# Patient Record
Sex: MALE | Race: WHITE | Employment: OTHER | URBAN - METROPOLITAN AREA
[De-identification: names, ages, dates, MRNs, and addresses within clinical notes are randomized per-mention and may not be internally consistent; named-entity substitution may affect disease eponyms.]

---

## 2017-02-03 PROBLEM — D40.10: Status: ACTIVE | Noted: 2017-02-03

## 2017-02-10 PROBLEM — C62.91 SEMINOMA OF RIGHT TESTIS, STAGE 1 (HCC): Status: ACTIVE | Noted: 2017-02-10

## 2017-11-26 PROBLEM — I60.9 SAH (SUBARACHNOID HEMORRHAGE) (HCC): Status: ACTIVE | Noted: 2017-11-26

## 2017-11-26 PROBLEM — S06.5XAA SUBDURAL HEMATOMA: Status: ACTIVE | Noted: 2017-11-26

## 2017-11-27 PROBLEM — F10.929 ALCOHOL INTOXICATION (HCC): Status: RESOLVED | Noted: 2017-11-27 | Resolved: 2017-11-27

## 2017-11-27 PROBLEM — J96.00 ACUTE RESPIRATORY FAILURE (HCC): Status: ACTIVE | Noted: 2017-11-27

## 2017-11-27 PROBLEM — F10.929 ALCOHOL INTOXICATION (HCC): Status: ACTIVE | Noted: 2017-11-27

## 2017-12-13 PROBLEM — I26.99 PULMONARY EMBOLUS (HCC): Status: ACTIVE | Noted: 2017-12-13

## 2018-01-08 PROBLEM — I95.9 HYPOTENSION: Status: ACTIVE | Noted: 2018-01-08

## 2018-01-08 PROBLEM — R13.10 DYSPHAGIA: Status: ACTIVE | Noted: 2018-01-08

## 2018-01-08 PROBLEM — J96.10 CHRONIC RESPIRATORY FAILURE (HCC): Status: ACTIVE | Noted: 2018-01-08

## 2018-01-08 PROBLEM — Z86.718 HISTORY OF DVT (DEEP VEIN THROMBOSIS): Status: ACTIVE | Noted: 2018-01-08

## 2018-01-08 PROBLEM — Z98.890 HISTORY OF CRANIOTOMY: Status: ACTIVE | Noted: 2018-01-08

## 2018-01-08 PROBLEM — G81.90 HEMIPLEGIA (HCC): Status: ACTIVE | Noted: 2018-01-08

## 2018-03-29 DIAGNOSIS — C62.91 SEMINOMA OF RIGHT TESTIS, STAGE 1 (HCC): Primary | ICD-10-CM

## 2018-04-05 ENCOUNTER — HOSPITAL ENCOUNTER (OUTPATIENT)
Dept: CT IMAGING | Age: 43
Discharge: HOME | End: 2018-04-05
Payer: COMMERCIAL

## 2018-04-05 DIAGNOSIS — C62.91 SEMINOMA OF RIGHT TESTIS, STAGE 1 (HCC): ICD-10-CM

## 2018-04-05 PROCEDURE — 74177 CT ABD & PELVIS W/CONTRAST: CPT

## 2018-04-05 PROCEDURE — 6360000004 HC RX CONTRAST MEDICATION: Performed by: RADIOLOGY

## 2018-04-05 RX ADMIN — IOPAMIDOL 80 ML: 755 INJECTION, SOLUTION INTRAVENOUS at 07:49

## 2018-04-09 ENCOUNTER — HOSPITAL ENCOUNTER (OUTPATIENT)
Dept: INFUSION THERAPY | Age: 43
Discharge: HOME | End: 2018-04-09
Payer: COMMERCIAL

## 2018-04-09 ENCOUNTER — TELEPHONE (OUTPATIENT)
Dept: INFUSION THERAPY | Age: 43
End: 2018-04-09

## 2018-04-09 ENCOUNTER — TELEPHONE (OUTPATIENT)
Dept: ONCOLOGY | Age: 43
End: 2018-04-09

## 2018-04-09 ENCOUNTER — OFFICE VISIT (OUTPATIENT)
Dept: ONCOLOGY | Age: 43
End: 2018-04-09
Payer: COMMERCIAL

## 2018-04-09 VITALS
HEART RATE: 62 BPM | WEIGHT: 197 LBS | RESPIRATION RATE: 20 BRPM | HEIGHT: 71 IN | BODY MASS INDEX: 27.58 KG/M2 | SYSTOLIC BLOOD PRESSURE: 95 MMHG | TEMPERATURE: 97.6 F | DIASTOLIC BLOOD PRESSURE: 67 MMHG

## 2018-04-09 DIAGNOSIS — D40.11 NEOPLASM OF UNCERTAIN BEHAVIOR OF RIGHT TESTIS: ICD-10-CM

## 2018-04-09 DIAGNOSIS — C62.91 SEMINOMA OF RIGHT TESTIS, STAGE 1 (HCC): ICD-10-CM

## 2018-04-09 DIAGNOSIS — D40.11 NEOPLASM OF UNCERTAIN BEHAVIOR OF RIGHT TESTIS: Primary | ICD-10-CM

## 2018-04-09 DIAGNOSIS — C62.91 SEMINOMA OF RIGHT TESTIS, STAGE 1 (HCC): Primary | ICD-10-CM

## 2018-04-09 LAB
ALBUMIN SERPL-MCNC: 4.1 G/DL (ref 3.5–5.2)
ALP BLD-CCNC: 82 U/L (ref 40–129)
ALT SERPL-CCNC: 13 U/L (ref 0–40)
ANION GAP SERPL CALCULATED.3IONS-SCNC: 13 MMOL/L (ref 7–16)
AST SERPL-CCNC: 11 U/L (ref 0–39)
BASOPHILS ABSOLUTE: 0.07 E9/L (ref 0–0.2)
BASOPHILS RELATIVE PERCENT: 1 % (ref 0–2)
BILIRUB SERPL-MCNC: 0.9 MG/DL (ref 0–1.2)
BUN BLDV-MCNC: 11 MG/DL (ref 6–20)
CALCIUM SERPL-MCNC: 9.5 MG/DL (ref 8.6–10.2)
CHLORIDE BLD-SCNC: 104 MMOL/L (ref 98–107)
CO2: 22 MMOL/L (ref 22–29)
CREAT SERPL-MCNC: 0.8 MG/DL (ref 0.7–1.2)
EOSINOPHILS ABSOLUTE: 0.12 E9/L (ref 0.05–0.5)
EOSINOPHILS RELATIVE PERCENT: 1.8 % (ref 0–6)
GFR AFRICAN AMERICAN: >60
GFR NON-AFRICAN AMERICAN: >60 ML/MIN/1.73
GLUCOSE BLD-MCNC: 127 MG/DL (ref 74–109)
HCT VFR BLD CALC: 44.7 % (ref 37–54)
HEMOGLOBIN: 14.9 G/DL (ref 12.5–16.5)
IMMATURE GRANULOCYTES #: 0.01 E9/L
IMMATURE GRANULOCYTES %: 0.1 % (ref 0–5)
LYMPHOCYTES ABSOLUTE: 1.86 E9/L (ref 1.5–4)
LYMPHOCYTES RELATIVE PERCENT: 27.9 % (ref 20–42)
MCH RBC QN AUTO: 27 PG (ref 26–35)
MCHC RBC AUTO-ENTMCNC: 33.3 % (ref 32–34.5)
MCV RBC AUTO: 81.1 FL (ref 80–99.9)
MONOCYTES ABSOLUTE: 0.51 E9/L (ref 0.1–0.95)
MONOCYTES RELATIVE PERCENT: 7.6 % (ref 2–12)
NEUTROPHILS ABSOLUTE: 4.1 E9/L (ref 1.8–7.3)
NEUTROPHILS RELATIVE PERCENT: 61.6 % (ref 43–80)
PDW BLD-RTO: 15.4 FL (ref 11.5–15)
PLATELET # BLD: 323 E9/L (ref 130–450)
PMV BLD AUTO: 8.9 FL (ref 7–12)
POTASSIUM SERPL-SCNC: 4.4 MMOL/L (ref 3.5–5)
RBC # BLD: 5.51 E12/L (ref 3.8–5.8)
SODIUM BLD-SCNC: 139 MMOL/L (ref 132–146)
TOTAL PROTEIN: 7.1 G/DL (ref 6.4–8.3)
TSH SERPL DL<=0.05 MIU/L-ACNC: 3.95 UIU/ML (ref 0.27–4.2)
WBC # BLD: 6.7 E9/L (ref 4.5–11.5)

## 2018-04-09 PROCEDURE — 80053 COMPREHEN METABOLIC PANEL: CPT

## 2018-04-09 PROCEDURE — 84443 ASSAY THYROID STIM HORMONE: CPT

## 2018-04-09 PROCEDURE — 82105 ALPHA-FETOPROTEIN SERUM: CPT

## 2018-04-09 PROCEDURE — 36415 COLL VENOUS BLD VENIPUNCTURE: CPT

## 2018-04-09 PROCEDURE — 84702 CHORIONIC GONADOTROPIN TEST: CPT

## 2018-04-09 PROCEDURE — 99212 OFFICE O/P EST SF 10 MIN: CPT

## 2018-04-09 PROCEDURE — 99213 OFFICE O/P EST LOW 20 MIN: CPT

## 2018-04-09 PROCEDURE — 85025 COMPLETE CBC W/AUTO DIFF WBC: CPT

## 2018-04-09 RX ORDER — LEVETIRACETAM 500 MG/1
500 TABLET ORAL DAILY
Refills: 5 | COMMUNITY
Start: 2018-03-20 | End: 2018-05-07 | Stop reason: ALTCHOICE

## 2018-04-09 RX ORDER — TRAZODONE HYDROCHLORIDE 100 MG/1
100 TABLET ORAL DAILY
Refills: 3 | COMMUNITY
Start: 2018-03-12 | End: 2018-04-09 | Stop reason: SDUPTHER

## 2018-04-09 NOTE — TELEPHONE ENCOUNTER
Called patient, spoke to spouse, to give appt information for CT chest scan. Patient is to arrive at [address] [place] [address] registration desk on 4/16/18 at 8:45 am, then will report to radiology afterward. Patient is to be NPO (nothing to eat or drink) beginning at 6:15 am.  Wife understood.

## 2018-04-10 DIAGNOSIS — C78.6 MALIGNANT NEOPLASM METASTATIC TO RETROPERITONEUM (HCC): ICD-10-CM

## 2018-04-10 DIAGNOSIS — C62.91 SEMINOMA OF RIGHT TESTIS, STAGE 1 (HCC): Primary | ICD-10-CM

## 2018-04-10 RX ORDER — PROCHLORPERAZINE MALEATE 10 MG
10 TABLET ORAL EVERY 6 HOURS PRN
Qty: 60 TABLET | Refills: 1 | Status: SHIPPED | OUTPATIENT
Start: 2018-04-10 | End: 2018-05-09

## 2018-04-10 RX ORDER — ONDANSETRON HYDROCHLORIDE 8 MG/1
8 TABLET, FILM COATED ORAL EVERY 12 HOURS PRN
Qty: 30 TABLET | Refills: 1 | Status: SHIPPED | OUTPATIENT
Start: 2018-04-10 | End: 2018-05-09

## 2018-04-11 LAB
AFP-TUMOR MARKER: 3 NG/ML (ref 0–9)
HCG TUMOR MARKER: 26 IU/L (ref 0–3)

## 2018-04-12 ENCOUNTER — HOSPITAL ENCOUNTER (OUTPATIENT)
Dept: INFUSION THERAPY | Age: 43
Discharge: HOME | End: 2018-04-12
Payer: COMMERCIAL

## 2018-04-12 PROCEDURE — 99215 OFFICE O/P EST HI 40 MIN: CPT

## 2018-04-12 NOTE — PROGRESS NOTES
Spoke with patient and family about chemotherapy  (Cisplatin/Etoposide). We went over the protocol to be used, what to expect as far as side effects, and when to call with problems. This was intended to reinforce the education done by Dr. Dominik Hogue . Patient had the opportunity to ask questions with all questions being answered to their satisfaction. I gave medication and dietary handouts to the patient to take home and told patient to call if there are any questions once they had a chance to absorb the information. The patient expresses understanding and acceptance of instructions.          Kasey Todd RP 4/12/2018 3:22 PM

## 2018-04-12 NOTE — PROGRESS NOTES
CHEMOTHERAPY TEACHING    Chemotherapy teaching performed today for patient and wife and mother. The teaching included:    1. Rationale for chemotherapy    2. Actions of chemotherapy    3. Actions of pre and/or post chemotherapy medications    4. Administration plan: approximate length of treatment and interval between doses. A.  Chemotherapeutic Agents:  CISplatin and Etoposide    5. Management of side effects:     A. Nausea and vomiting:  · Eat light the day of treatment  · Dietary alterations: no greasy or spicy foods. Stay away from favorites. B.  Alopecia:  · Obtain hairpiece prior to hair loss  · Alternatives to wigs  C. Bone Marrow Depression:  · Frequent CBC - Nick count (lab time prior to appointment with doctor)   D.  WBC:  · Function and recovery  · Precautionary measures when low (temperatures BID - avoid ill people/crowds)  E. Hemoglobin:  · Function and recovery  · Signs/symptoms if low  · Possibility of transfusion  F.  Platelets:  · Function and recovery  · Signs/symptoms if low    6. Mental status changes post chemotherapy:  A. Patient will need a  after 1st treatment (pre-meds)  B. Encourage family to stay with patient 24 hours post treatment. 7.  Sexuality and Reproduction:   A. Teratogenic effects of chemotherapy (prevent pregnancy during treatment                     and at least 2 months post therapy). B. Sperm banking (young males). 8.  Encourage patient to call clinic with questions. 9.  Copy of home going instructions         After answering the patient's questions, the patient received \"Chemotherapy and You\" booklet. A thermometer was given to the patient. Teaching lasted approx 80 minutes.       Diamond Delcid  4/12/2018

## 2018-04-13 ENCOUNTER — HOSPITAL ENCOUNTER (OUTPATIENT)
Dept: CT IMAGING | Age: 43
Discharge: HOME | End: 2018-04-13
Payer: COMMERCIAL

## 2018-04-13 DIAGNOSIS — C62.91 SEMINOMA OF RIGHT TESTIS, STAGE 1 (HCC): ICD-10-CM

## 2018-04-13 PROCEDURE — 6360000004 HC RX CONTRAST MEDICATION: Performed by: RADIOLOGY

## 2018-04-13 PROCEDURE — 71260 CT THORAX DX C+: CPT

## 2018-04-13 RX ADMIN — IOPAMIDOL 80 ML: 755 INJECTION, SOLUTION INTRAVENOUS at 09:09

## 2018-04-16 ENCOUNTER — HOSPITAL ENCOUNTER (OUTPATIENT)
Dept: INFUSION THERAPY | Age: 43
Discharge: HOME | End: 2018-04-16
Payer: COMMERCIAL

## 2018-04-16 ENCOUNTER — OFFICE VISIT (OUTPATIENT)
Dept: ONCOLOGY | Age: 43
End: 2018-04-16
Payer: COMMERCIAL

## 2018-04-16 VITALS — RESPIRATION RATE: 20 BRPM | DIASTOLIC BLOOD PRESSURE: 55 MMHG | SYSTOLIC BLOOD PRESSURE: 114 MMHG | HEART RATE: 70 BPM

## 2018-04-16 VITALS
HEIGHT: 71 IN | HEART RATE: 61 BPM | RESPIRATION RATE: 20 BRPM | SYSTOLIC BLOOD PRESSURE: 97 MMHG | TEMPERATURE: 97.9 F | WEIGHT: 201.9 LBS | BODY MASS INDEX: 28.27 KG/M2 | DIASTOLIC BLOOD PRESSURE: 64 MMHG

## 2018-04-16 DIAGNOSIS — C62.91 SEMINOMA OF RIGHT TESTIS, STAGE 1 (HCC): ICD-10-CM

## 2018-04-16 DIAGNOSIS — C62.91 SEMINOMA OF RIGHT TESTIS, STAGE 1 (HCC): Primary | ICD-10-CM

## 2018-04-16 DIAGNOSIS — D40.11 NEOPLASM OF UNCERTAIN BEHAVIOR OF RIGHT TESTIS: ICD-10-CM

## 2018-04-16 LAB
ALBUMIN SERPL-MCNC: 4.1 G/DL (ref 3.5–5.2)
ALP BLD-CCNC: 76 U/L (ref 40–129)
ALT SERPL-CCNC: 11 U/L (ref 0–40)
ANION GAP SERPL CALCULATED.3IONS-SCNC: 11 MMOL/L (ref 7–16)
AST SERPL-CCNC: 11 U/L (ref 0–39)
BASOPHILS ABSOLUTE: 0.1 E9/L (ref 0–0.2)
BASOPHILS RELATIVE PERCENT: 1.4 % (ref 0–2)
BILIRUB SERPL-MCNC: 0.5 MG/DL (ref 0–1.2)
BUN BLDV-MCNC: 12 MG/DL (ref 6–20)
CALCIUM SERPL-MCNC: 9.1 MG/DL (ref 8.6–10.2)
CHLORIDE BLD-SCNC: 105 MMOL/L (ref 98–107)
CO2: 22 MMOL/L (ref 22–29)
CREAT SERPL-MCNC: 1 MG/DL (ref 0.7–1.2)
EOSINOPHILS ABSOLUTE: 0.3 E9/L (ref 0.05–0.5)
EOSINOPHILS RELATIVE PERCENT: 4.3 % (ref 0–6)
GFR AFRICAN AMERICAN: >60
GFR NON-AFRICAN AMERICAN: >60 ML/MIN/1.73
GLUCOSE BLD-MCNC: 104 MG/DL (ref 74–109)
HCT VFR BLD CALC: 44.1 % (ref 37–54)
HEMOGLOBIN: 14.1 G/DL (ref 12.5–16.5)
IMMATURE GRANULOCYTES #: 0.02 E9/L
IMMATURE GRANULOCYTES %: 0.3 % (ref 0–5)
LACTATE DEHYDROGENASE: 195 U/L (ref 135–225)
LYMPHOCYTES ABSOLUTE: 2.16 E9/L (ref 1.5–4)
LYMPHOCYTES RELATIVE PERCENT: 31.2 % (ref 20–42)
MAGNESIUM: 2.1 MG/DL (ref 1.6–2.6)
MCH RBC QN AUTO: 26.7 PG (ref 26–35)
MCHC RBC AUTO-ENTMCNC: 32 % (ref 32–34.5)
MCV RBC AUTO: 83.5 FL (ref 80–99.9)
MONOCYTES ABSOLUTE: 0.66 E9/L (ref 0.1–0.95)
MONOCYTES RELATIVE PERCENT: 9.5 % (ref 2–12)
NEUTROPHILS ABSOLUTE: 3.69 E9/L (ref 1.8–7.3)
NEUTROPHILS RELATIVE PERCENT: 53.3 % (ref 43–80)
PDW BLD-RTO: 15.8 FL (ref 11.5–15)
PLATELET # BLD: 305 E9/L (ref 130–450)
PMV BLD AUTO: 8.5 FL (ref 7–12)
POTASSIUM SERPL-SCNC: 4.4 MMOL/L (ref 3.5–5)
RBC # BLD: 5.28 E12/L (ref 3.8–5.8)
SODIUM BLD-SCNC: 138 MMOL/L (ref 132–146)
TOTAL PROTEIN: 7 G/DL (ref 6.4–8.3)
WBC # BLD: 6.9 E9/L (ref 4.5–11.5)

## 2018-04-16 PROCEDURE — 96415 CHEMO IV INFUSION ADDL HR: CPT

## 2018-04-16 PROCEDURE — 96361 HYDRATE IV INFUSION ADD-ON: CPT

## 2018-04-16 PROCEDURE — 85025 COMPLETE CBC W/AUTO DIFF WBC: CPT

## 2018-04-16 PROCEDURE — 83735 ASSAY OF MAGNESIUM: CPT

## 2018-04-16 PROCEDURE — 96375 TX/PRO/DX INJ NEW DRUG ADDON: CPT

## 2018-04-16 PROCEDURE — 96417 CHEMO IV INFUS EACH ADDL SEQ: CPT

## 2018-04-16 PROCEDURE — 96413 CHEMO IV INFUSION 1 HR: CPT

## 2018-04-16 PROCEDURE — 80053 COMPREHEN METABOLIC PANEL: CPT

## 2018-04-16 PROCEDURE — 83615 LACTATE (LD) (LDH) ENZYME: CPT

## 2018-04-16 PROCEDURE — 2580000003 HC RX 258: Performed by: INTERNAL MEDICINE

## 2018-04-16 PROCEDURE — 6360000002 HC RX W HCPCS: Performed by: INTERNAL MEDICINE

## 2018-04-16 PROCEDURE — 36415 COLL VENOUS BLD VENIPUNCTURE: CPT

## 2018-04-16 RX ORDER — SODIUM CHLORIDE 9 MG/ML
INJECTION, SOLUTION INTRAVENOUS ONCE
Status: CANCELLED | OUTPATIENT
Start: 2018-04-18 | End: 2018-04-18

## 2018-04-16 RX ORDER — SODIUM CHLORIDE 9 MG/ML
INJECTION, SOLUTION INTRAVENOUS CONTINUOUS
Status: CANCELLED | OUTPATIENT
Start: 2018-04-20

## 2018-04-16 RX ORDER — SODIUM CHLORIDE 9 MG/ML
INJECTION, SOLUTION INTRAVENOUS CONTINUOUS
Status: CANCELLED | OUTPATIENT
Start: 2018-04-19

## 2018-04-16 RX ORDER — 0.9 % SODIUM CHLORIDE 0.9 %
10 VIAL (ML) INJECTION ONCE
Status: CANCELLED | OUTPATIENT
Start: 2018-04-20 | End: 2018-04-20

## 2018-04-16 RX ORDER — HEPARIN SODIUM (PORCINE) LOCK FLUSH IV SOLN 100 UNIT/ML 100 UNIT/ML
500 SOLUTION INTRAVENOUS PRN
Status: CANCELLED | OUTPATIENT
Start: 2018-04-20

## 2018-04-16 RX ORDER — 0.9 % SODIUM CHLORIDE 0.9 %
500 INTRAVENOUS SOLUTION INTRAVENOUS ONCE
Status: CANCELLED
Start: 2018-04-19 | End: 2018-04-19

## 2018-04-16 RX ORDER — DIPHENHYDRAMINE HYDROCHLORIDE 50 MG/ML
50 INJECTION INTRAMUSCULAR; INTRAVENOUS ONCE
Status: CANCELLED | OUTPATIENT
Start: 2018-04-20 | End: 2018-04-20

## 2018-04-16 RX ORDER — SODIUM CHLORIDE 9 MG/ML
INJECTION, SOLUTION INTRAVENOUS CONTINUOUS
Status: CANCELLED | OUTPATIENT
Start: 2018-04-17

## 2018-04-16 RX ORDER — METHYLPREDNISOLONE SODIUM SUCCINATE 125 MG/2ML
125 INJECTION, POWDER, LYOPHILIZED, FOR SOLUTION INTRAMUSCULAR; INTRAVENOUS ONCE
Status: CANCELLED | OUTPATIENT
Start: 2018-04-20 | End: 2018-04-20

## 2018-04-16 RX ORDER — DEXAMETHASONE SODIUM PHOSPHATE 10 MG/ML
10 INJECTION, SOLUTION INTRAMUSCULAR; INTRAVENOUS ONCE
Status: COMPLETED | OUTPATIENT
Start: 2018-04-16 | End: 2018-04-16

## 2018-04-16 RX ORDER — HEPARIN SODIUM (PORCINE) LOCK FLUSH IV SOLN 100 UNIT/ML 100 UNIT/ML
500 SOLUTION INTRAVENOUS PRN
Status: CANCELLED | OUTPATIENT
Start: 2018-04-16

## 2018-04-16 RX ORDER — PALONOSETRON 0.05 MG/ML
0.25 INJECTION, SOLUTION INTRAVENOUS ONCE
Status: CANCELLED | OUTPATIENT
Start: 2018-04-16

## 2018-04-16 RX ORDER — SODIUM CHLORIDE 9 MG/ML
INJECTION, SOLUTION INTRAVENOUS ONCE
Status: COMPLETED | OUTPATIENT
Start: 2018-04-16 | End: 2018-04-16

## 2018-04-16 RX ORDER — 0.9 % SODIUM CHLORIDE 0.9 %
500 INTRAVENOUS SOLUTION INTRAVENOUS ONCE
Status: CANCELLED
Start: 2018-04-20 | End: 2018-04-20

## 2018-04-16 RX ORDER — SODIUM CHLORIDE 0.9 % (FLUSH) 0.9 %
10 SYRINGE (ML) INJECTION PRN
Status: CANCELLED | OUTPATIENT
Start: 2018-04-19

## 2018-04-16 RX ORDER — SODIUM CHLORIDE 9 MG/ML
INJECTION, SOLUTION INTRAVENOUS ONCE
Status: CANCELLED | OUTPATIENT
Start: 2018-04-20 | End: 2018-04-20

## 2018-04-16 RX ORDER — SODIUM CHLORIDE 0.9 % (FLUSH) 0.9 %
10 SYRINGE (ML) INJECTION PRN
Status: CANCELLED | OUTPATIENT
Start: 2018-04-16

## 2018-04-16 RX ORDER — HEPARIN SODIUM (PORCINE) LOCK FLUSH IV SOLN 100 UNIT/ML 100 UNIT/ML
500 SOLUTION INTRAVENOUS PRN
Status: CANCELLED | OUTPATIENT
Start: 2018-04-19

## 2018-04-16 RX ORDER — 0.9 % SODIUM CHLORIDE 0.9 %
10 VIAL (ML) INJECTION ONCE
Status: CANCELLED | OUTPATIENT
Start: 2018-04-19 | End: 2018-04-19

## 2018-04-16 RX ORDER — 0.9 % SODIUM CHLORIDE 0.9 %
10 VIAL (ML) INJECTION ONCE
Status: CANCELLED | OUTPATIENT
Start: 2018-04-18 | End: 2018-04-18

## 2018-04-16 RX ORDER — SODIUM CHLORIDE 9 MG/ML
INJECTION, SOLUTION INTRAVENOUS ONCE
Status: CANCELLED | OUTPATIENT
Start: 2018-04-16 | End: 2018-04-16

## 2018-04-16 RX ORDER — SODIUM CHLORIDE 9 MG/ML
INJECTION, SOLUTION INTRAVENOUS CONTINUOUS
Status: CANCELLED | OUTPATIENT
Start: 2018-04-18

## 2018-04-16 RX ORDER — SODIUM CHLORIDE 9 MG/ML
INJECTION, SOLUTION INTRAVENOUS ONCE
Status: CANCELLED | OUTPATIENT
Start: 2018-04-17 | End: 2018-04-17

## 2018-04-16 RX ORDER — METHYLPREDNISOLONE SODIUM SUCCINATE 125 MG/2ML
125 INJECTION, POWDER, LYOPHILIZED, FOR SOLUTION INTRAMUSCULAR; INTRAVENOUS ONCE
Status: CANCELLED | OUTPATIENT
Start: 2018-04-16 | End: 2018-04-16

## 2018-04-16 RX ORDER — 0.9 % SODIUM CHLORIDE 0.9 %
10 VIAL (ML) INJECTION ONCE
Status: CANCELLED | OUTPATIENT
Start: 2018-04-17 | End: 2018-04-17

## 2018-04-16 RX ORDER — HEPARIN SODIUM (PORCINE) LOCK FLUSH IV SOLN 100 UNIT/ML 100 UNIT/ML
500 SOLUTION INTRAVENOUS PRN
Status: CANCELLED | OUTPATIENT
Start: 2018-04-17

## 2018-04-16 RX ORDER — 0.9 % SODIUM CHLORIDE 0.9 %
500 INTRAVENOUS SOLUTION INTRAVENOUS ONCE
Status: CANCELLED
Start: 2018-04-18 | End: 2018-04-18

## 2018-04-16 RX ORDER — 0.9 % SODIUM CHLORIDE 0.9 %
500 INTRAVENOUS SOLUTION INTRAVENOUS ONCE
Status: CANCELLED
Start: 2018-04-17 | End: 2018-04-17

## 2018-04-16 RX ORDER — DIPHENHYDRAMINE HYDROCHLORIDE 50 MG/ML
50 INJECTION INTRAMUSCULAR; INTRAVENOUS ONCE
Status: CANCELLED | OUTPATIENT
Start: 2018-04-17 | End: 2018-04-17

## 2018-04-16 RX ORDER — METHYLPREDNISOLONE SODIUM SUCCINATE 125 MG/2ML
125 INJECTION, POWDER, LYOPHILIZED, FOR SOLUTION INTRAMUSCULAR; INTRAVENOUS ONCE
Status: CANCELLED | OUTPATIENT
Start: 2018-04-18 | End: 2018-04-18

## 2018-04-16 RX ORDER — HEPARIN SODIUM (PORCINE) LOCK FLUSH IV SOLN 100 UNIT/ML 100 UNIT/ML
500 SOLUTION INTRAVENOUS PRN
Status: CANCELLED | OUTPATIENT
Start: 2018-04-18

## 2018-04-16 RX ORDER — PALONOSETRON 0.05 MG/ML
0.25 INJECTION, SOLUTION INTRAVENOUS ONCE
Status: CANCELLED
Start: 2018-04-19 | End: 2018-04-19

## 2018-04-16 RX ORDER — DIPHENHYDRAMINE HYDROCHLORIDE 50 MG/ML
50 INJECTION INTRAMUSCULAR; INTRAVENOUS ONCE
Status: CANCELLED | OUTPATIENT
Start: 2018-04-16 | End: 2018-04-16

## 2018-04-16 RX ORDER — SODIUM CHLORIDE 0.9 % (FLUSH) 0.9 %
10 SYRINGE (ML) INJECTION PRN
Status: CANCELLED | OUTPATIENT
Start: 2018-04-20

## 2018-04-16 RX ORDER — DIPHENHYDRAMINE HYDROCHLORIDE 50 MG/ML
50 INJECTION INTRAMUSCULAR; INTRAVENOUS ONCE
Status: CANCELLED | OUTPATIENT
Start: 2018-04-18 | End: 2018-04-18

## 2018-04-16 RX ORDER — METHYLPREDNISOLONE SODIUM SUCCINATE 125 MG/2ML
125 INJECTION, POWDER, LYOPHILIZED, FOR SOLUTION INTRAMUSCULAR; INTRAVENOUS ONCE
Status: CANCELLED | OUTPATIENT
Start: 2018-04-17 | End: 2018-04-17

## 2018-04-16 RX ORDER — 0.9 % SODIUM CHLORIDE 0.9 %
10 VIAL (ML) INJECTION ONCE
Status: CANCELLED | OUTPATIENT
Start: 2018-04-16 | End: 2018-04-16

## 2018-04-16 RX ORDER — 0.9 % SODIUM CHLORIDE 0.9 %
500 INTRAVENOUS SOLUTION INTRAVENOUS ONCE
Status: CANCELLED
Start: 2018-04-16 | End: 2018-04-16

## 2018-04-16 RX ORDER — SODIUM CHLORIDE 0.9 % (FLUSH) 0.9 %
10 SYRINGE (ML) INJECTION PRN
Status: CANCELLED | OUTPATIENT
Start: 2018-04-17

## 2018-04-16 RX ORDER — SODIUM CHLORIDE 9 MG/ML
INJECTION, SOLUTION INTRAVENOUS ONCE
Status: CANCELLED | OUTPATIENT
Start: 2018-04-19 | End: 2018-04-19

## 2018-04-16 RX ORDER — 0.9 % SODIUM CHLORIDE 0.9 %
500 INTRAVENOUS SOLUTION INTRAVENOUS ONCE
Status: COMPLETED | OUTPATIENT
Start: 2018-04-16 | End: 2018-04-16

## 2018-04-16 RX ORDER — METHYLPREDNISOLONE SODIUM SUCCINATE 125 MG/2ML
125 INJECTION, POWDER, LYOPHILIZED, FOR SOLUTION INTRAMUSCULAR; INTRAVENOUS ONCE
Status: CANCELLED | OUTPATIENT
Start: 2018-04-19 | End: 2018-04-19

## 2018-04-16 RX ORDER — SODIUM CHLORIDE 0.9 % (FLUSH) 0.9 %
10 SYRINGE (ML) INJECTION PRN
Status: CANCELLED | OUTPATIENT
Start: 2018-04-18

## 2018-04-16 RX ORDER — SODIUM CHLORIDE 9 MG/ML
INJECTION, SOLUTION INTRAVENOUS CONTINUOUS
Status: CANCELLED | OUTPATIENT
Start: 2018-04-16

## 2018-04-16 RX ORDER — PALONOSETRON 0.05 MG/ML
0.25 INJECTION, SOLUTION INTRAVENOUS ONCE
Status: COMPLETED | OUTPATIENT
Start: 2018-04-16 | End: 2018-04-16

## 2018-04-16 RX ORDER — DIPHENHYDRAMINE HYDROCHLORIDE 50 MG/ML
50 INJECTION INTRAMUSCULAR; INTRAVENOUS ONCE
Status: CANCELLED | OUTPATIENT
Start: 2018-04-19 | End: 2018-04-19

## 2018-04-16 RX ADMIN — SODIUM CHLORIDE 500 ML: 9 INJECTION, SOLUTION INTRAVENOUS at 09:33

## 2018-04-16 RX ADMIN — SODIUM CHLORIDE: 9 INJECTION, SOLUTION INTRAVENOUS at 10:54

## 2018-04-16 RX ADMIN — DEXAMETHASONE SODIUM PHOSPHATE 10 MG: 10 INJECTION, SOLUTION INTRAMUSCULAR; INTRAVENOUS at 09:36

## 2018-04-16 RX ADMIN — POTASSIUM CHLORIDE: 2 INJECTION, SOLUTION, CONCENTRATE INTRAVENOUS at 11:33

## 2018-04-16 RX ADMIN — SODIUM CHLORIDE 150 MG: 9 INJECTION, SOLUTION INTRAVENOUS at 10:55

## 2018-04-16 RX ADMIN — ETOPOSIDE 200 MG: 20 INJECTION INTRAVENOUS at 13:45

## 2018-04-16 RX ADMIN — PALONOSETRON HYDROCHLORIDE 0.25 MG: 0.25 INJECTION INTRAVENOUS at 09:34

## 2018-04-17 ENCOUNTER — HOSPITAL ENCOUNTER (OUTPATIENT)
Dept: INFUSION THERAPY | Age: 43
Discharge: HOME | End: 2018-04-17
Payer: COMMERCIAL

## 2018-04-17 VITALS
HEART RATE: 56 BPM | SYSTOLIC BLOOD PRESSURE: 114 MMHG | DIASTOLIC BLOOD PRESSURE: 72 MMHG | TEMPERATURE: 98.9 F | RESPIRATION RATE: 20 BRPM

## 2018-04-17 DIAGNOSIS — D40.11 NEOPLASM OF UNCERTAIN BEHAVIOR OF RIGHT TESTIS: ICD-10-CM

## 2018-04-17 PROCEDURE — 96361 HYDRATE IV INFUSION ADD-ON: CPT

## 2018-04-17 PROCEDURE — 96375 TX/PRO/DX INJ NEW DRUG ADDON: CPT

## 2018-04-17 PROCEDURE — 96417 CHEMO IV INFUS EACH ADDL SEQ: CPT

## 2018-04-17 PROCEDURE — 96413 CHEMO IV INFUSION 1 HR: CPT

## 2018-04-17 PROCEDURE — 2580000003 HC RX 258: Performed by: INTERNAL MEDICINE

## 2018-04-17 PROCEDURE — 6360000002 HC RX W HCPCS: Performed by: INTERNAL MEDICINE

## 2018-04-17 RX ORDER — DEXAMETHASONE SODIUM PHOSPHATE 10 MG/ML
8 INJECTION, SOLUTION INTRAMUSCULAR; INTRAVENOUS ONCE
Status: COMPLETED | OUTPATIENT
Start: 2018-04-17 | End: 2018-04-17

## 2018-04-17 RX ORDER — 0.9 % SODIUM CHLORIDE 0.9 %
500 INTRAVENOUS SOLUTION INTRAVENOUS ONCE
Status: COMPLETED | OUTPATIENT
Start: 2018-04-17 | End: 2018-04-17

## 2018-04-17 RX ORDER — SODIUM CHLORIDE 9 MG/ML
INJECTION, SOLUTION INTRAVENOUS ONCE
Status: COMPLETED | OUTPATIENT
Start: 2018-04-17 | End: 2018-04-17

## 2018-04-17 RX ADMIN — CISPLATIN: 100 INJECTION, SOLUTION INTRAVENOUS at 09:54

## 2018-04-17 RX ADMIN — SODIUM CHLORIDE: 9 INJECTION, SOLUTION INTRAVENOUS at 09:53

## 2018-04-17 RX ADMIN — ETOPOSIDE 200 MG: 20 INJECTION INTRAVENOUS at 11:04

## 2018-04-17 RX ADMIN — SODIUM CHLORIDE 500 ML: 9 INJECTION, SOLUTION INTRAVENOUS at 08:35

## 2018-04-17 RX ADMIN — DEXAMETHASONE SODIUM PHOSPHATE 8 MG: 10 INJECTION, SOLUTION INTRAMUSCULAR; INTRAVENOUS at 08:39

## 2018-04-18 ENCOUNTER — HOSPITAL ENCOUNTER (OUTPATIENT)
Dept: INFUSION THERAPY | Age: 43
Discharge: HOME | End: 2018-04-18
Payer: COMMERCIAL

## 2018-04-18 VITALS — SYSTOLIC BLOOD PRESSURE: 121 MMHG | HEART RATE: 50 BPM | DIASTOLIC BLOOD PRESSURE: 71 MMHG | RESPIRATION RATE: 18 BRPM

## 2018-04-18 DIAGNOSIS — D40.11 NEOPLASM OF UNCERTAIN BEHAVIOR OF RIGHT TESTIS: ICD-10-CM

## 2018-04-18 PROCEDURE — 96375 TX/PRO/DX INJ NEW DRUG ADDON: CPT

## 2018-04-18 PROCEDURE — 96360 HYDRATION IV INFUSION INIT: CPT

## 2018-04-18 PROCEDURE — 96413 CHEMO IV INFUSION 1 HR: CPT

## 2018-04-18 PROCEDURE — 6360000002 HC RX W HCPCS: Performed by: INTERNAL MEDICINE

## 2018-04-18 PROCEDURE — 2580000003 HC RX 258: Performed by: INTERNAL MEDICINE

## 2018-04-18 PROCEDURE — 96417 CHEMO IV INFUS EACH ADDL SEQ: CPT

## 2018-04-18 PROCEDURE — 96415 CHEMO IV INFUSION ADDL HR: CPT

## 2018-04-18 RX ORDER — SODIUM CHLORIDE 9 MG/ML
INJECTION, SOLUTION INTRAVENOUS ONCE
Status: COMPLETED | OUTPATIENT
Start: 2018-04-18 | End: 2018-04-18

## 2018-04-18 RX ORDER — DEXAMETHASONE SODIUM PHOSPHATE 10 MG/ML
8 INJECTION, SOLUTION INTRAMUSCULAR; INTRAVENOUS ONCE
Status: COMPLETED | OUTPATIENT
Start: 2018-04-18 | End: 2018-04-18

## 2018-04-18 RX ORDER — 0.9 % SODIUM CHLORIDE 0.9 %
500 INTRAVENOUS SOLUTION INTRAVENOUS ONCE
Status: COMPLETED | OUTPATIENT
Start: 2018-04-18 | End: 2018-04-18

## 2018-04-18 RX ADMIN — ETOPOSIDE 200 MG: 20 INJECTION, SOLUTION INTRAVENOUS at 10:56

## 2018-04-18 RX ADMIN — CISPLATIN: 100 INJECTION, SOLUTION INTRAVENOUS at 09:43

## 2018-04-18 RX ADMIN — DEXAMETHASONE SODIUM PHOSPHATE 8 MG: 10 INJECTION, SOLUTION INTRAMUSCULAR; INTRAVENOUS at 09:15

## 2018-04-18 RX ADMIN — SODIUM CHLORIDE: 9 INJECTION, SOLUTION INTRAVENOUS at 09:42

## 2018-04-18 RX ADMIN — SODIUM CHLORIDE 500 ML: 9 INJECTION, SOLUTION INTRAVENOUS at 08:18

## 2018-04-19 ENCOUNTER — HOSPITAL ENCOUNTER (OUTPATIENT)
Dept: INFUSION THERAPY | Age: 43
Discharge: HOME | End: 2018-04-19
Payer: COMMERCIAL

## 2018-04-19 VITALS
RESPIRATION RATE: 20 BRPM | HEART RATE: 50 BPM | TEMPERATURE: 98.8 F | SYSTOLIC BLOOD PRESSURE: 112 MMHG | DIASTOLIC BLOOD PRESSURE: 77 MMHG

## 2018-04-19 DIAGNOSIS — D40.11 NEOPLASM OF UNCERTAIN BEHAVIOR OF RIGHT TESTIS: ICD-10-CM

## 2018-04-19 PROCEDURE — 6360000002 HC RX W HCPCS: Performed by: INTERNAL MEDICINE

## 2018-04-19 PROCEDURE — 96361 HYDRATE IV INFUSION ADD-ON: CPT

## 2018-04-19 PROCEDURE — 2580000003 HC RX 258: Performed by: INTERNAL MEDICINE

## 2018-04-19 PROCEDURE — 96375 TX/PRO/DX INJ NEW DRUG ADDON: CPT

## 2018-04-19 PROCEDURE — 6370000000 HC RX 637 (ALT 250 FOR IP): Performed by: NURSE PRACTITIONER

## 2018-04-19 PROCEDURE — 96413 CHEMO IV INFUSION 1 HR: CPT

## 2018-04-19 PROCEDURE — 96417 CHEMO IV INFUS EACH ADDL SEQ: CPT

## 2018-04-19 RX ORDER — 0.9 % SODIUM CHLORIDE 0.9 %
500 INTRAVENOUS SOLUTION INTRAVENOUS ONCE
Status: COMPLETED | OUTPATIENT
Start: 2018-04-19 | End: 2018-04-19

## 2018-04-19 RX ORDER — SODIUM CHLORIDE 9 MG/ML
INJECTION, SOLUTION INTRAVENOUS ONCE
Status: COMPLETED | OUTPATIENT
Start: 2018-04-19 | End: 2018-04-19

## 2018-04-19 RX ORDER — PALONOSETRON 0.05 MG/ML
0.25 INJECTION, SOLUTION INTRAVENOUS ONCE
Status: COMPLETED | OUTPATIENT
Start: 2018-04-19 | End: 2018-04-19

## 2018-04-19 RX ORDER — LOPERAMIDE HYDROCHLORIDE 2 MG/1
4 CAPSULE ORAL ONCE
Status: COMPLETED | OUTPATIENT
Start: 2018-04-19 | End: 2018-04-19

## 2018-04-19 RX ORDER — DEXAMETHASONE SODIUM PHOSPHATE 10 MG/ML
8 INJECTION, SOLUTION INTRAMUSCULAR; INTRAVENOUS ONCE
Status: COMPLETED | OUTPATIENT
Start: 2018-04-19 | End: 2018-04-19

## 2018-04-19 RX ADMIN — LOPERAMIDE HYDROCHLORIDE 4 MG: 2 CAPSULE ORAL at 10:20

## 2018-04-19 RX ADMIN — DEXAMETHASONE SODIUM PHOSPHATE 8 MG: 10 INJECTION, SOLUTION INTRAMUSCULAR; INTRAVENOUS at 09:06

## 2018-04-19 RX ADMIN — CISPLATIN: 100 INJECTION, SOLUTION INTRAVENOUS at 10:30

## 2018-04-19 RX ADMIN — PALONOSETRON HYDROCHLORIDE 0.25 MG: 0.25 INJECTION INTRAVENOUS at 09:02

## 2018-04-19 RX ADMIN — ETOPOSIDE 200 MG: 20 INJECTION, SOLUTION INTRAVENOUS at 11:36

## 2018-04-19 RX ADMIN — SODIUM CHLORIDE: 9 INJECTION, SOLUTION INTRAVENOUS at 10:29

## 2018-04-19 RX ADMIN — SODIUM CHLORIDE 500 ML: 9 INJECTION, SOLUTION INTRAVENOUS at 09:01

## 2018-04-20 ENCOUNTER — HOSPITAL ENCOUNTER (OUTPATIENT)
Dept: INFUSION THERAPY | Age: 43
Discharge: HOME | End: 2018-04-20
Payer: COMMERCIAL

## 2018-04-20 VITALS
RESPIRATION RATE: 20 BRPM | SYSTOLIC BLOOD PRESSURE: 116 MMHG | DIASTOLIC BLOOD PRESSURE: 66 MMHG | TEMPERATURE: 97.7 F | HEART RATE: 51 BPM

## 2018-04-20 DIAGNOSIS — D40.11 NEOPLASM OF UNCERTAIN BEHAVIOR OF RIGHT TESTIS: ICD-10-CM

## 2018-04-20 PROCEDURE — 2580000003 HC RX 258: Performed by: INTERNAL MEDICINE

## 2018-04-20 PROCEDURE — 96417 CHEMO IV INFUS EACH ADDL SEQ: CPT

## 2018-04-20 PROCEDURE — 96413 CHEMO IV INFUSION 1 HR: CPT

## 2018-04-20 PROCEDURE — 6360000002 HC RX W HCPCS: Performed by: INTERNAL MEDICINE

## 2018-04-20 PROCEDURE — 96377 APPLICATON ON-BODY INJECTOR: CPT

## 2018-04-20 PROCEDURE — 96375 TX/PRO/DX INJ NEW DRUG ADDON: CPT

## 2018-04-20 RX ORDER — HEPARIN SODIUM (PORCINE) LOCK FLUSH IV SOLN 100 UNIT/ML 100 UNIT/ML
500 SOLUTION INTRAVENOUS PRN
Status: DISCONTINUED | OUTPATIENT
Start: 2018-04-20 | End: 2018-04-21 | Stop reason: HOSPADM

## 2018-04-20 RX ORDER — 0.9 % SODIUM CHLORIDE 0.9 %
500 INTRAVENOUS SOLUTION INTRAVENOUS ONCE
Status: COMPLETED | OUTPATIENT
Start: 2018-04-20 | End: 2018-04-20

## 2018-04-20 RX ORDER — SODIUM CHLORIDE 0.9 % (FLUSH) 0.9 %
10 SYRINGE (ML) INJECTION PRN
Status: DISCONTINUED | OUTPATIENT
Start: 2018-04-20 | End: 2018-04-21 | Stop reason: HOSPADM

## 2018-04-20 RX ORDER — SODIUM CHLORIDE 9 MG/ML
INJECTION, SOLUTION INTRAVENOUS ONCE
Status: DISCONTINUED | OUTPATIENT
Start: 2018-04-20 | End: 2018-04-21 | Stop reason: HOSPADM

## 2018-04-20 RX ORDER — DEXAMETHASONE SODIUM PHOSPHATE 10 MG/ML
8 INJECTION, SOLUTION INTRAMUSCULAR; INTRAVENOUS ONCE
Status: COMPLETED | OUTPATIENT
Start: 2018-04-20 | End: 2018-04-20

## 2018-04-20 RX ADMIN — DEXAMETHASONE SODIUM PHOSPHATE 8 MG: 10 INJECTION, SOLUTION INTRAMUSCULAR; INTRAVENOUS at 09:58

## 2018-04-20 RX ADMIN — ETOPOSIDE 200 MG: 20 INJECTION, SOLUTION INTRAVENOUS at 11:24

## 2018-04-20 RX ADMIN — PEGFILGRASTIM 6 MG: KIT SUBCUTANEOUS at 12:38

## 2018-04-20 RX ADMIN — SODIUM CHLORIDE: 9 INJECTION, SOLUTION INTRAVENOUS at 09:57

## 2018-04-20 RX ADMIN — CISPLATIN: 100 INJECTION, SOLUTION INTRAVENOUS at 10:08

## 2018-04-20 RX ADMIN — SODIUM CHLORIDE 500 ML: 9 INJECTION, SOLUTION INTRAVENOUS at 08:51

## 2018-04-20 NOTE — PROGRESS NOTES
The patients wife viewed the [facility] video and was given the opportunity to ask questions. The patient and his wife were given a copy of instructions for patient with [facility].

## 2018-04-23 ENCOUNTER — HOSPITAL ENCOUNTER (OUTPATIENT)
Dept: INFUSION THERAPY | Age: 43
Discharge: HOME | End: 2018-04-23
Payer: COMMERCIAL

## 2018-04-23 DIAGNOSIS — C62.91 SEMINOMA OF RIGHT TESTIS, STAGE 1 (HCC): ICD-10-CM

## 2018-04-23 LAB
ALBUMIN SERPL-MCNC: 4.1 G/DL (ref 3.5–5.2)
ALP BLD-CCNC: 70 U/L (ref 40–129)
ALT SERPL-CCNC: 14 U/L (ref 0–40)
ANION GAP SERPL CALCULATED.3IONS-SCNC: 15 MMOL/L (ref 7–16)
AST SERPL-CCNC: 10 U/L (ref 0–39)
BASOPHILS ABSOLUTE: 0 E9/L (ref 0–0.2)
BASOPHILS RELATIVE PERCENT: 0.4 % (ref 0–2)
BILIRUB SERPL-MCNC: 1.3 MG/DL (ref 0–1.2)
BUN BLDV-MCNC: 12 MG/DL (ref 6–20)
CALCIUM SERPL-MCNC: 8.9 MG/DL (ref 8.6–10.2)
CHLORIDE BLD-SCNC: 100 MMOL/L (ref 98–107)
CO2: 20 MMOL/L (ref 22–29)
CREAT SERPL-MCNC: 0.8 MG/DL (ref 0.7–1.2)
EOSINOPHILS ABSOLUTE: 0.24 E9/L (ref 0.05–0.5)
EOSINOPHILS RELATIVE PERCENT: 1 % (ref 0–6)
GFR AFRICAN AMERICAN: >60
GFR NON-AFRICAN AMERICAN: >60 ML/MIN/1.73
GLUCOSE BLD-MCNC: 136 MG/DL (ref 74–109)
HCT VFR BLD CALC: 42.7 % (ref 37–54)
HEMOGLOBIN: 14.4 G/DL (ref 12.5–16.5)
LYMPHOCYTES ABSOLUTE: 1.45 E9/L (ref 1.5–4)
LYMPHOCYTES RELATIVE PERCENT: 5.5 % (ref 20–42)
MCH RBC QN AUTO: 27.4 PG (ref 26–35)
MCHC RBC AUTO-ENTMCNC: 33.7 % (ref 32–34.5)
MCV RBC AUTO: 81.2 FL (ref 80–99.9)
MONOCYTES ABSOLUTE: 0 E9/L (ref 0.1–0.95)
MONOCYTES RELATIVE PERCENT: 0.2 % (ref 2–12)
NEUTROPHILS ABSOLUTE: 22.75 E9/L (ref 1.8–7.3)
NEUTROPHILS RELATIVE PERCENT: 93.5 % (ref 43–80)
OVALOCYTES: ABNORMAL
PDW BLD-RTO: 15.4 FL (ref 11.5–15)
PLATELET # BLD: 269 E9/L (ref 130–450)
PMV BLD AUTO: 8.9 FL (ref 7–12)
POIKILOCYTES: ABNORMAL
POTASSIUM SERPL-SCNC: 3.8 MMOL/L (ref 3.5–5)
RBC # BLD: 5.26 E12/L (ref 3.8–5.8)
SODIUM BLD-SCNC: 135 MMOL/L (ref 132–146)
TOTAL PROTEIN: 6.8 G/DL (ref 6.4–8.3)
WBC # BLD: 24.2 E9/L (ref 4.5–11.5)

## 2018-04-23 PROCEDURE — 80053 COMPREHEN METABOLIC PANEL: CPT

## 2018-04-23 PROCEDURE — 85025 COMPLETE CBC W/AUTO DIFF WBC: CPT

## 2018-04-23 PROCEDURE — 36415 COLL VENOUS BLD VENIPUNCTURE: CPT

## 2018-04-25 ENCOUNTER — HOSPITAL ENCOUNTER (OUTPATIENT)
Dept: INFUSION THERAPY | Age: 43
Discharge: HOME | End: 2018-04-25
Payer: COMMERCIAL

## 2018-04-25 ENCOUNTER — OFFICE VISIT (OUTPATIENT)
Dept: ONCOLOGY | Age: 43
End: 2018-04-25
Payer: COMMERCIAL

## 2018-04-25 VITALS
RESPIRATION RATE: 20 BRPM | SYSTOLIC BLOOD PRESSURE: 87 MMHG | WEIGHT: 192.9 LBS | TEMPERATURE: 97.5 F | HEIGHT: 71 IN | HEART RATE: 66 BPM | BODY MASS INDEX: 27.01 KG/M2 | DIASTOLIC BLOOD PRESSURE: 51 MMHG

## 2018-04-25 VITALS — SYSTOLIC BLOOD PRESSURE: 105 MMHG | DIASTOLIC BLOOD PRESSURE: 69 MMHG | HEART RATE: 62 BPM | RESPIRATION RATE: 20 BRPM

## 2018-04-25 DIAGNOSIS — C62.91 SEMINOMA OF RIGHT TESTIS, STAGE 1 (HCC): Primary | ICD-10-CM

## 2018-04-25 DIAGNOSIS — C62.91 SEMINOMA OF RIGHT TESTIS, STAGE 1 (HCC): ICD-10-CM

## 2018-04-25 PROCEDURE — 96360 HYDRATION IV INFUSION INIT: CPT

## 2018-04-25 PROCEDURE — 2580000003 HC RX 258: Performed by: NURSE PRACTITIONER

## 2018-04-25 PROCEDURE — 99212 OFFICE O/P EST SF 10 MIN: CPT

## 2018-04-25 RX ORDER — SODIUM CHLORIDE 0.9 % (FLUSH) 0.9 %
5 SYRINGE (ML) INJECTION PRN
Status: CANCELLED | OUTPATIENT
Start: 2018-04-25

## 2018-04-25 RX ORDER — 0.9 % SODIUM CHLORIDE 0.9 %
500 INTRAVENOUS SOLUTION INTRAVENOUS ONCE
Status: COMPLETED | OUTPATIENT
Start: 2018-04-25 | End: 2018-04-25

## 2018-04-25 RX ORDER — SODIUM CHLORIDE 0.9 % (FLUSH) 0.9 %
10 SYRINGE (ML) INJECTION PRN
Status: CANCELLED | OUTPATIENT
Start: 2018-04-25

## 2018-04-25 RX ORDER — HEPARIN SODIUM (PORCINE) LOCK FLUSH IV SOLN 100 UNIT/ML 100 UNIT/ML
500 SOLUTION INTRAVENOUS PRN
Status: CANCELLED | OUTPATIENT
Start: 2018-04-25

## 2018-04-25 RX ORDER — 0.9 % SODIUM CHLORIDE 0.9 %
500 INTRAVENOUS SOLUTION INTRAVENOUS ONCE
Status: CANCELLED | OUTPATIENT
Start: 2018-04-25 | End: 2018-04-25

## 2018-04-25 RX ADMIN — SODIUM CHLORIDE 500 ML: 9 INJECTION, SOLUTION INTRAVENOUS at 13:27

## 2018-05-07 ENCOUNTER — HOSPITAL ENCOUNTER (OUTPATIENT)
Dept: INFUSION THERAPY | Age: 43
Discharge: HOME | End: 2018-05-07
Payer: COMMERCIAL

## 2018-05-07 ENCOUNTER — OFFICE VISIT (OUTPATIENT)
Dept: ONCOLOGY | Age: 43
End: 2018-05-07
Payer: COMMERCIAL

## 2018-05-07 ENCOUNTER — HOSPITAL ENCOUNTER (OUTPATIENT)
Dept: INTERVENTIONAL RADIOLOGY/VASCULAR | Age: 43
Discharge: HOME | End: 2018-05-07
Payer: COMMERCIAL

## 2018-05-07 ENCOUNTER — HOSPITAL ENCOUNTER (OUTPATIENT)
Age: 43
Discharge: HOME | End: 2018-05-07
Payer: COMMERCIAL

## 2018-05-07 VITALS
DIASTOLIC BLOOD PRESSURE: 41 MMHG | HEART RATE: 72 BPM | SYSTOLIC BLOOD PRESSURE: 112 MMHG | BODY MASS INDEX: 26.98 KG/M2 | WEIGHT: 192.7 LBS | TEMPERATURE: 97.8 F | HEIGHT: 71 IN | RESPIRATION RATE: 20 BRPM

## 2018-05-07 VITALS — HEART RATE: 55 BPM | RESPIRATION RATE: 20 BRPM | SYSTOLIC BLOOD PRESSURE: 120 MMHG | DIASTOLIC BLOOD PRESSURE: 73 MMHG

## 2018-05-07 DIAGNOSIS — C62.91 SEMINOMA OF RIGHT TESTIS, STAGE 1 (HCC): ICD-10-CM

## 2018-05-07 DIAGNOSIS — D40.11 NEOPLASM OF UNCERTAIN BEHAVIOR OF RIGHT TESTIS: ICD-10-CM

## 2018-05-07 DIAGNOSIS — I87.8 POOR VENOUS ACCESS: ICD-10-CM

## 2018-05-07 DIAGNOSIS — C62.91 SEMINOMA OF RIGHT TESTIS, STAGE 1 (HCC): Primary | ICD-10-CM

## 2018-05-07 LAB
ALBUMIN SERPL-MCNC: 3.8 G/DL (ref 3.5–5.2)
ALP BLD-CCNC: 102 U/L (ref 40–129)
ALT SERPL-CCNC: 11 U/L (ref 0–40)
ANION GAP SERPL CALCULATED.3IONS-SCNC: 13 MMOL/L (ref 7–16)
AST SERPL-CCNC: 13 U/L (ref 0–39)
BASOPHILS ABSOLUTE: 0.09 E9/L (ref 0–0.2)
BASOPHILS RELATIVE PERCENT: 0.5 % (ref 0–2)
BILIRUB SERPL-MCNC: 0.2 MG/DL (ref 0–1.2)
BUN BLDV-MCNC: 6 MG/DL (ref 6–20)
CALCIUM SERPL-MCNC: 9.3 MG/DL (ref 8.6–10.2)
CHLORIDE BLD-SCNC: 103 MMOL/L (ref 98–107)
CO2: 25 MMOL/L (ref 22–29)
CREAT SERPL-MCNC: 0.9 MG/DL (ref 0.7–1.2)
EOSINOPHILS ABSOLUTE: 0.09 E9/L (ref 0.05–0.5)
EOSINOPHILS RELATIVE PERCENT: 0.5 % (ref 0–6)
GFR AFRICAN AMERICAN: >60
GFR NON-AFRICAN AMERICAN: >60 ML/MIN/1.73
GLUCOSE BLD-MCNC: 99 MG/DL (ref 74–109)
HCT VFR BLD CALC: 39.4 % (ref 37–54)
HEMOGLOBIN: 12.9 G/DL (ref 12.5–16.5)
INR BLD: 1.1
LYMPHOCYTES ABSOLUTE: 2.34 E9/L (ref 1.5–4)
LYMPHOCYTES RELATIVE PERCENT: 13 % (ref 20–42)
MAGNESIUM: 2 MG/DL (ref 1.6–2.6)
MCH RBC QN AUTO: 27.2 PG (ref 26–35)
MCHC RBC AUTO-ENTMCNC: 32.7 % (ref 32–34.5)
MCV RBC AUTO: 82.9 FL (ref 80–99.9)
METAMYELOCYTES RELATIVE PERCENT: 3.5 % (ref 0–1)
MONOCYTES ABSOLUTE: 0.72 E9/L (ref 0.1–0.95)
MONOCYTES RELATIVE PERCENT: 3.5 % (ref 2–12)
MYELOCYTE PERCENT: 2 % (ref 0–0)
NEUTROPHILS ABSOLUTE: 14.94 E9/L (ref 1.8–7.3)
NEUTROPHILS RELATIVE PERCENT: 77 % (ref 43–80)
PDW BLD-RTO: 16.3 FL (ref 11.5–15)
PLATELET # BLD: 378 E9/L (ref 130–450)
PMV BLD AUTO: 8.2 FL (ref 7–12)
POTASSIUM SERPL-SCNC: 4.4 MMOL/L (ref 3.5–5)
PROTHROMBIN TIME: 12.4 SEC (ref 9.3–12.4)
RBC # BLD: 4.75 E12/L (ref 3.8–5.8)
RBC # BLD: NORMAL 10*6/UL
SODIUM BLD-SCNC: 141 MMOL/L (ref 132–146)
TOTAL PROTEIN: 7 G/DL (ref 6.4–8.3)
WBC # BLD: 18 E9/L (ref 4.5–11.5)

## 2018-05-07 PROCEDURE — 77001 FLUOROGUIDE FOR VEIN DEVICE: CPT | Performed by: RADIOLOGY

## 2018-05-07 PROCEDURE — 96413 CHEMO IV INFUSION 1 HR: CPT

## 2018-05-07 PROCEDURE — 96417 CHEMO IV INFUS EACH ADDL SEQ: CPT

## 2018-05-07 PROCEDURE — 96361 HYDRATE IV INFUSION ADD-ON: CPT

## 2018-05-07 PROCEDURE — 2580000003 HC RX 258: Performed by: INTERNAL MEDICINE

## 2018-05-07 PROCEDURE — 80053 COMPREHEN METABOLIC PANEL: CPT

## 2018-05-07 PROCEDURE — 76937 US GUIDE VASCULAR ACCESS: CPT | Performed by: RADIOLOGY

## 2018-05-07 PROCEDURE — 85610 PROTHROMBIN TIME: CPT

## 2018-05-07 PROCEDURE — 6360000002 HC RX W HCPCS: Performed by: INTERNAL MEDICINE

## 2018-05-07 PROCEDURE — 36569 INSJ PICC 5 YR+ W/O IMAGING: CPT | Performed by: RADIOLOGY

## 2018-05-07 PROCEDURE — 85025 COMPLETE CBC W/AUTO DIFF WBC: CPT

## 2018-05-07 PROCEDURE — 96415 CHEMO IV INFUSION ADDL HR: CPT

## 2018-05-07 PROCEDURE — 36415 COLL VENOUS BLD VENIPUNCTURE: CPT | Performed by: INTERNAL MEDICINE

## 2018-05-07 PROCEDURE — 96375 TX/PRO/DX INJ NEW DRUG ADDON: CPT

## 2018-05-07 PROCEDURE — C1751 CATH, INF, PER/CENT/MIDLINE: HCPCS

## 2018-05-07 PROCEDURE — 96366 THER/PROPH/DIAG IV INF ADDON: CPT

## 2018-05-07 PROCEDURE — 83735 ASSAY OF MAGNESIUM: CPT

## 2018-05-07 RX ORDER — HEPARIN SODIUM (PORCINE) LOCK FLUSH IV SOLN 100 UNIT/ML 100 UNIT/ML
500 SOLUTION INTRAVENOUS PRN
Status: CANCELLED | OUTPATIENT
Start: 2018-05-10

## 2018-05-07 RX ORDER — SODIUM CHLORIDE 0.9 % (FLUSH) 0.9 %
10 SYRINGE (ML) INJECTION PRN
Status: CANCELLED | OUTPATIENT
Start: 2018-05-11

## 2018-05-07 RX ORDER — 0.9 % SODIUM CHLORIDE 0.9 %
500 INTRAVENOUS SOLUTION INTRAVENOUS ONCE
Status: CANCELLED
Start: 2018-05-09 | End: 2018-05-09

## 2018-05-07 RX ORDER — 0.9 % SODIUM CHLORIDE 0.9 %
500 INTRAVENOUS SOLUTION INTRAVENOUS ONCE
Status: COMPLETED | OUTPATIENT
Start: 2018-05-07 | End: 2018-05-07

## 2018-05-07 RX ORDER — SODIUM CHLORIDE 9 MG/ML
INJECTION, SOLUTION INTRAVENOUS ONCE
Status: COMPLETED | OUTPATIENT
Start: 2018-05-07 | End: 2018-05-07

## 2018-05-07 RX ORDER — SODIUM CHLORIDE 9 MG/ML
INJECTION, SOLUTION INTRAVENOUS ONCE
Status: CANCELLED | OUTPATIENT
Start: 2018-05-10 | End: 2018-05-10

## 2018-05-07 RX ORDER — LACOSAMIDE 150 MG/1
150 TABLET, FILM COATED ORAL DAILY
Status: ON HOLD | COMMUNITY
Start: 2018-05-05 | End: 2018-06-14 | Stop reason: HOSPADM

## 2018-05-07 RX ORDER — DIPHENHYDRAMINE HYDROCHLORIDE 50 MG/ML
50 INJECTION INTRAMUSCULAR; INTRAVENOUS ONCE
Status: CANCELLED | OUTPATIENT
Start: 2018-05-08 | End: 2018-05-08

## 2018-05-07 RX ORDER — SODIUM CHLORIDE 9 MG/ML
INJECTION, SOLUTION INTRAVENOUS CONTINUOUS
Status: CANCELLED | OUTPATIENT
Start: 2018-05-10

## 2018-05-07 RX ORDER — SODIUM CHLORIDE 9 MG/ML
INJECTION, SOLUTION INTRAVENOUS CONTINUOUS
Status: CANCELLED | OUTPATIENT
Start: 2018-05-08

## 2018-05-07 RX ORDER — HEPARIN SODIUM (PORCINE) LOCK FLUSH IV SOLN 100 UNIT/ML 100 UNIT/ML
500 SOLUTION INTRAVENOUS PRN
Status: CANCELLED | OUTPATIENT
Start: 2018-05-08

## 2018-05-07 RX ORDER — DEXAMETHASONE SODIUM PHOSPHATE 10 MG/ML
10 INJECTION, SOLUTION INTRAMUSCULAR; INTRAVENOUS ONCE
Status: COMPLETED | OUTPATIENT
Start: 2018-05-07 | End: 2018-05-07

## 2018-05-07 RX ORDER — SODIUM CHLORIDE 0.9 % (FLUSH) 0.9 %
10 SYRINGE (ML) INJECTION PRN
Status: CANCELLED | OUTPATIENT
Start: 2018-05-09

## 2018-05-07 RX ORDER — SODIUM CHLORIDE 0.9 % (FLUSH) 0.9 %
10 SYRINGE (ML) INJECTION PRN
Status: CANCELLED | OUTPATIENT
Start: 2018-05-07

## 2018-05-07 RX ORDER — DIPHENHYDRAMINE HYDROCHLORIDE 50 MG/ML
50 INJECTION INTRAMUSCULAR; INTRAVENOUS ONCE
Status: CANCELLED | OUTPATIENT
Start: 2018-05-09 | End: 2018-05-09

## 2018-05-07 RX ORDER — METHYLPREDNISOLONE SODIUM SUCCINATE 125 MG/2ML
125 INJECTION, POWDER, LYOPHILIZED, FOR SOLUTION INTRAMUSCULAR; INTRAVENOUS ONCE
Status: CANCELLED | OUTPATIENT
Start: 2018-05-11 | End: 2018-05-11

## 2018-05-07 RX ORDER — METHYLPREDNISOLONE SODIUM SUCCINATE 125 MG/2ML
125 INJECTION, POWDER, LYOPHILIZED, FOR SOLUTION INTRAMUSCULAR; INTRAVENOUS ONCE
Status: CANCELLED | OUTPATIENT
Start: 2018-05-09 | End: 2018-05-09

## 2018-05-07 RX ORDER — HEPARIN SODIUM (PORCINE) LOCK FLUSH IV SOLN 100 UNIT/ML 100 UNIT/ML
500 SOLUTION INTRAVENOUS PRN
Status: CANCELLED | OUTPATIENT
Start: 2018-05-09

## 2018-05-07 RX ORDER — PHENYTOIN SODIUM 100 MG/1
100 CAPSULE, EXTENDED RELEASE ORAL 3 TIMES DAILY
Status: ON HOLD | COMMUNITY
Start: 2018-05-05 | End: 2019-06-01

## 2018-05-07 RX ORDER — PALONOSETRON 0.05 MG/ML
0.25 INJECTION, SOLUTION INTRAVENOUS ONCE
Status: COMPLETED | OUTPATIENT
Start: 2018-05-07 | End: 2018-05-07

## 2018-05-07 RX ORDER — DIPHENHYDRAMINE HYDROCHLORIDE 50 MG/ML
50 INJECTION INTRAMUSCULAR; INTRAVENOUS ONCE
Status: CANCELLED | OUTPATIENT
Start: 2018-05-07 | End: 2018-05-07

## 2018-05-07 RX ORDER — 0.9 % SODIUM CHLORIDE 0.9 %
500 INTRAVENOUS SOLUTION INTRAVENOUS ONCE
Status: CANCELLED
Start: 2018-05-11 | End: 2018-05-11

## 2018-05-07 RX ORDER — 0.9 % SODIUM CHLORIDE 0.9 %
10 VIAL (ML) INJECTION ONCE
Status: CANCELLED | OUTPATIENT
Start: 2018-05-08 | End: 2018-05-08

## 2018-05-07 RX ORDER — METHYLPREDNISOLONE SODIUM SUCCINATE 125 MG/2ML
125 INJECTION, POWDER, LYOPHILIZED, FOR SOLUTION INTRAMUSCULAR; INTRAVENOUS ONCE
Status: CANCELLED | OUTPATIENT
Start: 2018-05-10 | End: 2018-05-10

## 2018-05-07 RX ORDER — METHYLPREDNISOLONE SODIUM SUCCINATE 125 MG/2ML
125 INJECTION, POWDER, LYOPHILIZED, FOR SOLUTION INTRAMUSCULAR; INTRAVENOUS ONCE
Status: CANCELLED | OUTPATIENT
Start: 2018-05-07 | End: 2018-05-07

## 2018-05-07 RX ORDER — 0.9 % SODIUM CHLORIDE 0.9 %
500 INTRAVENOUS SOLUTION INTRAVENOUS ONCE
Status: CANCELLED
Start: 2018-05-07 | End: 2018-05-07

## 2018-05-07 RX ORDER — 0.9 % SODIUM CHLORIDE 0.9 %
500 INTRAVENOUS SOLUTION INTRAVENOUS ONCE
Status: CANCELLED
Start: 2018-05-10 | End: 2018-05-10

## 2018-05-07 RX ORDER — HEPARIN SODIUM (PORCINE) LOCK FLUSH IV SOLN 100 UNIT/ML 100 UNIT/ML
500 SOLUTION INTRAVENOUS PRN
Status: CANCELLED | OUTPATIENT
Start: 2018-05-11

## 2018-05-07 RX ORDER — 0.9 % SODIUM CHLORIDE 0.9 %
10 VIAL (ML) INJECTION ONCE
Status: CANCELLED | OUTPATIENT
Start: 2018-05-07 | End: 2018-05-07

## 2018-05-07 RX ORDER — 0.9 % SODIUM CHLORIDE 0.9 %
500 INTRAVENOUS SOLUTION INTRAVENOUS ONCE
Status: CANCELLED
Start: 2018-05-08 | End: 2018-05-08

## 2018-05-07 RX ORDER — LORAZEPAM 0.5 MG/1
0.5 TABLET ORAL DAILY
COMMUNITY
Start: 2018-05-05 | End: 2018-05-09

## 2018-05-07 RX ORDER — DIPHENHYDRAMINE HYDROCHLORIDE 50 MG/ML
50 INJECTION INTRAMUSCULAR; INTRAVENOUS ONCE
Status: CANCELLED | OUTPATIENT
Start: 2018-05-11 | End: 2018-05-11

## 2018-05-07 RX ORDER — SODIUM CHLORIDE 0.9 % (FLUSH) 0.9 %
10 SYRINGE (ML) INJECTION PRN
Status: CANCELLED | OUTPATIENT
Start: 2018-05-08

## 2018-05-07 RX ORDER — SODIUM CHLORIDE 9 MG/ML
INJECTION, SOLUTION INTRAVENOUS CONTINUOUS
Status: CANCELLED | OUTPATIENT
Start: 2018-05-11

## 2018-05-07 RX ORDER — METHYLPREDNISOLONE SODIUM SUCCINATE 125 MG/2ML
125 INJECTION, POWDER, LYOPHILIZED, FOR SOLUTION INTRAMUSCULAR; INTRAVENOUS ONCE
Status: CANCELLED | OUTPATIENT
Start: 2018-05-08 | End: 2018-05-08

## 2018-05-07 RX ORDER — SODIUM CHLORIDE 9 MG/ML
INJECTION, SOLUTION INTRAVENOUS ONCE
Status: CANCELLED | OUTPATIENT
Start: 2018-05-08 | End: 2018-05-08

## 2018-05-07 RX ORDER — DIPHENHYDRAMINE HYDROCHLORIDE 50 MG/ML
50 INJECTION INTRAMUSCULAR; INTRAVENOUS ONCE
Status: CANCELLED | OUTPATIENT
Start: 2018-05-10 | End: 2018-05-10

## 2018-05-07 RX ORDER — PALONOSETRON 0.05 MG/ML
0.25 INJECTION, SOLUTION INTRAVENOUS ONCE
Status: CANCELLED
Start: 2018-05-10 | End: 2018-05-10

## 2018-05-07 RX ORDER — 0.9 % SODIUM CHLORIDE 0.9 %
10 VIAL (ML) INJECTION ONCE
Status: CANCELLED | OUTPATIENT
Start: 2018-05-09 | End: 2018-05-09

## 2018-05-07 RX ORDER — SODIUM CHLORIDE 0.9 % (FLUSH) 0.9 %
10 SYRINGE (ML) INJECTION PRN
Status: CANCELLED | OUTPATIENT
Start: 2018-05-10

## 2018-05-07 RX ORDER — SODIUM CHLORIDE 9 MG/ML
INJECTION, SOLUTION INTRAVENOUS ONCE
Status: CANCELLED | OUTPATIENT
Start: 2018-05-11 | End: 2018-05-11

## 2018-05-07 RX ORDER — SODIUM CHLORIDE 9 MG/ML
INJECTION, SOLUTION INTRAVENOUS ONCE
Status: CANCELLED | OUTPATIENT
Start: 2018-05-07 | End: 2018-05-07

## 2018-05-07 RX ORDER — LEVETIRACETAM 750 MG/1
750 TABLET ORAL 2 TIMES DAILY
Status: ON HOLD | COMMUNITY
Start: 2018-05-05 | End: 2018-06-14 | Stop reason: HOSPADM

## 2018-05-07 RX ORDER — SODIUM CHLORIDE 9 MG/ML
INJECTION, SOLUTION INTRAVENOUS ONCE
Status: CANCELLED | OUTPATIENT
Start: 2018-05-09 | End: 2018-05-09

## 2018-05-07 RX ORDER — SODIUM CHLORIDE 9 MG/ML
INJECTION, SOLUTION INTRAVENOUS CONTINUOUS
Status: CANCELLED | OUTPATIENT
Start: 2018-05-07

## 2018-05-07 RX ORDER — 0.9 % SODIUM CHLORIDE 0.9 %
10 VIAL (ML) INJECTION ONCE
Status: CANCELLED | OUTPATIENT
Start: 2018-05-10 | End: 2018-05-10

## 2018-05-07 RX ORDER — 0.9 % SODIUM CHLORIDE 0.9 %
10 VIAL (ML) INJECTION ONCE
Status: CANCELLED | OUTPATIENT
Start: 2018-05-11 | End: 2018-05-11

## 2018-05-07 RX ORDER — HEPARIN SODIUM (PORCINE) LOCK FLUSH IV SOLN 100 UNIT/ML 100 UNIT/ML
500 SOLUTION INTRAVENOUS PRN
Status: CANCELLED | OUTPATIENT
Start: 2018-05-07

## 2018-05-07 RX ORDER — PALONOSETRON 0.05 MG/ML
0.25 INJECTION, SOLUTION INTRAVENOUS ONCE
Status: CANCELLED | OUTPATIENT
Start: 2018-05-07

## 2018-05-07 RX ORDER — SODIUM CHLORIDE 9 MG/ML
INJECTION, SOLUTION INTRAVENOUS CONTINUOUS
Status: CANCELLED | OUTPATIENT
Start: 2018-05-09

## 2018-05-07 RX ADMIN — DEXAMETHASONE SODIUM PHOSPHATE 10 MG: 10 INJECTION, SOLUTION INTRAMUSCULAR; INTRAVENOUS at 14:17

## 2018-05-07 RX ADMIN — SODIUM CHLORIDE: 9 INJECTION, SOLUTION INTRAVENOUS at 14:15

## 2018-05-07 RX ADMIN — SODIUM CHLORIDE 500 ML: 9 INJECTION, SOLUTION INTRAVENOUS at 14:14

## 2018-05-07 RX ADMIN — PALONOSETRON 0.25 MG: 0.05 INJECTION, SOLUTION INTRAVENOUS at 14:15

## 2018-05-07 RX ADMIN — POTASSIUM CHLORIDE: 2 INJECTION, SOLUTION, CONCENTRATE INTRAVENOUS at 15:03

## 2018-05-07 RX ADMIN — SODIUM CHLORIDE 150 MG: 0.9 INJECTION, SOLUTION INTRAVENOUS at 14:34

## 2018-05-07 RX ADMIN — ETOPOSIDE 200 MG: 20 INJECTION INTRAVENOUS at 17:14

## 2018-05-07 NOTE — PROGRESS NOTES
Two failed attempts at venipuncture, followed by a successful attempt that the patient stated was painful while the line was being flushed therefore necessiting its removal, followed by a successful attempt into the right antecubital that clotted off shortly after insert and before the treatment was started. A stat pt/inr is drawn from a separate sight and the patient is scheduled for a PICC line at [facility] at 1300.

## 2018-05-07 NOTE — PROGRESS NOTES
A conversation was had with the patient about the possibility of having a port inserted due to the declining condition of his peripheral veins. Pt receptive to the idea. NP, Alec Hammans, to put an order in for a surgical consult.

## 2018-05-08 ENCOUNTER — HOSPITAL ENCOUNTER (OUTPATIENT)
Dept: INFUSION THERAPY | Age: 43
Discharge: HOME | End: 2018-05-08
Payer: COMMERCIAL

## 2018-05-08 VITALS
HEART RATE: 64 BPM | BODY MASS INDEX: 26.88 KG/M2 | TEMPERATURE: 97.2 F | HEIGHT: 71 IN | RESPIRATION RATE: 20 BRPM | WEIGHT: 192 LBS | DIASTOLIC BLOOD PRESSURE: 67 MMHG | SYSTOLIC BLOOD PRESSURE: 103 MMHG

## 2018-05-08 DIAGNOSIS — D40.11 NEOPLASM OF UNCERTAIN BEHAVIOR OF RIGHT TESTIS: ICD-10-CM

## 2018-05-08 PROCEDURE — 96375 TX/PRO/DX INJ NEW DRUG ADDON: CPT

## 2018-05-08 PROCEDURE — 96413 CHEMO IV INFUSION 1 HR: CPT

## 2018-05-08 PROCEDURE — 2580000003 HC RX 258: Performed by: INTERNAL MEDICINE

## 2018-05-08 PROCEDURE — 96417 CHEMO IV INFUS EACH ADDL SEQ: CPT

## 2018-05-08 PROCEDURE — 96361 HYDRATE IV INFUSION ADD-ON: CPT

## 2018-05-08 PROCEDURE — 6360000002 HC RX W HCPCS: Performed by: INTERNAL MEDICINE

## 2018-05-08 RX ORDER — SODIUM CHLORIDE 0.9 % (FLUSH) 0.9 %
10 SYRINGE (ML) INJECTION PRN
Status: DISCONTINUED | OUTPATIENT
Start: 2018-05-08 | End: 2018-05-09 | Stop reason: HOSPADM

## 2018-05-08 RX ORDER — HEPARIN SODIUM (PORCINE) LOCK FLUSH IV SOLN 100 UNIT/ML 100 UNIT/ML
500 SOLUTION INTRAVENOUS PRN
Status: DISCONTINUED | OUTPATIENT
Start: 2018-05-08 | End: 2018-05-09 | Stop reason: HOSPADM

## 2018-05-08 RX ORDER — SODIUM CHLORIDE 9 MG/ML
INJECTION, SOLUTION INTRAVENOUS ONCE
Status: COMPLETED | OUTPATIENT
Start: 2018-05-08 | End: 2018-05-08

## 2018-05-08 RX ORDER — 0.9 % SODIUM CHLORIDE 0.9 %
500 INTRAVENOUS SOLUTION INTRAVENOUS ONCE
Status: COMPLETED | OUTPATIENT
Start: 2018-05-08 | End: 2018-05-08

## 2018-05-08 RX ORDER — DEXAMETHASONE SODIUM PHOSPHATE 10 MG/ML
8 INJECTION, SOLUTION INTRAMUSCULAR; INTRAVENOUS ONCE
Status: COMPLETED | OUTPATIENT
Start: 2018-05-08 | End: 2018-05-08

## 2018-05-08 RX ADMIN — CISPLATIN: 100 INJECTION, SOLUTION INTRAVENOUS at 12:53

## 2018-05-08 RX ADMIN — Medication 500 UNITS: at 15:27

## 2018-05-08 RX ADMIN — ETOPOSIDE 200 MG: 20 INJECTION, SOLUTION INTRAVENOUS at 14:20

## 2018-05-08 RX ADMIN — Medication 10 ML: at 15:28

## 2018-05-08 RX ADMIN — DEXAMETHASONE SODIUM PHOSPHATE 8 MG: 10 INJECTION, SOLUTION INTRAMUSCULAR; INTRAVENOUS at 11:59

## 2018-05-08 RX ADMIN — SODIUM CHLORIDE 500 ML: 9 INJECTION, SOLUTION INTRAVENOUS at 11:58

## 2018-05-08 RX ADMIN — SODIUM CHLORIDE: 9 INJECTION, SOLUTION INTRAVENOUS at 11:59

## 2018-05-08 NOTE — PROGRESS NOTES
Late entry 05/07/2018: Met with patient and his wife during his cycle 2 day 1 Cisplatin and Etoposide treatment for his testicular cancer. Introduced nurse navigator role, gave contact information and informed of other supportive services in clinic: , nutrition, ACS patient navigator and financial navigator. Patient denied issues with living arrangements, transportation, insurance and prescription coverage. He had his clinic calendar and verbalized understanding that he's to return to clinic 05/08-05/11/2018 for chemo days 2-5, then 05/21/2018 for his юлия and to see Dr. Para Lanes. His chemotherapy is now being administered through a PICC line. Patient reported he tolerated his last chemo treatment well, took Compazine throughout the 1st week as needed, so was able to eat, his weight is stable. Encouraged to drink plenty of fluids to keep hydrated, especially as the weather gets warmer. Patient stated he's napping every day and tries to stay as independent as he's able to. He's not driving at this time and can't until he's been seizure free for 6 months. His wife said she thinks he had one last Wednesday, therefore she's driving, but has family members helping her as needed. Discussed and gave the following information: ACS Testicular Cancer with emphasis on Germ Cell information, ACS Managing Fatigue, ACS Taking Care of Myself During Chemotherapy, [address] [place] [address] brochure, Look Good Feel Better information, local resources for survivors and [facility]. Patient denied any needs today and encouraged him to call should any arise, he verbalized understanding. Omari Razo RN, ADN, BSE, OCN Patient Nurse Navigator

## 2018-05-09 ENCOUNTER — HOSPITAL ENCOUNTER (OUTPATIENT)
Dept: INFUSION THERAPY | Age: 43
Discharge: HOME | End: 2018-05-09
Payer: COMMERCIAL

## 2018-05-09 ENCOUNTER — TELEPHONE (OUTPATIENT)
Dept: SURGERY | Age: 43
End: 2018-05-09

## 2018-05-09 ENCOUNTER — INITIAL CONSULT (OUTPATIENT)
Dept: SURGERY | Age: 43
End: 2018-05-09
Payer: COMMERCIAL

## 2018-05-09 VITALS
HEART RATE: 76 BPM | WEIGHT: 198 LBS | DIASTOLIC BLOOD PRESSURE: 74 MMHG | HEIGHT: 71 IN | SYSTOLIC BLOOD PRESSURE: 108 MMHG | OXYGEN SATURATION: 97 % | BODY MASS INDEX: 27.72 KG/M2

## 2018-05-09 VITALS — SYSTOLIC BLOOD PRESSURE: 120 MMHG | HEART RATE: 56 BPM | RESPIRATION RATE: 20 BRPM | DIASTOLIC BLOOD PRESSURE: 80 MMHG

## 2018-05-09 DIAGNOSIS — I87.8 POOR VENOUS ACCESS: Primary | ICD-10-CM

## 2018-05-09 DIAGNOSIS — D40.11 NEOPLASM OF UNCERTAIN BEHAVIOR OF RIGHT TESTIS: ICD-10-CM

## 2018-05-09 PROCEDURE — 6360000002 HC RX W HCPCS: Performed by: INTERNAL MEDICINE

## 2018-05-09 PROCEDURE — 2580000003 HC RX 258: Performed by: INTERNAL MEDICINE

## 2018-05-09 PROCEDURE — 99214 OFFICE O/P EST MOD 30 MIN: CPT | Performed by: SURGERY

## 2018-05-09 PROCEDURE — 96361 HYDRATE IV INFUSION ADD-ON: CPT

## 2018-05-09 PROCEDURE — 96417 CHEMO IV INFUS EACH ADDL SEQ: CPT

## 2018-05-09 PROCEDURE — 96413 CHEMO IV INFUSION 1 HR: CPT

## 2018-05-09 PROCEDURE — 96375 TX/PRO/DX INJ NEW DRUG ADDON: CPT

## 2018-05-09 RX ORDER — 0.9 % SODIUM CHLORIDE 0.9 %
500 INTRAVENOUS SOLUTION INTRAVENOUS ONCE
Status: COMPLETED | OUTPATIENT
Start: 2018-05-09 | End: 2018-05-09

## 2018-05-09 RX ORDER — SODIUM CHLORIDE 0.9 % (FLUSH) 0.9 %
10 SYRINGE (ML) INJECTION PRN
Status: DISCONTINUED | OUTPATIENT
Start: 2018-05-09 | End: 2018-05-10 | Stop reason: HOSPADM

## 2018-05-09 RX ORDER — DEXAMETHASONE SODIUM PHOSPHATE 10 MG/ML
8 INJECTION, SOLUTION INTRAMUSCULAR; INTRAVENOUS ONCE
Status: COMPLETED | OUTPATIENT
Start: 2018-05-09 | End: 2018-05-09

## 2018-05-09 RX ORDER — HEPARIN SODIUM (PORCINE) LOCK FLUSH IV SOLN 100 UNIT/ML 100 UNIT/ML
500 SOLUTION INTRAVENOUS PRN
Status: DISCONTINUED | OUTPATIENT
Start: 2018-05-09 | End: 2018-05-10 | Stop reason: HOSPADM

## 2018-05-09 RX ORDER — SODIUM CHLORIDE 9 MG/ML
INJECTION, SOLUTION INTRAVENOUS ONCE
Status: COMPLETED | OUTPATIENT
Start: 2018-05-09 | End: 2018-05-09

## 2018-05-09 RX ADMIN — SODIUM CHLORIDE 500 ML: 9 INJECTION, SOLUTION INTRAVENOUS at 09:10

## 2018-05-09 RX ADMIN — Medication 10 ML: at 12:42

## 2018-05-09 RX ADMIN — DEXAMETHASONE SODIUM PHOSPHATE 8 MG: 10 INJECTION, SOLUTION INTRAMUSCULAR; INTRAVENOUS at 10:12

## 2018-05-09 RX ADMIN — CISPLATIN: 1 INJECTION, SOLUTION INTRAVENOUS at 10:23

## 2018-05-09 RX ADMIN — SODIUM CHLORIDE: 9 INJECTION, SOLUTION INTRAVENOUS at 10:12

## 2018-05-09 RX ADMIN — Medication 500 UNITS: at 12:42

## 2018-05-09 RX ADMIN — ETOPOSIDE 200 MG: 20 INJECTION INTRAVENOUS at 11:35

## 2018-05-10 ENCOUNTER — HOSPITAL ENCOUNTER (OUTPATIENT)
Dept: INFUSION THERAPY | Age: 43
Discharge: HOME | End: 2018-05-10
Payer: COMMERCIAL

## 2018-05-10 ENCOUNTER — TELEPHONE (OUTPATIENT)
Dept: SURGERY | Age: 43
End: 2018-05-10

## 2018-05-10 VITALS
SYSTOLIC BLOOD PRESSURE: 126 MMHG | RESPIRATION RATE: 20 BRPM | HEART RATE: 51 BPM | DIASTOLIC BLOOD PRESSURE: 82 MMHG | TEMPERATURE: 97.4 F

## 2018-05-10 DIAGNOSIS — C62.91 SEMINOMA OF RIGHT TESTIS, STAGE 1 (HCC): Primary | ICD-10-CM

## 2018-05-10 DIAGNOSIS — D40.11 NEOPLASM OF UNCERTAIN BEHAVIOR OF RIGHT TESTIS: ICD-10-CM

## 2018-05-10 DIAGNOSIS — R56.9 INCREASING FREQUENCY OF SEIZURE ACTIVITY (HCC): ICD-10-CM

## 2018-05-10 DIAGNOSIS — Z98.890 HISTORY OF CRANIOTOMY: ICD-10-CM

## 2018-05-10 PROCEDURE — 96375 TX/PRO/DX INJ NEW DRUG ADDON: CPT

## 2018-05-10 PROCEDURE — 96417 CHEMO IV INFUS EACH ADDL SEQ: CPT

## 2018-05-10 PROCEDURE — 96361 HYDRATE IV INFUSION ADD-ON: CPT

## 2018-05-10 PROCEDURE — 6360000002 HC RX W HCPCS: Performed by: INTERNAL MEDICINE

## 2018-05-10 PROCEDURE — 2580000003 HC RX 258: Performed by: INTERNAL MEDICINE

## 2018-05-10 PROCEDURE — 96413 CHEMO IV INFUSION 1 HR: CPT

## 2018-05-10 RX ORDER — SODIUM CHLORIDE 0.9 % (FLUSH) 0.9 %
10 SYRINGE (ML) INJECTION PRN
Status: DISCONTINUED | OUTPATIENT
Start: 2018-05-10 | End: 2018-05-11 | Stop reason: HOSPADM

## 2018-05-10 RX ORDER — 0.9 % SODIUM CHLORIDE 0.9 %
500 INTRAVENOUS SOLUTION INTRAVENOUS ONCE
Status: COMPLETED | OUTPATIENT
Start: 2018-05-10 | End: 2018-05-10

## 2018-05-10 RX ORDER — PALONOSETRON 0.05 MG/ML
0.25 INJECTION, SOLUTION INTRAVENOUS ONCE
Status: COMPLETED | OUTPATIENT
Start: 2018-05-10 | End: 2018-05-10

## 2018-05-10 RX ORDER — DEXAMETHASONE SODIUM PHOSPHATE 10 MG/ML
8 INJECTION, SOLUTION INTRAMUSCULAR; INTRAVENOUS ONCE
Status: COMPLETED | OUTPATIENT
Start: 2018-05-10 | End: 2018-05-10

## 2018-05-10 RX ORDER — HEPARIN SODIUM (PORCINE) LOCK FLUSH IV SOLN 100 UNIT/ML 100 UNIT/ML
500 SOLUTION INTRAVENOUS PRN
Status: DISCONTINUED | OUTPATIENT
Start: 2018-05-10 | End: 2018-05-11 | Stop reason: HOSPADM

## 2018-05-10 RX ORDER — SODIUM CHLORIDE 9 MG/ML
INJECTION, SOLUTION INTRAVENOUS ONCE
Status: COMPLETED | OUTPATIENT
Start: 2018-05-10 | End: 2018-05-10

## 2018-05-10 RX ADMIN — Medication 10 ML: at 13:14

## 2018-05-10 RX ADMIN — PALONOSETRON HYDROCHLORIDE 0.25 MG: 0.25 INJECTION INTRAVENOUS at 10:30

## 2018-05-10 RX ADMIN — SODIUM CHLORIDE: 9 INJECTION, SOLUTION INTRAVENOUS at 10:28

## 2018-05-10 RX ADMIN — DEXAMETHASONE SODIUM PHOSPHATE 8 MG: 10 INJECTION, SOLUTION INTRAMUSCULAR; INTRAVENOUS at 10:32

## 2018-05-10 RX ADMIN — ETOPOSIDE 200 MG: 20 INJECTION INTRAVENOUS at 12:04

## 2018-05-10 RX ADMIN — SODIUM CHLORIDE 500 ML: 9 INJECTION, SOLUTION INTRAVENOUS at 09:14

## 2018-05-10 RX ADMIN — CISPLATIN: 100 INJECTION, SOLUTION INTRAVENOUS at 10:51

## 2018-05-10 RX ADMIN — Medication 500 UNITS: at 13:14

## 2018-05-11 ENCOUNTER — HOSPITAL ENCOUNTER (OUTPATIENT)
Dept: INFUSION THERAPY | Age: 43
Discharge: HOME | End: 2018-05-11
Payer: COMMERCIAL

## 2018-05-11 ENCOUNTER — TELEPHONE (OUTPATIENT)
Dept: INFUSION THERAPY | Age: 43
End: 2018-05-11

## 2018-05-11 ENCOUNTER — TELEPHONE (OUTPATIENT)
Dept: SURGERY | Age: 43
End: 2018-05-11

## 2018-05-11 VITALS — RESPIRATION RATE: 20 BRPM | HEART RATE: 53 BPM | DIASTOLIC BLOOD PRESSURE: 69 MMHG | SYSTOLIC BLOOD PRESSURE: 108 MMHG

## 2018-05-11 DIAGNOSIS — D40.11 NEOPLASM OF UNCERTAIN BEHAVIOR OF RIGHT TESTIS: ICD-10-CM

## 2018-05-11 DIAGNOSIS — I87.8 POOR VENOUS ACCESS: ICD-10-CM

## 2018-05-11 DIAGNOSIS — C62.91 SEMINOMA OF RIGHT TESTIS, STAGE 1 (HCC): Primary | ICD-10-CM

## 2018-05-11 PROCEDURE — 2580000003 HC RX 258: Performed by: INTERNAL MEDICINE

## 2018-05-11 PROCEDURE — 96417 CHEMO IV INFUS EACH ADDL SEQ: CPT

## 2018-05-11 PROCEDURE — 96377 APPLICATON ON-BODY INJECTOR: CPT

## 2018-05-11 PROCEDURE — 96361 HYDRATE IV INFUSION ADD-ON: CPT

## 2018-05-11 PROCEDURE — 6360000002 HC RX W HCPCS: Performed by: INTERNAL MEDICINE

## 2018-05-11 PROCEDURE — 96413 CHEMO IV INFUSION 1 HR: CPT

## 2018-05-11 PROCEDURE — 96375 TX/PRO/DX INJ NEW DRUG ADDON: CPT

## 2018-05-11 RX ORDER — SODIUM CHLORIDE 9 MG/ML
INJECTION, SOLUTION INTRAVENOUS ONCE
Status: COMPLETED | OUTPATIENT
Start: 2018-05-11 | End: 2018-05-11

## 2018-05-11 RX ORDER — SODIUM CHLORIDE 0.9 % (FLUSH) 0.9 %
10 SYRINGE (ML) INJECTION PRN
Status: DISCONTINUED | OUTPATIENT
Start: 2018-05-11 | End: 2018-05-12 | Stop reason: HOSPADM

## 2018-05-11 RX ORDER — DEXAMETHASONE SODIUM PHOSPHATE 10 MG/ML
8 INJECTION, SOLUTION INTRAMUSCULAR; INTRAVENOUS ONCE
Status: COMPLETED | OUTPATIENT
Start: 2018-05-11 | End: 2018-05-11

## 2018-05-11 RX ORDER — HEPARIN SODIUM (PORCINE) LOCK FLUSH IV SOLN 100 UNIT/ML 100 UNIT/ML
500 SOLUTION INTRAVENOUS PRN
Status: DISCONTINUED | OUTPATIENT
Start: 2018-05-11 | End: 2018-05-12 | Stop reason: HOSPADM

## 2018-05-11 RX ORDER — 0.9 % SODIUM CHLORIDE 0.9 %
500 INTRAVENOUS SOLUTION INTRAVENOUS ONCE
Status: COMPLETED | OUTPATIENT
Start: 2018-05-11 | End: 2018-05-11

## 2018-05-11 RX ADMIN — SODIUM CHLORIDE, PRESERVATIVE FREE 500 UNITS: 5 INJECTION INTRAVENOUS at 15:11

## 2018-05-11 RX ADMIN — SODIUM CHLORIDE 500 ML: 9 INJECTION, SOLUTION INTRAVENOUS at 12:06

## 2018-05-11 RX ADMIN — Medication 10 ML: at 15:11

## 2018-05-11 RX ADMIN — SODIUM CHLORIDE: 9 INJECTION, SOLUTION INTRAVENOUS at 12:26

## 2018-05-11 RX ADMIN — DEXAMETHASONE SODIUM PHOSPHATE 8 MG: 10 INJECTION, SOLUTION INTRAMUSCULAR; INTRAVENOUS at 12:26

## 2018-05-11 RX ADMIN — ETOPOSIDE 200 MG: 20 INJECTION INTRAVENOUS at 13:56

## 2018-05-11 RX ADMIN — PEGFILGRASTIM 6 MG: KIT SUBCUTANEOUS at 15:11

## 2018-05-11 RX ADMIN — CISPLATIN: 100 INJECTION, SOLUTION INTRAVENOUS at 12:49

## 2018-05-11 NOTE — TELEPHONE ENCOUNTER
[address] Dr (spoke to Doris small). Explained that patient needs to be seen tomorrow (Saturday May 12, 2018) for Home Care Visit. Doris small asked me to fax the order to their office to better see what is being ordered and schedule patient home care visits. I faxed order to [phone].

## 2018-05-11 NOTE — TELEPHONE ENCOUNTER
Torsten Randall phoned back from [facility] to let me know that patient is set up for his home care and they will be seeing him tomorrow (Saturday 5/12/18)for his first visit. They spoke to wife and she is aware of home health visit(s).

## 2018-05-17 ENCOUNTER — APPOINTMENT (OUTPATIENT)
Dept: GENERAL RADIOLOGY | Age: 43
End: 2018-05-17
Attending: SURGERY
Payer: COMMERCIAL

## 2018-05-17 ENCOUNTER — HOSPITAL ENCOUNTER (OUTPATIENT)
Age: 43
Setting detail: OUTPATIENT SURGERY
Discharge: HOME | End: 2018-05-17
Attending: SURGERY | Admitting: SURGERY
Payer: COMMERCIAL

## 2018-05-17 ENCOUNTER — ANESTHESIA (OUTPATIENT)
Dept: OPERATING ROOM | Age: 43
End: 2018-05-17
Payer: COMMERCIAL

## 2018-05-17 ENCOUNTER — ANESTHESIA EVENT (OUTPATIENT)
Dept: OPERATING ROOM | Age: 43
End: 2018-05-17
Payer: COMMERCIAL

## 2018-05-17 VITALS
RESPIRATION RATE: 16 BRPM | HEIGHT: 71 IN | SYSTOLIC BLOOD PRESSURE: 100 MMHG | DIASTOLIC BLOOD PRESSURE: 68 MMHG | WEIGHT: 185 LBS | HEART RATE: 59 BPM | OXYGEN SATURATION: 100 % | BODY MASS INDEX: 25.9 KG/M2 | TEMPERATURE: 97.4 F

## 2018-05-17 VITALS
OXYGEN SATURATION: 100 % | RESPIRATION RATE: 18 BRPM | DIASTOLIC BLOOD PRESSURE: 66 MMHG | SYSTOLIC BLOOD PRESSURE: 101 MMHG

## 2018-05-17 DIAGNOSIS — I87.8 POOR VENOUS ACCESS: ICD-10-CM

## 2018-05-17 PROCEDURE — C1788 PORT, INDWELLING, IMP: HCPCS | Performed by: SURGERY

## 2018-05-17 PROCEDURE — 2580000003 HC RX 258: Performed by: SURGERY

## 2018-05-17 PROCEDURE — 3600000003 HC SURGERY LEVEL 3 BASE: Performed by: SURGERY

## 2018-05-17 PROCEDURE — 76937 US GUIDE VASCULAR ACCESS: CPT | Performed by: SURGERY

## 2018-05-17 PROCEDURE — 6360000002 HC RX W HCPCS: Performed by: NURSE ANESTHETIST, CERTIFIED REGISTERED

## 2018-05-17 PROCEDURE — 2500000003 HC RX 250 WO HCPCS: Performed by: SURGERY

## 2018-05-17 PROCEDURE — 71045 X-RAY EXAM CHEST 1 VIEW: CPT

## 2018-05-17 PROCEDURE — 77001 FLUOROGUIDE FOR VEIN DEVICE: CPT | Performed by: SURGERY

## 2018-05-17 PROCEDURE — 36561 INSERT TUNNELED CV CATH: CPT | Performed by: SURGERY

## 2018-05-17 PROCEDURE — 2500000003 HC RX 250 WO HCPCS: Performed by: NURSE ANESTHETIST, CERTIFIED REGISTERED

## 2018-05-17 PROCEDURE — 3209999900 FLUORO FOR SURGICAL PROCEDURES

## 2018-05-17 PROCEDURE — 3700000000 HC ANESTHESIA ATTENDED CARE: Performed by: SURGERY

## 2018-05-17 PROCEDURE — 3600000013 HC SURGERY LEVEL 3 ADDTL 15MIN: Performed by: SURGERY

## 2018-05-17 PROCEDURE — 6360000002 HC RX W HCPCS: Performed by: SURGERY

## 2018-05-17 PROCEDURE — 3700000001 HC ADD 15 MINUTES (ANESTHESIA): Performed by: SURGERY

## 2018-05-17 PROCEDURE — 7100000010 HC PHASE II RECOVERY - FIRST 15 MIN: Performed by: SURGERY

## 2018-05-17 PROCEDURE — 6360000002 HC RX W HCPCS: Performed by: ANESTHESIOLOGY

## 2018-05-17 PROCEDURE — 7100000011 HC PHASE II RECOVERY - ADDTL 15 MIN: Performed by: SURGERY

## 2018-05-17 DEVICE — PORT SMARTPORT 8FR CT TI W/VLV SHTH: Type: IMPLANTABLE DEVICE | Site: CHEST | Status: FUNCTIONAL

## 2018-05-17 RX ORDER — FENTANYL CITRATE 50 UG/ML
25 INJECTION, SOLUTION INTRAMUSCULAR; INTRAVENOUS EVERY 5 MIN PRN
Status: DISCONTINUED | OUTPATIENT
Start: 2018-05-17 | End: 2018-05-17 | Stop reason: HOSPADM

## 2018-05-17 RX ORDER — PROPOFOL 10 MG/ML
INJECTION, EMULSION INTRAVENOUS CONTINUOUS PRN
Status: DISCONTINUED | OUTPATIENT
Start: 2018-05-17 | End: 2018-05-17 | Stop reason: SDUPTHER

## 2018-05-17 RX ORDER — EPHEDRINE SULFATE 50 MG/ML
INJECTION INTRAVENOUS PRN
Status: DISCONTINUED | OUTPATIENT
Start: 2018-05-17 | End: 2018-05-17 | Stop reason: SDUPTHER

## 2018-05-17 RX ORDER — HEPARIN SODIUM (PORCINE) LOCK FLUSH IV SOLN 100 UNIT/ML 100 UNIT/ML
SOLUTION INTRAVENOUS PRN
Status: DISCONTINUED | OUTPATIENT
Start: 2018-05-17 | End: 2018-05-17 | Stop reason: HOSPADM

## 2018-05-17 RX ORDER — FENTANYL CITRATE 50 UG/ML
50 INJECTION, SOLUTION INTRAMUSCULAR; INTRAVENOUS EVERY 5 MIN PRN
Status: DISCONTINUED | OUTPATIENT
Start: 2018-05-17 | End: 2018-05-17 | Stop reason: HOSPADM

## 2018-05-17 RX ORDER — PROPOFOL 10 MG/ML
INJECTION, EMULSION INTRAVENOUS PRN
Status: DISCONTINUED | OUTPATIENT
Start: 2018-05-17 | End: 2018-05-17 | Stop reason: SDUPTHER

## 2018-05-17 RX ORDER — SODIUM CHLORIDE 0.9 % (FLUSH) 0.9 %
10 SYRINGE (ML) INJECTION EVERY 12 HOURS SCHEDULED
Status: DISCONTINUED | OUTPATIENT
Start: 2018-05-17 | End: 2018-05-17 | Stop reason: HOSPADM

## 2018-05-17 RX ORDER — SODIUM CHLORIDE 9 MG/ML
INJECTION, SOLUTION INTRAVENOUS CONTINUOUS
Status: DISCONTINUED | OUTPATIENT
Start: 2018-05-17 | End: 2018-05-17 | Stop reason: HOSPADM

## 2018-05-17 RX ORDER — MIDAZOLAM HYDROCHLORIDE 1 MG/ML
INJECTION INTRAMUSCULAR; INTRAVENOUS PRN
Status: DISCONTINUED | OUTPATIENT
Start: 2018-05-17 | End: 2018-05-17 | Stop reason: SDUPTHER

## 2018-05-17 RX ORDER — SODIUM CHLORIDE 0.9 % (FLUSH) 0.9 %
10 SYRINGE (ML) INJECTION PRN
Status: DISCONTINUED | OUTPATIENT
Start: 2018-05-17 | End: 2018-05-17 | Stop reason: HOSPADM

## 2018-05-17 RX ADMIN — EPHEDRINE SULFATE 10 MG: 50 INJECTION, SOLUTION INTRAVENOUS at 08:11

## 2018-05-17 RX ADMIN — MIDAZOLAM HYDROCHLORIDE 2 MG: 1 INJECTION INTRAMUSCULAR; INTRAVENOUS at 07:34

## 2018-05-17 RX ADMIN — PROPOFOL 50 MG: 10 INJECTION, EMULSION INTRAVENOUS at 07:38

## 2018-05-17 RX ADMIN — FENTANYL CITRATE 50 MCG: 50 INJECTION, SOLUTION INTRAMUSCULAR; INTRAVENOUS at 07:34

## 2018-05-17 RX ADMIN — CEFAZOLIN SODIUM 2 G: 2 SOLUTION INTRAVENOUS at 07:49

## 2018-05-17 RX ADMIN — PROPOFOL 80 MCG/KG/MIN: 10 INJECTION, EMULSION INTRAVENOUS at 07:38

## 2018-05-17 RX ADMIN — EPHEDRINE SULFATE 5 MG: 50 INJECTION, SOLUTION INTRAVENOUS at 07:58

## 2018-05-17 RX ADMIN — EPHEDRINE SULFATE 5 MG: 50 INJECTION, SOLUTION INTRAVENOUS at 08:01

## 2018-05-17 RX ADMIN — SODIUM CHLORIDE: 9 INJECTION, SOLUTION INTRAVENOUS at 07:34

## 2018-05-17 ASSESSMENT — LIFESTYLE VARIABLES: SMOKING_STATUS: 0

## 2018-05-17 ASSESSMENT — ENCOUNTER SYMPTOMS: SHORTNESS OF BREATH: 0

## 2018-05-17 ASSESSMENT — PULMONARY FUNCTION TESTS
PIF_VALUE: 1
PIF_VALUE: 0
PIF_VALUE: 1
PIF_VALUE: 0
PIF_VALUE: 1
PIF_VALUE: 0
PIF_VALUE: 1
PIF_VALUE: 0
PIF_VALUE: 1
PIF_VALUE: 2
PIF_VALUE: 1
PIF_VALUE: 0
PIF_VALUE: 1
PIF_VALUE: 3
PIF_VALUE: 3
PIF_VALUE: 1
PIF_VALUE: 1

## 2018-05-17 ASSESSMENT — PAIN - FUNCTIONAL ASSESSMENT: PAIN_FUNCTIONAL_ASSESSMENT: 0-10

## 2018-05-17 ASSESSMENT — PAIN SCALES - GENERAL: PAINLEVEL_OUTOF10: 0

## 2018-05-21 ENCOUNTER — HOSPITAL ENCOUNTER (OUTPATIENT)
Dept: INFUSION THERAPY | Age: 43
Discharge: HOME | End: 2018-05-21
Payer: COMMERCIAL

## 2018-05-21 ENCOUNTER — OFFICE VISIT (OUTPATIENT)
Dept: ONCOLOGY | Age: 43
End: 2018-05-21
Payer: COMMERCIAL

## 2018-05-21 VITALS
HEART RATE: 78 BPM | WEIGHT: 182.2 LBS | TEMPERATURE: 97.6 F | DIASTOLIC BLOOD PRESSURE: 68 MMHG | SYSTOLIC BLOOD PRESSURE: 101 MMHG | HEIGHT: 71 IN | RESPIRATION RATE: 18 BRPM | BODY MASS INDEX: 25.51 KG/M2

## 2018-05-21 DIAGNOSIS — D40.11 NEOPLASM OF UNCERTAIN BEHAVIOR OF RIGHT TESTIS: ICD-10-CM

## 2018-05-21 DIAGNOSIS — C62.91 SEMINOMA OF RIGHT TESTIS, STAGE 1 (HCC): ICD-10-CM

## 2018-05-21 DIAGNOSIS — C62.91 SEMINOMA OF RIGHT TESTIS, STAGE 1 (HCC): Primary | ICD-10-CM

## 2018-05-21 LAB
ALBUMIN SERPL-MCNC: 4 G/DL (ref 3.5–5.2)
ALP BLD-CCNC: 112 U/L (ref 40–129)
ALT SERPL-CCNC: 14 U/L (ref 0–40)
ANION GAP SERPL CALCULATED.3IONS-SCNC: 13 MMOL/L (ref 7–16)
AST SERPL-CCNC: 9 U/L (ref 0–39)
BASOPHILS ABSOLUTE: 0.15 E9/L (ref 0–0.2)
BASOPHILS RELATIVE PERCENT: 1.9 % (ref 0–2)
BILIRUB SERPL-MCNC: 0.4 MG/DL (ref 0–1.2)
BUN BLDV-MCNC: 10 MG/DL (ref 6–20)
CALCIUM SERPL-MCNC: 9.1 MG/DL (ref 8.6–10.2)
CHLORIDE BLD-SCNC: 102 MMOL/L (ref 98–107)
CO2: 24 MMOL/L (ref 22–29)
CREAT SERPL-MCNC: 0.9 MG/DL (ref 0.7–1.2)
EOSINOPHILS ABSOLUTE: 0.11 E9/L (ref 0.05–0.5)
EOSINOPHILS RELATIVE PERCENT: 1.4 % (ref 0–6)
GFR AFRICAN AMERICAN: >60
GFR NON-AFRICAN AMERICAN: >60 ML/MIN/1.73
GLUCOSE BLD-MCNC: 94 MG/DL (ref 74–109)
HCT VFR BLD CALC: 31.3 % (ref 37–54)
HEMOGLOBIN: 10.5 G/DL (ref 12.5–16.5)
IMMATURE GRANULOCYTES #: 0.06 E9/L
IMMATURE GRANULOCYTES %: 0.8 % (ref 0–5)
LYMPHOCYTES ABSOLUTE: 1.69 E9/L (ref 1.5–4)
LYMPHOCYTES RELATIVE PERCENT: 21.7 % (ref 20–42)
MCH RBC QN AUTO: 27.8 PG (ref 26–35)
MCHC RBC AUTO-ENTMCNC: 33.5 % (ref 32–34.5)
MCV RBC AUTO: 82.8 FL (ref 80–99.9)
MONOCYTES ABSOLUTE: 1.1 E9/L (ref 0.1–0.95)
MONOCYTES RELATIVE PERCENT: 14.1 % (ref 2–12)
NEUTROPHILS ABSOLUTE: 4.68 E9/L (ref 1.8–7.3)
NEUTROPHILS RELATIVE PERCENT: 60.1 % (ref 43–80)
PDW BLD-RTO: 15.5 FL (ref 11.5–15)
PLATELET # BLD: 138 E9/L (ref 130–450)
PMV BLD AUTO: 8.8 FL (ref 7–12)
POTASSIUM SERPL-SCNC: 4.3 MMOL/L (ref 3.5–5)
RBC # BLD: 3.78 E12/L (ref 3.8–5.8)
SODIUM BLD-SCNC: 139 MMOL/L (ref 132–146)
TOTAL PROTEIN: 6.6 G/DL (ref 6.4–8.3)
WBC # BLD: 7.8 E9/L (ref 4.5–11.5)

## 2018-05-21 PROCEDURE — 85025 COMPLETE CBC W/AUTO DIFF WBC: CPT

## 2018-05-21 PROCEDURE — 6360000002 HC RX W HCPCS: Performed by: NURSE PRACTITIONER

## 2018-05-21 PROCEDURE — 99214 OFFICE O/P EST MOD 30 MIN: CPT | Performed by: INTERNAL MEDICINE

## 2018-05-21 PROCEDURE — 2580000003 HC RX 258: Performed by: NURSE PRACTITIONER

## 2018-05-21 PROCEDURE — 80053 COMPREHEN METABOLIC PANEL: CPT

## 2018-05-21 RX ORDER — SODIUM CHLORIDE 0.9 % (FLUSH) 0.9 %
10 SYRINGE (ML) INJECTION PRN
Status: CANCELLED | OUTPATIENT
Start: 2018-05-31

## 2018-05-21 RX ORDER — SODIUM CHLORIDE 0.9 % (FLUSH) 0.9 %
10 SYRINGE (ML) INJECTION PRN
Status: DISCONTINUED | OUTPATIENT
Start: 2018-05-21 | End: 2018-05-22 | Stop reason: HOSPADM

## 2018-05-21 RX ORDER — DIPHENHYDRAMINE HYDROCHLORIDE 50 MG/ML
50 INJECTION INTRAMUSCULAR; INTRAVENOUS ONCE
Status: CANCELLED | OUTPATIENT
Start: 2018-06-02 | End: 2018-06-02

## 2018-05-21 RX ORDER — SODIUM CHLORIDE 9 MG/ML
INJECTION, SOLUTION INTRAVENOUS ONCE
Status: CANCELLED | OUTPATIENT
Start: 2018-05-29 | End: 2018-05-29

## 2018-05-21 RX ORDER — PALONOSETRON 0.05 MG/ML
0.25 INJECTION, SOLUTION INTRAVENOUS ONCE
Status: CANCELLED | OUTPATIENT
Start: 2018-05-29

## 2018-05-21 RX ORDER — SODIUM CHLORIDE 0.9 % (FLUSH) 0.9 %
10 SYRINGE (ML) INJECTION PRN
Status: CANCELLED | OUTPATIENT
Start: 2018-05-30

## 2018-05-21 RX ORDER — SODIUM CHLORIDE 0.9 % (FLUSH) 0.9 %
10 SYRINGE (ML) INJECTION PRN
Status: CANCELLED | OUTPATIENT
Start: 2018-06-01

## 2018-05-21 RX ORDER — 0.9 % SODIUM CHLORIDE 0.9 %
500 INTRAVENOUS SOLUTION INTRAVENOUS ONCE
Status: CANCELLED
Start: 2018-06-01 | End: 2018-06-01

## 2018-05-21 RX ORDER — SODIUM CHLORIDE 0.9 % (FLUSH) 0.9 %
10 SYRINGE (ML) INJECTION PRN
Status: CANCELLED | OUTPATIENT
Start: 2018-05-29

## 2018-05-21 RX ORDER — HEPARIN SODIUM (PORCINE) LOCK FLUSH IV SOLN 100 UNIT/ML 100 UNIT/ML
500 SOLUTION INTRAVENOUS PRN
Status: CANCELLED | OUTPATIENT
Start: 2018-05-30

## 2018-05-21 RX ORDER — PALONOSETRON 0.05 MG/ML
0.25 INJECTION, SOLUTION INTRAVENOUS ONCE
Status: CANCELLED
Start: 2018-06-01 | End: 2018-06-01

## 2018-05-21 RX ORDER — DIPHENHYDRAMINE HYDROCHLORIDE 50 MG/ML
50 INJECTION INTRAMUSCULAR; INTRAVENOUS ONCE
Status: CANCELLED | OUTPATIENT
Start: 2018-05-30 | End: 2018-05-30

## 2018-05-21 RX ORDER — 0.9 % SODIUM CHLORIDE 0.9 %
500 INTRAVENOUS SOLUTION INTRAVENOUS ONCE
Status: CANCELLED
Start: 2018-05-29 | End: 2018-05-29

## 2018-05-21 RX ORDER — SODIUM CHLORIDE 9 MG/ML
INJECTION, SOLUTION INTRAVENOUS ONCE
Status: CANCELLED | OUTPATIENT
Start: 2018-05-31 | End: 2018-05-31

## 2018-05-21 RX ORDER — SODIUM CHLORIDE 0.9 % (FLUSH) 0.9 %
5 SYRINGE (ML) INJECTION PRN
Status: CANCELLED | OUTPATIENT
Start: 2018-05-21

## 2018-05-21 RX ORDER — HEPARIN SODIUM (PORCINE) LOCK FLUSH IV SOLN 100 UNIT/ML 100 UNIT/ML
500 SOLUTION INTRAVENOUS PRN
Status: CANCELLED | OUTPATIENT
Start: 2018-05-29

## 2018-05-21 RX ORDER — 0.9 % SODIUM CHLORIDE 0.9 %
500 INTRAVENOUS SOLUTION INTRAVENOUS ONCE
Status: CANCELLED
Start: 2018-06-02 | End: 2018-06-02

## 2018-05-21 RX ORDER — LORAZEPAM 0.5 MG/1
0.5 TABLET ORAL EVERY 6 HOURS PRN
Status: ON HOLD | COMMUNITY
End: 2019-06-01

## 2018-05-21 RX ORDER — HEPARIN SODIUM (PORCINE) LOCK FLUSH IV SOLN 100 UNIT/ML 100 UNIT/ML
500 SOLUTION INTRAVENOUS PRN
Status: DISCONTINUED | OUTPATIENT
Start: 2018-05-21 | End: 2018-05-22 | Stop reason: HOSPADM

## 2018-05-21 RX ORDER — SODIUM CHLORIDE 9 MG/ML
INJECTION, SOLUTION INTRAVENOUS ONCE
Status: CANCELLED | OUTPATIENT
Start: 2018-06-01 | End: 2018-06-01

## 2018-05-21 RX ORDER — METHYLPREDNISOLONE SODIUM SUCCINATE 125 MG/2ML
125 INJECTION, POWDER, LYOPHILIZED, FOR SOLUTION INTRAMUSCULAR; INTRAVENOUS ONCE
Status: CANCELLED | OUTPATIENT
Start: 2018-05-29 | End: 2018-05-29

## 2018-05-21 RX ORDER — DIPHENHYDRAMINE HYDROCHLORIDE 50 MG/ML
50 INJECTION INTRAMUSCULAR; INTRAVENOUS ONCE
Status: CANCELLED | OUTPATIENT
Start: 2018-05-31 | End: 2018-05-31

## 2018-05-21 RX ORDER — METHYLPREDNISOLONE SODIUM SUCCINATE 125 MG/2ML
125 INJECTION, POWDER, LYOPHILIZED, FOR SOLUTION INTRAMUSCULAR; INTRAVENOUS ONCE
Status: CANCELLED | OUTPATIENT
Start: 2018-06-02 | End: 2018-06-02

## 2018-05-21 RX ORDER — SODIUM CHLORIDE 0.9 % (FLUSH) 0.9 %
10 SYRINGE (ML) INJECTION PRN
Status: CANCELLED | OUTPATIENT
Start: 2018-05-21

## 2018-05-21 RX ORDER — SODIUM CHLORIDE 9 MG/ML
INJECTION, SOLUTION INTRAVENOUS CONTINUOUS
Status: CANCELLED | OUTPATIENT
Start: 2018-05-31

## 2018-05-21 RX ORDER — SODIUM CHLORIDE 9 MG/ML
INJECTION, SOLUTION INTRAVENOUS ONCE
Status: CANCELLED | OUTPATIENT
Start: 2018-06-02 | End: 2018-06-02

## 2018-05-21 RX ORDER — SODIUM CHLORIDE 9 MG/ML
INJECTION, SOLUTION INTRAVENOUS CONTINUOUS
Status: CANCELLED | OUTPATIENT
Start: 2018-06-02

## 2018-05-21 RX ORDER — 0.9 % SODIUM CHLORIDE 0.9 %
10 VIAL (ML) INJECTION ONCE
Status: CANCELLED | OUTPATIENT
Start: 2018-05-31 | End: 2018-05-31

## 2018-05-21 RX ORDER — 0.9 % SODIUM CHLORIDE 0.9 %
10 VIAL (ML) INJECTION ONCE
Status: CANCELLED | OUTPATIENT
Start: 2018-06-01 | End: 2018-06-01

## 2018-05-21 RX ORDER — 0.9 % SODIUM CHLORIDE 0.9 %
500 INTRAVENOUS SOLUTION INTRAVENOUS ONCE
Status: CANCELLED
Start: 2018-05-30 | End: 2018-05-30

## 2018-05-21 RX ORDER — 0.9 % SODIUM CHLORIDE 0.9 %
10 VIAL (ML) INJECTION ONCE
Status: CANCELLED | OUTPATIENT
Start: 2018-06-02 | End: 2018-06-02

## 2018-05-21 RX ORDER — SODIUM CHLORIDE 0.9 % (FLUSH) 0.9 %
10 SYRINGE (ML) INJECTION PRN
Status: CANCELLED | OUTPATIENT
Start: 2018-06-02

## 2018-05-21 RX ORDER — HEPARIN SODIUM (PORCINE) LOCK FLUSH IV SOLN 100 UNIT/ML 100 UNIT/ML
500 SOLUTION INTRAVENOUS PRN
Status: CANCELLED | OUTPATIENT
Start: 2018-05-31

## 2018-05-21 RX ORDER — 0.9 % SODIUM CHLORIDE 0.9 %
500 INTRAVENOUS SOLUTION INTRAVENOUS ONCE
Status: CANCELLED
Start: 2018-05-31 | End: 2018-05-31

## 2018-05-21 RX ORDER — 0.9 % SODIUM CHLORIDE 0.9 %
10 VIAL (ML) INJECTION ONCE
Status: CANCELLED | OUTPATIENT
Start: 2018-05-29 | End: 2018-05-29

## 2018-05-21 RX ORDER — 0.9 % SODIUM CHLORIDE 0.9 %
10 VIAL (ML) INJECTION ONCE
Status: CANCELLED | OUTPATIENT
Start: 2018-05-30 | End: 2018-05-30

## 2018-05-21 RX ORDER — SODIUM CHLORIDE 9 MG/ML
INJECTION, SOLUTION INTRAVENOUS CONTINUOUS
Status: CANCELLED | OUTPATIENT
Start: 2018-05-29

## 2018-05-21 RX ORDER — METHYLPREDNISOLONE SODIUM SUCCINATE 125 MG/2ML
125 INJECTION, POWDER, LYOPHILIZED, FOR SOLUTION INTRAMUSCULAR; INTRAVENOUS ONCE
Status: CANCELLED | OUTPATIENT
Start: 2018-05-30 | End: 2018-05-30

## 2018-05-21 RX ORDER — DIPHENHYDRAMINE HYDROCHLORIDE 50 MG/ML
50 INJECTION INTRAMUSCULAR; INTRAVENOUS ONCE
Status: CANCELLED | OUTPATIENT
Start: 2018-05-29 | End: 2018-05-29

## 2018-05-21 RX ORDER — DIPHENHYDRAMINE HYDROCHLORIDE 50 MG/ML
50 INJECTION INTRAMUSCULAR; INTRAVENOUS ONCE
Status: CANCELLED | OUTPATIENT
Start: 2018-06-01 | End: 2018-06-01

## 2018-05-21 RX ORDER — HEPARIN SODIUM (PORCINE) LOCK FLUSH IV SOLN 100 UNIT/ML 100 UNIT/ML
500 SOLUTION INTRAVENOUS PRN
Status: CANCELLED | OUTPATIENT
Start: 2018-06-02

## 2018-05-21 RX ORDER — HEPARIN SODIUM (PORCINE) LOCK FLUSH IV SOLN 100 UNIT/ML 100 UNIT/ML
500 SOLUTION INTRAVENOUS PRN
Status: CANCELLED | OUTPATIENT
Start: 2018-05-21

## 2018-05-21 RX ORDER — METHYLPREDNISOLONE SODIUM SUCCINATE 125 MG/2ML
125 INJECTION, POWDER, LYOPHILIZED, FOR SOLUTION INTRAMUSCULAR; INTRAVENOUS ONCE
Status: CANCELLED | OUTPATIENT
Start: 2018-05-31 | End: 2018-05-31

## 2018-05-21 RX ORDER — SODIUM CHLORIDE 9 MG/ML
INJECTION, SOLUTION INTRAVENOUS ONCE
Status: CANCELLED | OUTPATIENT
Start: 2018-05-30 | End: 2018-05-30

## 2018-05-21 RX ORDER — HEPARIN SODIUM (PORCINE) LOCK FLUSH IV SOLN 100 UNIT/ML 100 UNIT/ML
500 SOLUTION INTRAVENOUS PRN
Status: CANCELLED | OUTPATIENT
Start: 2018-06-01

## 2018-05-21 RX ORDER — SODIUM CHLORIDE 9 MG/ML
INJECTION, SOLUTION INTRAVENOUS CONTINUOUS
Status: CANCELLED | OUTPATIENT
Start: 2018-06-01

## 2018-05-21 RX ORDER — SODIUM CHLORIDE 9 MG/ML
INJECTION, SOLUTION INTRAVENOUS CONTINUOUS
Status: CANCELLED | OUTPATIENT
Start: 2018-05-30

## 2018-05-21 RX ORDER — METHYLPREDNISOLONE SODIUM SUCCINATE 125 MG/2ML
125 INJECTION, POWDER, LYOPHILIZED, FOR SOLUTION INTRAMUSCULAR; INTRAVENOUS ONCE
Status: CANCELLED | OUTPATIENT
Start: 2018-06-01 | End: 2018-06-01

## 2018-05-21 RX ADMIN — Medication 500 UNITS: at 13:11

## 2018-05-21 RX ADMIN — Medication 10 ML: at 13:11

## 2018-05-29 ENCOUNTER — HOSPITAL ENCOUNTER (OUTPATIENT)
Dept: INFUSION THERAPY | Age: 43
Discharge: HOME | End: 2018-05-29
Payer: COMMERCIAL

## 2018-05-29 ENCOUNTER — OFFICE VISIT (OUTPATIENT)
Dept: ONCOLOGY | Age: 43
End: 2018-05-29
Payer: COMMERCIAL

## 2018-05-29 VITALS
RESPIRATION RATE: 20 BRPM | SYSTOLIC BLOOD PRESSURE: 93 MMHG | BODY MASS INDEX: 24.86 KG/M2 | WEIGHT: 177.6 LBS | DIASTOLIC BLOOD PRESSURE: 56 MMHG | HEART RATE: 73 BPM | HEIGHT: 71 IN | TEMPERATURE: 98.2 F

## 2018-05-29 VITALS — DIASTOLIC BLOOD PRESSURE: 56 MMHG | SYSTOLIC BLOOD PRESSURE: 96 MMHG | HEART RATE: 74 BPM | RESPIRATION RATE: 20 BRPM

## 2018-05-29 DIAGNOSIS — D40.11 NEOPLASM OF UNCERTAIN BEHAVIOR OF RIGHT TESTIS: ICD-10-CM

## 2018-05-29 DIAGNOSIS — C62.91 SEMINOMA OF RIGHT TESTIS, STAGE 1 (HCC): Primary | ICD-10-CM

## 2018-05-29 DIAGNOSIS — C62.91 SEMINOMA OF RIGHT TESTIS, STAGE 1 (HCC): ICD-10-CM

## 2018-05-29 LAB
ALBUMIN SERPL-MCNC: 3.9 G/DL (ref 3.5–5.2)
ALP BLD-CCNC: 111 U/L (ref 40–129)
ALT SERPL-CCNC: 23 U/L (ref 0–40)
ANION GAP SERPL CALCULATED.3IONS-SCNC: 14 MMOL/L (ref 7–16)
ANISOCYTOSIS: ABNORMAL
AST SERPL-CCNC: 20 U/L (ref 0–39)
BASOPHILS ABSOLUTE: 0 E9/L (ref 0–0.2)
BASOPHILS RELATIVE PERCENT: 0.8 % (ref 0–2)
BILIRUB SERPL-MCNC: 0.3 MG/DL (ref 0–1.2)
BUN BLDV-MCNC: 9 MG/DL (ref 6–20)
CALCIUM SERPL-MCNC: 9.7 MG/DL (ref 8.6–10.2)
CHLORIDE BLD-SCNC: 101 MMOL/L (ref 98–107)
CO2: 24 MMOL/L (ref 22–29)
CREAT SERPL-MCNC: 1 MG/DL (ref 0.7–1.2)
EOSINOPHILS ABSOLUTE: 0.63 E9/L (ref 0.05–0.5)
EOSINOPHILS RELATIVE PERCENT: 2.5 % (ref 0–6)
GFR AFRICAN AMERICAN: >60
GFR NON-AFRICAN AMERICAN: >60 ML/MIN/1.73
GLUCOSE BLD-MCNC: 128 MG/DL (ref 74–109)
HCT VFR BLD CALC: 34.2 % (ref 37–54)
HEMOGLOBIN: 11.5 G/DL (ref 12.5–16.5)
LYMPHOCYTES ABSOLUTE: 3.54 E9/L (ref 1.5–4)
LYMPHOCYTES RELATIVE PERCENT: 13.9 % (ref 20–42)
MAGNESIUM: 2.1 MG/DL (ref 1.6–2.6)
MCH RBC QN AUTO: 27.8 PG (ref 26–35)
MCHC RBC AUTO-ENTMCNC: 33.6 % (ref 32–34.5)
MCV RBC AUTO: 82.6 FL (ref 80–99.9)
METAMYELOCYTES RELATIVE PERCENT: 1 % (ref 0–1)
MONOCYTES ABSOLUTE: 0.76 E9/L (ref 0.1–0.95)
MONOCYTES RELATIVE PERCENT: 3 % (ref 2–12)
MYELOCYTE PERCENT: 1.5 % (ref 0–0)
NEUTROPHILS ABSOLUTE: 20.49 E9/L (ref 1.8–7.3)
NEUTROPHILS RELATIVE PERCENT: 78.1 % (ref 43–80)
OVALOCYTES: ABNORMAL
PDW BLD-RTO: 17.3 FL (ref 11.5–15)
PLATELET # BLD: 411 E9/L (ref 130–450)
PMV BLD AUTO: 8.2 FL (ref 7–12)
POIKILOCYTES: ABNORMAL
POLYCHROMASIA: ABNORMAL
POTASSIUM SERPL-SCNC: 4.3 MMOL/L (ref 3.5–5)
RBC # BLD: 4.14 E12/L (ref 3.8–5.8)
SODIUM BLD-SCNC: 139 MMOL/L (ref 132–146)
TOTAL PROTEIN: 7.4 G/DL (ref 6.4–8.3)
WBC # BLD: 25.3 E9/L (ref 4.5–11.5)

## 2018-05-29 PROCEDURE — 96413 CHEMO IV INFUSION 1 HR: CPT

## 2018-05-29 PROCEDURE — 96415 CHEMO IV INFUSION ADDL HR: CPT

## 2018-05-29 PROCEDURE — 36415 COLL VENOUS BLD VENIPUNCTURE: CPT | Performed by: NURSE PRACTITIONER

## 2018-05-29 PROCEDURE — 96366 THER/PROPH/DIAG IV INF ADDON: CPT

## 2018-05-29 PROCEDURE — 6360000002 HC RX W HCPCS: Performed by: INTERNAL MEDICINE

## 2018-05-29 PROCEDURE — 85025 COMPLETE CBC W/AUTO DIFF WBC: CPT

## 2018-05-29 PROCEDURE — 96375 TX/PRO/DX INJ NEW DRUG ADDON: CPT

## 2018-05-29 PROCEDURE — 80053 COMPREHEN METABOLIC PANEL: CPT

## 2018-05-29 PROCEDURE — 36593 DECLOT VASCULAR DEVICE: CPT

## 2018-05-29 PROCEDURE — 83735 ASSAY OF MAGNESIUM: CPT

## 2018-05-29 PROCEDURE — 96417 CHEMO IV INFUS EACH ADDL SEQ: CPT

## 2018-05-29 PROCEDURE — 2580000003 HC RX 258: Performed by: INTERNAL MEDICINE

## 2018-05-29 RX ORDER — SODIUM CHLORIDE 0.9 % (FLUSH) 0.9 %
10 SYRINGE (ML) INJECTION PRN
Status: DISCONTINUED | OUTPATIENT
Start: 2018-05-29 | End: 2018-05-30 | Stop reason: HOSPADM

## 2018-05-29 RX ORDER — PALONOSETRON 0.05 MG/ML
0.25 INJECTION, SOLUTION INTRAVENOUS ONCE
Status: COMPLETED | OUTPATIENT
Start: 2018-05-29 | End: 2018-05-29

## 2018-05-29 RX ORDER — HEPARIN SODIUM (PORCINE) LOCK FLUSH IV SOLN 100 UNIT/ML 100 UNIT/ML
500 SOLUTION INTRAVENOUS PRN
Status: DISCONTINUED | OUTPATIENT
Start: 2018-05-29 | End: 2018-05-30 | Stop reason: HOSPADM

## 2018-05-29 RX ORDER — 0.9 % SODIUM CHLORIDE 0.9 %
500 INTRAVENOUS SOLUTION INTRAVENOUS ONCE
Status: COMPLETED | OUTPATIENT
Start: 2018-05-29 | End: 2018-05-29

## 2018-05-29 RX ORDER — DEXAMETHASONE SODIUM PHOSPHATE 10 MG/ML
10 INJECTION, SOLUTION INTRAMUSCULAR; INTRAVENOUS ONCE
Status: COMPLETED | OUTPATIENT
Start: 2018-05-29 | End: 2018-05-29

## 2018-05-29 RX ORDER — SODIUM CHLORIDE 9 MG/ML
INJECTION, SOLUTION INTRAVENOUS ONCE
Status: COMPLETED | OUTPATIENT
Start: 2018-05-29 | End: 2018-05-29

## 2018-05-29 RX ADMIN — SODIUM CHLORIDE 500 ML: 9 INJECTION, SOLUTION INTRAVENOUS at 11:51

## 2018-05-29 RX ADMIN — POTASSIUM CHLORIDE: 2 INJECTION, SOLUTION, CONCENTRATE INTRAVENOUS at 13:45

## 2018-05-29 RX ADMIN — PALONOSETRON HYDROCHLORIDE 0.25 MG: 0.25 INJECTION INTRAVENOUS at 11:53

## 2018-05-29 RX ADMIN — ETOPOSIDE 200 MG: 20 INJECTION INTRAVENOUS at 15:56

## 2018-05-29 RX ADMIN — SODIUM CHLORIDE: 9 INJECTION, SOLUTION INTRAVENOUS at 13:45

## 2018-05-29 RX ADMIN — DEXAMETHASONE SODIUM PHOSPHATE 10 MG: 10 INJECTION, SOLUTION INTRAMUSCULAR; INTRAVENOUS at 11:53

## 2018-05-29 RX ADMIN — SODIUM CHLORIDE 150 MG: 900 INJECTION, SOLUTION INTRAVENOUS at 12:00

## 2018-05-29 RX ADMIN — ALTEPLASE 2 MG: 2.2 INJECTION, POWDER, LYOPHILIZED, FOR SOLUTION INTRAVENOUS at 10:37

## 2018-05-29 NOTE — PROGRESS NOTES
Left chest medport accessed without difficulty, flushes easily but unable to aspirate blood return, alteplase instilled and dwelled x 30 minutes at which time 20 cc of blood is aspirated and wasted and the mport is flushes with 20cc of NS.

## 2018-05-30 ENCOUNTER — HOSPITAL ENCOUNTER (OUTPATIENT)
Dept: INFUSION THERAPY | Age: 43
Discharge: HOME | End: 2018-05-30
Payer: COMMERCIAL

## 2018-05-30 VITALS — HEART RATE: 59 BPM | RESPIRATION RATE: 18 BRPM | DIASTOLIC BLOOD PRESSURE: 65 MMHG | SYSTOLIC BLOOD PRESSURE: 105 MMHG

## 2018-05-30 DIAGNOSIS — D40.11 NEOPLASM OF UNCERTAIN BEHAVIOR OF RIGHT TESTIS: ICD-10-CM

## 2018-05-30 PROCEDURE — 2580000003 HC RX 258: Performed by: INTERNAL MEDICINE

## 2018-05-30 PROCEDURE — 96361 HYDRATE IV INFUSION ADD-ON: CPT

## 2018-05-30 PROCEDURE — 96413 CHEMO IV INFUSION 1 HR: CPT

## 2018-05-30 PROCEDURE — 96417 CHEMO IV INFUS EACH ADDL SEQ: CPT

## 2018-05-30 PROCEDURE — 96375 TX/PRO/DX INJ NEW DRUG ADDON: CPT

## 2018-05-30 PROCEDURE — 6360000002 HC RX W HCPCS: Performed by: INTERNAL MEDICINE

## 2018-05-30 RX ORDER — DEXAMETHASONE SODIUM PHOSPHATE 10 MG/ML
8 INJECTION, SOLUTION INTRAMUSCULAR; INTRAVENOUS ONCE
Status: COMPLETED | OUTPATIENT
Start: 2018-05-30 | End: 2018-05-30

## 2018-05-30 RX ORDER — 0.9 % SODIUM CHLORIDE 0.9 %
500 INTRAVENOUS SOLUTION INTRAVENOUS ONCE
Status: COMPLETED | OUTPATIENT
Start: 2018-05-30 | End: 2018-05-30

## 2018-05-30 RX ORDER — HEPARIN SODIUM (PORCINE) LOCK FLUSH IV SOLN 100 UNIT/ML 100 UNIT/ML
500 SOLUTION INTRAVENOUS PRN
Status: DISCONTINUED | OUTPATIENT
Start: 2018-05-30 | End: 2018-05-31 | Stop reason: HOSPADM

## 2018-05-30 RX ORDER — SODIUM CHLORIDE 9 MG/ML
INJECTION, SOLUTION INTRAVENOUS ONCE
Status: DISCONTINUED | OUTPATIENT
Start: 2018-05-30 | End: 2018-05-31 | Stop reason: HOSPADM

## 2018-05-30 RX ORDER — SODIUM CHLORIDE 0.9 % (FLUSH) 0.9 %
10 SYRINGE (ML) INJECTION PRN
Status: DISCONTINUED | OUTPATIENT
Start: 2018-05-30 | End: 2018-05-31 | Stop reason: HOSPADM

## 2018-05-30 RX ADMIN — SODIUM CHLORIDE: 9 INJECTION, SOLUTION INTRAVENOUS at 14:39

## 2018-05-30 RX ADMIN — SODIUM CHLORIDE 500 ML: 9 INJECTION, SOLUTION INTRAVENOUS at 14:39

## 2018-05-30 RX ADMIN — Medication 500 UNITS: at 17:32

## 2018-05-30 RX ADMIN — Medication 10 ML: at 17:32

## 2018-05-30 RX ADMIN — ETOPOSIDE 200 MG: 20 INJECTION INTRAVENOUS at 16:15

## 2018-05-30 RX ADMIN — CISPLATIN: 1 INJECTION, SOLUTION INTRAVENOUS at 15:07

## 2018-05-30 RX ADMIN — DEXAMETHASONE SODIUM PHOSPHATE 8 MG: 10 INJECTION, SOLUTION INTRAMUSCULAR; INTRAVENOUS at 14:40

## 2018-05-31 ENCOUNTER — HOSPITAL ENCOUNTER (OUTPATIENT)
Dept: INFUSION THERAPY | Age: 43
Discharge: HOME | End: 2018-05-31
Payer: COMMERCIAL

## 2018-05-31 VITALS — RESPIRATION RATE: 20 BRPM | HEART RATE: 55 BPM | SYSTOLIC BLOOD PRESSURE: 97 MMHG | DIASTOLIC BLOOD PRESSURE: 64 MMHG

## 2018-05-31 DIAGNOSIS — D40.11 NEOPLASM OF UNCERTAIN BEHAVIOR OF RIGHT TESTIS: ICD-10-CM

## 2018-05-31 PROCEDURE — 96417 CHEMO IV INFUS EACH ADDL SEQ: CPT

## 2018-05-31 PROCEDURE — 6360000002 HC RX W HCPCS: Performed by: INTERNAL MEDICINE

## 2018-05-31 PROCEDURE — 96375 TX/PRO/DX INJ NEW DRUG ADDON: CPT

## 2018-05-31 PROCEDURE — 96413 CHEMO IV INFUSION 1 HR: CPT

## 2018-05-31 PROCEDURE — 2580000003 HC RX 258: Performed by: INTERNAL MEDICINE

## 2018-05-31 RX ORDER — DEXAMETHASONE SODIUM PHOSPHATE 10 MG/ML
8 INJECTION, SOLUTION INTRAMUSCULAR; INTRAVENOUS ONCE
Status: COMPLETED | OUTPATIENT
Start: 2018-05-31 | End: 2018-05-31

## 2018-05-31 RX ORDER — SODIUM CHLORIDE 9 MG/ML
INJECTION, SOLUTION INTRAVENOUS ONCE
Status: COMPLETED | OUTPATIENT
Start: 2018-05-31 | End: 2018-05-31

## 2018-05-31 RX ORDER — HEPARIN SODIUM (PORCINE) LOCK FLUSH IV SOLN 100 UNIT/ML 100 UNIT/ML
500 SOLUTION INTRAVENOUS PRN
Status: DISCONTINUED | OUTPATIENT
Start: 2018-05-31 | End: 2018-06-01 | Stop reason: HOSPADM

## 2018-05-31 RX ORDER — SODIUM CHLORIDE 0.9 % (FLUSH) 0.9 %
10 SYRINGE (ML) INJECTION PRN
Status: DISCONTINUED | OUTPATIENT
Start: 2018-05-31 | End: 2018-06-01 | Stop reason: HOSPADM

## 2018-05-31 RX ORDER — 0.9 % SODIUM CHLORIDE 0.9 %
500 INTRAVENOUS SOLUTION INTRAVENOUS ONCE
Status: COMPLETED | OUTPATIENT
Start: 2018-05-31 | End: 2018-05-31

## 2018-05-31 RX ADMIN — SODIUM CHLORIDE: 9 INJECTION, SOLUTION INTRAVENOUS at 12:45

## 2018-05-31 RX ADMIN — ETOPOSIDE 200 MG: 20 INJECTION INTRAVENOUS at 14:24

## 2018-05-31 RX ADMIN — Medication 10 ML: at 15:33

## 2018-05-31 RX ADMIN — DEXAMETHASONE SODIUM PHOSPHATE 8 MG: 10 INJECTION, SOLUTION INTRAMUSCULAR; INTRAVENOUS at 12:50

## 2018-05-31 RX ADMIN — SODIUM CHLORIDE 500 ML: 9 INJECTION, SOLUTION INTRAVENOUS at 12:45

## 2018-05-31 RX ADMIN — SODIUM CHLORIDE, PRESERVATIVE FREE 500 UNITS: 5 INJECTION INTRAVENOUS at 15:34

## 2018-05-31 RX ADMIN — CISPLATIN: 100 INJECTION, SOLUTION INTRAVENOUS at 13:16

## 2018-06-01 ENCOUNTER — HOSPITAL ENCOUNTER (OUTPATIENT)
Dept: INFUSION THERAPY | Age: 43
Discharge: HOME | End: 2018-06-01
Payer: COMMERCIAL

## 2018-06-01 VITALS — HEART RATE: 60 BPM | SYSTOLIC BLOOD PRESSURE: 102 MMHG | DIASTOLIC BLOOD PRESSURE: 58 MMHG | RESPIRATION RATE: 20 BRPM

## 2018-06-01 DIAGNOSIS — D40.11 NEOPLASM OF UNCERTAIN BEHAVIOR OF RIGHT TESTIS: ICD-10-CM

## 2018-06-01 PROCEDURE — 2580000003 HC RX 258: Performed by: INTERNAL MEDICINE

## 2018-06-01 PROCEDURE — 96413 CHEMO IV INFUSION 1 HR: CPT

## 2018-06-01 PROCEDURE — 96361 HYDRATE IV INFUSION ADD-ON: CPT

## 2018-06-01 PROCEDURE — 96375 TX/PRO/DX INJ NEW DRUG ADDON: CPT

## 2018-06-01 PROCEDURE — 96417 CHEMO IV INFUS EACH ADDL SEQ: CPT

## 2018-06-01 PROCEDURE — 6360000002 HC RX W HCPCS: Performed by: INTERNAL MEDICINE

## 2018-06-01 RX ORDER — SODIUM CHLORIDE 9 MG/ML
INJECTION, SOLUTION INTRAVENOUS ONCE
Status: COMPLETED | OUTPATIENT
Start: 2018-06-01 | End: 2018-06-01

## 2018-06-01 RX ORDER — PALONOSETRON 0.05 MG/ML
0.25 INJECTION, SOLUTION INTRAVENOUS ONCE
Status: COMPLETED | OUTPATIENT
Start: 2018-06-01 | End: 2018-06-01

## 2018-06-01 RX ORDER — SODIUM CHLORIDE 0.9 % (FLUSH) 0.9 %
10 SYRINGE (ML) INJECTION PRN
Status: DISCONTINUED | OUTPATIENT
Start: 2018-06-01 | End: 2018-06-02 | Stop reason: HOSPADM

## 2018-06-01 RX ORDER — DEXAMETHASONE SODIUM PHOSPHATE 10 MG/ML
8 INJECTION, SOLUTION INTRAMUSCULAR; INTRAVENOUS ONCE
Status: COMPLETED | OUTPATIENT
Start: 2018-06-01 | End: 2018-06-01

## 2018-06-01 RX ORDER — 0.9 % SODIUM CHLORIDE 0.9 %
500 INTRAVENOUS SOLUTION INTRAVENOUS ONCE
Status: COMPLETED | OUTPATIENT
Start: 2018-06-01 | End: 2018-06-01

## 2018-06-01 RX ORDER — HEPARIN SODIUM (PORCINE) LOCK FLUSH IV SOLN 100 UNIT/ML 100 UNIT/ML
500 SOLUTION INTRAVENOUS PRN
Status: DISCONTINUED | OUTPATIENT
Start: 2018-06-01 | End: 2018-06-02 | Stop reason: HOSPADM

## 2018-06-01 RX ADMIN — DEXAMETHASONE SODIUM PHOSPHATE 8 MG: 10 INJECTION, SOLUTION INTRAMUSCULAR; INTRAVENOUS at 10:17

## 2018-06-01 RX ADMIN — Medication 10 ML: at 13:04

## 2018-06-01 RX ADMIN — SODIUM CHLORIDE, PRESERVATIVE FREE 500 UNITS: 5 INJECTION INTRAVENOUS at 13:04

## 2018-06-01 RX ADMIN — CISPLATIN: 100 INJECTION, SOLUTION INTRAVENOUS at 10:45

## 2018-06-01 RX ADMIN — SODIUM CHLORIDE 500 ML: 9 INJECTION, SOLUTION INTRAVENOUS at 10:17

## 2018-06-01 RX ADMIN — SODIUM CHLORIDE: 9 INJECTION, SOLUTION INTRAVENOUS at 10:17

## 2018-06-01 RX ADMIN — ETOPOSIDE 200 MG: 20 INJECTION INTRAVENOUS at 11:57

## 2018-06-01 RX ADMIN — PALONOSETRON HYDROCHLORIDE 0.25 MG: 0.25 INJECTION INTRAVENOUS at 10:17

## 2018-06-02 ENCOUNTER — HOSPITAL ENCOUNTER (OUTPATIENT)
Age: 43
Discharge: HOME | End: 2018-06-02
Attending: INTERNAL MEDICINE | Admitting: INTERNAL MEDICINE
Payer: COMMERCIAL

## 2018-06-02 VITALS
SYSTOLIC BLOOD PRESSURE: 92 MMHG | TEMPERATURE: 98.6 F | OXYGEN SATURATION: 97 % | DIASTOLIC BLOOD PRESSURE: 52 MMHG | HEART RATE: 54 BPM | RESPIRATION RATE: 18 BRPM

## 2018-06-02 DIAGNOSIS — D40.11 NEOPLASM OF UNCERTAIN BEHAVIOR OF RIGHT TESTIS: ICD-10-CM

## 2018-06-02 PROCEDURE — 96372 THER/PROPH/DIAG INJ SC/IM: CPT

## 2018-06-02 PROCEDURE — 6360000002 HC RX W HCPCS: Performed by: INTERNAL MEDICINE

## 2018-06-02 PROCEDURE — 96365 THER/PROPH/DIAG IV INF INIT: CPT

## 2018-06-02 PROCEDURE — 96417 CHEMO IV INFUS EACH ADDL SEQ: CPT

## 2018-06-02 PROCEDURE — 96360 HYDRATION IV INFUSION INIT: CPT

## 2018-06-02 PROCEDURE — 96413 CHEMO IV INFUSION 1 HR: CPT

## 2018-06-02 PROCEDURE — 96367 TX/PROPH/DG ADDL SEQ IV INF: CPT

## 2018-06-02 PROCEDURE — 96361 HYDRATE IV INFUSION ADD-ON: CPT

## 2018-06-02 PROCEDURE — 2580000003 HC RX 258: Performed by: INTERNAL MEDICINE

## 2018-06-02 PROCEDURE — 96377 APPLICATON ON-BODY INJECTOR: CPT

## 2018-06-02 PROCEDURE — 96375 TX/PRO/DX INJ NEW DRUG ADDON: CPT

## 2018-06-02 PROCEDURE — 36593 DECLOT VASCULAR DEVICE: CPT

## 2018-06-02 RX ORDER — 0.9 % SODIUM CHLORIDE 0.9 %
500 INTRAVENOUS SOLUTION INTRAVENOUS ONCE
Status: COMPLETED | OUTPATIENT
Start: 2018-06-02 | End: 2018-06-02

## 2018-06-02 RX ORDER — SODIUM CHLORIDE 9 MG/ML
250 INJECTION, SOLUTION INTRAVENOUS ONCE
Status: DISCONTINUED | OUTPATIENT
Start: 2018-06-02 | End: 2018-06-02 | Stop reason: HOSPADM

## 2018-06-02 RX ORDER — DEXAMETHASONE SODIUM PHOSPHATE 10 MG/ML
8 INJECTION, SOLUTION INTRAMUSCULAR; INTRAVENOUS ONCE
Status: COMPLETED | OUTPATIENT
Start: 2018-06-02 | End: 2018-06-02

## 2018-06-02 RX ORDER — SODIUM CHLORIDE 0.9 % (FLUSH) 0.9 %
10 SYRINGE (ML) INJECTION PRN
Status: DISCONTINUED | OUTPATIENT
Start: 2018-06-02 | End: 2018-06-02 | Stop reason: HOSPADM

## 2018-06-02 RX ORDER — HEPARIN SODIUM (PORCINE) LOCK FLUSH IV SOLN 100 UNIT/ML 100 UNIT/ML
500 SOLUTION INTRAVENOUS PRN
Status: DISCONTINUED | OUTPATIENT
Start: 2018-06-02 | End: 2018-06-02 | Stop reason: HOSPADM

## 2018-06-02 RX ADMIN — ALTEPLASE 2 MG: 2.2 INJECTION, POWDER, LYOPHILIZED, FOR SOLUTION INTRAVENOUS at 12:29

## 2018-06-02 RX ADMIN — SODIUM CHLORIDE 500 ML: 9 INJECTION, SOLUTION INTRAVENOUS at 13:00

## 2018-06-02 RX ADMIN — SODIUM CHLORIDE 250 ML: 9 INJECTION, SOLUTION INTRAVENOUS at 14:11

## 2018-06-02 RX ADMIN — ETOPOSIDE 200 MG: 20 INJECTION INTRAVENOUS at 15:12

## 2018-06-02 RX ADMIN — PEGFILGRASTIM 6 MG: KIT SUBCUTANEOUS at 16:22

## 2018-06-02 RX ADMIN — DEXAMETHASONE SODIUM PHOSPHATE 8 MG: 10 INJECTION, SOLUTION INTRAMUSCULAR; INTRAVENOUS at 13:03

## 2018-06-02 RX ADMIN — CISPLATIN: 100 INJECTION, SOLUTION INTRAVENOUS at 14:12

## 2018-06-06 DIAGNOSIS — D40.11 NEOPLASM OF UNCERTAIN BEHAVIOR OF RIGHT TESTIS: Primary | ICD-10-CM

## 2018-06-06 RX ORDER — MIRTAZAPINE 15 MG/1
15 TABLET, FILM COATED ORAL NIGHTLY
Qty: 30 TABLET | Refills: 0 | Status: SHIPPED | OUTPATIENT
Start: 2018-06-06 | End: 2018-07-18 | Stop reason: SDUPTHER

## 2018-06-09 ENCOUNTER — APPOINTMENT (OUTPATIENT)
Dept: CT IMAGING | Age: 43
End: 2018-06-09
Payer: COMMERCIAL

## 2018-06-09 ENCOUNTER — HOSPITAL ENCOUNTER (EMERGENCY)
Age: 43
Discharge: HOME | End: 2018-06-10
Attending: EMERGENCY MEDICINE
Payer: COMMERCIAL

## 2018-06-09 ENCOUNTER — APPOINTMENT (OUTPATIENT)
Dept: GENERAL RADIOLOGY | Age: 43
End: 2018-06-09
Payer: COMMERCIAL

## 2018-06-09 DIAGNOSIS — H66.001 ACUTE SUPPURATIVE OTITIS MEDIA OF RIGHT EAR WITHOUT SPONTANEOUS RUPTURE OF TYMPANIC MEMBRANE, RECURRENCE NOT SPECIFIED: Primary | ICD-10-CM

## 2018-06-09 LAB
ALBUMIN SERPL-MCNC: 3.8 G/DL (ref 3.5–5.2)
ALP BLD-CCNC: 147 U/L (ref 40–129)
ALT SERPL-CCNC: 41 U/L (ref 0–40)
AMORPHOUS: ABNORMAL
ANION GAP SERPL CALCULATED.3IONS-SCNC: 17 MMOL/L (ref 7–16)
AST SERPL-CCNC: 15 U/L (ref 0–39)
BACTERIA: ABNORMAL /HPF
BILIRUB SERPL-MCNC: 0.5 MG/DL (ref 0–1.2)
BILIRUBIN URINE: ABNORMAL
BLOOD, URINE: NEGATIVE
BUN BLDV-MCNC: 14 MG/DL (ref 6–20)
CALCIUM SERPL-MCNC: 9.2 MG/DL (ref 8.6–10.2)
CHLORIDE BLD-SCNC: 99 MMOL/L (ref 98–107)
CLARITY: CLEAR
CO2: 22 MMOL/L (ref 22–29)
COLOR: YELLOW
CREAT SERPL-MCNC: 0.7 MG/DL (ref 0.7–1.2)
EPITHELIAL CELLS, UA: ABNORMAL /HPF
GFR AFRICAN AMERICAN: >60
GFR NON-AFRICAN AMERICAN: >60 ML/MIN/1.73
GLUCOSE BLD-MCNC: 115 MG/DL (ref 74–109)
GLUCOSE URINE: NEGATIVE MG/DL
HCT VFR BLD CALC: 26.8 % (ref 37–54)
HEMOGLOBIN: 9 G/DL (ref 12.5–16.5)
KETONES, URINE: ABNORMAL MG/DL
LACTIC ACID: 1.4 MMOL/L (ref 0.5–2.2)
LEUKOCYTE ESTERASE, URINE: NEGATIVE
LIPASE: 19 U/L (ref 13–60)
MCH RBC QN AUTO: 27.8 PG (ref 26–35)
MCHC RBC AUTO-ENTMCNC: 33.6 % (ref 32–34.5)
MCV RBC AUTO: 82.7 FL (ref 80–99.9)
NITRITE, URINE: NEGATIVE
PDW BLD-RTO: 16 FL (ref 11.5–15)
PH UA: 5 (ref 5–9)
PLATELET # BLD: 236 E9/L (ref 130–450)
PMV BLD AUTO: 8.8 FL (ref 7–12)
POTASSIUM SERPL-SCNC: 3.8 MMOL/L (ref 3.5–5)
PROTEIN UA: 30 MG/DL
RBC # BLD: 3.24 E12/L (ref 3.8–5.8)
RBC UA: ABNORMAL /HPF (ref 0–2)
SODIUM BLD-SCNC: 138 MMOL/L (ref 132–146)
SPECIFIC GRAVITY UA: >=1.03 (ref 1–1.03)
TOTAL PROTEIN: 6.6 G/DL (ref 6.4–8.3)
UROBILINOGEN, URINE: 1 E.U./DL
WBC # BLD: 14.8 E9/L (ref 4.5–11.5)
WBC UA: ABNORMAL /HPF (ref 0–5)

## 2018-06-09 PROCEDURE — 87040 BLOOD CULTURE FOR BACTERIA: CPT

## 2018-06-09 PROCEDURE — 85027 COMPLETE CBC AUTOMATED: CPT

## 2018-06-09 PROCEDURE — 81001 URINALYSIS AUTO W/SCOPE: CPT

## 2018-06-09 PROCEDURE — 83605 ASSAY OF LACTIC ACID: CPT

## 2018-06-09 PROCEDURE — 6370000000 HC RX 637 (ALT 250 FOR IP): Performed by: EMERGENCY MEDICINE

## 2018-06-09 PROCEDURE — 36415 COLL VENOUS BLD VENIPUNCTURE: CPT

## 2018-06-09 PROCEDURE — 80053 COMPREHEN METABOLIC PANEL: CPT

## 2018-06-09 PROCEDURE — 2580000003 HC RX 258: Performed by: EMERGENCY MEDICINE

## 2018-06-09 PROCEDURE — 87088 URINE BACTERIA CULTURE: CPT

## 2018-06-09 PROCEDURE — 99284 EMERGENCY DEPT VISIT MOD MDM: CPT

## 2018-06-09 PROCEDURE — 96374 THER/PROPH/DIAG INJ IV PUSH: CPT

## 2018-06-09 PROCEDURE — 71045 X-RAY EXAM CHEST 1 VIEW: CPT

## 2018-06-09 PROCEDURE — 83690 ASSAY OF LIPASE: CPT

## 2018-06-09 PROCEDURE — 6360000002 HC RX W HCPCS: Performed by: EMERGENCY MEDICINE

## 2018-06-09 PROCEDURE — 70450 CT HEAD/BRAIN W/O DYE: CPT

## 2018-06-09 RX ORDER — ONDANSETRON 4 MG/1
4 TABLET, ORALLY DISINTEGRATING ORAL EVERY 8 HOURS PRN
COMMUNITY
End: 2018-10-10 | Stop reason: ALTCHOICE

## 2018-06-09 RX ORDER — AMOXICILLIN AND CLAVULANATE POTASSIUM 875; 125 MG/1; MG/1
1 TABLET, FILM COATED ORAL 2 TIMES DAILY
Qty: 14 TABLET | Refills: 0 | Status: SHIPPED | OUTPATIENT
Start: 2018-06-09 | End: 2018-06-09

## 2018-06-09 RX ORDER — 0.9 % SODIUM CHLORIDE 0.9 %
1000 INTRAVENOUS SOLUTION INTRAVENOUS ONCE
Status: COMPLETED | OUTPATIENT
Start: 2018-06-09 | End: 2018-06-09

## 2018-06-09 RX ORDER — AMOXICILLIN AND CLAVULANATE POTASSIUM 875; 125 MG/1; MG/1
1 TABLET, FILM COATED ORAL 2 TIMES DAILY
Qty: 20 TABLET | Refills: 0 | Status: SHIPPED | OUTPATIENT
Start: 2018-06-09 | End: 2018-06-19

## 2018-06-09 RX ORDER — AMOXICILLIN AND CLAVULANATE POTASSIUM 875; 125 MG/1; MG/1
1 TABLET, FILM COATED ORAL ONCE
Status: COMPLETED | OUTPATIENT
Start: 2018-06-09 | End: 2018-06-09

## 2018-06-09 RX ORDER — ONDANSETRON 2 MG/ML
4 INJECTION INTRAMUSCULAR; INTRAVENOUS ONCE
Status: COMPLETED | OUTPATIENT
Start: 2018-06-09 | End: 2018-06-09

## 2018-06-09 RX ADMIN — SODIUM CHLORIDE 1000 ML: 9 INJECTION, SOLUTION INTRAVENOUS at 21:01

## 2018-06-09 RX ADMIN — ONDANSETRON 4 MG: 2 INJECTION INTRAMUSCULAR; INTRAVENOUS at 21:01

## 2018-06-09 RX ADMIN — AMOXICILLIN AND CLAVULANATE POTASSIUM 1 TABLET: 875; 125 TABLET, FILM COATED ORAL at 23:58

## 2018-06-09 ASSESSMENT — PAIN SCALES - GENERAL: PAINLEVEL_OUTOF10: 7

## 2018-06-09 ASSESSMENT — ENCOUNTER SYMPTOMS
SHORTNESS OF BREATH: 0
CHEST TIGHTNESS: 0
TROUBLE SWALLOWING: 0
ABDOMINAL PAIN: 0
SINUS PRESSURE: 1
SORE THROAT: 1
NAUSEA: 1
VOMITING: 1

## 2018-06-09 ASSESSMENT — PAIN DESCRIPTION - PAIN TYPE: TYPE: ACUTE PAIN

## 2018-06-09 ASSESSMENT — PAIN DESCRIPTION - DESCRIPTORS: DESCRIPTORS: CONSTANT;PRESSURE

## 2018-06-09 ASSESSMENT — PAIN DESCRIPTION - FREQUENCY: FREQUENCY: CONTINUOUS

## 2018-06-10 VITALS
DIASTOLIC BLOOD PRESSURE: 71 MMHG | RESPIRATION RATE: 18 BRPM | HEIGHT: 71 IN | OXYGEN SATURATION: 100 % | WEIGHT: 170 LBS | SYSTOLIC BLOOD PRESSURE: 97 MMHG | HEART RATE: 63 BPM | TEMPERATURE: 98.8 F | BODY MASS INDEX: 23.8 KG/M2

## 2018-06-11 ENCOUNTER — OFFICE VISIT (OUTPATIENT)
Dept: ONCOLOGY | Age: 43
End: 2018-06-11
Payer: COMMERCIAL

## 2018-06-11 ENCOUNTER — HOSPITAL ENCOUNTER (INPATIENT)
Age: 43
LOS: 3 days | Discharge: HOME | DRG: 102 | End: 2018-06-14
Attending: INTERNAL MEDICINE | Admitting: INTERNAL MEDICINE
Payer: COMMERCIAL

## 2018-06-11 ENCOUNTER — APPOINTMENT (OUTPATIENT)
Dept: MRI IMAGING | Age: 43
DRG: 102 | End: 2018-06-11
Attending: INTERNAL MEDICINE
Payer: COMMERCIAL

## 2018-06-11 ENCOUNTER — HOSPITAL ENCOUNTER (OUTPATIENT)
Dept: INFUSION THERAPY | Age: 43
Discharge: HOME | End: 2018-06-11
Payer: COMMERCIAL

## 2018-06-11 VITALS
WEIGHT: 173 LBS | HEIGHT: 71 IN | TEMPERATURE: 97.8 F | BODY MASS INDEX: 24.22 KG/M2 | RESPIRATION RATE: 18 BRPM | HEART RATE: 67 BPM | SYSTOLIC BLOOD PRESSURE: 97 MMHG | DIASTOLIC BLOOD PRESSURE: 56 MMHG

## 2018-06-11 DIAGNOSIS — D40.11 NEOPLASM OF UNCERTAIN BEHAVIOR OF RIGHT TESTIS: ICD-10-CM

## 2018-06-11 DIAGNOSIS — R56.9 SEIZURE (HCC): Primary | ICD-10-CM

## 2018-06-11 DIAGNOSIS — C62.91 SEMINOMA OF RIGHT TESTIS, STAGE 1 (HCC): Primary | ICD-10-CM

## 2018-06-11 DIAGNOSIS — D50.9 IRON DEFICIENCY ANEMIA, UNSPECIFIED IRON DEFICIENCY ANEMIA TYPE: ICD-10-CM

## 2018-06-11 DIAGNOSIS — C62.91 SEMINOMA OF RIGHT TESTIS, STAGE 1 (HCC): ICD-10-CM

## 2018-06-11 PROBLEM — R51.9 INTRACTABLE HEADACHE: Status: ACTIVE | Noted: 2018-06-11
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
## 2018-06-11 LAB
ALBUMIN SERPL-MCNC: 4.2 G/DL (ref 3.5–5.2)
ALP BLD-CCNC: 116 U/L (ref 40–129)
ALT SERPL-CCNC: 29 U/L (ref 0–40)
ANION GAP SERPL CALCULATED.3IONS-SCNC: 15 MMOL/L (ref 7–16)
AST SERPL-CCNC: 9 U/L (ref 0–39)
BASOPHILS ABSOLUTE: 0.09 E9/L (ref 0–0.2)
BASOPHILS RELATIVE PERCENT: 1.4 % (ref 0–2)
BILIRUB SERPL-MCNC: 0.6 MG/DL (ref 0–1.2)
BUN BLDV-MCNC: 12 MG/DL (ref 6–20)
CALCIUM SERPL-MCNC: 9.4 MG/DL (ref 8.6–10.2)
CHLORIDE BLD-SCNC: 99 MMOL/L (ref 98–107)
CO2: 24 MMOL/L (ref 22–29)
CREAT SERPL-MCNC: 0.8 MG/DL (ref 0.7–1.2)
EOSINOPHILS ABSOLUTE: 0.04 E9/L (ref 0.05–0.5)
EOSINOPHILS RELATIVE PERCENT: 0.6 % (ref 0–6)
GFR AFRICAN AMERICAN: >60
GFR NON-AFRICAN AMERICAN: >60 ML/MIN/1.73
GLUCOSE BLD-MCNC: 120 MG/DL (ref 74–109)
HCT VFR BLD CALC: 28 % (ref 37–54)
HEMOGLOBIN: 9.2 G/DL (ref 12.5–16.5)
IMMATURE GRANULOCYTES #: 0.03 E9/L
IMMATURE GRANULOCYTES %: 0.5 % (ref 0–5)
LYMPHOCYTES ABSOLUTE: 1.17 E9/L (ref 1.5–4)
LYMPHOCYTES RELATIVE PERCENT: 17.7 % (ref 20–42)
MAGNESIUM: 1.7 MG/DL (ref 1.6–2.6)
MCH RBC QN AUTO: 28.4 PG (ref 26–35)
MCHC RBC AUTO-ENTMCNC: 32.9 % (ref 32–34.5)
MCV RBC AUTO: 86.4 FL (ref 80–99.9)
MONOCYTES ABSOLUTE: 0.52 E9/L (ref 0.1–0.95)
MONOCYTES RELATIVE PERCENT: 7.9 % (ref 2–12)
NEUTROPHILS ABSOLUTE: 4.77 E9/L (ref 1.8–7.3)
NEUTROPHILS RELATIVE PERCENT: 71.9 % (ref 43–80)
PDW BLD-RTO: 16.2 FL (ref 11.5–15)
PLATELET # BLD: 155 E9/L (ref 130–450)
PMV BLD AUTO: 9.1 FL (ref 7–12)
POTASSIUM SERPL-SCNC: 3.7 MMOL/L (ref 3.5–5)
RBC # BLD: 3.24 E12/L (ref 3.8–5.8)
SODIUM BLD-SCNC: 138 MMOL/L (ref 132–146)
TOTAL PROTEIN: 7.1 G/DL (ref 6.4–8.3)
WBC # BLD: 6.6 E9/L (ref 4.5–11.5)

## 2018-06-11 PROCEDURE — 70553 MRI BRAIN STEM W/O & W/DYE: CPT

## 2018-06-11 PROCEDURE — 97530 THERAPEUTIC ACTIVITIES: CPT | Performed by: PHYSICAL THERAPIST

## 2018-06-11 PROCEDURE — 6370000000 HC RX 637 (ALT 250 FOR IP): Performed by: FAMILY MEDICINE

## 2018-06-11 PROCEDURE — 6370000000 HC RX 637 (ALT 250 FOR IP): Performed by: INTERNAL MEDICINE

## 2018-06-11 PROCEDURE — 6360000002 HC RX W HCPCS: Performed by: INTERNAL MEDICINE

## 2018-06-11 PROCEDURE — 80053 COMPREHEN METABOLIC PANEL: CPT

## 2018-06-11 PROCEDURE — 1200000000 HC SEMI PRIVATE

## 2018-06-11 PROCEDURE — G8978 MOBILITY CURRENT STATUS: HCPCS | Performed by: PHYSICAL THERAPIST

## 2018-06-11 PROCEDURE — 99214 OFFICE O/P EST MOD 30 MIN: CPT | Performed by: INTERNAL MEDICINE

## 2018-06-11 PROCEDURE — 83735 ASSAY OF MAGNESIUM: CPT

## 2018-06-11 PROCEDURE — 6360000002 HC RX W HCPCS: Performed by: FAMILY MEDICINE

## 2018-06-11 PROCEDURE — 99222 1ST HOSP IP/OBS MODERATE 55: CPT | Performed by: NEUROLOGICAL SURGERY

## 2018-06-11 PROCEDURE — 6360000004 HC RX CONTRAST MEDICATION: Performed by: RADIOLOGY

## 2018-06-11 PROCEDURE — 97161 PT EVAL LOW COMPLEX 20 MIN: CPT | Performed by: PHYSICAL THERAPIST

## 2018-06-11 PROCEDURE — 85025 COMPLETE CBC W/AUTO DIFF WBC: CPT

## 2018-06-11 PROCEDURE — 36415 COLL VENOUS BLD VENIPUNCTURE: CPT | Performed by: INTERNAL MEDICINE

## 2018-06-11 PROCEDURE — G8979 MOBILITY GOAL STATUS: HCPCS | Performed by: PHYSICAL THERAPIST

## 2018-06-11 PROCEDURE — A9579 GAD-BASE MR CONTRAST NOS,1ML: HCPCS | Performed by: RADIOLOGY

## 2018-06-11 PROCEDURE — 99202 OFFICE O/P NEW SF 15 MIN: CPT

## 2018-06-11 RX ORDER — MIRTAZAPINE 15 MG/1
15 TABLET, FILM COATED ORAL NIGHTLY
Status: DISCONTINUED | OUTPATIENT
Start: 2018-06-11 | End: 2018-06-14 | Stop reason: HOSPADM

## 2018-06-11 RX ORDER — PHENYTOIN SODIUM 100 MG/1
100 CAPSULE, EXTENDED RELEASE ORAL 3 TIMES DAILY
Status: DISCONTINUED | OUTPATIENT
Start: 2018-06-11 | End: 2018-06-14 | Stop reason: HOSPADM

## 2018-06-11 RX ORDER — LEVETIRACETAM 500 MG/1
1500 TABLET ORAL 2 TIMES DAILY
Status: DISCONTINUED | OUTPATIENT
Start: 2018-06-11 | End: 2018-06-14 | Stop reason: HOSPADM

## 2018-06-11 RX ORDER — LORAZEPAM 0.5 MG/1
0.5 TABLET ORAL EVERY 6 HOURS PRN
Status: DISCONTINUED | OUTPATIENT
Start: 2018-06-11 | End: 2018-06-14 | Stop reason: HOSPADM

## 2018-06-11 RX ORDER — HEPARIN SODIUM (PORCINE) LOCK FLUSH IV SOLN 100 UNIT/ML 100 UNIT/ML
300 SOLUTION INTRAVENOUS PRN
Status: DISCONTINUED | OUTPATIENT
Start: 2018-06-11 | End: 2018-06-14 | Stop reason: HOSPADM

## 2018-06-11 RX ORDER — ONDANSETRON 2 MG/ML
4 INJECTION INTRAMUSCULAR; INTRAVENOUS EVERY 6 HOURS PRN
Status: DISCONTINUED | OUTPATIENT
Start: 2018-06-11 | End: 2018-06-14 | Stop reason: HOSPADM

## 2018-06-11 RX ORDER — ACETAMINOPHEN 325 MG/1
650 TABLET ORAL EVERY 4 HOURS PRN
Status: DISCONTINUED | OUTPATIENT
Start: 2018-06-11 | End: 2018-06-14 | Stop reason: HOSPADM

## 2018-06-11 RX ORDER — SODIUM CHLORIDE 0.9 % (FLUSH) 0.9 %
10 SYRINGE (ML) INJECTION PRN
Status: DISCONTINUED | OUTPATIENT
Start: 2018-06-11 | End: 2018-06-14 | Stop reason: HOSPADM

## 2018-06-11 RX ORDER — HEPARIN SODIUM (PORCINE) LOCK FLUSH IV SOLN 100 UNIT/ML 100 UNIT/ML
SOLUTION INTRAVENOUS
Status: DISPENSED
Start: 2018-06-11 | End: 2018-06-12

## 2018-06-11 RX ORDER — AMOXICILLIN AND CLAVULANATE POTASSIUM 875; 125 MG/1; MG/1
1 TABLET, FILM COATED ORAL 2 TIMES DAILY
Status: DISCONTINUED | OUTPATIENT
Start: 2018-06-11 | End: 2018-06-14 | Stop reason: HOSPADM

## 2018-06-11 RX ORDER — SODIUM CHLORIDE AND POTASSIUM CHLORIDE .9; .15 G/100ML; G/100ML
SOLUTION INTRAVENOUS CONTINUOUS
Status: DISCONTINUED | OUTPATIENT
Start: 2018-06-11 | End: 2018-06-14 | Stop reason: HOSPADM

## 2018-06-11 RX ADMIN — SODIUM CHLORIDE AND POTASSIUM CHLORIDE: 9; 1.49 INJECTION, SOLUTION INTRAVENOUS at 17:15

## 2018-06-11 RX ADMIN — PHENYTOIN SODIUM 100 MG: 100 CAPSULE ORAL at 17:15

## 2018-06-11 RX ADMIN — ACETAMINOPHEN 650 MG: 325 TABLET, FILM COATED ORAL at 20:47

## 2018-06-11 RX ADMIN — LACOSAMIDE 150 MG: 100 TABLET, FILM COATED ORAL at 21:54

## 2018-06-11 RX ADMIN — AMOXICILLIN AND CLAVULANATE POTASSIUM 1 TABLET: 875; 125 TABLET, FILM COATED ORAL at 21:21

## 2018-06-11 RX ADMIN — GADOTERIDOL 20 ML: 279.3 INJECTION, SOLUTION INTRAVENOUS at 16:01

## 2018-06-11 RX ADMIN — LEVETIRACETAM 1500 MG: 500 TABLET ORAL at 21:21

## 2018-06-11 RX ADMIN — ALTEPLASE 1 MG: 2.2 INJECTION, POWDER, LYOPHILIZED, FOR SOLUTION INTRAVENOUS at 23:51

## 2018-06-11 RX ADMIN — ALTEPLASE 1 MG: 2.2 INJECTION, POWDER, LYOPHILIZED, FOR SOLUTION INTRAVENOUS at 17:12

## 2018-06-11 RX ADMIN — MIRTAZAPINE 15 MG: 15 TABLET, FILM COATED ORAL at 21:21

## 2018-06-11 RX ADMIN — LORAZEPAM 0.5 MG: 0.5 TABLET ORAL at 21:21

## 2018-06-11 RX ADMIN — PHENYTOIN SODIUM 100 MG: 100 CAPSULE ORAL at 21:21

## 2018-06-11 ASSESSMENT — PAIN DESCRIPTION - DESCRIPTORS: DESCRIPTORS: HEADACHE

## 2018-06-11 ASSESSMENT — PAIN SCALES - GENERAL
PAINLEVEL_OUTOF10: 7
PAINLEVEL_OUTOF10: 0

## 2018-06-11 ASSESSMENT — PAIN DESCRIPTION - ONSET: ONSET: GRADUAL

## 2018-06-11 ASSESSMENT — PAIN DESCRIPTION - LOCATION: LOCATION: HEAD

## 2018-06-11 ASSESSMENT — PAIN DESCRIPTION - PROGRESSION: CLINICAL_PROGRESSION: GRADUALLY WORSENING

## 2018-06-11 ASSESSMENT — PAIN DESCRIPTION - PAIN TYPE: TYPE: ACUTE PAIN

## 2018-06-11 ASSESSMENT — PAIN DESCRIPTION - FREQUENCY: FREQUENCY: INTERMITTENT

## 2018-06-11 NOTE — PROGRESS NOTES
Patient to go up and down 10  step(s) using 1 rail(s) with  Supervision       Increase rom in affected joints by 10%  Increase strength in affected mm groups by 1/3 grade  Increase balance to allow for improvement towards functional goals. Increase endurance to allow for improvement towards functional goals.          Glenys Ricardo, PT

## 2018-06-11 NOTE — H&P
History Narrative    ** Merged History Encounter **            ROS:  General:   Admits to generalized malaise and fatigue. Admits to weight loss. Psychological:   Denies anxiety, disorientation or hallucinations    ENT:    Admits to an intractable headache. The pain does have some localization to the right frontal lobe. Cardiovascular:   Denies any chest pain, irregular heartbeats, or palpitations. No paroxysmal nocturnal dyspnea. Respiratory:   Denies shortness of breath, coughing, sputum production, hemoptysis, or wheezing. No orthopnea. Gastrointestinal:   Admits to decreased oral intake and weight loss. No overt abdominal pain. Genito-Urinary:    Denies any urgency, frequency, hematuria. Voiding without difficulty. Musculoskeletal:   Denies joint pain, joint stiffness, joint swelling or muscle pain    Neurology:    Denies any headache or focal neurological deficits. Admits to generalized weakness and deconditioning. Derm:    Denies any rashes, ulcers, or excoriations. Denies bruising. Extremities:   Denies any lower extremity swelling or edema. PHYSICAL EXAM:  VITALS:  Vitals:    06/11/18 1430   BP: (!) 84/55   Pulse: 64   Resp: 18   Temp: 97.4 °F (36.3 °C)   SpO2: 100%         CONSTITUTIONAL:    Awake, alert, cooperative, no apparent distress, and appears stated age    EYES:    PERRL, EOMI, sclera clear, conjunctiva normal    ENT:    Normocephalic, atraumatic, sinuses nontender on palpation. External ears without lesions. Oral pharynx with moist mucus membranes. Tonsils without erythema or exudates. Obvious signs of previous surgery. NECK:    Supple, symmetrical, trachea midline, no adenopathy, thyroid symmetric, not enlarged and no tenderness, skin normal, no bruits, no JVD    HEMATOLOGIC/LYMPHATICS:    No cervical lymphadenopathy and no supraclavicular lymphadenopathy    LUNGS:    Symmetric.  No increased work of breathing, good air exchange, clear to auscultation 06/11/2018    LABGLOM >60 06/11/2018    GLUCOSE 120 06/11/2018       ASSESSMENT:  1. Intractable headache with increasing dizziness and frequent falls at home in the setting of previous subdural hematoma and subarachnoid hemorrhage status post craniotomy and dural plasty in the past with external hydrocephalus  2. Seizure disorder on multiple epileptic medications  3. 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 Severe protein calorie malnutrition with decreased oral intake  5. Hyperlipidemia    PLAN:  The patient will undergo MRI evaluation for further workup of his intractable headache. This is certainly a concerning finding in the setting of his history as documented above. The patient does seem to exhibit some focal deficits and the neurosurgical team will be asked to provide consultation as well. Seizure medications will be continued. IV fluid resuscitation will be undertaken and we will ask the dietary team to evaluate for supplement recommendations as the patient has become profoundly malnourished. The patient's wife was present and updated accordingly. Certainly many of the patient's symptoms are related to his current chemotherapy regimen including his decreased oral intake and fatigue.     Aracely Yañez D.O., Sudhakar Allison  2:44 PM  6/11/2018

## 2018-06-11 NOTE — PROGRESS NOTES
Dr. Adam Perera did return call and I read him MRI results, no further orders. He will see the patient tomorrow.   Electronically signed by Calista Lin RN on 6/11/2018 at 6:22 PM

## 2018-06-12 LAB
ALBUMIN SERPL-MCNC: 3.7 G/DL (ref 3.5–5.2)
ALP BLD-CCNC: 101 U/L (ref 40–129)
ALT SERPL-CCNC: 21 U/L (ref 0–40)
ANION GAP SERPL CALCULATED.3IONS-SCNC: 10 MMOL/L (ref 7–16)
AST SERPL-CCNC: 8 U/L (ref 0–39)
BASOPHILS ABSOLUTE: 0.06 E9/L (ref 0–0.2)
BASOPHILS RELATIVE PERCENT: 1 % (ref 0–2)
BILIRUB SERPL-MCNC: 0.5 MG/DL (ref 0–1.2)
BUN BLDV-MCNC: 9 MG/DL (ref 6–20)
CALCIUM SERPL-MCNC: 9.1 MG/DL (ref 8.6–10.2)
CHLORIDE BLD-SCNC: 104 MMOL/L (ref 98–107)
CO2: 23 MMOL/L (ref 22–29)
CREAT SERPL-MCNC: 0.6 MG/DL (ref 0.7–1.2)
EOSINOPHILS ABSOLUTE: 0.07 E9/L (ref 0.05–0.5)
EOSINOPHILS RELATIVE PERCENT: 1.1 % (ref 0–6)
GFR AFRICAN AMERICAN: >60
GFR NON-AFRICAN AMERICAN: >60 ML/MIN/1.73
GLUCOSE BLD-MCNC: 85 MG/DL (ref 74–109)
HCT VFR BLD CALC: 23.8 % (ref 37–54)
HEMOGLOBIN: 8 G/DL (ref 12.5–16.5)
IMMATURE GRANULOCYTES #: 0.1 E9/L
IMMATURE GRANULOCYTES %: 1.6 % (ref 0–5)
LYMPHOCYTES ABSOLUTE: 1.46 E9/L (ref 1.5–4)
LYMPHOCYTES RELATIVE PERCENT: 23.7 % (ref 20–42)
MAGNESIUM: 1.7 MG/DL (ref 1.6–2.6)
MCH RBC QN AUTO: 28.9 PG (ref 26–35)
MCHC RBC AUTO-ENTMCNC: 33.6 % (ref 32–34.5)
MCV RBC AUTO: 85.9 FL (ref 80–99.9)
MONOCYTES ABSOLUTE: 0.71 E9/L (ref 0.1–0.95)
MONOCYTES RELATIVE PERCENT: 11.5 % (ref 2–12)
NEUTROPHILS ABSOLUTE: 3.76 E9/L (ref 1.8–7.3)
NEUTROPHILS RELATIVE PERCENT: 61.1 % (ref 43–80)
PDW BLD-RTO: 17.1 FL (ref 11.5–15)
PHOSPHORUS: 2.7 MG/DL (ref 2.5–4.5)
PLATELET # BLD: 112 E9/L (ref 130–450)
PMV BLD AUTO: 9.1 FL (ref 7–12)
POTASSIUM SERPL-SCNC: 3.8 MMOL/L (ref 3.5–5)
RBC # BLD: 2.77 E12/L (ref 3.8–5.8)
SODIUM BLD-SCNC: 137 MMOL/L (ref 132–146)
TOTAL PROTEIN: 6.3 G/DL (ref 6.4–8.3)
TSH SERPL DL<=0.05 MIU/L-ACNC: 1.62 UIU/ML (ref 0.27–4.2)
URINE CULTURE, ROUTINE: NORMAL
WBC # BLD: 6.2 E9/L (ref 4.5–11.5)

## 2018-06-12 PROCEDURE — 80053 COMPREHEN METABOLIC PANEL: CPT

## 2018-06-12 PROCEDURE — G8987 SELF CARE CURRENT STATUS: HCPCS

## 2018-06-12 PROCEDURE — 97535 SELF CARE MNGMENT TRAINING: CPT

## 2018-06-12 PROCEDURE — 84100 ASSAY OF PHOSPHORUS: CPT

## 2018-06-12 PROCEDURE — 6370000000 HC RX 637 (ALT 250 FOR IP): Performed by: INTERNAL MEDICINE

## 2018-06-12 PROCEDURE — 1200000000 HC SEMI PRIVATE

## 2018-06-12 PROCEDURE — 6360000002 HC RX W HCPCS: Performed by: INTERNAL MEDICINE

## 2018-06-12 PROCEDURE — 36415 COLL VENOUS BLD VENIPUNCTURE: CPT

## 2018-06-12 PROCEDURE — 97530 THERAPEUTIC ACTIVITIES: CPT

## 2018-06-12 PROCEDURE — 84443 ASSAY THYROID STIM HORMONE: CPT

## 2018-06-12 PROCEDURE — 97166 OT EVAL MOD COMPLEX 45 MIN: CPT

## 2018-06-12 PROCEDURE — 83735 ASSAY OF MAGNESIUM: CPT

## 2018-06-12 PROCEDURE — G8988 SELF CARE GOAL STATUS: HCPCS

## 2018-06-12 PROCEDURE — 6370000000 HC RX 637 (ALT 250 FOR IP): Performed by: FAMILY MEDICINE

## 2018-06-12 PROCEDURE — 85025 COMPLETE CBC W/AUTO DIFF WBC: CPT

## 2018-06-12 RX ORDER — KETOROLAC TROMETHAMINE 30 MG/ML
30 INJECTION, SOLUTION INTRAMUSCULAR; INTRAVENOUS EVERY 6 HOURS PRN
Status: DISCONTINUED | OUTPATIENT
Start: 2018-06-12 | End: 2018-06-14 | Stop reason: HOSPADM

## 2018-06-12 RX ADMIN — PHENYTOIN SODIUM 100 MG: 100 CAPSULE ORAL at 09:05

## 2018-06-12 RX ADMIN — PHENYTOIN SODIUM 100 MG: 100 CAPSULE ORAL at 14:52

## 2018-06-12 RX ADMIN — ENOXAPARIN SODIUM 40 MG: 40 INJECTION, SOLUTION INTRAVENOUS; SUBCUTANEOUS at 11:56

## 2018-06-12 RX ADMIN — PHENYTOIN SODIUM 100 MG: 100 CAPSULE ORAL at 20:02

## 2018-06-12 RX ADMIN — KETOROLAC TROMETHAMINE 30 MG: 30 INJECTION, SOLUTION INTRAMUSCULAR at 20:05

## 2018-06-12 RX ADMIN — SODIUM CHLORIDE AND POTASSIUM CHLORIDE: 9; 1.49 INJECTION, SOLUTION INTRAVENOUS at 20:05

## 2018-06-12 RX ADMIN — LACOSAMIDE 150 MG: 100 TABLET, FILM COATED ORAL at 09:05

## 2018-06-12 RX ADMIN — LEVETIRACETAM 1500 MG: 500 TABLET ORAL at 09:05

## 2018-06-12 RX ADMIN — AMOXICILLIN AND CLAVULANATE POTASSIUM 1 TABLET: 875; 125 TABLET, FILM COATED ORAL at 20:02

## 2018-06-12 RX ADMIN — LACOSAMIDE 150 MG: 100 TABLET, FILM COATED ORAL at 20:02

## 2018-06-12 RX ADMIN — AMOXICILLIN AND CLAVULANATE POTASSIUM 1 TABLET: 875; 125 TABLET, FILM COATED ORAL at 09:05

## 2018-06-12 RX ADMIN — LEVETIRACETAM 1500 MG: 500 TABLET ORAL at 20:01

## 2018-06-12 RX ADMIN — MIRTAZAPINE 15 MG: 15 TABLET, FILM COATED ORAL at 20:02

## 2018-06-12 RX ADMIN — ACETAMINOPHEN 650 MG: 325 TABLET, FILM COATED ORAL at 09:07

## 2018-06-12 ASSESSMENT — PAIN SCALES - GENERAL
PAINLEVEL_OUTOF10: 5
PAINLEVEL_OUTOF10: 2
PAINLEVEL_OUTOF10: 8

## 2018-06-12 ASSESSMENT — PAIN DESCRIPTION - PAIN TYPE: TYPE: ACUTE PAIN

## 2018-06-12 ASSESSMENT — PAIN DESCRIPTION - DESCRIPTORS: DESCRIPTORS: HEADACHE

## 2018-06-12 ASSESSMENT — PAIN DESCRIPTION - LOCATION: LOCATION: HEAD

## 2018-06-12 NOTE — PROGRESS NOTES
HPI:  Ge Varma seems to be resting comfortably during my examination this morning but continues to complain of a mild headache. MRI of the brain has been reviewed at length with the patient and the neurosurgical team is providing consultation. Lab work and vital signs are otherwise stable. The patient's wife was updated extensively at the bedside yesterday. Patient voiced no new complaints since hospital admission. Questions, answers, and tests reviewed. ROS:  Cardiovascular:   Denies any chest pain, irregular heartbeats, or palpitations. Respiratory:   Denies shortness of breath, coughing, sputum production, hemoptysis, or wheezing. Gastrointestinal:   Denies nausea, vomiting, diarrhea, or constipation. Denies any abdominal pain. Admits to a decreased appetite and decreased oral intake. Extremities:   Denies any lower extremity swelling or edema. Neurology:    Admits to a right-sided focal headache. Admits to generalized weakness and deconditioning. Derm:    Denies any rashes, ulcers, or excoriations. Denies bruising. Genitourinary:    Denies any urgency, frequency, hematuria. Voiding without difficulty. Physical Exam:    Vitals:    06/11/18 2350   BP:    Pulse:    Resp:    Temp:    SpO2: 99%       HEENT:  PERRLA. EOMI. Sclera clear. Buccal mucosa moist.    Neck:  Supple. Trachea midline. No thyromegaly. No JVD. No bruits. Heart:  Rhythm regular, rate controlled. No murmurs. Lungs:  Symmetrical. Clear to auscultation bilaterally. No wheezes. No rhonchi. No rales. Abdomen: Soft. Non-tender. Non-distended. Bowel sounds positive. No organomegaly or masses. No pain on palpation    Extremities:  Peripheral pulses present. No peripheral edema. No ulcers. Neurologic:  Alert x 3. Very fine focal deficits identified with proprioception. Skin:  No petechia. No hemorrhage. No wounds.     CBC with Differential:    Lab Results   Component Value Date    WBC 6.2 06/12/2018    RBC 2.77 06/12/2018    HGB 8.0 06/12/2018    HCT 23.8 06/12/2018     06/12/2018    MCV 85.9 06/12/2018    MCH 28.9 06/12/2018    MCHC 33.6 06/12/2018    RDW 17.1 06/12/2018    METASPCT 1.0 05/29/2018    LYMPHOPCT 23.7 06/12/2018    MONOPCT 11.5 06/12/2018    MYELOPCT 1.5 05/29/2018    BASOPCT 1.0 06/12/2018    MONOSABS 0.71 06/12/2018    LYMPHSABS 1.46 06/12/2018    EOSABS 0.07 06/12/2018    BASOSABS 0.06 06/12/2018     CMP:    Lab Results   Component Value Date     06/12/2018    K 3.8 06/12/2018    K 4.7 01/09/2018     06/12/2018    CO2 23 06/12/2018    BUN 9 06/12/2018    CREATININE 0.6 06/12/2018    GFRAA >60 06/12/2018    LABGLOM >60 06/12/2018    GLUCOSE 85 06/12/2018    PROT 6.3 06/12/2018    LABALBU 3.7 06/12/2018    CALCIUM 9.1 06/12/2018    BILITOT 0.5 06/12/2018    ALKPHOS 101 06/12/2018    AST 8 06/12/2018    ALT 21 06/12/2018       Other Data:      Intake/Output Summary (Last 24 hours) at 06/12/18 0920  Last data filed at 06/12/18 0617   Gross per 24 hour   Intake             1061 ml   Output              450 ml   Net              611 ml         Scheduled Medications:   amoxicillin-clavulanate  1 tablet Oral BID    mirtazapine  15 mg Oral Nightly    phenytoin  100 mg Oral TID    levETIRAcetam  1,500 mg Oral BID    lacosamide  150 mg Oral BID    heparin flush             Infusion Medications:   0.9% NaCl with KCl 20 mEq 75 mL/hr at 06/11/18 9783       Assessment:   1. Intractable headache with increasing dizziness and frequent falls at home in the setting of previous subdural hematoma and subarachnoid hemorrhage status post craniotomy and duraplasty  2. 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 Seizure disorder on multiple epileptic medications  4. 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  Severe protein calorie malnutrition with decreased oral intake  6. Hyperlipidemia    Plan:   MRI results of been reviewed at length with the patient. The patient's headache may be explained by the underlying mastoid effusion and transverse sinus/jugular vein thrombus. Postoperative changes were identified without any new abnormality. We will await final recommendations from the neurosurgical team. We have asked the therapy teams to work with the patient. The dietary team will also evaluate for recommendations in regards to the patient's poor appetite and decreased oral intake. Continue current therapy. See orders for further plan of care.     Rahel Love D.O.  9:20 AM  6/12/2018

## 2018-06-12 NOTE — PROGRESS NOTES
Nutrition Assessment    Type and Reason for Visit: Initial, Positive Nutrition Screen    Nutrition Recommendations: None at this time    Malnutrition Assessment:  · Malnutrition Status: Meets the criteria for severe malnutrition  · Context: Chronic illness  · Findings of the 6 clinical characteristics of malnutrition (Minimum of 2 out of 6 clinical characteristics is required to make the diagnosis of moderate or severe Protein Calorie Malnutrition based on AND/ASPEN Guidelines):  1. Energy Intake-Less than or equal to 75%, greater than or equal to 1 month    2. Weight Loss-10% loss or greater (17%), in 6 months (7 months)  3. Fat Loss-No significant subcutaneous fat loss,    4. Muscle Loss-No significant muscle mass loss,    5. Fluid Accumulation-No significant fluid accumulation,    6.   Strength-Not measured    Nutrition Diagnosis:   · Problem: in context of chronic illness, Severe malnutrition  · Etiology: related to Insufficient energy/nutrient consumption, Catabolic illness (Metabolic demands cancer/chemo)     Signs and symptoms:  as evidenced by Diet history of poor intake, Intake 25-50%, Weight loss greater than or equal to 10% in 6 months, Nausea, Vomiting    Nutrition Assessment:  · Subjective Assessment: Patient states weight loss since brain injury November 2017; diminished appetite since chemo x 2 months PTA, experiencing N/V 4 days PTA and currently no appetite  · Nutrition-Focused Physical Findings: No evidence of fat/muscle wasting, sitting up in bed, stated loss of appetite, abdomen soft, positive bowel sounds, no edema present  · Wound Type: None  · Current Nutrition Therapies:  · Oral Diet Orders: General   · Oral Diet intake: 26-50%  · Oral Nutrition Supplement (ONS) Orders: None  · Anthropometric Measures:  · Ht: 5' 11\" (180.3 cm)   · Current Body Wt: 175 lb (79.4 kg)  · Admission Body Wt: 173 lb (78.5 kg) (6/11, no method)  · Usual Body Wt: 210 lb (95.3 kg) (Patient stated, confirmed weight loss EMR 11/26/17, 208 lb, bed scale, 1/18/18 202 lb, bed scale, 5/7/18 192 lb, no method)  · % Weight Change: 17%,  7 months  · Ideal Body Wt: 172 lb (78 kg), % Ideal Body 102%  · Adjusted Body Wt:  , body weight adjusted for    · BMI Classification: BMI 18.5 - 24.9 Normal Weight  · Comparative Standards (Estimated Nutrition Needs):  · Estimated Daily Total Kcal: 6035-8352 kcal (MSJ 1715 x 1.3 SF (chemo) )  · Estimated Daily Protein (g):  gm    Estimated Intake vs Estimated Needs: Intake Less Than Needs    Nutrition Risk Level: High    Nutrition Interventions:   Continue current diet, Start ONS (Ensure Enlive 2x/day)  Continued Inpatient Monitoring, No recommendations at this time, Education Not Indicated    Nutrition Evaluation:   · Evaluation: Goals set   · Goals: % current diet and supplement with good tolerance    · Monitoring: Meal Intake, Supplement Intake, Diet Tolerance, Nausea or Vomiting, Skin Integrity, Fluid Balance, Weight, Comparative Standards, Pertinent Labs    See Adult Nutrition Doc Flowsheet for more detail.      Electronically signed by Olga Major on 6/12/18 at 12:04 PM    Contact Number: [phone]

## 2018-06-12 NOTE — PROGRESS NOTES
Occupational Therapy  Occupational Therapy Initial Assessment    Date:2018  Patient Name: Austin Gonzales  MRN: [id number]  : 1975  ROOM #: 0415/0415-01    Placement recommendation:[facility]OT   Equipment prescriptions needed:  TBD shower seat ? AM-PAC Daily Activity Inpatient   How much help for putting on and taking off regular lower body clothing?: A Little  How much help for Bathing?: A Little  How much help for Toileting?: A Little  How much help for putting on and taking off regular upper body clothing?: A Little  How much help for taking care of personal grooming?: None  How much help for eating meals?: None  AM-PAC Inpatient Daily Activity Raw Score: 20  AM-PAC Inpatient ADL T-Scale Score : 42.03  ADL Inpatient CMS 0-100% Score: 38.32  ADL Inpatient CMS G-Code Modifier : CJ      Diagnosis / Problem List: No diagnosis found.    Patient Active Problem List   Diagnosis    SAH (subarachnoid hemorrhage) (HCC)    Subdural hematoma (HCC)    Acute respiratory failure (HCC)    Hypoalbuminemia    Electrolyte imbalance    Hypophosphatemia    Fall (on) (from) other stairs and steps, initial encounter    Pulmonary embolus ([address]    Tumor of testis of uncertain behavior    Seminoma of right testis, stage 1 ([address]    History of craniotomy    Uncal herniation (HCC)    Dysphagia    Hypotension    Hemiplegia (HCC)    Chronic respiratory failure (HCC)    History of DVT (deep vein thrombosis)    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)          The admitting diagnosis and active problem list as listed above have been reviewed prior to the initiation of this

## 2018-06-13 LAB
ANION GAP SERPL CALCULATED.3IONS-SCNC: 12 MMOL/L (ref 7–16)
BASOPHILS ABSOLUTE: 0.03 E9/L (ref 0–0.2)
BASOPHILS RELATIVE PERCENT: 0.5 % (ref 0–2)
BUN BLDV-MCNC: 6 MG/DL (ref 6–20)
CALCIUM SERPL-MCNC: 8.7 MG/DL (ref 8.6–10.2)
CHLORIDE BLD-SCNC: 105 MMOL/L (ref 98–107)
CO2: 22 MMOL/L (ref 22–29)
CREAT SERPL-MCNC: 0.7 MG/DL (ref 0.7–1.2)
EOSINOPHILS ABSOLUTE: 0.08 E9/L (ref 0.05–0.5)
EOSINOPHILS RELATIVE PERCENT: 1.5 % (ref 0–6)
FERRITIN: 1082 NG/ML
FOLATE: 11 NG/ML (ref 4.8–24.2)
GFR AFRICAN AMERICAN: >60
GFR NON-AFRICAN AMERICAN: >60 ML/MIN/1.73
GLUCOSE BLD-MCNC: 77 MG/DL (ref 74–109)
HCT VFR BLD CALC: 22.2 % (ref 37–54)
HCT VFR BLD CALC: 23.3 % (ref 37–54)
HEMOGLOBIN: 7.7 G/DL (ref 12.5–16.5)
IMMATURE RETIC FRACT: 27.9 % (ref 2.3–13.4)
IRON SATURATION: 24 % (ref 20–55)
IRON: 35 MCG/DL (ref 59–158)
LYMPHOCYTES ABSOLUTE: 1.84 E9/L (ref 1.5–4)
LYMPHOCYTES RELATIVE PERCENT: 34.3 % (ref 20–42)
MCH RBC QN AUTO: 28.9 PG (ref 26–35)
MCHC RBC AUTO-ENTMCNC: 33 % (ref 32–34.5)
MCV RBC AUTO: 87.6 FL (ref 80–99.9)
MONOCYTES ABSOLUTE: 0.11 E9/L (ref 0.1–0.95)
MONOCYTES RELATIVE PERCENT: 1.5 % (ref 2–12)
NEUTROPHILS ABSOLUTE: 3.35 E9/L (ref 1.8–7.3)
NEUTROPHILS RELATIVE PERCENT: 62.3 % (ref 43–80)
PDW BLD-RTO: 17.8 FL (ref 11.5–15)
PLATELET # BLD: 84 E9/L (ref 130–450)
PLATELET CONFIRMATION: NORMAL
PMV BLD AUTO: 9.3 FL (ref 7–12)
POTASSIUM SERPL-SCNC: 4 MMOL/L (ref 3.5–5)
RBC # BLD: 2.66 E12/L (ref 3.8–5.8)
RBC # BLD: NORMAL 10*6/UL
RETIC HGB EQUIVALENT: 34.8 PG (ref 28.2–36.6)
RETICULOCYTE ABSOLUTE COUNT: 0.09 E12/L
RETICULOCYTE COUNT PCT: 3.6 % (ref 0.4–1.9)
SODIUM BLD-SCNC: 139 MMOL/L (ref 132–146)
TOTAL IRON BINDING CAPACITY: 148 MCG/DL (ref 250–450)
VITAMIN B-12: 1970 PG/ML (ref 211–946)
WBC # BLD: 5.4 E9/L (ref 4.5–11.5)

## 2018-06-13 PROCEDURE — 83540 ASSAY OF IRON: CPT

## 2018-06-13 PROCEDURE — 1200000000 HC SEMI PRIVATE

## 2018-06-13 PROCEDURE — 85045 AUTOMATED RETICULOCYTE COUNT: CPT

## 2018-06-13 PROCEDURE — 82607 VITAMIN B-12: CPT

## 2018-06-13 PROCEDURE — 6370000000 HC RX 637 (ALT 250 FOR IP): Performed by: INTERNAL MEDICINE

## 2018-06-13 PROCEDURE — 80048 BASIC METABOLIC PNL TOTAL CA: CPT

## 2018-06-13 PROCEDURE — 82746 ASSAY OF FOLIC ACID SERUM: CPT

## 2018-06-13 PROCEDURE — 82728 ASSAY OF FERRITIN: CPT

## 2018-06-13 PROCEDURE — 36415 COLL VENOUS BLD VENIPUNCTURE: CPT

## 2018-06-13 PROCEDURE — 6360000002 HC RX W HCPCS: Performed by: INTERNAL MEDICINE

## 2018-06-13 PROCEDURE — 83550 IRON BINDING TEST: CPT

## 2018-06-13 PROCEDURE — 85025 COMPLETE CBC W/AUTO DIFF WBC: CPT

## 2018-06-13 PROCEDURE — 97530 THERAPEUTIC ACTIVITIES: CPT

## 2018-06-13 RX ORDER — LOPERAMIDE HYDROCHLORIDE 2 MG/1
2 CAPSULE ORAL 4 TIMES DAILY PRN
Status: DISCONTINUED | OUTPATIENT
Start: 2018-06-13 | End: 2018-06-14 | Stop reason: HOSPADM

## 2018-06-13 RX ORDER — LOPERAMIDE HYDROCHLORIDE 2 MG/1
2 CAPSULE ORAL ONCE
Status: DISCONTINUED | OUTPATIENT
Start: 2018-06-13 | End: 2018-06-14 | Stop reason: HOSPADM

## 2018-06-13 RX ORDER — FERROUS SULFATE 325(65) MG
325 TABLET ORAL 2 TIMES DAILY WITH MEALS
Status: DISCONTINUED | OUTPATIENT
Start: 2018-06-13 | End: 2018-06-14 | Stop reason: HOSPADM

## 2018-06-13 RX ADMIN — AMOXICILLIN AND CLAVULANATE POTASSIUM 1 TABLET: 875; 125 TABLET, FILM COATED ORAL at 09:11

## 2018-06-13 RX ADMIN — PHENYTOIN SODIUM 100 MG: 100 CAPSULE ORAL at 09:12

## 2018-06-13 RX ADMIN — AMOXICILLIN AND CLAVULANATE POTASSIUM 1 TABLET: 875; 125 TABLET, FILM COATED ORAL at 21:11

## 2018-06-13 RX ADMIN — LEVETIRACETAM 1500 MG: 500 TABLET ORAL at 21:12

## 2018-06-13 RX ADMIN — PHENYTOIN SODIUM 100 MG: 100 CAPSULE ORAL at 14:33

## 2018-06-13 RX ADMIN — KETOROLAC TROMETHAMINE 30 MG: 30 INJECTION, SOLUTION INTRAMUSCULAR at 07:26

## 2018-06-13 RX ADMIN — LOPERAMIDE HYDROCHLORIDE 2 MG: 2 CAPSULE ORAL at 19:01

## 2018-06-13 RX ADMIN — LEVETIRACETAM 1500 MG: 500 TABLET ORAL at 09:12

## 2018-06-13 RX ADMIN — LACOSAMIDE 150 MG: 100 TABLET, FILM COATED ORAL at 09:12

## 2018-06-13 RX ADMIN — KETOROLAC TROMETHAMINE 30 MG: 30 INJECTION, SOLUTION INTRAMUSCULAR at 14:33

## 2018-06-13 RX ADMIN — LACOSAMIDE 150 MG: 100 TABLET, FILM COATED ORAL at 21:12

## 2018-06-13 RX ADMIN — ENOXAPARIN SODIUM 40 MG: 40 INJECTION, SOLUTION INTRAVENOUS; SUBCUTANEOUS at 09:11

## 2018-06-13 RX ADMIN — MIRTAZAPINE 15 MG: 15 TABLET, FILM COATED ORAL at 21:12

## 2018-06-13 RX ADMIN — KETOROLAC TROMETHAMINE 30 MG: 30 INJECTION, SOLUTION INTRAMUSCULAR at 21:51

## 2018-06-13 RX ADMIN — PHENYTOIN SODIUM 100 MG: 100 CAPSULE ORAL at 21:12

## 2018-06-13 ASSESSMENT — PAIN DESCRIPTION - LOCATION
LOCATION: HEAD
LOCATION: HEAD

## 2018-06-13 ASSESSMENT — PAIN SCALES - GENERAL
PAINLEVEL_OUTOF10: 8
PAINLEVEL_OUTOF10: 9
PAINLEVEL_OUTOF10: 2
PAINLEVEL_OUTOF10: 0
PAINLEVEL_OUTOF10: 8
PAINLEVEL_OUTOF10: 8
PAINLEVEL_OUTOF10: 0

## 2018-06-13 ASSESSMENT — PAIN DESCRIPTION - ORIENTATION: ORIENTATION: RIGHT;POSTERIOR;OTHER (COMMENT)

## 2018-06-13 ASSESSMENT — PAIN DESCRIPTION - DESCRIPTORS
DESCRIPTORS: ACHING;THROBBING;DISCOMFORT
DESCRIPTORS: ACHING;DISCOMFORT

## 2018-06-13 ASSESSMENT — PAIN DESCRIPTION - FREQUENCY: FREQUENCY: INTERMITTENT

## 2018-06-13 ASSESSMENT — PAIN DESCRIPTION - PAIN TYPE
TYPE: CHRONIC PAIN
TYPE: ACUTE PAIN

## 2018-06-13 ASSESSMENT — PAIN DESCRIPTION - ONSET
ONSET: ON-GOING
ONSET: ON-GOING

## 2018-06-13 ASSESSMENT — PAIN DESCRIPTION - PROGRESSION: CLINICAL_PROGRESSION: NOT CHANGED

## 2018-06-13 NOTE — PROGRESS NOTES
Differential:    Lab Results   Component Value Date    WBC 5.4 06/13/2018    RBC 2.66 06/13/2018    HGB 7.7 06/13/2018    HCT 23.3 06/13/2018    PLT 84 06/13/2018    MCV 87.6 06/13/2018    MCH 28.9 06/13/2018    MCHC 33.0 06/13/2018    RDW 17.8 06/13/2018    METASPCT 1.0 05/29/2018    LYMPHOPCT 34.3 06/13/2018    MONOPCT 1.5 06/13/2018    MYELOPCT 1.5 05/29/2018    BASOPCT 0.5 06/13/2018    MONOSABS 0.11 06/13/2018    LYMPHSABS 1.84 06/13/2018    EOSABS 0.08 06/13/2018    BASOSABS 0.03 06/13/2018     CMP:    Lab Results   Component Value Date     06/13/2018    K 4.0 06/13/2018    K 4.7 01/09/2018     06/13/2018    CO2 22 06/13/2018    BUN 6 06/13/2018    CREATININE 0.7 06/13/2018    GFRAA >60 06/13/2018    LABGLOM >60 06/13/2018    GLUCOSE 77 06/13/2018    PROT 6.3 06/12/2018    LABALBU 3.7 06/12/2018    CALCIUM 8.7 06/13/2018    BILITOT 0.5 06/12/2018    ALKPHOS 101 06/12/2018    AST 8 06/12/2018    ALT 21 06/12/2018       Other Data:      Intake/Output Summary (Last 24 hours) at 06/13/18 1046  Last data filed at 06/13/18 0223   Gross per 24 hour   Intake            987.5 ml   Output              500 ml   Net            487.5 ml         Scheduled Medications:   enoxaparin  40 mg Subcutaneous Daily    amoxicillin-clavulanate  1 tablet Oral BID    mirtazapine  15 mg Oral Nightly    phenytoin  100 mg Oral TID    levETIRAcetam  1,500 mg Oral BID    lacosamide  150 mg Oral BID         Infusion Medications:   0.9% NaCl with KCl 20 mEq 75 mL/hr at 06/13/18 0800       Assessment:   1. Intractable headache with increasing dizziness and frequent falls at home in the setting of previous subdural hematoma and subarachnoid hemorrhage status post craniotomy and duraplasty  2. 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 Seizure disorder on multiple epileptic medications  4.  

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
## 2018-06-13 NOTE — PROGRESS NOTES
Physical Therapy  Daily Treatment Note  6/13/2018  [id number]                      Eliud aPul   [id number]                              1975    Patient Active Problem List   Diagnosis    [facility]oid [facility]) [facility]    Subdural hematoma (HCC)    Acute respiratory failure (HCC)    Hypoalbuminemia    Electrolyte imbalance    Hypophosphatemia    Fall (on) (from) other stairs and steps, initial encounter    Pulmonary embolus ([facility]    Tumor of testis of uncertain behavior    Seminoma of right testis, stage 1 ([facility]    History of craniotomy    Uncal herniation (HCC)    Dysphagia    Hypotension    Hemiplegia (HCC)    Chronic respiratory failure (HCC)    History of DVT (deep vein thrombosis)    Neoplasm of uncertain behavior of right testis    Intractable headache    Falls, initial encounter       Recommendation for discharge: HHPT with supervision   Equipment prescriptions needed:to be determined  AM-PAC Basic Mobility    Key: total(1); a lot (2); a little (3): none (4)  How much help from another person is needed. ..  3  1. Turning from your back to your side while in a flat bed without using bed rails?    3  2. Moving from lying on your back to sitting on the side of a flat bed w/o using bed rails? 3  3. Moving to and from a bed to a chair or wheelchair?     3  4. Standing up from a chair using your arms?    3  5. To walk in a hospital room?    2  6. Climbing 3-5 steps with a railing? Raw score:  17/24    Precautions: falls , hx craniotomy and cranioplasty, chemo     S: Patient cleared by nursing for treatment. Patient is agreeable to treatment. Pt's wife present and supportive. Pt with no new concerns  Pain status: (measured on a visual analog scale with 0=no pain and 10=excruciating pain) 0/10.    O: Pt was instructed in and performed the following:   Bed Mobility- Supine to sit-NA     Scooting-   NA   Sit to supine-supervision               Transfers-sit to stand- supervision

## 2018-06-14 VITALS
DIASTOLIC BLOOD PRESSURE: 65 MMHG | SYSTOLIC BLOOD PRESSURE: 100 MMHG | TEMPERATURE: 97.9 F | OXYGEN SATURATION: 97 % | HEART RATE: 58 BPM | RESPIRATION RATE: 16 BRPM | BODY MASS INDEX: 24.5 KG/M2 | WEIGHT: 175 LBS | HEIGHT: 71 IN

## 2018-06-14 LAB
ACANTHOCYTES: ABNORMAL
ANION GAP SERPL CALCULATED.3IONS-SCNC: 10 MMOL/L (ref 7–16)
ANISOCYTOSIS: ABNORMAL
BASOPHILS ABSOLUTE: 0.13 E9/L (ref 0–0.2)
BASOPHILS RELATIVE PERCENT: 2 % (ref 0–2)
BUN BLDV-MCNC: 5 MG/DL (ref 6–20)
BURR CELLS: ABNORMAL
CALCIUM SERPL-MCNC: 8.9 MG/DL (ref 8.6–10.2)
CHLORIDE BLD-SCNC: 109 MMOL/L (ref 98–107)
CO2: 22 MMOL/L (ref 22–29)
CREAT SERPL-MCNC: 0.6 MG/DL (ref 0.7–1.2)
EOSINOPHILS ABSOLUTE: 0.07 E9/L (ref 0.05–0.5)
EOSINOPHILS RELATIVE PERCENT: 1 % (ref 0–6)
GFR AFRICAN AMERICAN: >60
GFR NON-AFRICAN AMERICAN: >60 ML/MIN/1.73
GLUCOSE BLD-MCNC: 83 MG/DL (ref 74–109)
HCT VFR BLD CALC: 23 % (ref 37–54)
HEMOGLOBIN: 7.5 G/DL (ref 12.5–16.5)
LYMPHOCYTES ABSOLUTE: 1.81 E9/L (ref 1.5–4)
LYMPHOCYTES RELATIVE PERCENT: 27 % (ref 20–42)
MCH RBC QN AUTO: 28.6 PG (ref 26–35)
MCHC RBC AUTO-ENTMCNC: 32.6 % (ref 32–34.5)
MCV RBC AUTO: 87.8 FL (ref 80–99.9)
MONOCYTES ABSOLUTE: 0.47 E9/L (ref 0.1–0.95)
MONOCYTES RELATIVE PERCENT: 6.5 % (ref 2–12)
NEUTROPHILS ABSOLUTE: 4.29 E9/L (ref 1.8–7.3)
NEUTROPHILS RELATIVE PERCENT: 63.5 % (ref 43–80)
OVALOCYTES: ABNORMAL
PDW BLD-RTO: 18.6 FL (ref 11.5–15)
PLATELET # BLD: 78 E9/L (ref 130–450)
PLATELET CONFIRMATION: NORMAL
PMV BLD AUTO: 9.1 FL (ref 7–12)
POIKILOCYTES: ABNORMAL
POLYCHROMASIA: ABNORMAL
POTASSIUM SERPL-SCNC: 4.2 MMOL/L (ref 3.5–5)
RBC # BLD: 2.62 E12/L (ref 3.8–5.8)
SODIUM BLD-SCNC: 141 MMOL/L (ref 132–146)
TEAR DROP CELLS: ABNORMAL
TOXIC GRANULATION: ABNORMAL
WBC # BLD: 6.7 E9/L (ref 4.5–11.5)

## 2018-06-14 PROCEDURE — 80048 BASIC METABOLIC PNL TOTAL CA: CPT

## 2018-06-14 PROCEDURE — 97110 THERAPEUTIC EXERCISES: CPT

## 2018-06-14 PROCEDURE — 6370000000 HC RX 637 (ALT 250 FOR IP): Performed by: INTERNAL MEDICINE

## 2018-06-14 PROCEDURE — 97530 THERAPEUTIC ACTIVITIES: CPT

## 2018-06-14 PROCEDURE — 2580000003 HC RX 258: Performed by: FAMILY MEDICINE

## 2018-06-14 PROCEDURE — 85025 COMPLETE CBC W/AUTO DIFF WBC: CPT

## 2018-06-14 PROCEDURE — 36591 DRAW BLOOD OFF VENOUS DEVICE: CPT

## 2018-06-14 PROCEDURE — 36415 COLL VENOUS BLD VENIPUNCTURE: CPT

## 2018-06-14 PROCEDURE — 6360000002 HC RX W HCPCS: Performed by: INTERNAL MEDICINE

## 2018-06-14 RX ORDER — LACOSAMIDE 150 MG/1
150 TABLET ORAL 2 TIMES DAILY
Qty: 60 TABLET | Status: ON HOLD | COMMUNITY
Start: 2018-06-14 | End: 2019-06-01

## 2018-06-14 RX ORDER — LEVETIRACETAM 750 MG/1
1500 TABLET ORAL 2 TIMES DAILY
Qty: 60 TABLET | Refills: 3 | Status: ON HOLD | COMMUNITY
Start: 2018-06-14 | End: 2019-06-05 | Stop reason: HOSPADM

## 2018-06-14 RX ORDER — FERROUS SULFATE 325(65) MG
325 TABLET ORAL 2 TIMES DAILY WITH MEALS
Qty: 30 TABLET | Refills: 3 | Status: SHIPPED | OUTPATIENT
Start: 2018-06-14 | End: 2018-12-10 | Stop reason: ALTCHOICE

## 2018-06-14 RX ADMIN — PHENYTOIN SODIUM 100 MG: 100 CAPSULE ORAL at 08:59

## 2018-06-14 RX ADMIN — LEVETIRACETAM 1500 MG: 500 TABLET ORAL at 09:00

## 2018-06-14 RX ADMIN — Medication 10 ML: at 09:13

## 2018-06-14 RX ADMIN — PHENYTOIN SODIUM 100 MG: 100 CAPSULE ORAL at 14:47

## 2018-06-14 RX ADMIN — KETOROLAC TROMETHAMINE 30 MG: 30 INJECTION, SOLUTION INTRAMUSCULAR at 09:12

## 2018-06-14 RX ADMIN — ENOXAPARIN SODIUM 40 MG: 40 INJECTION, SOLUTION INTRAVENOUS; SUBCUTANEOUS at 09:00

## 2018-06-14 RX ADMIN — AMOXICILLIN AND CLAVULANATE POTASSIUM 1 TABLET: 875; 125 TABLET, FILM COATED ORAL at 11:04

## 2018-06-14 RX ADMIN — LACOSAMIDE 150 MG: 100 TABLET, FILM COATED ORAL at 08:59

## 2018-06-14 RX ADMIN — FERROUS SULFATE TAB 325 MG (65 MG ELEMENTAL FE) 325 MG: 325 (65 FE) TAB at 08:59

## 2018-06-14 ASSESSMENT — PAIN DESCRIPTION - LOCATION: LOCATION: HEAD

## 2018-06-14 ASSESSMENT — PAIN DESCRIPTION - FREQUENCY: FREQUENCY: INTERMITTENT

## 2018-06-14 ASSESSMENT — PAIN SCALES - GENERAL
PAINLEVEL_OUTOF10: 0
PAINLEVEL_OUTOF10: 7
PAINLEVEL_OUTOF10: 2

## 2018-06-14 ASSESSMENT — PAIN DESCRIPTION - DESCRIPTORS: DESCRIPTORS: ACHING;DISCOMFORT;DULL;HEADACHE

## 2018-06-14 ASSESSMENT — PAIN DESCRIPTION - PROGRESSION: CLINICAL_PROGRESSION: NOT CHANGED

## 2018-06-14 ASSESSMENT — PAIN DESCRIPTION - ONSET: ONSET: GRADUAL

## 2018-06-14 ASSESSMENT — PAIN DESCRIPTION - PAIN TYPE: TYPE: ACUTE PAIN

## 2018-06-14 NOTE — PROGRESS NOTES
cues     Gait:  Patient ambulated 70 feet x 2  using no device  with Minimal assist.  Comments:    Steps:  NA  Treatment: as above, ptperformed ap, x 20, standing heel raises, knee bends, marching x  10 reps  Comment: Call light left by patient. Pt returned to supine with alarm activated   A: Pt with increased activity as above . Decreased strength and endurance limiting function. At risk forfalls. Pt pleasant and motivated to participate. P: Continue with physical therapy     Sybil Carter PTA     Patient exhibits decreased strength, balance, coordination impairing functional mobility.     GOALS to be met in 3 days. Bed mobility-  Independent                                         Transfers-Sit to stand-Independent                Gait:  Patient to ambulate 150 feet   Independent                Steps: Patient to go up and down 10  step(s) using 1 rail(s) with  Supervision        Increase rom in affected joints by 10%  Increase strength in affected mm groups by 1/3 grade  Increase balance to allow for improvement towards functional goals. Increase endurance to allow for improvement towards functional goals.
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
## 2018-06-14 NOTE — PLAN OF CARE
Problem: Falls - Risk of:  Goal: Absence of physical injury  Absence of physical injury   Outcome: Met This Shift

## 2018-06-15 LAB — BLOOD CULTURE, ROUTINE: NORMAL

## 2018-06-18 ENCOUNTER — HOSPITAL ENCOUNTER (OUTPATIENT)
Dept: INFUSION THERAPY | Age: 43
Discharge: HOME | End: 2018-06-18
Payer: COMMERCIAL

## 2018-06-18 ENCOUNTER — OFFICE VISIT (OUTPATIENT)
Dept: ONCOLOGY | Age: 43
End: 2018-06-18
Payer: COMMERCIAL

## 2018-06-18 VITALS
SYSTOLIC BLOOD PRESSURE: 88 MMHG | TEMPERATURE: 98.7 F | HEIGHT: 71 IN | HEART RATE: 84 BPM | BODY MASS INDEX: 23.87 KG/M2 | DIASTOLIC BLOOD PRESSURE: 73 MMHG | RESPIRATION RATE: 20 BRPM | WEIGHT: 170.5 LBS

## 2018-06-18 VITALS
DIASTOLIC BLOOD PRESSURE: 68 MMHG | HEART RATE: 51 BPM | TEMPERATURE: 96.9 F | SYSTOLIC BLOOD PRESSURE: 106 MMHG | RESPIRATION RATE: 18 BRPM

## 2018-06-18 DIAGNOSIS — D40.11 NEOPLASM OF UNCERTAIN BEHAVIOR OF RIGHT TESTIS: ICD-10-CM

## 2018-06-18 DIAGNOSIS — C62.91 SEMINOMA OF RIGHT TESTIS, STAGE 1 (HCC): Primary | ICD-10-CM

## 2018-06-18 LAB
ABO/RH: NORMAL
ANTIBODY SCREEN: NORMAL
BLOOD BANK DISPENSE STATUS: NORMAL
BLOOD BANK DISPENSE STATUS: NORMAL
BLOOD BANK PRODUCT CODE: NORMAL
BLOOD BANK PRODUCT CODE: NORMAL
BPU ID: NORMAL
BPU ID: NORMAL
DESCRIPTION BLOOD BANK: NORMAL
DESCRIPTION BLOOD BANK: NORMAL

## 2018-06-18 PROCEDURE — 6360000002 HC RX W HCPCS: Performed by: NURSE PRACTITIONER

## 2018-06-18 PROCEDURE — 96374 THER/PROPH/DIAG INJ IV PUSH: CPT

## 2018-06-18 PROCEDURE — 86901 BLOOD TYPING SEROLOGIC RH(D): CPT

## 2018-06-18 PROCEDURE — 2580000003 HC RX 258: Performed by: NURSE PRACTITIONER

## 2018-06-18 PROCEDURE — P9016 RBC LEUKOCYTES REDUCED: HCPCS

## 2018-06-18 PROCEDURE — 86850 RBC ANTIBODY SCREEN: CPT

## 2018-06-18 PROCEDURE — 36430 TRANSFUSION BLD/BLD COMPNT: CPT

## 2018-06-18 PROCEDURE — 86920 COMPATIBILITY TEST SPIN: CPT

## 2018-06-18 PROCEDURE — 6370000000 HC RX 637 (ALT 250 FOR IP): Performed by: NURSE PRACTITIONER

## 2018-06-18 PROCEDURE — 86900 BLOOD TYPING SEROLOGIC ABO: CPT

## 2018-06-18 RX ORDER — EPINEPHRINE 1 MG/ML
0.3 INJECTION, SOLUTION, CONCENTRATE INTRAVENOUS PRN
Status: CANCELLED | OUTPATIENT
Start: 2018-06-18

## 2018-06-18 RX ORDER — 0.9 % SODIUM CHLORIDE 0.9 %
10 VIAL (ML) INJECTION ONCE
Status: CANCELLED | OUTPATIENT
Start: 2018-06-18 | End: 2018-06-18

## 2018-06-18 RX ORDER — ACETAMINOPHEN 325 MG/1
650 TABLET ORAL ONCE
Status: CANCELLED | OUTPATIENT
Start: 2018-06-18 | End: 2018-06-18

## 2018-06-18 RX ORDER — METHYLPREDNISOLONE SODIUM SUCCINATE 125 MG/2ML
125 INJECTION, POWDER, LYOPHILIZED, FOR SOLUTION INTRAMUSCULAR; INTRAVENOUS ONCE
Status: CANCELLED | OUTPATIENT
Start: 2018-06-18 | End: 2018-06-18

## 2018-06-18 RX ORDER — SODIUM CHLORIDE 0.9 % (FLUSH) 0.9 %
20 SYRINGE (ML) INJECTION PRN
Status: CANCELLED | OUTPATIENT
Start: 2018-06-18

## 2018-06-18 RX ORDER — SODIUM CHLORIDE 9 MG/ML
INJECTION, SOLUTION INTRAVENOUS CONTINUOUS
Status: CANCELLED | OUTPATIENT
Start: 2018-06-18

## 2018-06-18 RX ORDER — DIPHENHYDRAMINE HCL 25 MG
25 TABLET ORAL ONCE
Status: CANCELLED | OUTPATIENT
Start: 2018-06-18 | End: 2018-06-18

## 2018-06-18 RX ORDER — ACETAMINOPHEN 325 MG/1
650 TABLET ORAL ONCE
Status: COMPLETED | OUTPATIENT
Start: 2018-06-18 | End: 2018-06-18

## 2018-06-18 RX ORDER — SODIUM CHLORIDE 9 MG/ML
250 INJECTION, SOLUTION INTRAVENOUS CONTINUOUS
Status: DISCONTINUED | OUTPATIENT
Start: 2018-06-18 | End: 2018-06-19 | Stop reason: HOSPADM

## 2018-06-18 RX ORDER — DIPHENHYDRAMINE HYDROCHLORIDE 50 MG/ML
25 INJECTION INTRAMUSCULAR; INTRAVENOUS ONCE
Status: CANCELLED | OUTPATIENT
Start: 2018-06-18 | End: 2018-06-18

## 2018-06-18 RX ORDER — FUROSEMIDE 10 MG/ML
20 INJECTION INTRAMUSCULAR; INTRAVENOUS ONCE
Status: CANCELLED | OUTPATIENT
Start: 2018-06-18 | End: 2018-06-18

## 2018-06-18 RX ORDER — DIPHENHYDRAMINE HYDROCHLORIDE 50 MG/ML
25 INJECTION INTRAMUSCULAR; INTRAVENOUS ONCE
Status: COMPLETED | OUTPATIENT
Start: 2018-06-18 | End: 2018-06-18

## 2018-06-18 RX ORDER — DIPHENHYDRAMINE HYDROCHLORIDE 50 MG/ML
50 INJECTION INTRAMUSCULAR; INTRAVENOUS ONCE
Status: CANCELLED | OUTPATIENT
Start: 2018-06-18 | End: 2018-06-18

## 2018-06-18 RX ADMIN — SODIUM CHLORIDE 250 ML: 9 INJECTION, SOLUTION INTRAVENOUS at 11:39

## 2018-06-18 RX ADMIN — DIPHENHYDRAMINE HYDROCHLORIDE 25 MG: 50 INJECTION, SOLUTION INTRAMUSCULAR; INTRAVENOUS at 11:42

## 2018-06-18 RX ADMIN — ACETAMINOPHEN 650 MG: 325 TABLET, FILM COATED ORAL at 11:40

## 2018-06-18 ASSESSMENT — PAIN SCALES - GENERAL: PAINLEVEL_OUTOF10: 0

## 2018-06-19 NOTE — PROGRESS NOTES
Patient received 2 unit(s) of prbc's. Transfusion was tolerated well and the patient was observed for 30 minutes prior to discharge.

## 2018-06-25 ENCOUNTER — OFFICE VISIT (OUTPATIENT)
Dept: ONCOLOGY | Age: 43
End: 2018-06-25
Payer: COMMERCIAL

## 2018-06-25 ENCOUNTER — HOSPITAL ENCOUNTER (OUTPATIENT)
Dept: INFUSION THERAPY | Age: 43
Discharge: HOME | End: 2018-06-25
Payer: COMMERCIAL

## 2018-06-25 VITALS
HEIGHT: 71 IN | HEART RATE: 64 BPM | RESPIRATION RATE: 20 BRPM | TEMPERATURE: 97.9 F | BODY MASS INDEX: 23.9 KG/M2 | WEIGHT: 170.7 LBS | DIASTOLIC BLOOD PRESSURE: 73 MMHG | SYSTOLIC BLOOD PRESSURE: 100 MMHG

## 2018-06-25 VITALS — HEART RATE: 53 BPM | DIASTOLIC BLOOD PRESSURE: 56 MMHG | RESPIRATION RATE: 20 BRPM | SYSTOLIC BLOOD PRESSURE: 94 MMHG

## 2018-06-25 DIAGNOSIS — C62.91 SEMINOMA OF RIGHT TESTIS, STAGE 1 (HCC): Primary | ICD-10-CM

## 2018-06-25 DIAGNOSIS — D40.11 NEOPLASM OF UNCERTAIN BEHAVIOR OF RIGHT TESTIS: ICD-10-CM

## 2018-06-25 DIAGNOSIS — R56.9 SEIZURE (HCC): ICD-10-CM

## 2018-06-25 DIAGNOSIS — C62.91 SEMINOMA OF RIGHT TESTIS, STAGE 1 (HCC): ICD-10-CM

## 2018-06-25 DIAGNOSIS — D50.9 IRON DEFICIENCY ANEMIA, UNSPECIFIED IRON DEFICIENCY ANEMIA TYPE: ICD-10-CM

## 2018-06-25 LAB
ALBUMIN SERPL-MCNC: 3.9 G/DL (ref 3.5–5.2)
ALP BLD-CCNC: 99 U/L (ref 40–129)
ALT SERPL-CCNC: 24 U/L (ref 0–40)
ANION GAP SERPL CALCULATED.3IONS-SCNC: 13 MMOL/L (ref 7–16)
AST SERPL-CCNC: 17 U/L (ref 0–39)
BASOPHILS ABSOLUTE: 0.15 E9/L (ref 0–0.2)
BASOPHILS RELATIVE PERCENT: 1.4 % (ref 0–2)
BILIRUB SERPL-MCNC: 0.2 MG/DL (ref 0–1.2)
BUN BLDV-MCNC: 13 MG/DL (ref 6–20)
CALCIUM SERPL-MCNC: 9.5 MG/DL (ref 8.6–10.2)
CHLORIDE BLD-SCNC: 105 MMOL/L (ref 98–107)
CO2: 24 MMOL/L (ref 22–29)
CREAT SERPL-MCNC: 0.8 MG/DL (ref 0.7–1.2)
EOSINOPHILS ABSOLUTE: 0.3 E9/L (ref 0.05–0.5)
EOSINOPHILS RELATIVE PERCENT: 2.8 % (ref 0–6)
GFR AFRICAN AMERICAN: >60
GFR NON-AFRICAN AMERICAN: >60 ML/MIN/1.73
GLUCOSE BLD-MCNC: 89 MG/DL (ref 74–109)
HCT VFR BLD CALC: 38.2 % (ref 37–54)
HEMOGLOBIN: 12.5 G/DL (ref 12.5–16.5)
IMMATURE GRANULOCYTES #: 0.23 E9/L
IMMATURE GRANULOCYTES %: 2.2 % (ref 0–5)
LYMPHOCYTES ABSOLUTE: 2.32 E9/L (ref 1.5–4)
LYMPHOCYTES RELATIVE PERCENT: 22 % (ref 20–42)
MAGNESIUM: 1.9 MG/DL (ref 1.6–2.6)
MCH RBC QN AUTO: 29.6 PG (ref 26–35)
MCHC RBC AUTO-ENTMCNC: 32.7 % (ref 32–34.5)
MCV RBC AUTO: 90.3 FL (ref 80–99.9)
MONOCYTES ABSOLUTE: 0.89 E9/L (ref 0.1–0.95)
MONOCYTES RELATIVE PERCENT: 8.4 % (ref 2–12)
NEUTROPHILS ABSOLUTE: 6.65 E9/L (ref 1.8–7.3)
NEUTROPHILS RELATIVE PERCENT: 63.2 % (ref 43–80)
PDW BLD-RTO: 19 FL (ref 11.5–15)
PLATELET # BLD: 419 E9/L (ref 130–450)
PMV BLD AUTO: 8.2 FL (ref 7–12)
POTASSIUM SERPL-SCNC: 4.1 MMOL/L (ref 3.5–5)
RBC # BLD: 4.23 E12/L (ref 3.8–5.8)
SODIUM BLD-SCNC: 142 MMOL/L (ref 132–146)
TOTAL PROTEIN: 6.7 G/DL (ref 6.4–8.3)
WBC # BLD: 10.5 E9/L (ref 4.5–11.5)

## 2018-06-25 PROCEDURE — 96417 CHEMO IV INFUS EACH ADDL SEQ: CPT

## 2018-06-25 PROCEDURE — 96415 CHEMO IV INFUSION ADDL HR: CPT

## 2018-06-25 PROCEDURE — 85025 COMPLETE CBC W/AUTO DIFF WBC: CPT

## 2018-06-25 PROCEDURE — 96367 TX/PROPH/DG ADDL SEQ IV INF: CPT

## 2018-06-25 PROCEDURE — 2580000003 HC RX 258: Performed by: INTERNAL MEDICINE

## 2018-06-25 PROCEDURE — 96413 CHEMO IV INFUSION 1 HR: CPT

## 2018-06-25 PROCEDURE — 96375 TX/PRO/DX INJ NEW DRUG ADDON: CPT

## 2018-06-25 PROCEDURE — 6360000002 HC RX W HCPCS: Performed by: INTERNAL MEDICINE

## 2018-06-25 PROCEDURE — 80053 COMPREHEN METABOLIC PANEL: CPT

## 2018-06-25 PROCEDURE — 83735 ASSAY OF MAGNESIUM: CPT

## 2018-06-25 RX ORDER — PALONOSETRON 0.05 MG/ML
0.25 INJECTION, SOLUTION INTRAVENOUS ONCE
Status: CANCELLED | OUTPATIENT
Start: 2018-06-25

## 2018-06-25 RX ORDER — SODIUM CHLORIDE 9 MG/ML
INJECTION, SOLUTION INTRAVENOUS CONTINUOUS
Status: CANCELLED | OUTPATIENT
Start: 2018-06-27

## 2018-06-25 RX ORDER — SODIUM CHLORIDE 0.9 % (FLUSH) 0.9 %
10 SYRINGE (ML) INJECTION PRN
Status: CANCELLED | OUTPATIENT
Start: 2018-06-29

## 2018-06-25 RX ORDER — SODIUM CHLORIDE 9 MG/ML
INJECTION, SOLUTION INTRAVENOUS ONCE
Status: CANCELLED | OUTPATIENT
Start: 2018-06-25 | End: 2018-06-25

## 2018-06-25 RX ORDER — SODIUM CHLORIDE 9 MG/ML
INJECTION, SOLUTION INTRAVENOUS ONCE
Status: CANCELLED | OUTPATIENT
Start: 2018-06-28 | End: 2018-06-28

## 2018-06-25 RX ORDER — SODIUM CHLORIDE 9 MG/ML
INJECTION, SOLUTION INTRAVENOUS ONCE
Status: CANCELLED | OUTPATIENT
Start: 2018-06-26 | End: 2018-06-26

## 2018-06-25 RX ORDER — SODIUM CHLORIDE 9 MG/ML
INJECTION, SOLUTION INTRAVENOUS ONCE
Status: CANCELLED | OUTPATIENT
Start: 2018-06-27 | End: 2018-06-27

## 2018-06-25 RX ORDER — SODIUM CHLORIDE 9 MG/ML
INJECTION, SOLUTION INTRAVENOUS CONTINUOUS
Status: CANCELLED | OUTPATIENT
Start: 2018-06-25

## 2018-06-25 RX ORDER — DEXAMETHASONE SODIUM PHOSPHATE 10 MG/ML
10 INJECTION, SOLUTION INTRAMUSCULAR; INTRAVENOUS ONCE
Status: COMPLETED | OUTPATIENT
Start: 2018-06-25 | End: 2018-06-25

## 2018-06-25 RX ORDER — HEPARIN SODIUM (PORCINE) LOCK FLUSH IV SOLN 100 UNIT/ML 100 UNIT/ML
500 SOLUTION INTRAVENOUS PRN
Status: CANCELLED | OUTPATIENT
Start: 2018-06-28

## 2018-06-25 RX ORDER — METHYLPREDNISOLONE SODIUM SUCCINATE 125 MG/2ML
125 INJECTION, POWDER, LYOPHILIZED, FOR SOLUTION INTRAMUSCULAR; INTRAVENOUS ONCE
Status: CANCELLED | OUTPATIENT
Start: 2018-06-28 | End: 2018-06-28

## 2018-06-25 RX ORDER — SODIUM CHLORIDE 0.9 % (FLUSH) 0.9 %
10 SYRINGE (ML) INJECTION PRN
Status: CANCELLED | OUTPATIENT
Start: 2018-06-27

## 2018-06-25 RX ORDER — 0.9 % SODIUM CHLORIDE 0.9 %
500 INTRAVENOUS SOLUTION INTRAVENOUS ONCE
Status: CANCELLED
Start: 2018-06-25 | End: 2018-06-25

## 2018-06-25 RX ORDER — METHYLPREDNISOLONE SODIUM SUCCINATE 125 MG/2ML
125 INJECTION, POWDER, LYOPHILIZED, FOR SOLUTION INTRAMUSCULAR; INTRAVENOUS ONCE
Status: CANCELLED | OUTPATIENT
Start: 2018-06-27 | End: 2018-06-27

## 2018-06-25 RX ORDER — DIPHENHYDRAMINE HYDROCHLORIDE 50 MG/ML
50 INJECTION INTRAMUSCULAR; INTRAVENOUS ONCE
Status: CANCELLED | OUTPATIENT
Start: 2018-06-29 | End: 2018-06-29

## 2018-06-25 RX ORDER — 0.9 % SODIUM CHLORIDE 0.9 %
500 INTRAVENOUS SOLUTION INTRAVENOUS ONCE
Status: CANCELLED
Start: 2018-06-26 | End: 2018-06-26

## 2018-06-25 RX ORDER — 0.9 % SODIUM CHLORIDE 0.9 %
500 INTRAVENOUS SOLUTION INTRAVENOUS ONCE
Status: COMPLETED | OUTPATIENT
Start: 2018-06-25 | End: 2018-06-25

## 2018-06-25 RX ORDER — 0.9 % SODIUM CHLORIDE 0.9 %
500 INTRAVENOUS SOLUTION INTRAVENOUS ONCE
Status: CANCELLED
Start: 2018-06-28 | End: 2018-06-28

## 2018-06-25 RX ORDER — 0.9 % SODIUM CHLORIDE 0.9 %
10 VIAL (ML) INJECTION ONCE
Status: CANCELLED | OUTPATIENT
Start: 2018-06-27 | End: 2018-06-27

## 2018-06-25 RX ORDER — 0.9 % SODIUM CHLORIDE 0.9 %
500 INTRAVENOUS SOLUTION INTRAVENOUS ONCE
Status: CANCELLED
Start: 2018-06-27 | End: 2018-06-27

## 2018-06-25 RX ORDER — SODIUM CHLORIDE 9 MG/ML
INJECTION, SOLUTION INTRAVENOUS ONCE
Status: CANCELLED | OUTPATIENT
Start: 2018-06-29 | End: 2018-06-29

## 2018-06-25 RX ORDER — METHYLPREDNISOLONE SODIUM SUCCINATE 125 MG/2ML
125 INJECTION, POWDER, LYOPHILIZED, FOR SOLUTION INTRAMUSCULAR; INTRAVENOUS ONCE
Status: CANCELLED | OUTPATIENT
Start: 2018-06-29 | End: 2018-06-29

## 2018-06-25 RX ORDER — 0.9 % SODIUM CHLORIDE 0.9 %
10 VIAL (ML) INJECTION ONCE
Status: CANCELLED | OUTPATIENT
Start: 2018-06-25 | End: 2018-06-25

## 2018-06-25 RX ORDER — HEPARIN SODIUM (PORCINE) LOCK FLUSH IV SOLN 100 UNIT/ML 100 UNIT/ML
500 SOLUTION INTRAVENOUS PRN
Status: DISCONTINUED | OUTPATIENT
Start: 2018-06-25 | End: 2018-06-26 | Stop reason: HOSPADM

## 2018-06-25 RX ORDER — HEPARIN SODIUM (PORCINE) LOCK FLUSH IV SOLN 100 UNIT/ML 100 UNIT/ML
500 SOLUTION INTRAVENOUS PRN
Status: CANCELLED | OUTPATIENT
Start: 2018-06-27

## 2018-06-25 RX ORDER — SODIUM CHLORIDE 0.9 % (FLUSH) 0.9 %
10 SYRINGE (ML) INJECTION PRN
Status: CANCELLED | OUTPATIENT
Start: 2018-06-25

## 2018-06-25 RX ORDER — SODIUM CHLORIDE 9 MG/ML
INJECTION, SOLUTION INTRAVENOUS CONTINUOUS
Status: CANCELLED | OUTPATIENT
Start: 2018-06-28

## 2018-06-25 RX ORDER — SODIUM CHLORIDE 0.9 % (FLUSH) 0.9 %
10 SYRINGE (ML) INJECTION PRN
Status: CANCELLED | OUTPATIENT
Start: 2018-06-28

## 2018-06-25 RX ORDER — 0.9 % SODIUM CHLORIDE 0.9 %
10 VIAL (ML) INJECTION ONCE
Status: CANCELLED | OUTPATIENT
Start: 2018-06-26 | End: 2018-06-26

## 2018-06-25 RX ORDER — SODIUM CHLORIDE 9 MG/ML
INJECTION, SOLUTION INTRAVENOUS CONTINUOUS
Status: CANCELLED | OUTPATIENT
Start: 2018-06-26

## 2018-06-25 RX ORDER — HEPARIN SODIUM (PORCINE) LOCK FLUSH IV SOLN 100 UNIT/ML 100 UNIT/ML
500 SOLUTION INTRAVENOUS PRN
Status: CANCELLED | OUTPATIENT
Start: 2018-06-26

## 2018-06-25 RX ORDER — DIPHENHYDRAMINE HYDROCHLORIDE 50 MG/ML
50 INJECTION INTRAMUSCULAR; INTRAVENOUS ONCE
Status: CANCELLED | OUTPATIENT
Start: 2018-06-25 | End: 2018-06-25

## 2018-06-25 RX ORDER — SODIUM CHLORIDE 0.9 % (FLUSH) 0.9 %
10 SYRINGE (ML) INJECTION PRN
Status: DISCONTINUED | OUTPATIENT
Start: 2018-06-25 | End: 2018-06-26 | Stop reason: HOSPADM

## 2018-06-25 RX ORDER — METHYLPREDNISOLONE SODIUM SUCCINATE 125 MG/2ML
125 INJECTION, POWDER, LYOPHILIZED, FOR SOLUTION INTRAMUSCULAR; INTRAVENOUS ONCE
Status: CANCELLED | OUTPATIENT
Start: 2018-06-26 | End: 2018-06-26

## 2018-06-25 RX ORDER — HEPARIN SODIUM (PORCINE) LOCK FLUSH IV SOLN 100 UNIT/ML 100 UNIT/ML
500 SOLUTION INTRAVENOUS PRN
Status: CANCELLED | OUTPATIENT
Start: 2018-06-25

## 2018-06-25 RX ORDER — PALONOSETRON 0.05 MG/ML
0.25 INJECTION, SOLUTION INTRAVENOUS ONCE
Status: COMPLETED | OUTPATIENT
Start: 2018-06-25 | End: 2018-06-25

## 2018-06-25 RX ORDER — HEPARIN SODIUM (PORCINE) LOCK FLUSH IV SOLN 100 UNIT/ML 100 UNIT/ML
500 SOLUTION INTRAVENOUS PRN
Status: CANCELLED | OUTPATIENT
Start: 2018-06-29

## 2018-06-25 RX ORDER — 0.9 % SODIUM CHLORIDE 0.9 %
10 VIAL (ML) INJECTION ONCE
Status: CANCELLED | OUTPATIENT
Start: 2018-06-29 | End: 2018-06-29

## 2018-06-25 RX ORDER — SODIUM CHLORIDE 0.9 % (FLUSH) 0.9 %
10 SYRINGE (ML) INJECTION PRN
Status: CANCELLED | OUTPATIENT
Start: 2018-06-26

## 2018-06-25 RX ORDER — SODIUM CHLORIDE 9 MG/ML
INJECTION, SOLUTION INTRAVENOUS CONTINUOUS
Status: CANCELLED | OUTPATIENT
Start: 2018-06-29

## 2018-06-25 RX ORDER — 0.9 % SODIUM CHLORIDE 0.9 %
500 INTRAVENOUS SOLUTION INTRAVENOUS ONCE
Status: CANCELLED
Start: 2018-06-29 | End: 2018-06-29

## 2018-06-25 RX ORDER — DIPHENHYDRAMINE HYDROCHLORIDE 50 MG/ML
50 INJECTION INTRAMUSCULAR; INTRAVENOUS ONCE
Status: CANCELLED | OUTPATIENT
Start: 2018-06-26 | End: 2018-06-26

## 2018-06-25 RX ORDER — METHYLPREDNISOLONE SODIUM SUCCINATE 125 MG/2ML
125 INJECTION, POWDER, LYOPHILIZED, FOR SOLUTION INTRAMUSCULAR; INTRAVENOUS ONCE
Status: CANCELLED | OUTPATIENT
Start: 2018-06-25 | End: 2018-06-25

## 2018-06-25 RX ORDER — DIPHENHYDRAMINE HYDROCHLORIDE 50 MG/ML
50 INJECTION INTRAMUSCULAR; INTRAVENOUS ONCE
Status: CANCELLED | OUTPATIENT
Start: 2018-06-27 | End: 2018-06-27

## 2018-06-25 RX ORDER — DIPHENHYDRAMINE HYDROCHLORIDE 50 MG/ML
50 INJECTION INTRAMUSCULAR; INTRAVENOUS ONCE
Status: CANCELLED | OUTPATIENT
Start: 2018-06-28 | End: 2018-06-28

## 2018-06-25 RX ORDER — PALONOSETRON 0.05 MG/ML
0.25 INJECTION, SOLUTION INTRAVENOUS ONCE
Status: CANCELLED
Start: 2018-06-28 | End: 2018-06-28

## 2018-06-25 RX ORDER — 0.9 % SODIUM CHLORIDE 0.9 %
10 VIAL (ML) INJECTION ONCE
Status: CANCELLED | OUTPATIENT
Start: 2018-06-28 | End: 2018-06-28

## 2018-06-25 RX ADMIN — DEXAMETHASONE SODIUM PHOSPHATE 10 MG: 10 INJECTION, SOLUTION INTRAMUSCULAR; INTRAVENOUS at 10:53

## 2018-06-25 RX ADMIN — SODIUM CHLORIDE 150 MG: 9 INJECTION, SOLUTION INTRAVENOUS at 11:28

## 2018-06-25 RX ADMIN — SODIUM CHLORIDE, PRESERVATIVE FREE 500 UNITS: 5 INJECTION INTRAVENOUS at 15:25

## 2018-06-25 RX ADMIN — PALONOSETRON HYDROCHLORIDE 0.25 MG: 0.25 INJECTION INTRAVENOUS at 10:52

## 2018-06-25 RX ADMIN — ETOPOSIDE 200 MG: 20 INJECTION INTRAVENOUS at 14:11

## 2018-06-25 RX ADMIN — SODIUM CHLORIDE 500 ML: 9 INJECTION, SOLUTION INTRAVENOUS at 10:30

## 2018-06-25 RX ADMIN — POTASSIUM CHLORIDE: 2 INJECTION, SOLUTION, CONCENTRATE INTRAVENOUS at 11:57

## 2018-06-25 RX ADMIN — Medication 10 ML: at 15:25

## 2018-06-26 ENCOUNTER — HOSPITAL ENCOUNTER (OUTPATIENT)
Dept: INFUSION THERAPY | Age: 43
Discharge: HOME | End: 2018-06-26
Payer: COMMERCIAL

## 2018-06-26 VITALS
TEMPERATURE: 96.5 F | SYSTOLIC BLOOD PRESSURE: 108 MMHG | RESPIRATION RATE: 18 BRPM | HEART RATE: 50 BPM | DIASTOLIC BLOOD PRESSURE: 69 MMHG

## 2018-06-26 DIAGNOSIS — D40.11 NEOPLASM OF UNCERTAIN BEHAVIOR OF RIGHT TESTIS: ICD-10-CM

## 2018-06-26 PROCEDURE — 2580000003 HC RX 258: Performed by: INTERNAL MEDICINE

## 2018-06-26 PROCEDURE — 96417 CHEMO IV INFUS EACH ADDL SEQ: CPT

## 2018-06-26 PROCEDURE — 96413 CHEMO IV INFUSION 1 HR: CPT

## 2018-06-26 PROCEDURE — 96415 CHEMO IV INFUSION ADDL HR: CPT

## 2018-06-26 PROCEDURE — 96375 TX/PRO/DX INJ NEW DRUG ADDON: CPT

## 2018-06-26 PROCEDURE — 6360000002 HC RX W HCPCS: Performed by: INTERNAL MEDICINE

## 2018-06-26 RX ORDER — SODIUM CHLORIDE 0.9 % (FLUSH) 0.9 %
10 SYRINGE (ML) INJECTION PRN
Status: DISCONTINUED | OUTPATIENT
Start: 2018-06-26 | End: 2018-06-27 | Stop reason: HOSPADM

## 2018-06-26 RX ORDER — DEXAMETHASONE SODIUM PHOSPHATE 10 MG/ML
8 INJECTION, SOLUTION INTRAMUSCULAR; INTRAVENOUS ONCE
Status: COMPLETED | OUTPATIENT
Start: 2018-06-26 | End: 2018-06-26

## 2018-06-26 RX ORDER — HEPARIN SODIUM (PORCINE) LOCK FLUSH IV SOLN 100 UNIT/ML 100 UNIT/ML
500 SOLUTION INTRAVENOUS PRN
Status: DISCONTINUED | OUTPATIENT
Start: 2018-06-26 | End: 2018-06-27 | Stop reason: HOSPADM

## 2018-06-26 RX ORDER — 0.9 % SODIUM CHLORIDE 0.9 %
500 INTRAVENOUS SOLUTION INTRAVENOUS ONCE
Status: COMPLETED | OUTPATIENT
Start: 2018-06-26 | End: 2018-06-26

## 2018-06-26 RX ADMIN — Medication 10 ML: at 12:09

## 2018-06-26 RX ADMIN — CISPLATIN: 100 INJECTION, SOLUTION INTRAVENOUS at 09:25

## 2018-06-26 RX ADMIN — SODIUM CHLORIDE 500 ML: 9 INJECTION, SOLUTION INTRAVENOUS at 09:04

## 2018-06-26 RX ADMIN — SODIUM CHLORIDE, PRESERVATIVE FREE 500 UNITS: 5 INJECTION INTRAVENOUS at 12:09

## 2018-06-26 RX ADMIN — DEXAMETHASONE SODIUM PHOSPHATE 8 MG: 10 INJECTION, SOLUTION INTRAMUSCULAR; INTRAVENOUS at 09:04

## 2018-06-26 RX ADMIN — ETOPOSIDE 200 MG: 20 INJECTION INTRAVENOUS at 10:57

## 2018-06-26 NOTE — PROGRESS NOTES
Patient here for his cycle 4 day 2 Cisplatin and Etoposide treatment, he was asleep, spoke with his wife. She stated patient is tolerating the chemo so far pretty well, but has no energy. She reported this past Sunday was a good day for him, he ate 3 pieces of pizza and sat outside talking with her for few hours, which is unusual for him. She said he ate pizza last night and stated it didn't taste right. She said she tries to give him 3 nutritional supplements a day and his healthy weight is about 215 lbs. Will update clinic dietician. She denied any needs today and said she's been meeting with the financial navigator to help sort through their medical bills. Informed her patient will be contacted with appointments for CT scans of the chest, abdomen and pelvis for late July and to contact this nurse navigator if they hadn't been scheduled by the middle of the month, gave contact number, she verbalized understanding. Patient is scheduled to complete his current chemo cycle on 06/29/2018, then return to see Dr. Vincent Hart on 07/09/2018 at 0830 for a юлия. Encouraged to call clinic with any questions of needs. Franco Farris  RN, ADN, BSE, OCN  Patient Nurse Navigator

## 2018-06-27 ENCOUNTER — HOSPITAL ENCOUNTER (OUTPATIENT)
Dept: INFUSION THERAPY | Age: 43
Discharge: HOME | End: 2018-06-27
Payer: COMMERCIAL

## 2018-06-27 VITALS
WEIGHT: 170 LBS | DIASTOLIC BLOOD PRESSURE: 67 MMHG | RESPIRATION RATE: 18 BRPM | SYSTOLIC BLOOD PRESSURE: 108 MMHG | TEMPERATURE: 97.5 F | BODY MASS INDEX: 23.8 KG/M2 | HEIGHT: 71 IN | HEART RATE: 48 BPM

## 2018-06-27 DIAGNOSIS — D40.11 NEOPLASM OF UNCERTAIN BEHAVIOR OF RIGHT TESTIS: ICD-10-CM

## 2018-06-27 PROCEDURE — 6360000002 HC RX W HCPCS: Performed by: INTERNAL MEDICINE

## 2018-06-27 PROCEDURE — 96413 CHEMO IV INFUSION 1 HR: CPT

## 2018-06-27 PROCEDURE — 96375 TX/PRO/DX INJ NEW DRUG ADDON: CPT

## 2018-06-27 PROCEDURE — 2580000003 HC RX 258: Performed by: INTERNAL MEDICINE

## 2018-06-27 PROCEDURE — 96361 HYDRATE IV INFUSION ADD-ON: CPT

## 2018-06-27 PROCEDURE — 96417 CHEMO IV INFUS EACH ADDL SEQ: CPT

## 2018-06-27 RX ORDER — 0.9 % SODIUM CHLORIDE 0.9 %
500 INTRAVENOUS SOLUTION INTRAVENOUS ONCE
Status: COMPLETED | OUTPATIENT
Start: 2018-06-27 | End: 2018-06-27

## 2018-06-27 RX ORDER — HEPARIN SODIUM (PORCINE) LOCK FLUSH IV SOLN 100 UNIT/ML 100 UNIT/ML
500 SOLUTION INTRAVENOUS PRN
Status: DISCONTINUED | OUTPATIENT
Start: 2018-06-27 | End: 2018-06-28 | Stop reason: HOSPADM

## 2018-06-27 RX ORDER — SODIUM CHLORIDE 0.9 % (FLUSH) 0.9 %
10 SYRINGE (ML) INJECTION PRN
Status: DISCONTINUED | OUTPATIENT
Start: 2018-06-27 | End: 2018-06-28 | Stop reason: HOSPADM

## 2018-06-27 RX ORDER — DEXAMETHASONE SODIUM PHOSPHATE 10 MG/ML
8 INJECTION, SOLUTION INTRAMUSCULAR; INTRAVENOUS ONCE
Status: COMPLETED | OUTPATIENT
Start: 2018-06-27 | End: 2018-06-27

## 2018-06-27 RX ADMIN — ETOPOSIDE 200 MG: 20 INJECTION INTRAVENOUS at 10:29

## 2018-06-27 RX ADMIN — DEXAMETHASONE SODIUM PHOSPHATE 8 MG: 10 INJECTION, SOLUTION INTRAMUSCULAR; INTRAVENOUS at 08:51

## 2018-06-27 RX ADMIN — SODIUM CHLORIDE 500 ML: 9 INJECTION, SOLUTION INTRAVENOUS at 08:51

## 2018-06-27 RX ADMIN — Medication 500 UNITS: at 11:38

## 2018-06-27 RX ADMIN — Medication 10 ML: at 11:38

## 2018-06-27 RX ADMIN — CISPLATIN: 100 INJECTION, SOLUTION INTRAVENOUS at 09:17

## 2018-06-28 ENCOUNTER — HOSPITAL ENCOUNTER (OUTPATIENT)
Dept: INFUSION THERAPY | Age: 43
Discharge: HOME | End: 2018-06-28
Payer: COMMERCIAL

## 2018-06-28 VITALS — DIASTOLIC BLOOD PRESSURE: 75 MMHG | HEART RATE: 70 BPM | SYSTOLIC BLOOD PRESSURE: 106 MMHG

## 2018-06-28 DIAGNOSIS — D40.11 NEOPLASM OF UNCERTAIN BEHAVIOR OF RIGHT TESTIS: ICD-10-CM

## 2018-06-28 PROCEDURE — 6360000002 HC RX W HCPCS: Performed by: INTERNAL MEDICINE

## 2018-06-28 PROCEDURE — 96413 CHEMO IV INFUSION 1 HR: CPT

## 2018-06-28 PROCEDURE — 96375 TX/PRO/DX INJ NEW DRUG ADDON: CPT

## 2018-06-28 PROCEDURE — 96361 HYDRATE IV INFUSION ADD-ON: CPT

## 2018-06-28 PROCEDURE — 96417 CHEMO IV INFUS EACH ADDL SEQ: CPT

## 2018-06-28 PROCEDURE — 2580000003 HC RX 258: Performed by: INTERNAL MEDICINE

## 2018-06-28 RX ORDER — PALONOSETRON 0.05 MG/ML
0.25 INJECTION, SOLUTION INTRAVENOUS ONCE
Status: COMPLETED | OUTPATIENT
Start: 2018-06-28 | End: 2018-06-28

## 2018-06-28 RX ORDER — DEXAMETHASONE SODIUM PHOSPHATE 10 MG/ML
8 INJECTION, SOLUTION INTRAMUSCULAR; INTRAVENOUS ONCE
Status: COMPLETED | OUTPATIENT
Start: 2018-06-28 | End: 2018-06-28

## 2018-06-28 RX ORDER — 0.9 % SODIUM CHLORIDE 0.9 %
500 INTRAVENOUS SOLUTION INTRAVENOUS ONCE
Status: COMPLETED | OUTPATIENT
Start: 2018-06-28 | End: 2018-06-28

## 2018-06-28 RX ORDER — SODIUM CHLORIDE 0.9 % (FLUSH) 0.9 %
10 SYRINGE (ML) INJECTION PRN
Status: DISCONTINUED | OUTPATIENT
Start: 2018-06-28 | End: 2018-06-29 | Stop reason: HOSPADM

## 2018-06-28 RX ORDER — HEPARIN SODIUM (PORCINE) LOCK FLUSH IV SOLN 100 UNIT/ML 100 UNIT/ML
500 SOLUTION INTRAVENOUS PRN
Status: DISCONTINUED | OUTPATIENT
Start: 2018-06-28 | End: 2018-06-29 | Stop reason: HOSPADM

## 2018-06-28 RX ADMIN — Medication 10 ML: at 12:42

## 2018-06-28 RX ADMIN — SODIUM CHLORIDE 500 ML: 9 INJECTION, SOLUTION INTRAVENOUS at 09:17

## 2018-06-28 RX ADMIN — SODIUM CHLORIDE, PRESERVATIVE FREE 500 UNITS: 5 INJECTION INTRAVENOUS at 12:42

## 2018-06-28 RX ADMIN — DEXAMETHASONE SODIUM PHOSPHATE 8 MG: 10 INJECTION, SOLUTION INTRAMUSCULAR; INTRAVENOUS at 09:20

## 2018-06-28 RX ADMIN — PALONOSETRON HYDROCHLORIDE 0.25 MG: 0.25 INJECTION INTRAVENOUS at 09:17

## 2018-06-28 RX ADMIN — CISPLATIN: 100 INJECTION, SOLUTION INTRAVENOUS at 10:07

## 2018-06-28 RX ADMIN — ETOPOSIDE 200 MG: 20 INJECTION INTRAVENOUS at 11:26

## 2018-06-29 ENCOUNTER — HOSPITAL ENCOUNTER (OUTPATIENT)
Dept: INFUSION THERAPY | Age: 43
Discharge: HOME | End: 2018-06-29
Payer: COMMERCIAL

## 2018-06-29 VITALS
TEMPERATURE: 97.8 F | SYSTOLIC BLOOD PRESSURE: 99 MMHG | RESPIRATION RATE: 18 BRPM | DIASTOLIC BLOOD PRESSURE: 70 MMHG | HEART RATE: 58 BPM

## 2018-06-29 DIAGNOSIS — D40.11 NEOPLASM OF UNCERTAIN BEHAVIOR OF RIGHT TESTIS: ICD-10-CM

## 2018-06-29 PROCEDURE — 2580000003 HC RX 258: Performed by: INTERNAL MEDICINE

## 2018-06-29 PROCEDURE — 96417 CHEMO IV INFUS EACH ADDL SEQ: CPT

## 2018-06-29 PROCEDURE — 96375 TX/PRO/DX INJ NEW DRUG ADDON: CPT

## 2018-06-29 PROCEDURE — 6360000002 HC RX W HCPCS: Performed by: INTERNAL MEDICINE

## 2018-06-29 PROCEDURE — 96361 HYDRATE IV INFUSION ADD-ON: CPT

## 2018-06-29 PROCEDURE — 96377 APPLICATON ON-BODY INJECTOR: CPT

## 2018-06-29 PROCEDURE — 96413 CHEMO IV INFUSION 1 HR: CPT

## 2018-06-29 RX ORDER — SODIUM CHLORIDE 0.9 % (FLUSH) 0.9 %
10 SYRINGE (ML) INJECTION PRN
Status: DISCONTINUED | OUTPATIENT
Start: 2018-06-29 | End: 2018-06-30 | Stop reason: HOSPADM

## 2018-06-29 RX ORDER — 0.9 % SODIUM CHLORIDE 0.9 %
500 INTRAVENOUS SOLUTION INTRAVENOUS ONCE
Status: COMPLETED | OUTPATIENT
Start: 2018-06-29 | End: 2018-06-29

## 2018-06-29 RX ORDER — DEXAMETHASONE SODIUM PHOSPHATE 10 MG/ML
8 INJECTION, SOLUTION INTRAMUSCULAR; INTRAVENOUS ONCE
Status: COMPLETED | OUTPATIENT
Start: 2018-06-29 | End: 2018-06-29

## 2018-06-29 RX ORDER — HEPARIN SODIUM (PORCINE) LOCK FLUSH IV SOLN 100 UNIT/ML 100 UNIT/ML
500 SOLUTION INTRAVENOUS PRN
Status: DISCONTINUED | OUTPATIENT
Start: 2018-06-29 | End: 2018-06-30 | Stop reason: HOSPADM

## 2018-06-29 RX ADMIN — Medication 10 ML: at 11:30

## 2018-06-29 RX ADMIN — PEGFILGRASTIM 6 MG: KIT SUBCUTANEOUS at 11:26

## 2018-06-29 RX ADMIN — SODIUM CHLORIDE 500 ML: 9 INJECTION, SOLUTION INTRAVENOUS at 08:43

## 2018-06-29 RX ADMIN — SODIUM CHLORIDE, PRESERVATIVE FREE 500 UNITS: 5 INJECTION INTRAVENOUS at 11:30

## 2018-06-29 RX ADMIN — DEXAMETHASONE SODIUM PHOSPHATE 8 MG: 10 INJECTION, SOLUTION INTRAMUSCULAR; INTRAVENOUS at 08:44

## 2018-06-29 RX ADMIN — ETOPOSIDE 200 MG: 20 INJECTION INTRAVENOUS at 10:08

## 2018-06-29 RX ADMIN — CISPLATIN: 100 INJECTION, SOLUTION INTRAVENOUS at 08:55

## 2018-07-02 ENCOUNTER — TELEPHONE (OUTPATIENT)
Dept: NEUROLOGY | Age: 43
End: 2018-07-02

## 2018-07-02 ENCOUNTER — OFFICE VISIT (OUTPATIENT)
Dept: NEUROLOGY | Age: 43
End: 2018-07-02
Payer: COMMERCIAL

## 2018-07-02 VITALS
BODY MASS INDEX: 23.32 KG/M2 | DIASTOLIC BLOOD PRESSURE: 65 MMHG | OXYGEN SATURATION: 96 % | WEIGHT: 166.6 LBS | HEART RATE: 81 BPM | HEIGHT: 71 IN | SYSTOLIC BLOOD PRESSURE: 91 MMHG

## 2018-07-02 DIAGNOSIS — R56.9 SEIZURE (HCC): Primary | ICD-10-CM

## 2018-07-02 PROCEDURE — 99205 OFFICE O/P NEW HI 60 MIN: CPT | Performed by: CLINICAL NURSE SPECIALIST

## 2018-07-02 NOTE — TELEPHONE ENCOUNTER
Per Art Kojo need to fax Clinical over to [phone] for EEG ([id number]). [facility]#[id number].   Electronically signed by Angelito Matt on 7/2/2018 at 12:25 PM
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
## 2018-07-09 ENCOUNTER — HOSPITAL ENCOUNTER (OUTPATIENT)
Dept: INFUSION THERAPY | Age: 43
Discharge: HOME | End: 2018-07-09
Payer: COMMERCIAL

## 2018-07-09 ENCOUNTER — OFFICE VISIT (OUTPATIENT)
Dept: ONCOLOGY | Age: 43
End: 2018-07-09
Payer: COMMERCIAL

## 2018-07-09 ENCOUNTER — HOSPITAL ENCOUNTER (OUTPATIENT)
Age: 43
Discharge: HOME | End: 2018-07-11
Payer: COMMERCIAL

## 2018-07-09 VITALS
HEART RATE: 71 BPM | WEIGHT: 160.3 LBS | BODY MASS INDEX: 22.44 KG/M2 | RESPIRATION RATE: 20 BRPM | TEMPERATURE: 97.4 F | HEIGHT: 71 IN | DIASTOLIC BLOOD PRESSURE: 74 MMHG | SYSTOLIC BLOOD PRESSURE: 116 MMHG

## 2018-07-09 DIAGNOSIS — C62.91 SEMINOMA OF RIGHT TESTIS, STAGE 1 (HCC): Primary | ICD-10-CM

## 2018-07-09 DIAGNOSIS — D40.11 NEOPLASM OF UNCERTAIN BEHAVIOR OF RIGHT TESTIS: ICD-10-CM

## 2018-07-09 DIAGNOSIS — Z86.718 HISTORY OF DVT (DEEP VEIN THROMBOSIS): ICD-10-CM

## 2018-07-09 DIAGNOSIS — C62.91 SEMINOMA OF RIGHT TESTIS, STAGE 1 (HCC): ICD-10-CM

## 2018-07-09 LAB
ALBUMIN SERPL-MCNC: 4.5 G/DL (ref 3.5–5.2)
ALP BLD-CCNC: 125 U/L (ref 40–129)
ALT SERPL-CCNC: 38 U/L (ref 0–40)
ANION GAP SERPL CALCULATED.3IONS-SCNC: 15 MMOL/L (ref 7–16)
ANISOCYTOSIS: ABNORMAL
AST SERPL-CCNC: 15 U/L (ref 0–39)
BASOPHILS ABSOLUTE: 0.1 E9/L (ref 0–0.2)
BASOPHILS RELATIVE PERCENT: 2 % (ref 0–2)
BILIRUB SERPL-MCNC: 0.4 MG/DL (ref 0–1.2)
BUN BLDV-MCNC: 13 MG/DL (ref 6–20)
BURR CELLS: ABNORMAL
CALCIUM SERPL-MCNC: 9.6 MG/DL (ref 8.6–10.2)
CHLORIDE BLD-SCNC: 100 MMOL/L (ref 98–107)
CO2: 24 MMOL/L (ref 22–29)
CREAT SERPL-MCNC: 1 MG/DL (ref 0.7–1.2)
EOSINOPHILS ABSOLUTE: 0.08 E9/L (ref 0.05–0.5)
EOSINOPHILS RELATIVE PERCENT: 1.5 % (ref 0–6)
GFR AFRICAN AMERICAN: >60
GFR NON-AFRICAN AMERICAN: >60 ML/MIN/1.73
GLUCOSE BLD-MCNC: 98 MG/DL (ref 74–109)
HCT VFR BLD CALC: 32.7 % (ref 37–54)
HEMOGLOBIN: 10.8 G/DL (ref 12.5–16.5)
LYMPHOCYTES ABSOLUTE: 1.63 E9/L (ref 1.5–4)
LYMPHOCYTES RELATIVE PERCENT: 31.7 % (ref 20–42)
MAGNESIUM: 1.6 MG/DL (ref 1.6–2.6)
MCH RBC QN AUTO: 29.7 PG (ref 26–35)
MCHC RBC AUTO-ENTMCNC: 33 % (ref 32–34.5)
MCV RBC AUTO: 89.8 FL (ref 80–99.9)
MONOCYTES ABSOLUTE: 0.46 E9/L (ref 0.1–0.95)
MONOCYTES RELATIVE PERCENT: 8.5 % (ref 2–12)
NEUTROPHILS ABSOLUTE: 2.86 E9/L (ref 1.8–7.3)
NEUTROPHILS RELATIVE PERCENT: 56.3 % (ref 43–80)
OVALOCYTES: ABNORMAL
PDW BLD-RTO: 16.2 FL (ref 11.5–15)
PHENYTOIN DOSE AMOUNT: ABNORMAL
PHENYTOIN LEVEL: 2.9 UG/ML (ref 10–20)
PLATELET # BLD: 87 E9/L (ref 130–450)
PLATELET CONFIRMATION: NORMAL
PMV BLD AUTO: 10.1 FL (ref 7–12)
POIKILOCYTES: ABNORMAL
POTASSIUM SERPL-SCNC: 3.7 MMOL/L (ref 3.5–5)
RBC # BLD: 3.64 E12/L (ref 3.8–5.8)
SODIUM BLD-SCNC: 139 MMOL/L (ref 132–146)
TOTAL PROTEIN: 6.9 G/DL (ref 6.4–8.3)
WBC # BLD: 5.1 E9/L (ref 4.5–11.5)

## 2018-07-09 PROCEDURE — 36415 COLL VENOUS BLD VENIPUNCTURE: CPT

## 2018-07-09 PROCEDURE — 80185 ASSAY OF PHENYTOIN TOTAL: CPT

## 2018-07-09 PROCEDURE — 83735 ASSAY OF MAGNESIUM: CPT

## 2018-07-09 PROCEDURE — 99214 OFFICE O/P EST MOD 30 MIN: CPT

## 2018-07-09 PROCEDURE — 85025 COMPLETE CBC W/AUTO DIFF WBC: CPT

## 2018-07-09 PROCEDURE — 80053 COMPREHEN METABOLIC PANEL: CPT

## 2018-07-09 NOTE — PROGRESS NOTES
PORT FLUSH    Patient presents to clinic for [facility] today. 20 ant  SQ port accessed per policy using 2.64 inch Keller needle for good blood return. Aspirate for waste and specimen sent to lab. Site flushed easily with 10 mL NSS followed by 5 mL Heparin solution 100 units/ml rinse prior to de-access. Dry sterile dressing to area. Tolerated procedure well. Encouraged to schedule port flush every 4 weeks.

## 2018-07-09 NOTE — PROGRESS NOTES
[facility]. [facility]        Pt Name: Patrick Uribe: 1975  Date of evaluation: 7/9/2018  Primary Care Physician: Ruben Mercedes MD  Reason for evaluation:   Chief Complaint   Patient presents with    Cancer     Seminoma of right testis, stage 1 ([address]    Follow-up        Subjective: Here for Nick and follow-up. Feels well but is tired and fatigued at times. Completed 4 cycles of  Cis-platinum and -16 on Friday, June 29, 2018        OBJECTIVE:  VITALS:  height is 5' 11\" (1.803 m) and weight is 160 lb 4.8 oz (72.7 kg). His temporal temperature is 97.4 °F (36.3 °C). His blood pressure is 116/74 and his pulse is 71. His respiration is 20. Physical Exam:  Performance Status: 0  Well developed, well nourished male  Eyes:  No gross abnormalities. and PERRL  Heart:  RRR, no murmur. Lungs:  Normal expansion.  Clear to auscultation.  No wheezing. Abdomen:  Soft, Non-Tender   Extremities: no swelling, no edema, no calf tenderness  Neurologic: CNs grossly intact, no slurred speech, A&Ox3  Skin:  Skin color, texture, turgor normal. No rashes or lesions.         Medications  Prior to Admission medications    Medication Sig Start Date End Date Taking? Authorizing Provider   levETIRAcetam (KEPPRA) 750 MG tablet Take 2 tablets by mouth 2 times daily 6/14/18   Prisca Patten DO   lacosamide (VIMPAT) 150 MG TABS tablet Take 1 tablet by mouth 2 times daily. . 6/14/18   Prisca Patten DO   ferrous sulfate 325 (65 Fe) MG tablet Take 1 tablet by mouth 2 times daily (with meals) 6/14/18   Prisca Patten DO   ondansetron (ZOFRAN-ODT) 4 MG disintegrating tablet Take 4 mg by mouth every 8 hours as needed for Nausea or Vomiting    Historical Provider, MD   Prochlorperazine Maleate (COMPAZINE PO) Take by mouth    Historical Provider, MD   mirtazapine (REMERON) 15 MG tablet Take 1 tablet by mouth nightly 6/6/18   VENKATA Martin - CNP   LORazepam (ATIVAN) 0.5 MG tablet Take 0.5 mg by mouth every 6 hours as needed for Anxiety. Thien Tellez Historical Provider, MD   phenytoin (DILANTIN) 100 MG ER capsule Take 100 mg by mouth 3 times daily  5/5/18   Historical Provider, MD    Scheduled Meds:  Continuous Infusions:  PRN Meds:.        Recent Laboratory Data-     Lab Results   Component Value Date    WBC 10.5 06/25/2018    HGB 12.5 06/25/2018    HCT 38.2 06/25/2018    MCV 90.3 06/25/2018     06/25/2018    LYMPHOPCT 22.0 06/25/2018    RBC 4.23 06/25/2018    MCH 29.6 06/25/2018    MCHC 32.7 06/25/2018    RDW 19.0 (H) 06/25/2018    NEUTOPHILPCT 63.2 06/25/2018    MONOPCT 8.4 06/25/2018    BASOPCT 1.4 06/25/2018    NEUTROABS 6.65 06/25/2018    LYMPHSABS 2.32 06/25/2018    MONOSABS 0.89 06/25/2018    EOSABS 0.30 06/25/2018    BASOSABS 0.15 06/25/2018       Lab Results   Component Value Date     06/25/2018    K 4.1 06/25/2018     06/25/2018    CO2 24 06/25/2018    BUN 13 06/25/2018    CREATININE 0.8 06/25/2018    GLUCOSE 89 06/25/2018    CALCIUM 9.5 06/25/2018    PROT 6.7 06/25/2018    LABALBU 3.9 06/25/2018    BILITOT 0.2 06/25/2018    ALKPHOS 99 06/25/2018    AST 17 06/25/2018    ALT 24 06/25/2018    LABGLOM >60 06/25/2018    GFRAA >60 06/25/2018         Lab Results   Component Value Date    IRON 35 (L) 06/13/2018    TIBC 148 (L) 06/13/2018    FERRITIN 1,082 06/13/2018          Radiology-  Ct Head Wo Contrast  Result Date: 6/9/2018  Reading location:  200 CLINICAL STATEMENT: Headache. Testicular malignancy CT images through the brain were obtained without contrast. Automated dose exposure control was utilized for this examination. COMPARISON: February 12, 2018. There are changes of a previous craniotomy on the right. There is dilatation of the frontal horn of the right lateral ventricle with frontal lobe encephalomalacia, probably secondary to the surgery. The right temporal horn is dilated as well. I see no intraparenchymal hemorrhage or extra-axial collection.  No mass

## 2018-07-16 ENCOUNTER — TELEPHONE (OUTPATIENT)
Dept: NEUROLOGY | Age: 43
End: 2018-07-16

## 2018-07-17 ENCOUNTER — TELEPHONE (OUTPATIENT)
Dept: NEUROLOGY | Age: 43
End: 2018-07-17

## 2018-07-17 NOTE — TELEPHONE ENCOUNTER
Per Theresa Handler [phone] stating that patient needs a standard EEG and also that it was not explained why this test was necessary, therefore denied not medically necessary. Denial in .   Electronically signed by Aleyda Kapoor on 7/17/2018 at 3:54 PM

## 2018-07-18 DIAGNOSIS — D40.11 NEOPLASM OF UNCERTAIN BEHAVIOR OF RIGHT TESTIS: ICD-10-CM

## 2018-07-18 RX ORDER — MIRTAZAPINE 15 MG/1
15 TABLET, FILM COATED ORAL NIGHTLY
Qty: 30 TABLET | Refills: 0 | Status: SHIPPED | OUTPATIENT
Start: 2018-07-18 | End: 2018-12-10 | Stop reason: ALTCHOICE

## 2018-07-26 ENCOUNTER — HOSPITAL ENCOUNTER (OUTPATIENT)
Dept: NEUROLOGY | Age: 43
Discharge: HOME | End: 2018-07-26
Payer: COMMERCIAL

## 2018-07-26 PROCEDURE — 95819 EEG AWAKE AND ASLEEP: CPT

## 2018-07-26 PROCEDURE — 95819 EEG AWAKE AND ASLEEP: CPT | Performed by: PSYCHIATRY & NEUROLOGY

## 2018-07-26 NOTE — PROCEDURES
EEG report    This 41-year-old man on Remeron, Zoloft, Keppra, Vimpat, Zofran, Compazine, Ativan and Dilantin displayed the following underlying rhythms---well-organized, synchronous, 10 Hz, 30 µV alpha rhythms in both posterior regions. 20 Hz, 10 µV beta rhythms were noted above precentral regions. There was symmetrical attenuation of posterior alpha rhythms with eye opening. Hyperventilation was not performed there was minimal driving to photic stimulation, without abnormalities. The patient did fall sleep, progressing uneventfully through stages 1-2 somnolence. Well-formed, bilateral sleep spindles were seen. Throughout this tracing, there were no focal abnormalities or epileptiform discharges. Impression---normal awake and sleep EEG    Clinical correlation was highly advised.

## 2018-07-30 ENCOUNTER — HOSPITAL ENCOUNTER (OUTPATIENT)
Dept: CT IMAGING | Age: 43
Discharge: HOME | End: 2018-07-30
Payer: COMMERCIAL

## 2018-07-30 DIAGNOSIS — C62.91 SEMINOMA OF RIGHT TESTIS, STAGE 1 (HCC): ICD-10-CM

## 2018-07-30 PROCEDURE — 74177 CT ABD & PELVIS W/CONTRAST: CPT

## 2018-07-30 PROCEDURE — 71260 CT THORAX DX C+: CPT

## 2018-07-30 PROCEDURE — 6360000004 HC RX CONTRAST MEDICATION: Performed by: RADIOLOGY

## 2018-07-30 RX ADMIN — IOPAMIDOL 80 ML: 755 INJECTION, SOLUTION INTRAVENOUS at 11:18

## 2018-08-06 ENCOUNTER — HOSPITAL ENCOUNTER (OUTPATIENT)
Dept: INFUSION THERAPY | Age: 43
Discharge: HOME | End: 2018-08-06
Payer: COMMERCIAL

## 2018-08-06 ENCOUNTER — OFFICE VISIT (OUTPATIENT)
Dept: ONCOLOGY | Age: 43
End: 2018-08-06
Payer: COMMERCIAL

## 2018-08-06 VITALS
SYSTOLIC BLOOD PRESSURE: 99 MMHG | TEMPERATURE: 98.5 F | BODY MASS INDEX: 21.7 KG/M2 | HEART RATE: 70 BPM | RESPIRATION RATE: 20 BRPM | WEIGHT: 155 LBS | DIASTOLIC BLOOD PRESSURE: 66 MMHG | HEIGHT: 71 IN

## 2018-08-06 DIAGNOSIS — C62.91 SEMINOMA OF RIGHT TESTIS, STAGE 1 (HCC): Primary | ICD-10-CM

## 2018-08-06 DIAGNOSIS — C62.91 SEMINOMA OF RIGHT TESTIS, STAGE 1 (HCC): ICD-10-CM

## 2018-08-06 LAB
ALBUMIN SERPL-MCNC: 4.2 G/DL (ref 3.5–5.2)
ALP BLD-CCNC: 74 U/L (ref 40–129)
ALT SERPL-CCNC: 18 U/L (ref 0–40)
ANION GAP SERPL CALCULATED.3IONS-SCNC: 9 MMOL/L (ref 7–16)
ANISOCYTOSIS: ABNORMAL
AST SERPL-CCNC: 12 U/L (ref 0–39)
BASOPHILS ABSOLUTE: 0.11 E9/L (ref 0–0.2)
BASOPHILS RELATIVE PERCENT: 2 % (ref 0–2)
BILIRUB SERPL-MCNC: 0.4 MG/DL (ref 0–1.2)
BUN BLDV-MCNC: 9 MG/DL (ref 6–20)
BURR CELLS: ABNORMAL
CALCIUM SERPL-MCNC: 9.2 MG/DL (ref 8.6–10.2)
CHLORIDE BLD-SCNC: 107 MMOL/L (ref 98–107)
CO2: 25 MMOL/L (ref 22–29)
CREAT SERPL-MCNC: 0.9 MG/DL (ref 0.7–1.2)
EOSINOPHILS ABSOLUTE: 0.11 E9/L (ref 0.05–0.5)
EOSINOPHILS RELATIVE PERCENT: 2 % (ref 0–6)
GFR AFRICAN AMERICAN: >60
GFR NON-AFRICAN AMERICAN: >60 ML/MIN/1.73
GLUCOSE BLD-MCNC: 130 MG/DL (ref 74–109)
HCT VFR BLD CALC: 31.5 % (ref 37–54)
HEMOGLOBIN: 10.5 G/DL (ref 12.5–16.5)
LACTATE DEHYDROGENASE: 176 U/L (ref 135–225)
LYMPHOCYTES ABSOLUTE: 1.54 E9/L (ref 1.5–4)
LYMPHOCYTES RELATIVE PERCENT: 27 % (ref 20–42)
MCH RBC QN AUTO: 31.6 PG (ref 26–35)
MCHC RBC AUTO-ENTMCNC: 33.3 % (ref 32–34.5)
MCV RBC AUTO: 94.9 FL (ref 80–99.9)
MONOCYTES ABSOLUTE: 0.4 E9/L (ref 0.1–0.95)
MONOCYTES RELATIVE PERCENT: 7 % (ref 2–12)
NEUTROPHILS ABSOLUTE: 3.53 E9/L (ref 1.8–7.3)
NEUTROPHILS RELATIVE PERCENT: 62 % (ref 43–80)
OVALOCYTES: ABNORMAL
PDW BLD-RTO: 16.3 FL (ref 11.5–15)
PLATELET # BLD: 214 E9/L (ref 130–450)
PMV BLD AUTO: 9.3 FL (ref 7–12)
POIKILOCYTES: ABNORMAL
POTASSIUM SERPL-SCNC: 4 MMOL/L (ref 3.5–5)
RBC # BLD: 3.32 E12/L (ref 3.8–5.8)
SODIUM BLD-SCNC: 141 MMOL/L (ref 132–146)
TOTAL PROTEIN: 6.1 G/DL (ref 6.4–8.3)
WBC # BLD: 5.7 E9/L (ref 4.5–11.5)

## 2018-08-06 PROCEDURE — 85025 COMPLETE CBC W/AUTO DIFF WBC: CPT

## 2018-08-06 PROCEDURE — 80053 COMPREHEN METABOLIC PANEL: CPT

## 2018-08-06 PROCEDURE — 99214 OFFICE O/P EST MOD 30 MIN: CPT | Performed by: INTERNAL MEDICINE

## 2018-08-06 PROCEDURE — 83615 LACTATE (LD) (LDH) ENZYME: CPT

## 2018-08-06 RX ORDER — SODIUM CHLORIDE 0.9 % (FLUSH) 0.9 %
10 SYRINGE (ML) INJECTION PRN
Status: CANCELLED | OUTPATIENT
Start: 2018-08-06

## 2018-08-06 RX ORDER — SODIUM CHLORIDE 0.9 % (FLUSH) 0.9 %
20 SYRINGE (ML) INJECTION PRN
Status: CANCELLED | OUTPATIENT
Start: 2018-08-06

## 2018-08-06 RX ORDER — HEPARIN SODIUM (PORCINE) LOCK FLUSH IV SOLN 100 UNIT/ML 100 UNIT/ML
500 SOLUTION INTRAVENOUS PRN
Status: CANCELLED | OUTPATIENT
Start: 2018-08-06

## 2018-08-06 NOTE — PROGRESS NOTES
pathologic lymphadenopathy    Will be followed with surveillance and will have a follow-up CT scan of chest abdomen and pelvis in 3-4 month        Sebastien Haji M.D., F.A.C.P.   Electronically signed 8/6/2018 at 2:35 PM

## 2018-08-06 NOTE — PROGRESS NOTES
PORT FLUSH    Patient presents to clinic for [facility] today. 0.75 inch  SQ port accessed per policy using 20 ant Keller needle for good blood return. Aspirate for waste and specimen sent to lab. Site flushed easily with 10 mL NSS followed by 5 mL Heparin solution 100 units/ml rinse prior to de-access. Dry sterile dressing to area. Tolerated procedure well. Encouraged to schedule port flush every 4 weeks.

## 2018-09-20 ENCOUNTER — HOSPITAL ENCOUNTER (OUTPATIENT)
Dept: INFUSION THERAPY | Age: 43
Discharge: HOME | End: 2018-09-20
Payer: COMMERCIAL

## 2018-09-20 ENCOUNTER — TELEPHONE (OUTPATIENT)
Dept: NEUROLOGY | Age: 43
End: 2018-09-20

## 2018-09-20 ENCOUNTER — TELEPHONE (OUTPATIENT)
Dept: ONCOLOGY | Age: 43
End: 2018-09-20

## 2018-09-20 DIAGNOSIS — C62.91 SEMINOMA OF RIGHT TESTIS, STAGE 1 (HCC): ICD-10-CM

## 2018-09-20 PROCEDURE — 96523 IRRIG DRUG DELIVERY DEVICE: CPT

## 2018-09-20 PROCEDURE — 6360000002 HC RX W HCPCS: Performed by: NURSE PRACTITIONER

## 2018-09-20 PROCEDURE — 2580000003 HC RX 258: Performed by: NURSE PRACTITIONER

## 2018-09-20 RX ORDER — SODIUM CHLORIDE 0.9 % (FLUSH) 0.9 %
5 SYRINGE (ML) INJECTION PRN
Status: CANCELLED | OUTPATIENT
Start: 2018-09-20

## 2018-09-20 RX ORDER — HEPARIN SODIUM (PORCINE) LOCK FLUSH IV SOLN 100 UNIT/ML 100 UNIT/ML
500 SOLUTION INTRAVENOUS PRN
Status: DISCONTINUED | OUTPATIENT
Start: 2018-09-20 | End: 2018-09-21 | Stop reason: HOSPADM

## 2018-09-20 RX ORDER — SODIUM CHLORIDE 0.9 % (FLUSH) 0.9 %
5 SYRINGE (ML) INJECTION PRN
Status: DISCONTINUED | OUTPATIENT
Start: 2018-09-20 | End: 2018-09-21 | Stop reason: HOSPADM

## 2018-09-20 RX ORDER — SODIUM CHLORIDE 0.9 % (FLUSH) 0.9 %
10 SYRINGE (ML) INJECTION PRN
Status: DISCONTINUED | OUTPATIENT
Start: 2018-09-20 | End: 2018-09-21 | Stop reason: HOSPADM

## 2018-09-20 RX ORDER — SODIUM CHLORIDE 0.9 % (FLUSH) 0.9 %
10 SYRINGE (ML) INJECTION PRN
Status: CANCELLED | OUTPATIENT
Start: 2018-09-20

## 2018-09-20 RX ORDER — HEPARIN SODIUM (PORCINE) LOCK FLUSH IV SOLN 100 UNIT/ML 100 UNIT/ML
500 SOLUTION INTRAVENOUS PRN
Status: CANCELLED | OUTPATIENT
Start: 2018-09-20

## 2018-09-20 RX ORDER — SODIUM CHLORIDE 0.9 % (FLUSH) 0.9 %
20 SYRINGE (ML) INJECTION PRN
Status: CANCELLED | OUTPATIENT
Start: 2018-09-20

## 2018-09-20 RX ADMIN — Medication 500 UNITS: at 13:23

## 2018-09-20 RX ADMIN — Medication 10 ML: at 13:28

## 2018-09-20 NOTE — TELEPHONE ENCOUNTER
Met with patient during his appointment for his port flush, to discuss and give a [facility], Treatment Plan, Patient Resource Cancer Survivorship booklet, a list of local resources for survivors, follow-up appointments and contact information for the nurse navigator. Informed a copy will be faxed to his PCP, Dr. Mali Madrid, for the patient records. Called Dr. Pretty Chin office, informed of the [facility] and treatment plan, then faxed to [phone], fax confirmation received. Patient stated overall he's been doing very well since his treatments were finished. He reported eating well, has gained ~25 lbs, his energy level is \"great\". He said he's doing rehab at [facility] and is enrolled in the 12 week [employer] program at the [facility]. He did point to an slightly reddened, raised pimple like area with a darkened spot in the center, on his left neck area about 1/2 cm and wasn't sure if it was anything to be concerned about. Informed the nurse practitioner is gone for the day, but he could stop in the clinic next week to have her look at it if it doesn't go away, he verbalized understanding. Reminded of his appointment with Dr. Zan Mercedes and his next port flush on 11/07/2018 at 1150, with CT scans done prior to the visit. Informed he'll be contacted when the scans are scheduled. Patient denied needs today, encouraged to call should any arise before the next appointment, he verbalized understanding. Cooper Javier RN, ADN, BSE, OCN Patient Nurse Navigator

## 2018-09-20 NOTE — PROGRESS NOTES
PORT FLUSH    Patient presents to clinic for [facility] today. Single  SQ port accessed per policy using 20 G .75 inch Keller needle for good blood return. Site flushed easily with 10 mL NSS followed by 5 mL Heparin solution 100 units/ml rinse prior to de-access. Dry sterile dressing to area. Tolerated procedure well. Encouraged to schedule port flush every 4 weeks.

## 2018-10-08 ENCOUNTER — OFFICE VISIT (OUTPATIENT)
Dept: NEUROLOGY | Age: 43
End: 2018-10-08
Payer: COMMERCIAL

## 2018-10-08 VITALS
DIASTOLIC BLOOD PRESSURE: 77 MMHG | WEIGHT: 184.4 LBS | TEMPERATURE: 97.7 F | BODY MASS INDEX: 25.81 KG/M2 | HEIGHT: 71 IN | HEART RATE: 53 BPM | SYSTOLIC BLOOD PRESSURE: 108 MMHG | OXYGEN SATURATION: 97 %

## 2018-10-08 DIAGNOSIS — R56.9 SEIZURE (HCC): Primary | ICD-10-CM

## 2018-10-08 PROCEDURE — G8419 CALC BMI OUT NRM PARAM NOF/U: HCPCS | Performed by: CLINICAL NURSE SPECIALIST

## 2018-10-08 PROCEDURE — G8484 FLU IMMUNIZE NO ADMIN: HCPCS | Performed by: CLINICAL NURSE SPECIALIST

## 2018-10-08 PROCEDURE — G8427 DOCREV CUR MEDS BY ELIG CLIN: HCPCS | Performed by: CLINICAL NURSE SPECIALIST

## 2018-10-08 PROCEDURE — 1036F TOBACCO NON-USER: CPT | Performed by: CLINICAL NURSE SPECIALIST

## 2018-10-08 PROCEDURE — 99214 OFFICE O/P EST MOD 30 MIN: CPT | Performed by: CLINICAL NURSE SPECIALIST

## 2018-10-10 ENCOUNTER — OFFICE VISIT (OUTPATIENT)
Dept: VASCULAR SURGERY | Age: 43
End: 2018-10-10
Payer: COMMERCIAL

## 2018-10-10 VITALS — HEART RATE: 72 BPM | DIASTOLIC BLOOD PRESSURE: 68 MMHG | SYSTOLIC BLOOD PRESSURE: 108 MMHG

## 2018-10-10 DIAGNOSIS — Z95.828 PRESENCE OF INFERIOR VENA CAVA FILTER: Chronic | ICD-10-CM

## 2018-10-10 PROCEDURE — G8484 FLU IMMUNIZE NO ADMIN: HCPCS | Performed by: SURGERY

## 2018-10-10 PROCEDURE — 99213 OFFICE O/P EST LOW 20 MIN: CPT | Performed by: SURGERY

## 2018-10-10 PROCEDURE — 4004F PT TOBACCO SCREEN RCVD TLK: CPT | Performed by: SURGERY

## 2018-10-10 PROCEDURE — G8427 DOCREV CUR MEDS BY ELIG CLIN: HCPCS | Performed by: SURGERY

## 2018-10-10 PROCEDURE — G8419 CALC BMI OUT NRM PARAM NOF/U: HCPCS | Performed by: SURGERY

## 2018-10-16 ENCOUNTER — TELEPHONE (OUTPATIENT)
Dept: NEUROLOGY | Age: 43
End: 2018-10-16

## 2018-10-26 ENCOUNTER — HOSPITAL ENCOUNTER (OUTPATIENT)
Dept: CT IMAGING | Age: 43
Discharge: HOME | End: 2018-10-26
Payer: COMMERCIAL

## 2018-10-26 DIAGNOSIS — C62.91 SEMINOMA OF RIGHT TESTIS, STAGE 1 (HCC): ICD-10-CM

## 2018-10-26 PROCEDURE — 71260 CT THORAX DX C+: CPT

## 2018-10-26 PROCEDURE — 6360000004 HC RX CONTRAST MEDICATION: Performed by: RADIOLOGY

## 2018-10-26 PROCEDURE — 74177 CT ABD & PELVIS W/CONTRAST: CPT

## 2018-10-26 RX ADMIN — IOPAMIDOL 80 ML: 755 INJECTION, SOLUTION INTRAVENOUS at 07:14

## 2018-11-03 ENCOUNTER — APPOINTMENT (OUTPATIENT)
Dept: CT IMAGING | Age: 43
End: 2018-11-03
Payer: COMMERCIAL

## 2018-11-03 ENCOUNTER — HOSPITAL ENCOUNTER (EMERGENCY)
Age: 43
Discharge: HOME | End: 2018-11-03
Attending: EMERGENCY MEDICINE
Payer: COMMERCIAL

## 2018-11-03 ENCOUNTER — APPOINTMENT (OUTPATIENT)
Dept: GENERAL RADIOLOGY | Age: 43
End: 2018-11-03
Payer: COMMERCIAL

## 2018-11-03 VITALS
HEIGHT: 71 IN | TEMPERATURE: 98.2 F | OXYGEN SATURATION: 98 % | RESPIRATION RATE: 18 BRPM | SYSTOLIC BLOOD PRESSURE: 108 MMHG | BODY MASS INDEX: 26.37 KG/M2 | DIASTOLIC BLOOD PRESSURE: 64 MMHG | HEART RATE: 75 BPM | WEIGHT: 188.38 LBS

## 2018-11-03 DIAGNOSIS — R11.2 NAUSEA AND VOMITING, INTRACTABILITY OF VOMITING NOT SPECIFIED, UNSPECIFIED VOMITING TYPE: Primary | ICD-10-CM

## 2018-11-03 DIAGNOSIS — B34.9 VIRAL ILLNESS: ICD-10-CM

## 2018-11-03 DIAGNOSIS — E86.0 DEHYDRATION: ICD-10-CM

## 2018-11-03 LAB
ALBUMIN SERPL-MCNC: 4.1 G/DL (ref 3.5–5.2)
ALP BLD-CCNC: 80 U/L (ref 40–129)
ALT SERPL-CCNC: 12 U/L (ref 0–40)
ANION GAP SERPL CALCULATED.3IONS-SCNC: 13 MMOL/L (ref 7–16)
AST SERPL-CCNC: 12 U/L (ref 0–39)
BASOPHILS ABSOLUTE: 0 E9/L (ref 0–0.2)
BASOPHILS RELATIVE PERCENT: 0.3 % (ref 0–2)
BILIRUB SERPL-MCNC: 0.5 MG/DL (ref 0–1.2)
BUN BLDV-MCNC: 19 MG/DL (ref 6–20)
CALCIUM SERPL-MCNC: 8.8 MG/DL (ref 8.6–10.2)
CHLORIDE BLD-SCNC: 107 MMOL/L (ref 98–107)
CO2: 21 MMOL/L (ref 22–29)
CREAT SERPL-MCNC: 1 MG/DL (ref 0.7–1.2)
EKG ATRIAL RATE: 80 BPM
EKG P AXIS: 58 DEGREES
EKG P-R INTERVAL: 178 MS
EKG Q-T INTERVAL: 354 MS
EKG QRS DURATION: 72 MS
EKG QTC CALCULATION (BAZETT): 408 MS
EKG R AXIS: 56 DEGREES
EKG T AXIS: 49 DEGREES
EKG VENTRICULAR RATE: 80 BPM
EOSINOPHILS ABSOLUTE: 0.09 E9/L (ref 0.05–0.5)
EOSINOPHILS RELATIVE PERCENT: 0.9 % (ref 0–6)
GFR AFRICAN AMERICAN: >60
GFR NON-AFRICAN AMERICAN: >60 ML/MIN/1.73
GLUCOSE BLD-MCNC: 116 MG/DL (ref 74–109)
HCT VFR BLD CALC: 43.1 % (ref 37–54)
HEMOGLOBIN: 14.4 G/DL (ref 12.5–16.5)
LYMPHOCYTES ABSOLUTE: 0.39 E9/L (ref 1.5–4)
LYMPHOCYTES RELATIVE PERCENT: 4.4 % (ref 20–42)
MCH RBC QN AUTO: 30.7 PG (ref 26–35)
MCHC RBC AUTO-ENTMCNC: 33.4 % (ref 32–34.5)
MCV RBC AUTO: 91.9 FL (ref 80–99.9)
MONOCYTES ABSOLUTE: 0.48 E9/L (ref 0.1–0.95)
MONOCYTES RELATIVE PERCENT: 5.3 % (ref 2–12)
NEUTROPHILS ABSOLUTE: 8.73 E9/L (ref 1.8–7.3)
NEUTROPHILS RELATIVE PERCENT: 89.5 % (ref 43–80)
PDW BLD-RTO: 12 FL (ref 11.5–15)
PLATELET # BLD: 198 E9/L (ref 130–450)
PMV BLD AUTO: 8.2 FL (ref 7–12)
POTASSIUM SERPL-SCNC: 4.1 MMOL/L (ref 3.5–5)
RBC # BLD: 4.69 E12/L (ref 3.8–5.8)
RBC # BLD: NORMAL 10*6/UL
SODIUM BLD-SCNC: 141 MMOL/L (ref 132–146)
TOTAL PROTEIN: 6.6 G/DL (ref 6.4–8.3)
WBC # BLD: 9.7 E9/L (ref 4.5–11.5)

## 2018-11-03 PROCEDURE — 96374 THER/PROPH/DIAG INJ IV PUSH: CPT

## 2018-11-03 PROCEDURE — 2580000003 HC RX 258: Performed by: STUDENT IN AN ORGANIZED HEALTH CARE EDUCATION/TRAINING PROGRAM

## 2018-11-03 PROCEDURE — 71101 X-RAY EXAM UNILAT RIBS/CHEST: CPT

## 2018-11-03 PROCEDURE — 99284 EMERGENCY DEPT VISIT MOD MDM: CPT

## 2018-11-03 PROCEDURE — 6360000002 HC RX W HCPCS: Performed by: STUDENT IN AN ORGANIZED HEALTH CARE EDUCATION/TRAINING PROGRAM

## 2018-11-03 PROCEDURE — 70450 CT HEAD/BRAIN W/O DYE: CPT

## 2018-11-03 PROCEDURE — 36415 COLL VENOUS BLD VENIPUNCTURE: CPT

## 2018-11-03 PROCEDURE — 85025 COMPLETE CBC W/AUTO DIFF WBC: CPT

## 2018-11-03 PROCEDURE — 80053 COMPREHEN METABOLIC PANEL: CPT

## 2018-11-03 RX ORDER — ONDANSETRON 4 MG/1
4 TABLET, ORALLY DISINTEGRATING ORAL EVERY 8 HOURS PRN
Qty: 10 TABLET | Refills: 0 | Status: SHIPPED | OUTPATIENT
Start: 2018-11-03 | End: 2018-12-10 | Stop reason: ALTCHOICE

## 2018-11-03 RX ORDER — ONDANSETRON 2 MG/ML
4 INJECTION INTRAMUSCULAR; INTRAVENOUS ONCE
Status: COMPLETED | OUTPATIENT
Start: 2018-11-03 | End: 2018-11-03

## 2018-11-03 RX ORDER — HEPARIN SODIUM (PORCINE) LOCK FLUSH IV SOLN 100 UNIT/ML 100 UNIT/ML
SOLUTION INTRAVENOUS
Status: DISCONTINUED
Start: 2018-11-03 | End: 2018-11-03 | Stop reason: HOSPADM

## 2018-11-03 RX ORDER — 0.9 % SODIUM CHLORIDE 0.9 %
1000 INTRAVENOUS SOLUTION INTRAVENOUS ONCE
Status: COMPLETED | OUTPATIENT
Start: 2018-11-03 | End: 2018-11-03

## 2018-11-03 RX ADMIN — SODIUM CHLORIDE 1000 ML: 9 INJECTION, SOLUTION INTRAVENOUS at 06:48

## 2018-11-03 RX ADMIN — ONDANSETRON 4 MG: 2 INJECTION INTRAMUSCULAR; INTRAVENOUS at 06:48

## 2018-11-03 ASSESSMENT — ENCOUNTER SYMPTOMS
CONSTIPATION: 0
CHEST TIGHTNESS: 0
DIARRHEA: 0
SORE THROAT: 0
VOMITING: 1
SHORTNESS OF BREATH: 0
BLOOD IN STOOL: 0
ABDOMINAL PAIN: 0
COUGH: 0
WHEEZING: 0
BACK PAIN: 0
NAUSEA: 1
RHINORRHEA: 0

## 2018-11-03 ASSESSMENT — PAIN DESCRIPTION - ORIENTATION: ORIENTATION: RIGHT

## 2018-11-03 ASSESSMENT — PAIN DESCRIPTION - DESCRIPTORS: DESCRIPTORS: ACHING

## 2018-11-03 ASSESSMENT — PAIN DESCRIPTION - FREQUENCY: FREQUENCY: CONTINUOUS

## 2018-11-03 ASSESSMENT — PAIN DESCRIPTION - LOCATION: LOCATION: ARM;RIB CAGE

## 2018-11-03 ASSESSMENT — PAIN DESCRIPTION - PAIN TYPE: TYPE: ACUTE PAIN

## 2018-11-03 NOTE — ED NOTES
Port accessed. Flushes. Unable to obtain blood sample. IV initiated and pt. Medicated as ordered. venipuncture for blood specimens without incident.       Alli Kendrick RN  11/03/18 6800

## 2018-11-03 NOTE — ED PROVIDER NOTES
18   Ht 5' 11\" (1.803 m)   Wt 188 lb 6 oz (85.4 kg)   SpO2 98%   BMI 26.27 kg/m²   Oxygen Saturation Interpretation: Normal      ------------------------------------------ PROGRESS NOTES ------------------------------------------  I have spoken with the patient and discussed todays results, in addition to providing specific details for the plan of care and counseling regarding the diagnosis and prognosis. Their questions are answered at this time and they are agreeable with the plan. I discussed at length with them reasons for immediate return here for re evaluation. They will followup with primary care by calling their office tomorrow. --------------------------------- ADDITIONAL PROVIDER NOTES ---------------------------------  At this time the patient is without objective evidence of an acute process requiring hospitalization or inpatient management. They have remained hemodynamically stable throughout their entire ED visit and are stable for discharge with outpatient follow-up. Discussed imaging and laboratory results the patient. Patient slept results within normal limits. Imaging was negative for any acute pathology. Patient found relief of nausea with Zofran administration. Patient was also received fluids in the emergency department. Recommended the patient follow up with his primary care physician for reassessment. Provided perception for Zofran for continued management of nausea/vomiting. He struck the patient to return the emergency Department if symptoms worsened. Patient agreed with plan was discharged home. The plan has been discussed in detail and they are aware of the specific conditions for emergent return, as well as the importance of follow-up.       Discharge Medication List as of 11/3/2018  7:58 AM      START taking these medications    Details   ondansetron (ZOFRAN ODT) 4 MG disintegrating tablet Take 1 tablet by mouth every 8 hours as needed for Nausea or Vomiting, Disp-10

## 2018-11-07 ENCOUNTER — HOSPITAL ENCOUNTER (OUTPATIENT)
Dept: INFUSION THERAPY | Age: 43
Discharge: HOME | End: 2018-11-07
Payer: COMMERCIAL

## 2018-11-07 ENCOUNTER — OFFICE VISIT (OUTPATIENT)
Dept: ONCOLOGY | Age: 43
End: 2018-11-07
Payer: COMMERCIAL

## 2018-11-07 VITALS
HEIGHT: 71 IN | BODY MASS INDEX: 26.6 KG/M2 | DIASTOLIC BLOOD PRESSURE: 68 MMHG | TEMPERATURE: 98.6 F | WEIGHT: 190 LBS | HEART RATE: 67 BPM | SYSTOLIC BLOOD PRESSURE: 111 MMHG | RESPIRATION RATE: 20 BRPM

## 2018-11-07 DIAGNOSIS — C62.91 SEMINOMA OF RIGHT TESTIS, STAGE 1 (HCC): Primary | ICD-10-CM

## 2018-11-07 DIAGNOSIS — C62.91 SEMINOMA OF RIGHT TESTIS, STAGE 1 (HCC): ICD-10-CM

## 2018-11-07 PROCEDURE — 6360000002 HC RX W HCPCS: Performed by: NURSE PRACTITIONER

## 2018-11-07 PROCEDURE — 2580000003 HC RX 258: Performed by: NURSE PRACTITIONER

## 2018-11-07 PROCEDURE — 99212 OFFICE O/P EST SF 10 MIN: CPT | Performed by: INTERNAL MEDICINE

## 2018-11-07 PROCEDURE — 99214 OFFICE O/P EST MOD 30 MIN: CPT

## 2018-11-07 RX ORDER — HEPARIN SODIUM (PORCINE) LOCK FLUSH IV SOLN 100 UNIT/ML 100 UNIT/ML
500 SOLUTION INTRAVENOUS PRN
Status: DISCONTINUED | OUTPATIENT
Start: 2018-11-07 | End: 2018-11-08 | Stop reason: HOSPADM

## 2018-11-07 RX ORDER — HEPARIN SODIUM (PORCINE) LOCK FLUSH IV SOLN 100 UNIT/ML 100 UNIT/ML
500 SOLUTION INTRAVENOUS PRN
Status: CANCELLED | OUTPATIENT
Start: 2018-11-07

## 2018-11-07 RX ORDER — SODIUM CHLORIDE 0.9 % (FLUSH) 0.9 %
10 SYRINGE (ML) INJECTION PRN
Status: CANCELLED | OUTPATIENT
Start: 2018-11-07

## 2018-11-07 RX ORDER — SODIUM CHLORIDE 0.9 % (FLUSH) 0.9 %
10 SYRINGE (ML) INJECTION PRN
Status: DISCONTINUED | OUTPATIENT
Start: 2018-11-07 | End: 2018-11-08 | Stop reason: HOSPADM

## 2018-11-07 RX ADMIN — Medication 10 ML: at 13:07

## 2018-11-07 RX ADMIN — Medication 500 UNITS: at 13:07

## 2018-11-07 NOTE — PROGRESS NOTES
PORT FLUSH    Patient presents to clinic for [facility] today. single  SQ port accessed per policy using 26P, . 75 inch Keller needle for no blood return. Site flushed easily with 10 mL NSS followed by 5 mL Heparin solution 100 units/ml rinse prior to de-access. Dry sterile dressing to area. Tolerated procedure well. Encouraged to schedule port flush every 4 weeks.

## 2018-11-09 ENCOUNTER — HOSPITAL ENCOUNTER (OUTPATIENT)
Dept: ULTRASOUND IMAGING | Age: 43
Discharge: HOME | End: 2018-11-09
Payer: COMMERCIAL

## 2018-11-09 DIAGNOSIS — R52 PAIN: ICD-10-CM

## 2018-11-09 PROCEDURE — 93971 EXTREMITY STUDY: CPT

## 2018-11-14 ENCOUNTER — OFFICE VISIT (OUTPATIENT)
Dept: VASCULAR SURGERY | Age: 43
End: 2018-11-14
Payer: COMMERCIAL

## 2018-11-14 VITALS — DIASTOLIC BLOOD PRESSURE: 66 MMHG | SYSTOLIC BLOOD PRESSURE: 110 MMHG | HEART RATE: 64 BPM

## 2018-11-14 DIAGNOSIS — Z95.828 PRESENCE OF INFERIOR VENA CAVA FILTER: Chronic | ICD-10-CM

## 2018-11-14 PROCEDURE — 99213 OFFICE O/P EST LOW 20 MIN: CPT | Performed by: SURGERY

## 2018-11-14 PROCEDURE — G8484 FLU IMMUNIZE NO ADMIN: HCPCS | Performed by: SURGERY

## 2018-11-14 PROCEDURE — G8427 DOCREV CUR MEDS BY ELIG CLIN: HCPCS | Performed by: SURGERY

## 2018-11-14 PROCEDURE — 4004F PT TOBACCO SCREEN RCVD TLK: CPT | Performed by: SURGERY

## 2018-11-14 PROCEDURE — G8419 CALC BMI OUT NRM PARAM NOF/U: HCPCS | Performed by: SURGERY

## 2018-11-14 RX ORDER — TRAZODONE HYDROCHLORIDE 50 MG/1
100 TABLET ORAL DAILY
Refills: 3 | Status: ON HOLD | COMMUNITY
Start: 2018-11-08 | End: 2019-02-02 | Stop reason: ALTCHOICE

## 2018-11-14 NOTE — PROGRESS NOTES
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 At this point he is doing well is currently on anticoagulation. He will follow-up with his hematologist for further evaluation to consider if he needs anticoagulation. Otherwise he will be seen and will discuss inferior vena cava filter removal at that time. No Follow-up on file.

## 2018-11-14 NOTE — PROGRESS NOTES
Provider, MD   levETIRAcetam (KEPPRA) 750 MG tablet Take 2 tablets by mouth 2 times daily 6/14/18  Yes Jemal Orosco,    lacosamide (VIMPAT) 150 MG TABS tablet Take 1 tablet by mouth 2 times daily. . 6/14/18  Yes Jemal Orosco,    ondansetron (ZOFRAN ODT) 4 MG disintegrating tablet Take 1 tablet by mouth every 8 hours as needed for Nausea or Vomiting 11/3/18   Grupo Zepeda DO   mirtazapine (REMERON) 15 MG tablet Take 1 tablet by mouth nightly 7/18/18   VENKATA Ellis CNP   ferrous sulfate 325 (65 Fe) MG tablet Take 1 tablet by mouth 2 times daily (with meals) 6/14/18   Jemal Orosco DO   LORazepam (ATIVAN) 0.5 MG tablet Take 0.5 mg by mouth every 6 hours as needed for Anxiety. Deepa Hendrickson Historical Provider, MD   phenytoin (DILANTIN) 100 MG ER capsule Take 100 mg by mouth 3 times daily  5/5/18   Historical Provider, MD     Allergies:  Patient has no known allergies. Social History     Social History    Marital status:      Spouse name: N/A    Number of children: N/A    Years of education: N/A     Occupational History    Not on file. Social History Main Topics    Smoking status: Current Some Day Smoker     Types: Cigars    Smokeless tobacco: Never Used      Comment: social smoker, occassional for 10yrs    Alcohol use No    Drug use: No    Sexual activity: Yes     Other Topics Concern    Not on file     Social History Narrative    ** Merged History Encounter **             Family History   Problem Relation Age of Onset    Scoliosis Mother     Schizophrenia Father     Scoliosis Father     Cancer Sister         cervical    No Known Problems Brother        REVIEW OF SYSTEMS:    Constitutional: Negative for activity change, appetite change, chills, diaphoresis, fatigue, fever and unexpected weight change. HEENT: Negative for congestion, ear discharge, ear pain, hearing loss, nosebleeds, rhinorrhea, sinus pain, sore throat, tinnitus, trouble swallowing and voice change. Eyes: Negative for photophobia, pain, discharge, redness, itching and visual disturbance. Respiratory: Negative for apnea, cough, chest tightness, shortness of breath and wheezing. Cardiovascular: Negative for chest pain, palpitations and leg swelling. Gastrointestinal: Negative for abdominal distention, abdominal pain, blood in stool, constipation, diarrhea, nausea and vomiting. Endocrine: Negative for cold intolerance, heat intolerance, polydipsia, polyphagia and polyuria. Genitourinary: Negative for decreased urine volume, difficulty urinating, dysuria, frequency, hematuria and urgency. Musculoskeletal: Negative for arthralgias, back pain, gait problem, joint swelling and neck pain. Skin: Negative for color change, pallor, rash and wound. Allergic/Immunologic: Negative for environmental allergies, food allergies and immunocompromised state. Neurological: Negative for dizziness, syncope, facial asymmetry, speech difficulty, weakness, light-headedness, numbness and headaches. Hematological: Negative for adenopathy. Does not bruise/bleed easily. Psychiatric/Behavioral: Negative for agitation, confusion, sleep disturbance and suicidal ideas. The patient is not nervous/anxious. PHYSICAL EXAM:  Vitals:    11/14/18 0951   BP: 110/66   Pulse: 64     General Appearance:  Old tracheostomy scar is present alert and oriented to person, place and time, well developed and well- nourished, in no acute distress  Skin: warm and dry, no rash or erythema  Head: normocephalic and atraumatic  Eyes: extraocular eye movements intact, conjunctivae normal  ENT: external ear and ear canal normal bilaterally, nose without deformity  Pulmonary/Chest: Mediport is in place clear to auscultation bilaterally- no wheezes, rales or rhonchi, normal air movement, no respiratory distress  Cardiovascular: normal rate, regular rhythm, normal S1 and S2, no murmurs, no carotid bruits  Abdomen: soft, non-tender,

## 2018-12-10 ENCOUNTER — HOSPITAL ENCOUNTER (OUTPATIENT)
Dept: CARDIAC CATH/INVASIVE PROCEDURES | Age: 43
Discharge: HOME | End: 2018-12-10
Payer: COMMERCIAL

## 2018-12-10 ENCOUNTER — HOSPITAL ENCOUNTER (OUTPATIENT)
Dept: GENERAL RADIOLOGY | Age: 43
Discharge: HOME | End: 2018-12-12
Payer: COMMERCIAL

## 2018-12-10 VITALS
WEIGHT: 200 LBS | SYSTOLIC BLOOD PRESSURE: 129 MMHG | DIASTOLIC BLOOD PRESSURE: 77 MMHG | TEMPERATURE: 98.6 F | OXYGEN SATURATION: 95 % | HEIGHT: 71 IN | RESPIRATION RATE: 18 BRPM | HEART RATE: 63 BPM | BODY MASS INDEX: 28 KG/M2

## 2018-12-10 PROCEDURE — 6360000002 HC RX W HCPCS

## 2018-12-10 PROCEDURE — 2709999900 HC NON-CHARGEABLE SUPPLY

## 2018-12-10 PROCEDURE — 71045 X-RAY EXAM CHEST 1 VIEW: CPT

## 2018-12-10 PROCEDURE — 36010 PLACE CATHETER IN VEIN: CPT | Performed by: SURGERY

## 2018-12-10 PROCEDURE — C1725 CATH, TRANSLUMIN NON-LASER: HCPCS

## 2018-12-10 PROCEDURE — 6360000004 HC RX CONTRAST MEDICATION

## 2018-12-10 PROCEDURE — C1769 GUIDE WIRE: HCPCS

## 2018-12-10 PROCEDURE — 2500000003 HC RX 250 WO HCPCS

## 2018-12-10 PROCEDURE — 37193 REM ENDOVAS VENA CAVA FILTER: CPT | Performed by: SURGERY

## 2018-12-10 PROCEDURE — C1880 VENA CAVA FILTER: HCPCS

## 2018-12-10 PROCEDURE — 2580000003 HC RX 258: Performed by: SURGERY

## 2018-12-10 PROCEDURE — 37248 TRLUML BALO ANGIOP 1ST VEIN: CPT | Performed by: SURGERY

## 2018-12-10 PROCEDURE — 75825 VEIN X-RAY TRUNK: CPT | Performed by: SURGERY

## 2018-12-10 RX ORDER — SODIUM CHLORIDE 9 MG/ML
INJECTION, SOLUTION INTRAVENOUS ONCE
Status: COMPLETED | OUTPATIENT
Start: 2018-12-10 | End: 2018-12-10

## 2018-12-10 RX ADMIN — SODIUM CHLORIDE 20 ML/HR: 9 INJECTION, SOLUTION INTRAVENOUS at 07:50

## 2018-12-11 ENCOUNTER — TELEPHONE (OUTPATIENT)
Dept: VASCULAR SURGERY | Age: 43
End: 2018-12-11

## 2019-01-09 ENCOUNTER — HOSPITAL ENCOUNTER (EMERGENCY)
Age: 44
Discharge: HOME | End: 2019-01-09
Payer: COMMERCIAL

## 2019-01-09 ENCOUNTER — APPOINTMENT (OUTPATIENT)
Dept: CT IMAGING | Age: 44
End: 2019-01-09
Payer: COMMERCIAL

## 2019-01-09 VITALS
SYSTOLIC BLOOD PRESSURE: 110 MMHG | HEIGHT: 71 IN | BODY MASS INDEX: 25.9 KG/M2 | HEART RATE: 78 BPM | TEMPERATURE: 97.8 F | DIASTOLIC BLOOD PRESSURE: 72 MMHG | RESPIRATION RATE: 14 BRPM | WEIGHT: 185 LBS | OXYGEN SATURATION: 99 %

## 2019-01-09 DIAGNOSIS — N30.91 HEMORRHAGIC CYSTITIS: Primary | ICD-10-CM

## 2019-01-09 LAB
BACTERIA: ABNORMAL /HPF
BASOPHILS ABSOLUTE: 0.06 E9/L (ref 0–0.2)
BASOPHILS RELATIVE PERCENT: 0.7 % (ref 0–2)
BILIRUBIN URINE: NEGATIVE
BLOOD, URINE: ABNORMAL
CASTS 2: ABNORMAL /LPF
CASTS: ABNORMAL /LPF
CLARITY: ABNORMAL
CO2: 24 MMOL/L (ref 22–29)
COLOR: ABNORMAL
EOSINOPHILS ABSOLUTE: 0.19 E9/L (ref 0.05–0.5)
EOSINOPHILS RELATIVE PERCENT: 2.2 % (ref 0–6)
EPITHELIAL CELLS, UA: ABNORMAL /HPF
GFR AFRICAN AMERICAN: >60
GFR NON-AFRICAN AMERICAN: 60 ML/MIN/1.73
GLUCOSE BLD-MCNC: 92 MG/DL (ref 74–99)
GLUCOSE URINE: 100 MG/DL
HCT VFR BLD CALC: 45.3 % (ref 37–54)
HEMOGLOBIN: 15.3 G/DL (ref 12.5–16.5)
IMMATURE GRANULOCYTES #: 0.05 E9/L
IMMATURE GRANULOCYTES %: 0.6 % (ref 0–5)
KETONES, URINE: 15 MG/DL
LEUKOCYTE ESTERASE, URINE: ABNORMAL
LYMPHOCYTES ABSOLUTE: 1.34 E9/L (ref 1.5–4)
LYMPHOCYTES RELATIVE PERCENT: 15.3 % (ref 20–42)
MCH RBC QN AUTO: 31.4 PG (ref 26–35)
MCHC RBC AUTO-ENTMCNC: 33.8 % (ref 32–34.5)
MCV RBC AUTO: 93 FL (ref 80–99.9)
MONOCYTES ABSOLUTE: 0.77 E9/L (ref 0.1–0.95)
MONOCYTES RELATIVE PERCENT: 8.8 % (ref 2–12)
NEUTROPHILS ABSOLUTE: 6.33 E9/L (ref 1.8–7.3)
NEUTROPHILS RELATIVE PERCENT: 72.4 % (ref 43–80)
NITRITE, URINE: POSITIVE
PDW BLD-RTO: 12.7 FL (ref 11.5–15)
PH UA: 6.5 (ref 5–9)
PLATELET # BLD: 152 E9/L (ref 130–450)
PMV BLD AUTO: 8.4 FL (ref 7–12)
POC ANION GAP: 8 MMOL/L (ref 7–16)
POC BUN: 26 MG/DL (ref 8–23)
POC CHLORIDE: 106 MMOL/L (ref 100–108)
POC CREATININE: 1.3 MG/DL (ref 0.7–1.2)
POC POTASSIUM: 4.8 MMOL/L (ref 3.5–5)
POC SODIUM: 138 MMOL/L (ref 132–146)
PROTEIN UA: >=300 MG/DL
RBC # BLD: 4.87 E12/L (ref 3.8–5.8)
RBC UA: >20 /HPF (ref 0–2)
SPECIFIC GRAVITY UA: 1.02 (ref 1–1.03)
UROBILINOGEN, URINE: 2 E.U./DL
WBC # BLD: 8.7 E9/L (ref 4.5–11.5)
WBC UA: ABNORMAL /HPF (ref 0–5)

## 2019-01-09 PROCEDURE — 99284 EMERGENCY DEPT VISIT MOD MDM: CPT

## 2019-01-09 PROCEDURE — 84520 ASSAY OF UREA NITROGEN: CPT

## 2019-01-09 PROCEDURE — 82947 ASSAY GLUCOSE BLOOD QUANT: CPT

## 2019-01-09 PROCEDURE — 87088 URINE BACTERIA CULTURE: CPT

## 2019-01-09 PROCEDURE — 2580000003 HC RX 258: Performed by: NURSE PRACTITIONER

## 2019-01-09 PROCEDURE — 74177 CT ABD & PELVIS W/CONTRAST: CPT

## 2019-01-09 PROCEDURE — 81001 URINALYSIS AUTO W/SCOPE: CPT

## 2019-01-09 PROCEDURE — 96374 THER/PROPH/DIAG INJ IV PUSH: CPT

## 2019-01-09 PROCEDURE — 6360000004 HC RX CONTRAST MEDICATION: Performed by: RADIOLOGY

## 2019-01-09 PROCEDURE — 80051 ELECTROLYTE PANEL: CPT

## 2019-01-09 PROCEDURE — 36415 COLL VENOUS BLD VENIPUNCTURE: CPT

## 2019-01-09 PROCEDURE — 85025 COMPLETE CBC W/AUTO DIFF WBC: CPT

## 2019-01-09 PROCEDURE — 6360000002 HC RX W HCPCS: Performed by: NURSE PRACTITIONER

## 2019-01-09 PROCEDURE — 82565 ASSAY OF CREATININE: CPT

## 2019-01-09 RX ORDER — CEPHALEXIN 500 MG/1
500 CAPSULE ORAL 3 TIMES DAILY
Qty: 21 CAPSULE | Refills: 0 | Status: SHIPPED | OUTPATIENT
Start: 2019-01-09 | End: 2019-01-16

## 2019-01-09 RX ADMIN — WATER 2 G: 1 INJECTION INTRAMUSCULAR; INTRAVENOUS; SUBCUTANEOUS at 20:11

## 2019-01-09 RX ADMIN — IOPAMIDOL 80 ML: 755 INJECTION, SOLUTION INTRAVENOUS at 20:31

## 2019-01-09 ASSESSMENT — PAIN DESCRIPTION - PAIN TYPE: TYPE: ACUTE PAIN

## 2019-01-09 ASSESSMENT — PAIN DESCRIPTION - LOCATION: LOCATION: BACK

## 2019-01-09 ASSESSMENT — PAIN DESCRIPTION - ORIENTATION: ORIENTATION: LOWER

## 2019-01-12 ENCOUNTER — HOSPITAL ENCOUNTER (EMERGENCY)
Age: 44
Discharge: HOME | End: 2019-01-12
Payer: COMMERCIAL

## 2019-01-12 ENCOUNTER — APPOINTMENT (OUTPATIENT)
Dept: GENERAL RADIOLOGY | Age: 44
End: 2019-01-12
Payer: COMMERCIAL

## 2019-01-12 VITALS
SYSTOLIC BLOOD PRESSURE: 105 MMHG | HEIGHT: 71 IN | RESPIRATION RATE: 18 BRPM | DIASTOLIC BLOOD PRESSURE: 58 MMHG | BODY MASS INDEX: 26.32 KG/M2 | OXYGEN SATURATION: 99 % | WEIGHT: 188 LBS | HEART RATE: 63 BPM | TEMPERATURE: 98.8 F

## 2019-01-12 DIAGNOSIS — M54.41 ACUTE BILATERAL LOW BACK PAIN WITH BILATERAL SCIATICA: Primary | ICD-10-CM

## 2019-01-12 DIAGNOSIS — M54.42 ACUTE BILATERAL LOW BACK PAIN WITH BILATERAL SCIATICA: Primary | ICD-10-CM

## 2019-01-12 LAB
AMORPHOUS: NORMAL
BACTERIA: NORMAL /HPF
BILIRUBIN URINE: ABNORMAL
BLOOD, URINE: ABNORMAL
CLARITY: CLEAR
COLOR: YELLOW
EPITHELIAL CELLS, UA: NORMAL /HPF
GLUCOSE URINE: NEGATIVE MG/DL
KETONES, URINE: ABNORMAL MG/DL
LEUKOCYTE ESTERASE, URINE: NEGATIVE
NITRITE, URINE: NEGATIVE
PH UA: 6 (ref 5–9)
PROTEIN UA: ABNORMAL MG/DL
RBC UA: NORMAL /HPF (ref 0–2)
SPECIFIC GRAVITY UA: >=1.03 (ref 1–1.03)
URINE CULTURE, ROUTINE: NORMAL
UROBILINOGEN, URINE: 0.2 E.U./DL
WBC UA: NORMAL /HPF (ref 0–5)

## 2019-01-12 PROCEDURE — 81001 URINALYSIS AUTO W/SCOPE: CPT

## 2019-01-12 PROCEDURE — 6360000002 HC RX W HCPCS: Performed by: PHYSICIAN ASSISTANT

## 2019-01-12 PROCEDURE — 72100 X-RAY EXAM L-S SPINE 2/3 VWS: CPT

## 2019-01-12 PROCEDURE — 99283 EMERGENCY DEPT VISIT LOW MDM: CPT

## 2019-01-12 PROCEDURE — 96372 THER/PROPH/DIAG INJ SC/IM: CPT

## 2019-01-12 RX ORDER — PREDNISONE 10 MG/1
TABLET ORAL
Qty: 20 TABLET | Refills: 0 | Status: SHIPPED | OUTPATIENT
Start: 2019-01-12 | End: 2019-01-22

## 2019-01-12 RX ORDER — KETOROLAC TROMETHAMINE 30 MG/ML
60 INJECTION, SOLUTION INTRAMUSCULAR; INTRAVENOUS ONCE
Status: COMPLETED | OUTPATIENT
Start: 2019-01-12 | End: 2019-01-12

## 2019-01-12 RX ORDER — HYDROCODONE BITARTRATE AND ACETAMINOPHEN 5; 325 MG/1; MG/1
1 TABLET ORAL EVERY 6 HOURS PRN
Qty: 20 TABLET | Refills: 0 | Status: SHIPPED | OUTPATIENT
Start: 2019-01-12 | End: 2019-01-17

## 2019-01-12 RX ORDER — DEXAMETHASONE SODIUM PHOSPHATE 10 MG/ML
10 INJECTION INTRAMUSCULAR; INTRAVENOUS ONCE
Status: COMPLETED | OUTPATIENT
Start: 2019-01-12 | End: 2019-01-12

## 2019-01-12 RX ADMIN — DEXAMETHASONE SODIUM PHOSPHATE 10 MG: 10 INJECTION INTRAMUSCULAR; INTRAVENOUS at 18:26

## 2019-01-12 RX ADMIN — KETOROLAC TROMETHAMINE 60 MG: 30 INJECTION, SOLUTION INTRAMUSCULAR at 18:26

## 2019-01-12 ASSESSMENT — PAIN SCALES - GENERAL
PAINLEVEL_OUTOF10: 7
PAINLEVEL_OUTOF10: 10
PAINLEVEL_OUTOF10: 10

## 2019-02-01 ENCOUNTER — HOSPITAL ENCOUNTER (INPATIENT)
Age: 44
LOS: 3 days | Discharge: HOME | DRG: 197 | End: 2019-02-05
Attending: EMERGENCY MEDICINE | Admitting: INTERNAL MEDICINE
Payer: COMMERCIAL

## 2019-02-01 DIAGNOSIS — I82.421 ACUTE DEEP VEIN THROMBOSIS (DVT) OF ILIAC VEIN OF RIGHT LOWER EXTREMITY (HCC): ICD-10-CM

## 2019-02-01 DIAGNOSIS — I82.4Y1 DVT, LOWER EXTREMITY, PROXIMAL, ACUTE, RIGHT (HCC): Primary | ICD-10-CM

## 2019-02-01 PROCEDURE — 99284 EMERGENCY DEPT VISIT MOD MDM: CPT

## 2019-02-01 RX ORDER — HYDROCODONE BITARTRATE AND ACETAMINOPHEN 5; 325 MG/1; MG/1
1 TABLET ORAL EVERY 6 HOURS PRN
Status: ON HOLD | COMMUNITY
End: 2019-06-01

## 2019-02-01 ASSESSMENT — PAIN DESCRIPTION - LOCATION: LOCATION: LEG

## 2019-02-01 ASSESSMENT — PAIN DESCRIPTION - ORIENTATION: ORIENTATION: RIGHT

## 2019-02-01 ASSESSMENT — PAIN DESCRIPTION - DESCRIPTORS: DESCRIPTORS: RADIATING;DULL

## 2019-02-01 ASSESSMENT — PAIN SCALES - GENERAL: PAINLEVEL_OUTOF10: 9

## 2019-02-01 ASSESSMENT — PAIN DESCRIPTION - FREQUENCY: FREQUENCY: INTERMITTENT

## 2019-02-01 ASSESSMENT — PAIN DESCRIPTION - ONSET: ONSET: ON-GOING

## 2019-02-01 ASSESSMENT — PAIN DESCRIPTION - PAIN TYPE: TYPE: ACUTE PAIN

## 2019-02-02 ENCOUNTER — APPOINTMENT (OUTPATIENT)
Dept: ULTRASOUND IMAGING | Age: 44
DRG: 197 | End: 2019-02-02
Payer: COMMERCIAL

## 2019-02-02 PROBLEM — I82.421 ACUTE DEEP VEIN THROMBOSIS (DVT) OF ILIAC VEIN OF RIGHT LOWER EXTREMITY (HCC): Status: ACTIVE | Noted: 2019-02-02

## 2019-02-02 PROBLEM — I82.409 ACUTE DVT (DEEP VENOUS THROMBOSIS) (HCC): Status: ACTIVE | Noted: 2019-02-02

## 2019-02-02 PROBLEM — S02.0XXS: Status: ACTIVE | Noted: 2019-02-02

## 2019-02-02 PROBLEM — G40.909 EPILEPSY (HCC): Status: ACTIVE | Noted: 2019-02-02

## 2019-02-02 PROBLEM — S06.9XAS: Status: ACTIVE | Noted: 2019-02-02

## 2019-02-02 LAB
ALBUMIN SERPL-MCNC: 3.8 G/DL (ref 3.5–5.2)
ALP BLD-CCNC: 70 U/L (ref 40–129)
ALT SERPL-CCNC: 18 U/L (ref 0–40)
ANION GAP SERPL CALCULATED.3IONS-SCNC: 11 MMOL/L (ref 7–16)
APTT: 28.4 SEC (ref 24.5–35.1)
APTT: 34.8 SEC (ref 24.5–35.1)
AST SERPL-CCNC: 14 U/L (ref 0–39)
BASOPHILS ABSOLUTE: 0.08 E9/L (ref 0–0.2)
BASOPHILS RELATIVE PERCENT: 0.9 % (ref 0–2)
BILIRUB SERPL-MCNC: 0.5 MG/DL (ref 0–1.2)
BILIRUBIN DIRECT: <0.2 MG/DL (ref 0–0.3)
BILIRUBIN, INDIRECT: NORMAL MG/DL (ref 0–1)
BUN BLDV-MCNC: 14 MG/DL (ref 6–20)
CALCIUM SERPL-MCNC: 8.7 MG/DL (ref 8.6–10.2)
CHLORIDE BLD-SCNC: 106 MMOL/L (ref 98–107)
CO2: 25 MMOL/L (ref 22–29)
CREAT SERPL-MCNC: 1 MG/DL (ref 0.7–1.2)
EOSINOPHILS ABSOLUTE: 0.4 E9/L (ref 0.05–0.5)
EOSINOPHILS RELATIVE PERCENT: 4.6 % (ref 0–6)
GFR AFRICAN AMERICAN: >60
GFR NON-AFRICAN AMERICAN: >60 ML/MIN/1.73
GLUCOSE BLD-MCNC: 107 MG/DL (ref 74–99)
HCT VFR BLD CALC: 30.3 % (ref 37–54)
HEMOGLOBIN: 9.8 G/DL (ref 12.5–16.5)
IMMATURE GRANULOCYTES #: 0.06 E9/L
IMMATURE GRANULOCYTES %: 0.7 % (ref 0–5)
INR BLD: 1.2
INR BLD: 1.2
LYMPHOCYTES ABSOLUTE: 1.78 E9/L (ref 1.5–4)
LYMPHOCYTES RELATIVE PERCENT: 20.3 % (ref 20–42)
MAGNESIUM: 2.1 MG/DL (ref 1.6–2.6)
MCH RBC QN AUTO: 30.2 PG (ref 26–35)
MCHC RBC AUTO-ENTMCNC: 32.3 % (ref 32–34.5)
MCV RBC AUTO: 93.5 FL (ref 80–99.9)
MONOCYTES ABSOLUTE: 0.72 E9/L (ref 0.1–0.95)
MONOCYTES RELATIVE PERCENT: 8.2 % (ref 2–12)
NEUTROPHILS ABSOLUTE: 5.74 E9/L (ref 1.8–7.3)
NEUTROPHILS RELATIVE PERCENT: 65.3 % (ref 43–80)
PDW BLD-RTO: 13.6 FL (ref 11.5–15)
PLATELET # BLD: 203 E9/L (ref 130–450)
PMV BLD AUTO: 8.3 FL (ref 7–12)
POTASSIUM SERPL-SCNC: 3.9 MMOL/L (ref 3.5–5)
PROTHROMBIN TIME: 13.5 SEC (ref 9.3–12.4)
PROTHROMBIN TIME: 13.7 SEC (ref 9.3–12.4)
RBC # BLD: 3.24 E12/L (ref 3.8–5.8)
SODIUM BLD-SCNC: 142 MMOL/L (ref 132–146)
TOTAL PROTEIN: 6.5 G/DL (ref 6.4–8.3)
WBC # BLD: 8.8 E9/L (ref 4.5–11.5)

## 2019-02-02 PROCEDURE — 81291 MTHFR GENE: CPT

## 2019-02-02 PROCEDURE — 81400 MOPATH PROCEDURE LEVEL 1: CPT

## 2019-02-02 PROCEDURE — 80048 BASIC METABOLIC PNL TOTAL CA: CPT

## 2019-02-02 PROCEDURE — 6360000002 HC RX W HCPCS: Performed by: PHYSICIAN ASSISTANT

## 2019-02-02 PROCEDURE — 93971 EXTREMITY STUDY: CPT

## 2019-02-02 PROCEDURE — 85300 ANTITHROMBIN III ACTIVITY: CPT

## 2019-02-02 PROCEDURE — 6370000000 HC RX 637 (ALT 250 FOR IP): Performed by: INTERNAL MEDICINE

## 2019-02-02 PROCEDURE — 2580000003 HC RX 258: Performed by: INTERNAL MEDICINE

## 2019-02-02 PROCEDURE — 83735 ASSAY OF MAGNESIUM: CPT

## 2019-02-02 PROCEDURE — 2580000003 HC RX 258

## 2019-02-02 PROCEDURE — 80076 HEPATIC FUNCTION PANEL: CPT

## 2019-02-02 PROCEDURE — 81240 F2 GENE: CPT

## 2019-02-02 PROCEDURE — 36415 COLL VENOUS BLD VENIPUNCTURE: CPT

## 2019-02-02 PROCEDURE — 85610 PROTHROMBIN TIME: CPT

## 2019-02-02 PROCEDURE — 85730 THROMBOPLASTIN TIME PARTIAL: CPT

## 2019-02-02 PROCEDURE — 6360000002 HC RX W HCPCS: Performed by: INTERNAL MEDICINE

## 2019-02-02 PROCEDURE — 85025 COMPLETE CBC W/AUTO DIFF WBC: CPT

## 2019-02-02 PROCEDURE — 1200000000 HC SEMI PRIVATE

## 2019-02-02 PROCEDURE — 76870 US EXAM SCROTUM: CPT

## 2019-02-02 PROCEDURE — 81241 F5 GENE: CPT

## 2019-02-02 RX ORDER — ONDANSETRON 2 MG/ML
4 INJECTION INTRAMUSCULAR; INTRAVENOUS EVERY 6 HOURS PRN
Status: DISCONTINUED | OUTPATIENT
Start: 2019-02-02 | End: 2019-02-05 | Stop reason: HOSPADM

## 2019-02-02 RX ORDER — TRAZODONE HYDROCHLORIDE 50 MG/1
50 TABLET ORAL DAILY
Status: DISCONTINUED | OUTPATIENT
Start: 2019-02-02 | End: 2019-02-02 | Stop reason: SDUPTHER

## 2019-02-02 RX ORDER — HYDROCODONE BITARTRATE AND ACETAMINOPHEN 5; 325 MG/1; MG/1
1 TABLET ORAL EVERY 6 HOURS PRN
Status: DISCONTINUED | OUTPATIENT
Start: 2019-02-02 | End: 2019-02-05 | Stop reason: HOSPADM

## 2019-02-02 RX ORDER — PHENYTOIN SODIUM 100 MG/1
100 CAPSULE, EXTENDED RELEASE ORAL 3 TIMES DAILY
Status: DISCONTINUED | OUTPATIENT
Start: 2019-02-02 | End: 2019-02-02 | Stop reason: SDUPTHER

## 2019-02-02 RX ORDER — TRAZODONE HYDROCHLORIDE 100 MG/1
100 TABLET ORAL NIGHTLY
Refills: 3 | Status: ON HOLD | COMMUNITY
Start: 2019-01-28 | End: 2019-06-01

## 2019-02-02 RX ORDER — SODIUM CHLORIDE 0.9 % (FLUSH) 0.9 %
10 SYRINGE (ML) INJECTION PRN
Status: DISCONTINUED | OUTPATIENT
Start: 2019-02-02 | End: 2019-02-05 | Stop reason: HOSPADM

## 2019-02-02 RX ORDER — PHENYTOIN SODIUM 100 MG/1
100 CAPSULE, EXTENDED RELEASE ORAL 3 TIMES DAILY
Status: DISCONTINUED | OUTPATIENT
Start: 2019-02-02 | End: 2019-02-05 | Stop reason: HOSPADM

## 2019-02-02 RX ORDER — LEVETIRACETAM 500 MG/1
1500 TABLET ORAL 2 TIMES DAILY
Status: DISCONTINUED | OUTPATIENT
Start: 2019-02-02 | End: 2019-02-05 | Stop reason: HOSPADM

## 2019-02-02 RX ORDER — SODIUM CHLORIDE 0.9 % (FLUSH) 0.9 %
10 SYRINGE (ML) INJECTION EVERY 12 HOURS SCHEDULED
Status: DISCONTINUED | OUTPATIENT
Start: 2019-02-02 | End: 2019-02-05 | Stop reason: HOSPADM

## 2019-02-02 RX ORDER — LORAZEPAM 0.5 MG/1
0.5 TABLET ORAL EVERY 6 HOURS PRN
Status: DISCONTINUED | OUTPATIENT
Start: 2019-02-02 | End: 2019-02-05 | Stop reason: HOSPADM

## 2019-02-02 RX ORDER — TRAZODONE HYDROCHLORIDE 50 MG/1
50 TABLET ORAL DAILY
Status: DISCONTINUED | OUTPATIENT
Start: 2019-02-02 | End: 2019-02-05 | Stop reason: HOSPADM

## 2019-02-02 RX ORDER — SODIUM CHLORIDE 0.9 % (FLUSH) 0.9 %
SYRINGE (ML) INJECTION
Status: COMPLETED
Start: 2019-02-02 | End: 2019-02-02

## 2019-02-02 RX ORDER — LEVETIRACETAM 500 MG/1
1500 TABLET ORAL 2 TIMES DAILY
Status: DISCONTINUED | OUTPATIENT
Start: 2019-02-02 | End: 2019-02-02 | Stop reason: SDUPTHER

## 2019-02-02 RX ADMIN — HYDROCODONE BITARTRATE AND ACETAMINOPHEN 1 TABLET: 5; 325 TABLET ORAL at 03:56

## 2019-02-02 RX ADMIN — LORAZEPAM 0.5 MG: 0.5 TABLET ORAL at 21:28

## 2019-02-02 RX ADMIN — Medication 10 ML: at 21:29

## 2019-02-02 RX ADMIN — Medication: at 06:34

## 2019-02-02 RX ADMIN — TRAZODONE HYDROCHLORIDE 50 MG: 50 TABLET ORAL at 04:04

## 2019-02-02 RX ADMIN — PHENYTOIN SODIUM 100 MG: 100 CAPSULE ORAL at 04:05

## 2019-02-02 RX ADMIN — HYDROCODONE BITARTRATE AND ACETAMINOPHEN 1 TABLET: 5; 325 TABLET ORAL at 10:17

## 2019-02-02 RX ADMIN — LACOSAMIDE 150 MG: 100 TABLET, FILM COATED ORAL at 21:29

## 2019-02-02 RX ADMIN — SERTRALINE HYDROCHLORIDE 50 MG: 50 TABLET ORAL at 08:23

## 2019-02-02 RX ADMIN — ENOXAPARIN SODIUM 90 MG: 100 INJECTION SUBCUTANEOUS at 13:55

## 2019-02-02 RX ADMIN — LACOSAMIDE 150 MG: 100 TABLET, FILM COATED ORAL at 04:04

## 2019-02-02 RX ADMIN — ENOXAPARIN SODIUM 90 MG: 100 INJECTION SUBCUTANEOUS at 02:22

## 2019-02-02 RX ADMIN — LEVETIRACETAM 1500 MG: 500 TABLET, FILM COATED ORAL at 04:05

## 2019-02-02 RX ADMIN — PHENYTOIN SODIUM 100 MG: 100 CAPSULE ORAL at 08:23

## 2019-02-02 RX ADMIN — HYDROCODONE BITARTRATE AND ACETAMINOPHEN 1 TABLET: 5; 325 TABLET ORAL at 21:29

## 2019-02-02 RX ADMIN — PHENYTOIN SODIUM 100 MG: 100 CAPSULE ORAL at 13:55

## 2019-02-02 RX ADMIN — LEVETIRACETAM 1500 MG: 500 TABLET, FILM COATED ORAL at 21:29

## 2019-02-02 RX ADMIN — PHENYTOIN SODIUM 100 MG: 100 CAPSULE ORAL at 21:29

## 2019-02-02 RX ADMIN — Medication 10 ML: at 10:18

## 2019-02-02 RX ADMIN — LEVETIRACETAM 1500 MG: 500 TABLET, FILM COATED ORAL at 08:23

## 2019-02-02 RX ADMIN — LACOSAMIDE 150 MG: 100 TABLET, FILM COATED ORAL at 08:23

## 2019-02-02 RX ADMIN — TRAZODONE HYDROCHLORIDE 50 MG: 50 TABLET ORAL at 22:56

## 2019-02-02 ASSESSMENT — PAIN DESCRIPTION - LOCATION
LOCATION: LEG

## 2019-02-02 ASSESSMENT — PAIN SCALES - GENERAL
PAINLEVEL_OUTOF10: 4
PAINLEVEL_OUTOF10: 6
PAINLEVEL_OUTOF10: 9
PAINLEVEL_OUTOF10: 6
PAINLEVEL_OUTOF10: 5
PAINLEVEL_OUTOF10: 9
PAINLEVEL_OUTOF10: 8
PAINLEVEL_OUTOF10: 4
PAINLEVEL_OUTOF10: 9

## 2019-02-02 ASSESSMENT — PAIN - FUNCTIONAL ASSESSMENT
PAIN_FUNCTIONAL_ASSESSMENT: PREVENTS OR INTERFERES SOME ACTIVE ACTIVITIES AND ADLS

## 2019-02-02 ASSESSMENT — PAIN DESCRIPTION - FREQUENCY
FREQUENCY: CONTINUOUS

## 2019-02-02 ASSESSMENT — PAIN DESCRIPTION - ORIENTATION
ORIENTATION: RIGHT

## 2019-02-02 ASSESSMENT — PAIN DESCRIPTION - PAIN TYPE
TYPE: ACUTE PAIN

## 2019-02-02 ASSESSMENT — PAIN DESCRIPTION - PROGRESSION
CLINICAL_PROGRESSION: NOT CHANGED
CLINICAL_PROGRESSION: GRADUALLY WORSENING
CLINICAL_PROGRESSION: NOT CHANGED

## 2019-02-02 ASSESSMENT — PAIN DESCRIPTION - ONSET
ONSET: ON-GOING

## 2019-02-02 ASSESSMENT — PAIN DESCRIPTION - DESCRIPTORS
DESCRIPTORS: ACHING;DISCOMFORT;SORE;TENDER;THROBBING
DESCRIPTORS: ACHING;DISCOMFORT;TENDER
DESCRIPTORS: DULL;ACHING
DESCRIPTORS: DULL;ACHING
DESCRIPTORS: ACHING;DISCOMFORT;SORE;TENDER

## 2019-02-03 LAB
ANION GAP SERPL CALCULATED.3IONS-SCNC: 7 MMOL/L (ref 7–16)
AT-III ACTIVITY: 102 % ACTIVITY (ref 83–121)
BUN BLDV-MCNC: 12 MG/DL (ref 6–20)
CALCIUM SERPL-MCNC: 8.6 MG/DL (ref 8.6–10.2)
CHLORIDE BLD-SCNC: 110 MMOL/L (ref 98–107)
CO2: 25 MMOL/L (ref 22–29)
CREAT SERPL-MCNC: 1 MG/DL (ref 0.7–1.2)
GFR AFRICAN AMERICAN: >60
GFR NON-AFRICAN AMERICAN: >60 ML/MIN/1.73
GLUCOSE BLD-MCNC: 95 MG/DL (ref 74–99)
HCT VFR BLD CALC: 29.4 % (ref 37–54)
HEMOGLOBIN: 9.3 G/DL (ref 12.5–16.5)
MCH RBC QN AUTO: 29.9 PG (ref 26–35)
MCHC RBC AUTO-ENTMCNC: 31.6 % (ref 32–34.5)
MCV RBC AUTO: 94.5 FL (ref 80–99.9)
PDW BLD-RTO: 14 FL (ref 11.5–15)
PHOSPHORUS: 2.6 MG/DL (ref 2.5–4.5)
PLATELET # BLD: 218 E9/L (ref 130–450)
PMV BLD AUTO: 8.4 FL (ref 7–12)
POTASSIUM SERPL-SCNC: 4.2 MMOL/L (ref 3.5–5)
RBC # BLD: 3.11 E12/L (ref 3.8–5.8)
SODIUM BLD-SCNC: 142 MMOL/L (ref 132–146)
TSH SERPL DL<=0.05 MIU/L-ACNC: 1.22 UIU/ML (ref 0.27–4.2)
WBC # BLD: 6.5 E9/L (ref 4.5–11.5)

## 2019-02-03 PROCEDURE — 85027 COMPLETE CBC AUTOMATED: CPT

## 2019-02-03 PROCEDURE — 84443 ASSAY THYROID STIM HORMONE: CPT

## 2019-02-03 PROCEDURE — 6360000002 HC RX W HCPCS: Performed by: INTERNAL MEDICINE

## 2019-02-03 PROCEDURE — 85303 CLOT INHIBIT PROT C ACTIVITY: CPT

## 2019-02-03 PROCEDURE — 2580000003 HC RX 258: Performed by: INTERNAL MEDICINE

## 2019-02-03 PROCEDURE — 1200000000 HC SEMI PRIVATE

## 2019-02-03 PROCEDURE — 85306 CLOT INHIBIT PROT S FREE: CPT

## 2019-02-03 PROCEDURE — 6370000000 HC RX 637 (ALT 250 FOR IP): Performed by: INTERNAL MEDICINE

## 2019-02-03 PROCEDURE — 84100 ASSAY OF PHOSPHORUS: CPT

## 2019-02-03 PROCEDURE — 86147 CARDIOLIPIN ANTIBODY EA IG: CPT

## 2019-02-03 PROCEDURE — 81241 F5 GENE: CPT

## 2019-02-03 PROCEDURE — 36415 COLL VENOUS BLD VENIPUNCTURE: CPT

## 2019-02-03 PROCEDURE — 80048 BASIC METABOLIC PNL TOTAL CA: CPT

## 2019-02-03 RX ADMIN — LACOSAMIDE 150 MG: 100 TABLET, FILM COATED ORAL at 20:54

## 2019-02-03 RX ADMIN — HYDROCODONE BITARTRATE AND ACETAMINOPHEN 1 TABLET: 5; 325 TABLET ORAL at 08:36

## 2019-02-03 RX ADMIN — TRAZODONE HYDROCHLORIDE 50 MG: 50 TABLET ORAL at 20:54

## 2019-02-03 RX ADMIN — HYDROCODONE BITARTRATE AND ACETAMINOPHEN 1 TABLET: 5; 325 TABLET ORAL at 14:33

## 2019-02-03 RX ADMIN — PHENYTOIN SODIUM 100 MG: 100 CAPSULE ORAL at 08:28

## 2019-02-03 RX ADMIN — Medication 10 ML: at 08:37

## 2019-02-03 RX ADMIN — HYDROCODONE BITARTRATE AND ACETAMINOPHEN 1 TABLET: 5; 325 TABLET ORAL at 20:54

## 2019-02-03 RX ADMIN — ENOXAPARIN SODIUM 90 MG: 100 INJECTION SUBCUTANEOUS at 23:56

## 2019-02-03 RX ADMIN — LACOSAMIDE 150 MG: 100 TABLET, FILM COATED ORAL at 08:28

## 2019-02-03 RX ADMIN — PHENYTOIN SODIUM 100 MG: 100 CAPSULE ORAL at 20:54

## 2019-02-03 RX ADMIN — ENOXAPARIN SODIUM 90 MG: 100 INJECTION SUBCUTANEOUS at 12:05

## 2019-02-03 RX ADMIN — ENOXAPARIN SODIUM 90 MG: 100 INJECTION SUBCUTANEOUS at 02:01

## 2019-02-03 RX ADMIN — LEVETIRACETAM 1500 MG: 500 TABLET, FILM COATED ORAL at 20:54

## 2019-02-03 RX ADMIN — SERTRALINE HYDROCHLORIDE 50 MG: 50 TABLET ORAL at 08:28

## 2019-02-03 RX ADMIN — LEVETIRACETAM 1500 MG: 500 TABLET, FILM COATED ORAL at 08:28

## 2019-02-03 RX ADMIN — Medication 10 ML: at 20:54

## 2019-02-03 RX ADMIN — PHENYTOIN SODIUM 100 MG: 100 CAPSULE ORAL at 13:37

## 2019-02-03 ASSESSMENT — PAIN - FUNCTIONAL ASSESSMENT
PAIN_FUNCTIONAL_ASSESSMENT: PREVENTS OR INTERFERES SOME ACTIVE ACTIVITIES AND ADLS
PAIN_FUNCTIONAL_ASSESSMENT: ACTIVITIES ARE NOT PREVENTED

## 2019-02-03 ASSESSMENT — PAIN SCALES - GENERAL
PAINLEVEL_OUTOF10: 5
PAINLEVEL_OUTOF10: 4
PAINLEVEL_OUTOF10: 6
PAINLEVEL_OUTOF10: 5
PAINLEVEL_OUTOF10: 6
PAINLEVEL_OUTOF10: 3
PAINLEVEL_OUTOF10: 5

## 2019-02-03 ASSESSMENT — PAIN DESCRIPTION - ORIENTATION
ORIENTATION: RIGHT
ORIENTATION: RIGHT

## 2019-02-03 ASSESSMENT — PAIN DESCRIPTION - DESCRIPTORS
DESCRIPTORS: ACHING;DISCOMFORT;SORE
DESCRIPTORS: ACHING;DISCOMFORT;THROBBING

## 2019-02-03 ASSESSMENT — PAIN DESCRIPTION - PAIN TYPE
TYPE: ACUTE PAIN
TYPE: ACUTE PAIN

## 2019-02-03 ASSESSMENT — PAIN DESCRIPTION - FREQUENCY: FREQUENCY: CONTINUOUS

## 2019-02-03 ASSESSMENT — PAIN DESCRIPTION - ONSET: ONSET: ON-GOING

## 2019-02-03 ASSESSMENT — PAIN DESCRIPTION - LOCATION
LOCATION: LEG
LOCATION: LEG

## 2019-02-03 ASSESSMENT — PAIN DESCRIPTION - PROGRESSION: CLINICAL_PROGRESSION: GRADUALLY IMPROVING

## 2019-02-04 ENCOUNTER — APPOINTMENT (OUTPATIENT)
Dept: CT IMAGING | Age: 44
DRG: 197 | End: 2019-02-04
Payer: COMMERCIAL

## 2019-02-04 ENCOUNTER — APPOINTMENT (OUTPATIENT)
Dept: MRI IMAGING | Age: 44
DRG: 197 | End: 2019-02-04
Payer: COMMERCIAL

## 2019-02-04 LAB
ANION GAP SERPL CALCULATED.3IONS-SCNC: 8 MMOL/L (ref 7–16)
BUN BLDV-MCNC: 12 MG/DL (ref 6–20)
CALCIUM SERPL-MCNC: 8.5 MG/DL (ref 8.6–10.2)
CHLORIDE BLD-SCNC: 107 MMOL/L (ref 98–107)
CO2: 25 MMOL/L (ref 22–29)
CREAT SERPL-MCNC: 1 MG/DL (ref 0.7–1.2)
GFR AFRICAN AMERICAN: >60
GFR NON-AFRICAN AMERICAN: >60 ML/MIN/1.73
GLUCOSE BLD-MCNC: 103 MG/DL (ref 74–99)
HCT VFR BLD CALC: 30.7 % (ref 37–54)
HEMOGLOBIN: 9.7 G/DL (ref 12.5–16.5)
MCH RBC QN AUTO: 29.8 PG (ref 26–35)
MCHC RBC AUTO-ENTMCNC: 31.6 % (ref 32–34.5)
MCV RBC AUTO: 94.2 FL (ref 80–99.9)
PDW BLD-RTO: 13.7 FL (ref 11.5–15)
PLATELET # BLD: 227 E9/L (ref 130–450)
PMV BLD AUTO: 8.2 FL (ref 7–12)
POTASSIUM SERPL-SCNC: 4.1 MMOL/L (ref 3.5–5)
RBC # BLD: 3.26 E12/L (ref 3.8–5.8)
SODIUM BLD-SCNC: 140 MMOL/L (ref 132–146)
WBC # BLD: 7.4 E9/L (ref 4.5–11.5)

## 2019-02-04 PROCEDURE — 1200000000 HC SEMI PRIVATE

## 2019-02-04 PROCEDURE — 73701 CT LOWER EXTREMITY W/DYE: CPT

## 2019-02-04 PROCEDURE — 80048 BASIC METABOLIC PNL TOTAL CA: CPT

## 2019-02-04 PROCEDURE — 6360000004 HC RX CONTRAST MEDICATION: Performed by: RADIOLOGY

## 2019-02-04 PROCEDURE — 2580000003 HC RX 258: Performed by: INTERNAL MEDICINE

## 2019-02-04 PROCEDURE — 6370000000 HC RX 637 (ALT 250 FOR IP): Performed by: INTERNAL MEDICINE

## 2019-02-04 PROCEDURE — 6360000002 HC RX W HCPCS: Performed by: INTERNAL MEDICINE

## 2019-02-04 PROCEDURE — A9577 INJ MULTIHANCE: HCPCS | Performed by: RADIOLOGY

## 2019-02-04 PROCEDURE — 36415 COLL VENOUS BLD VENIPUNCTURE: CPT

## 2019-02-04 PROCEDURE — 85027 COMPLETE CBC AUTOMATED: CPT

## 2019-02-04 PROCEDURE — 73720 MRI LWR EXTREMITY W/O&W/DYE: CPT

## 2019-02-04 RX ORDER — HYDROCODONE BITARTRATE AND ACETAMINOPHEN 5; 325 MG/1; MG/1
1 TABLET ORAL EVERY 6 HOURS PRN
Qty: 28 TABLET | Refills: 0 | Status: SHIPPED | OUTPATIENT
Start: 2019-02-04 | End: 2019-02-11

## 2019-02-04 RX ADMIN — LACOSAMIDE 150 MG: 100 TABLET, FILM COATED ORAL at 20:35

## 2019-02-04 RX ADMIN — LACOSAMIDE 150 MG: 100 TABLET, FILM COATED ORAL at 09:00

## 2019-02-04 RX ADMIN — HYDROCODONE BITARTRATE AND ACETAMINOPHEN 1 TABLET: 5; 325 TABLET ORAL at 09:38

## 2019-02-04 RX ADMIN — PHENYTOIN SODIUM 100 MG: 100 CAPSULE ORAL at 14:50

## 2019-02-04 RX ADMIN — SERTRALINE HYDROCHLORIDE 50 MG: 50 TABLET ORAL at 09:38

## 2019-02-04 RX ADMIN — HYDROCODONE BITARTRATE AND ACETAMINOPHEN 1 TABLET: 5; 325 TABLET ORAL at 14:48

## 2019-02-04 RX ADMIN — PHENYTOIN SODIUM 100 MG: 100 CAPSULE ORAL at 20:35

## 2019-02-04 RX ADMIN — Medication 10 ML: at 09:39

## 2019-02-04 RX ADMIN — IOPAMIDOL 80 ML: 755 INJECTION, SOLUTION INTRAVENOUS at 13:31

## 2019-02-04 RX ADMIN — PHENYTOIN SODIUM 100 MG: 100 CAPSULE ORAL at 08:30

## 2019-02-04 RX ADMIN — HYDROCODONE BITARTRATE AND ACETAMINOPHEN 1 TABLET: 5; 325 TABLET ORAL at 20:39

## 2019-02-04 RX ADMIN — ENOXAPARIN SODIUM 90 MG: 100 INJECTION SUBCUTANEOUS at 11:20

## 2019-02-04 RX ADMIN — LEVETIRACETAM 1500 MG: 500 TABLET, FILM COATED ORAL at 20:34

## 2019-02-04 RX ADMIN — LORAZEPAM 0.5 MG: 0.5 TABLET ORAL at 00:07

## 2019-02-04 RX ADMIN — LEVETIRACETAM 1500 MG: 500 TABLET, FILM COATED ORAL at 09:30

## 2019-02-04 RX ADMIN — GADOBENATE DIMEGLUMINE 20 ML: 529 INJECTION, SOLUTION INTRAVENOUS at 16:47

## 2019-02-04 ASSESSMENT — PAIN DESCRIPTION - PAIN TYPE: TYPE: ACUTE PAIN

## 2019-02-04 ASSESSMENT — PAIN - FUNCTIONAL ASSESSMENT: PAIN_FUNCTIONAL_ASSESSMENT: PREVENTS OR INTERFERES SOME ACTIVE ACTIVITIES AND ADLS

## 2019-02-04 ASSESSMENT — PAIN DESCRIPTION - ONSET: ONSET: ON-GOING

## 2019-02-04 ASSESSMENT — PAIN SCALES - GENERAL
PAINLEVEL_OUTOF10: 6
PAINLEVEL_OUTOF10: 5
PAINLEVEL_OUTOF10: 6

## 2019-02-04 ASSESSMENT — PAIN DESCRIPTION - ORIENTATION: ORIENTATION: RIGHT;LEFT

## 2019-02-04 ASSESSMENT — PAIN DESCRIPTION - LOCATION: LOCATION: LEG

## 2019-02-04 ASSESSMENT — PAIN DESCRIPTION - DESCRIPTORS: DESCRIPTORS: ACHING;DISCOMFORT;DULL

## 2019-02-04 ASSESSMENT — PAIN DESCRIPTION - FREQUENCY: FREQUENCY: CONTINUOUS

## 2019-02-04 ASSESSMENT — PAIN DESCRIPTION - PROGRESSION: CLINICAL_PROGRESSION: GRADUALLY WORSENING

## 2019-02-05 VITALS
TEMPERATURE: 97.8 F | BODY MASS INDEX: 27.08 KG/M2 | HEIGHT: 71 IN | DIASTOLIC BLOOD PRESSURE: 60 MMHG | WEIGHT: 193.44 LBS | RESPIRATION RATE: 16 BRPM | OXYGEN SATURATION: 100 % | HEART RATE: 72 BPM | SYSTOLIC BLOOD PRESSURE: 96 MMHG

## 2019-02-05 LAB
ANION GAP SERPL CALCULATED.3IONS-SCNC: 7 MMOL/L (ref 7–16)
BUN BLDV-MCNC: 10 MG/DL (ref 6–20)
CALCIUM SERPL-MCNC: 8.5 MG/DL (ref 8.6–10.2)
CHLORIDE BLD-SCNC: 110 MMOL/L (ref 98–107)
CO2: 24 MMOL/L (ref 22–29)
CREAT SERPL-MCNC: 0.8 MG/DL (ref 0.7–1.2)
GFR AFRICAN AMERICAN: >60
GFR NON-AFRICAN AMERICAN: >60 ML/MIN/1.73
GLUCOSE BLD-MCNC: 101 MG/DL (ref 74–99)
HCT VFR BLD CALC: 30.8 % (ref 37–54)
HEMOGLOBIN: 9.8 G/DL (ref 12.5–16.5)
MCH RBC QN AUTO: 30 PG (ref 26–35)
MCHC RBC AUTO-ENTMCNC: 31.8 % (ref 32–34.5)
MCV RBC AUTO: 94.2 FL (ref 80–99.9)
PDW BLD-RTO: 14 FL (ref 11.5–15)
PLATELET # BLD: 274 E9/L (ref 130–450)
PMV BLD AUTO: 8.4 FL (ref 7–12)
POTASSIUM SERPL-SCNC: 4.2 MMOL/L (ref 3.5–5)
RBC # BLD: 3.27 E12/L (ref 3.8–5.8)
SODIUM BLD-SCNC: 141 MMOL/L (ref 132–146)
WBC # BLD: 7.2 E9/L (ref 4.5–11.5)

## 2019-02-05 PROCEDURE — 83516 IMMUNOASSAY NONANTIBODY: CPT

## 2019-02-05 PROCEDURE — 85027 COMPLETE CBC AUTOMATED: CPT

## 2019-02-05 PROCEDURE — 36415 COLL VENOUS BLD VENIPUNCTURE: CPT

## 2019-02-05 PROCEDURE — 6360000002 HC RX W HCPCS: Performed by: INTERNAL MEDICINE

## 2019-02-05 PROCEDURE — 80048 BASIC METABOLIC PNL TOTAL CA: CPT

## 2019-02-05 PROCEDURE — 6370000000 HC RX 637 (ALT 250 FOR IP): Performed by: INTERNAL MEDICINE

## 2019-02-05 RX ADMIN — ENOXAPARIN SODIUM 90 MG: 100 INJECTION SUBCUTANEOUS at 00:00

## 2019-02-05 RX ADMIN — TRAZODONE HYDROCHLORIDE 50 MG: 50 TABLET ORAL at 00:00

## 2019-02-05 RX ADMIN — LACOSAMIDE 150 MG: 100 TABLET, FILM COATED ORAL at 08:43

## 2019-02-05 RX ADMIN — PHENYTOIN SODIUM 100 MG: 100 CAPSULE ORAL at 08:43

## 2019-02-05 RX ADMIN — SERTRALINE HYDROCHLORIDE 50 MG: 50 TABLET ORAL at 08:44

## 2019-02-05 RX ADMIN — LEVETIRACETAM 1500 MG: 500 TABLET, FILM COATED ORAL at 08:43

## 2019-02-05 RX ADMIN — HYDROCODONE BITARTRATE AND ACETAMINOPHEN 1 TABLET: 5; 325 TABLET ORAL at 08:56

## 2019-02-05 RX ADMIN — ENOXAPARIN SODIUM 90 MG: 100 INJECTION SUBCUTANEOUS at 11:41

## 2019-02-05 ASSESSMENT — PAIN DESCRIPTION - DESCRIPTORS: DESCRIPTORS: ACHING;DISCOMFORT;DULL

## 2019-02-05 ASSESSMENT — PAIN DESCRIPTION - LOCATION: LOCATION: FOOT

## 2019-02-05 ASSESSMENT — PAIN - FUNCTIONAL ASSESSMENT: PAIN_FUNCTIONAL_ASSESSMENT: PREVENTS OR INTERFERES SOME ACTIVE ACTIVITIES AND ADLS

## 2019-02-05 ASSESSMENT — PAIN DESCRIPTION - PROGRESSION: CLINICAL_PROGRESSION: GRADUALLY WORSENING

## 2019-02-05 ASSESSMENT — PAIN DESCRIPTION - FREQUENCY: FREQUENCY: INTERMITTENT

## 2019-02-05 ASSESSMENT — PAIN DESCRIPTION - PAIN TYPE: TYPE: ACUTE PAIN

## 2019-02-05 ASSESSMENT — PAIN DESCRIPTION - ONSET: ONSET: ON-GOING

## 2019-02-05 ASSESSMENT — PAIN SCALES - GENERAL
PAINLEVEL_OUTOF10: 5
PAINLEVEL_OUTOF10: 3
PAINLEVEL_OUTOF10: 1

## 2019-02-05 ASSESSMENT — PAIN DESCRIPTION - ORIENTATION: ORIENTATION: RIGHT

## 2019-02-06 LAB
ANTICARDIOLIPIN IGA ANTIBODY: 0 APL (ref 0–11)
ANTICARDIOLIPIN IGG ANTIBODY: 5 GPL (ref 0–14)
CARDIOLIPIN AB IGM: 2 MPL (ref 0–12)
PROTEIN C FUNCTIONAL: 114 % (ref 83–168)
PROTEIN S ANTIGEN, FREE: 102 % (ref 74–147)

## 2019-02-08 LAB
ANTIPHOSPHOLIPID AB IGG: 0 GPL (ref 0–14)
ANTIPHOSPHOLIPID AB IGM: 0 MPL (ref 0–14)

## 2019-02-09 LAB
FACTOR V LEIDEN: NEGATIVE
SPECIMEN: NORMAL

## 2019-02-11 ENCOUNTER — OFFICE VISIT (OUTPATIENT)
Dept: NEUROLOGY | Age: 44
End: 2019-02-11
Payer: COMMERCIAL

## 2019-02-11 VITALS
WEIGHT: 193.4 LBS | SYSTOLIC BLOOD PRESSURE: 109 MMHG | BODY MASS INDEX: 27.07 KG/M2 | HEART RATE: 74 BPM | HEIGHT: 71 IN | DIASTOLIC BLOOD PRESSURE: 64 MMHG | RESPIRATION RATE: 20 BRPM | OXYGEN SATURATION: 100 % | TEMPERATURE: 97.6 F

## 2019-02-11 DIAGNOSIS — R56.9 SEIZURE (HCC): Primary | ICD-10-CM

## 2019-02-11 PROCEDURE — G8484 FLU IMMUNIZE NO ADMIN: HCPCS | Performed by: CLINICAL NURSE SPECIALIST

## 2019-02-11 PROCEDURE — G8427 DOCREV CUR MEDS BY ELIG CLIN: HCPCS | Performed by: CLINICAL NURSE SPECIALIST

## 2019-02-11 PROCEDURE — 99214 OFFICE O/P EST MOD 30 MIN: CPT | Performed by: CLINICAL NURSE SPECIALIST

## 2019-02-11 PROCEDURE — G8419 CALC BMI OUT NRM PARAM NOF/U: HCPCS | Performed by: CLINICAL NURSE SPECIALIST

## 2019-02-11 PROCEDURE — 4004F PT TOBACCO SCREEN RCVD TLK: CPT | Performed by: CLINICAL NURSE SPECIALIST

## 2019-02-13 ENCOUNTER — HOSPITAL ENCOUNTER (OUTPATIENT)
Dept: INFUSION THERAPY | Age: 44
Discharge: HOME | End: 2019-02-13
Payer: COMMERCIAL

## 2019-02-13 ENCOUNTER — OFFICE VISIT (OUTPATIENT)
Dept: ONCOLOGY | Age: 44
End: 2019-02-13
Payer: COMMERCIAL

## 2019-02-13 VITALS
HEART RATE: 64 BPM | HEIGHT: 71 IN | TEMPERATURE: 97.9 F | WEIGHT: 194.1 LBS | RESPIRATION RATE: 20 BRPM | DIASTOLIC BLOOD PRESSURE: 78 MMHG | BODY MASS INDEX: 27.17 KG/M2 | SYSTOLIC BLOOD PRESSURE: 121 MMHG

## 2019-02-13 DIAGNOSIS — I82.401 ACUTE DEEP VEIN THROMBOSIS (DVT) OF RIGHT LOWER EXTREMITY, UNSPECIFIED VEIN (HCC): ICD-10-CM

## 2019-02-13 DIAGNOSIS — C62.91 SEMINOMA OF RIGHT TESTIS, STAGE 1 (HCC): Primary | ICD-10-CM

## 2019-02-13 DIAGNOSIS — C62.91 SEMINOMA OF RIGHT TESTIS, STAGE 1 (HCC): ICD-10-CM

## 2019-02-13 LAB
BASOPHILS ABSOLUTE: 0.05 E9/L (ref 0–0.2)
BASOPHILS RELATIVE PERCENT: 0.7 % (ref 0–2)
EOSINOPHILS ABSOLUTE: 0.17 E9/L (ref 0.05–0.5)
EOSINOPHILS RELATIVE PERCENT: 2.5 % (ref 0–6)
FERRITIN: 356 NG/ML
HCT VFR BLD CALC: 34.5 % (ref 37–54)
HEMOGLOBIN: 10.9 G/DL (ref 12.5–16.5)
IMMATURE GRANULOCYTES #: 0.03 E9/L
IMMATURE GRANULOCYTES %: 0.4 % (ref 0–5)
IRON SATURATION: 32 % (ref 20–55)
IRON: 82 MCG/DL (ref 59–158)
LYMPHOCYTES ABSOLUTE: 1.54 E9/L (ref 1.5–4)
LYMPHOCYTES RELATIVE PERCENT: 23.1 % (ref 20–42)
MCH RBC QN AUTO: 30.5 PG (ref 26–35)
MCHC RBC AUTO-ENTMCNC: 31.6 % (ref 32–34.5)
MCV RBC AUTO: 96.6 FL (ref 80–99.9)
MONOCYTES ABSOLUTE: 0.63 E9/L (ref 0.1–0.95)
MONOCYTES RELATIVE PERCENT: 9.4 % (ref 2–12)
NEUTROPHILS ABSOLUTE: 4.25 E9/L (ref 1.8–7.3)
NEUTROPHILS RELATIVE PERCENT: 63.9 % (ref 43–80)
PDW BLD-RTO: 14.7 FL (ref 11.5–15)
PLATELET # BLD: 300 E9/L (ref 130–450)
PMV BLD AUTO: 8.2 FL (ref 7–12)
RBC # BLD: 3.57 E12/L (ref 3.8–5.8)
TOTAL IRON BINDING CAPACITY: 255 MCG/DL (ref 250–450)
WBC # BLD: 6.7 E9/L (ref 4.5–11.5)

## 2019-02-13 PROCEDURE — 83540 ASSAY OF IRON: CPT

## 2019-02-13 PROCEDURE — 82728 ASSAY OF FERRITIN: CPT

## 2019-02-13 PROCEDURE — 85025 COMPLETE CBC W/AUTO DIFF WBC: CPT

## 2019-02-13 PROCEDURE — 83550 IRON BINDING TEST: CPT

## 2019-02-13 PROCEDURE — 99213 OFFICE O/P EST LOW 20 MIN: CPT

## 2019-02-13 PROCEDURE — 36415 COLL VENOUS BLD VENIPUNCTURE: CPT

## 2019-02-13 PROCEDURE — 99212 OFFICE O/P EST SF 10 MIN: CPT

## 2019-02-13 RX ORDER — 0.9 % SODIUM CHLORIDE 0.9 %
10 VIAL (ML) INJECTION ONCE
Status: CANCELLED | OUTPATIENT
Start: 2019-02-13 | End: 2019-02-13

## 2019-02-13 RX ORDER — SODIUM CHLORIDE 0.9 % (FLUSH) 0.9 %
5 SYRINGE (ML) INJECTION PRN
Status: CANCELLED | OUTPATIENT
Start: 2019-02-13

## 2019-02-13 RX ORDER — METHYLPREDNISOLONE SODIUM SUCCINATE 125 MG/2ML
125 INJECTION, POWDER, LYOPHILIZED, FOR SOLUTION INTRAMUSCULAR; INTRAVENOUS ONCE
Status: CANCELLED | OUTPATIENT
Start: 2019-02-13 | End: 2019-02-13

## 2019-02-13 RX ORDER — SODIUM CHLORIDE 9 MG/ML
100 INJECTION, SOLUTION INTRAVENOUS CONTINUOUS
Status: CANCELLED | OUTPATIENT
Start: 2019-02-13

## 2019-02-13 RX ORDER — HEPARIN SODIUM (PORCINE) LOCK FLUSH IV SOLN 100 UNIT/ML 100 UNIT/ML
500 SOLUTION INTRAVENOUS PRN
Status: CANCELLED | OUTPATIENT
Start: 2019-02-13

## 2019-02-13 RX ORDER — EPINEPHRINE 1 MG/ML
0.3 INJECTION, SOLUTION, CONCENTRATE INTRAVENOUS PRN
Status: CANCELLED | OUTPATIENT
Start: 2019-02-13

## 2019-02-13 RX ORDER — DIPHENHYDRAMINE HYDROCHLORIDE 50 MG/ML
50 INJECTION INTRAMUSCULAR; INTRAVENOUS ONCE
Status: CANCELLED | OUTPATIENT
Start: 2019-02-13 | End: 2019-02-13

## 2019-02-13 RX ORDER — SODIUM CHLORIDE 0.9 % (FLUSH) 0.9 %
10 SYRINGE (ML) INJECTION PRN
Status: CANCELLED | OUTPATIENT
Start: 2019-02-13

## 2019-02-13 RX ORDER — SODIUM CHLORIDE 9 MG/ML
INJECTION, SOLUTION INTRAVENOUS ONCE
Status: CANCELLED | OUTPATIENT
Start: 2019-02-13 | End: 2019-02-13

## 2019-02-14 LAB — THROMBOPHILIA DNA ASSAY: NORMAL

## 2019-03-29 ENCOUNTER — HOSPITAL ENCOUNTER (OUTPATIENT)
Dept: INFUSION THERAPY | Age: 44
Discharge: HOME | End: 2019-03-29
Payer: COMMERCIAL

## 2019-03-29 DIAGNOSIS — C62.91 SEMINOMA OF RIGHT TESTIS, STAGE 1 (HCC): Primary | ICD-10-CM

## 2019-03-29 PROCEDURE — 6360000002 HC RX W HCPCS

## 2019-03-29 PROCEDURE — 2580000003 HC RX 258: Performed by: NURSE PRACTITIONER

## 2019-03-29 PROCEDURE — 96523 IRRIG DRUG DELIVERY DEVICE: CPT

## 2019-03-29 RX ORDER — HEPARIN SODIUM (PORCINE) LOCK FLUSH IV SOLN 100 UNIT/ML 100 UNIT/ML
500 SOLUTION INTRAVENOUS PRN
Status: CANCELLED | OUTPATIENT
Start: 2019-03-29

## 2019-03-29 RX ORDER — SODIUM CHLORIDE 9 MG/ML
100 INJECTION, SOLUTION INTRAVENOUS CONTINUOUS
Status: CANCELLED | OUTPATIENT
Start: 2019-03-29

## 2019-03-29 RX ORDER — EPINEPHRINE 1 MG/ML
0.3 INJECTION, SOLUTION, CONCENTRATE INTRAVENOUS PRN
Status: CANCELLED | OUTPATIENT
Start: 2019-03-29

## 2019-03-29 RX ORDER — 0.9 % SODIUM CHLORIDE 0.9 %
10 VIAL (ML) INJECTION ONCE
Status: CANCELLED | OUTPATIENT
Start: 2019-03-29 | End: 2019-03-29

## 2019-03-29 RX ORDER — SODIUM CHLORIDE 0.9 % (FLUSH) 0.9 %
10 SYRINGE (ML) INJECTION PRN
Status: CANCELLED | OUTPATIENT
Start: 2019-03-29

## 2019-03-29 RX ORDER — DIPHENHYDRAMINE HYDROCHLORIDE 50 MG/ML
50 INJECTION INTRAMUSCULAR; INTRAVENOUS ONCE
Status: CANCELLED | OUTPATIENT
Start: 2019-03-29 | End: 2019-03-29

## 2019-03-29 RX ORDER — SODIUM CHLORIDE 0.9 % (FLUSH) 0.9 %
5 SYRINGE (ML) INJECTION PRN
Status: CANCELLED | OUTPATIENT
Start: 2019-03-29

## 2019-03-29 RX ORDER — METHYLPREDNISOLONE SODIUM SUCCINATE 125 MG/2ML
125 INJECTION, POWDER, LYOPHILIZED, FOR SOLUTION INTRAMUSCULAR; INTRAVENOUS ONCE
Status: CANCELLED | OUTPATIENT
Start: 2019-03-29 | End: 2019-03-29

## 2019-03-29 RX ORDER — HEPARIN SODIUM (PORCINE) LOCK FLUSH IV SOLN 100 UNIT/ML 100 UNIT/ML
SOLUTION INTRAVENOUS
Status: COMPLETED
Start: 2019-03-29 | End: 2019-03-29

## 2019-03-29 RX ORDER — SODIUM CHLORIDE 9 MG/ML
INJECTION, SOLUTION INTRAVENOUS ONCE
Status: CANCELLED | OUTPATIENT
Start: 2019-03-29 | End: 2019-03-29

## 2019-03-29 RX ORDER — SODIUM CHLORIDE 0.9 % (FLUSH) 0.9 %
10 SYRINGE (ML) INJECTION PRN
Status: DISCONTINUED | OUTPATIENT
Start: 2019-03-29 | End: 2019-03-30 | Stop reason: HOSPADM

## 2019-03-29 RX ORDER — HEPARIN SODIUM (PORCINE) LOCK FLUSH IV SOLN 100 UNIT/ML 100 UNIT/ML
500 SOLUTION INTRAVENOUS PRN
Status: DISCONTINUED | OUTPATIENT
Start: 2019-03-29 | End: 2019-03-30 | Stop reason: HOSPADM

## 2019-03-29 RX ADMIN — Medication 500 UNITS: at 09:28

## 2019-03-29 RX ADMIN — HEPARIN SODIUM (PORCINE) LOCK FLUSH IV SOLN 100 UNIT/ML 500 UNITS: 100 SOLUTION at 09:28

## 2019-03-29 RX ADMIN — Medication 10 ML: at 09:27

## 2019-03-29 NOTE — PROGRESS NOTES
PORT FLUSH    Patient presents to clinic for [facility] today. Left chest single  SQ port accessed per policy using 58Q 0.31SXLT Keller needle for good blood return. Site flushed easily with 10 mL NSS followed by 5 mL Heparin solution 100 units/ml rinse prior to de-access. Dry sterile dressing to area. Tolerated procedure well. Encouraged to schedule port flush every 4 weeks.

## 2019-04-12 ENCOUNTER — TELEPHONE (OUTPATIENT)
Dept: VASCULAR SURGERY | Age: 44
End: 2019-04-12

## 2019-04-12 NOTE — TELEPHONE ENCOUNTER
Per Dr. Emmett Weathers, filter was in the same place after the procedure as it was before. Any question, KUB can be done. Attempted to telephone Laura Paola but her voice mail was full.

## 2019-04-12 NOTE — TELEPHONE ENCOUNTER
Pt's wife called. Pt had another fall 2 weeks ago and has been hospitalized in [place] and developed blood clots. Physicians are questioning if his IVC filter could have become dislodged during attempted removal causing blood clots?

## 2019-04-25 ENCOUNTER — APPOINTMENT (OUTPATIENT)
Dept: GENERAL RADIOLOGY | Age: 44
End: 2019-04-25
Payer: COMMERCIAL

## 2019-04-25 PROCEDURE — 71045 X-RAY EXAM CHEST 1 VIEW: CPT

## 2019-04-28 ENCOUNTER — APPOINTMENT (OUTPATIENT)
Dept: GENERAL RADIOLOGY | Age: 44
End: 2019-04-28
Payer: COMMERCIAL

## 2019-04-28 PROCEDURE — 71045 X-RAY EXAM CHEST 1 VIEW: CPT

## 2019-04-30 ENCOUNTER — APPOINTMENT (OUTPATIENT)
Dept: CT IMAGING | Age: 44
End: 2019-04-30
Payer: COMMERCIAL

## 2019-04-30 DIAGNOSIS — S06.369D TRAUMATIC HEMORRHAGE OF CEREBRUM WITH LOSS OF CONSCIOUSNESS, UNSPECIFIED LATERALITY, SUBSEQUENT ENCOUNTER: ICD-10-CM

## 2019-04-30 DIAGNOSIS — S06.5XAA SDH (SUBDURAL HEMATOMA): Primary | ICD-10-CM

## 2019-04-30 DIAGNOSIS — C79.31 BRAIN METASTASIS (HCC): ICD-10-CM

## 2019-04-30 PROCEDURE — 70450 CT HEAD/BRAIN W/O DYE: CPT

## 2019-05-13 ENCOUNTER — HOSPITAL ENCOUNTER (OUTPATIENT)
Dept: MRI IMAGING | Age: 44
Discharge: HOME | End: 2019-05-15
Payer: COMMERCIAL

## 2019-05-13 DIAGNOSIS — C79.31 BRAIN METASTASIS (HCC): ICD-10-CM

## 2019-05-13 PROCEDURE — 70553 MRI BRAIN STEM W/O & W/DYE: CPT

## 2019-05-13 PROCEDURE — 6360000004 HC RX CONTRAST MEDICATION: Performed by: RADIOLOGY

## 2019-05-13 PROCEDURE — A9577 INJ MULTIHANCE: HCPCS | Performed by: RADIOLOGY

## 2019-05-13 RX ADMIN — GADOBENATE DIMEGLUMINE 20 ML: 529 INJECTION, SOLUTION INTRAVENOUS at 18:59

## 2019-05-14 ENCOUNTER — OFFICE VISIT (OUTPATIENT)
Dept: NEUROSURGERY | Age: 44
End: 2019-05-14
Payer: COMMERCIAL

## 2019-05-14 DIAGNOSIS — M95.2 SKULL DEFECT: Primary | ICD-10-CM

## 2019-05-14 PROCEDURE — G8419 CALC BMI OUT NRM PARAM NOF/U: HCPCS | Performed by: NEUROLOGICAL SURGERY

## 2019-05-14 PROCEDURE — G8427 DOCREV CUR MEDS BY ELIG CLIN: HCPCS | Performed by: NEUROLOGICAL SURGERY

## 2019-05-14 PROCEDURE — 99214 OFFICE O/P EST MOD 30 MIN: CPT | Performed by: NEUROLOGICAL SURGERY

## 2019-05-14 PROCEDURE — 4004F PT TOBACCO SCREEN RCVD TLK: CPT | Performed by: NEUROLOGICAL SURGERY

## 2019-05-14 ASSESSMENT — ENCOUNTER SYMPTOMS
RESPIRATORY NEGATIVE: 1
VOMITING: 0
EYES NEGATIVE: 1
BLURRED VISION: 0
ALLERGIC/IMMUNOLOGIC NEGATIVE: 1
GASTROINTESTINAL NEGATIVE: 1

## 2019-05-14 NOTE — COMMUNICATION BODY
Assessment:     39year old male who presents with skull defect after a decompressive craniectomy on the left side. His bone flap is in [facility].         Plan:     I will get a custom implants and I will proceed with cranioplasty

## 2019-05-14 NOTE — LETTER
[facility]  [place] [address]  Phone: [phone]  Fax: [phone]    Darren Valdovinos MD      May 14, 2019     Fran Chappell, [address] [place] [address]    Patient: Royce Rivas  MR Number: [id number]  YOB: 1975  Date of Visit: 5/14/2019    Dear Dr. Fran Chappell: Thank you for the request for consultation for Royce Rivas to me for the evaluation of skull defect. Below are the relevant portions of my assessment and plan of care. Assessment:     39year old male who presents with skull defect after a decompressive craniectomy on the left side. His bone flap is in \[facility]\"". Plan:     I will get a custom implants and I will proceed with cranioplasty  If you have questions, please do not hesitate to call me. I look forward to following John Paul Mons along with you.     Sincerely,        Darren Valdovinos MD

## 2019-05-14 NOTE — PROGRESS NOTES
Subjective:      Patient ID: Abdirahman Coleman is a 39 y.o. male. Head Injury    The incident occurred more than 1 week ago. The injury mechanism was a fall. He lost consciousness for a period of greater than 5 minutes. There was no blood loss. The quality of the pain is described as aching. The pain is at a severity of 4/10. The pain is mild. The pain has been fluctuating since the injury. Associated symptoms include disorientation, headaches, memory loss and numbness. Pertinent negatives include no blurred vision, tinnitus, vomiting or weakness. He has tried acetaminophen and NSAIDs for the symptoms. The treatment provided mild relief. Review of Systems   Constitutional: Negative. HENT: Negative. Negative for tinnitus. Eyes: Negative. Negative for blurred vision. Respiratory: Negative. Cardiovascular: Negative. Gastrointestinal: Negative. Negative for vomiting. Endocrine: Negative. Genitourinary: Negative. Musculoskeletal: Negative. Allergic/Immunologic: Negative. Neurological: Positive for numbness and headaches. Negative for weakness. Hematological: Negative. Psychiatric/Behavioral: Positive for memory loss. Objective:   Physical Exam   Constitutional: He is oriented to person, place, and time. He appears well-developed and well-nourished. No distress. HENT:   Head: Normocephalic. Right Ear: External ear normal.   Left Ear: External ear normal.   Nose: Nose normal.   Mouth/Throat: Oropharynx is clear and moist. No oropharyngeal exudate. Eyes: Pupils are equal, round, and reactive to light. Conjunctivae and EOM are normal. Right eye exhibits no discharge. Left eye exhibits no discharge. No scleral icterus. Neck: Normal range of motion. Neck supple. No JVD present. No tracheal deviation present. No thyromegaly present. Pulmonary/Chest: Effort normal. No respiratory distress. Abdominal: He exhibits no distension. Musculoskeletal: Normal range of motion.  He

## 2019-05-14 NOTE — PATIENT INSTRUCTIONS
Patient Education        Craniotomy: Before Your Surgery  What is a craniotomy? A craniotomy is surgery to open your skull to fix a problem in your brain. It can be done for many reasons. For example, you may need a craniotomy if your brain or blood vessels are damaged. Or you may need one if you have a tumor or an infection in your brain. The doctor uses special tools to make cuts (incisions) through your scalp and skull. The doctor then looks at the inside of your skull or fixes the problem. He or she uses small plates and clamps to put the piece of your skull back in place. You may get medicine so you will be asleep during the surgery. Or you may be awake, but you will not feel pain. Sometimes a person must be awake during surgery so the doctor can test how well the brain is working. The surgery can last from 30 minutes to 12 hours. Afterward, you may stay in the hospital for 3 to 10 days. Depending on why you had the surgery, you may need 4 to 8 weeks to fully recover. Your recovery may take longer if you have weak areas of your body or have problems talking or seeing. You may need up to 6 months to recover from some brain injuries or infections. You may not recover completely from some types of brain injuries. Follow-up care is a key part of your treatment and safety. Be sure to make and go to all appointments, and call your doctor if you are having problems. It's also a good idea to know your test results and keep a list of the medicines you take. What happens before surgery?   Surgery can be stressful. This information will help you understand what you can expect. And it will help you safely prepare for surgery.   Preparing for surgery    · Understand exactly what surgery is planned, along with the risks, benefits, and other options. · Tell your doctors ALL the medicines, vitamins, supplements, and herbal remedies you take.  Some of these can increase the risk of bleeding or interact with anesthesia.     · If you take blood thinners, such as warfarin (Coumadin), clopidogrel (Plavix), or aspirin, be sure to talk to your doctor. He or she will tell you if you should stop taking these medicines before your surgery. Make sure that you understand exactly what your doctor wants you to do.     · Your doctor will tell you which medicines to take or stop before your surgery. You may need to stop taking certain medicines a week or more before surgery. So talk to your doctor as soon as you can.     · If you have an advance directive, let your doctor know. It may include a living will and a durable power of  for health care. Bring a copy to the hospital. If you don't have one, you may want to prepare one. It lets your doctor and loved ones know your health care wishes. Doctors advise that everyone prepare these papers before any type of surgery or procedure. What happens on the day of surgery? · Follow the instructions exactly about when to stop eating and drinking. If you don't, your surgery may be canceled. If your doctor told you to take your medicines on the day of surgery, take them with only a sip of water.     · Take a bath or shower before you come in for your surgery. Do not apply lotions, perfumes, deodorants, or nail polish.     · Do not shave the surgical site yourself.     · Take off all jewelry and piercings. And take out contact lenses, if you wear them.    At the hospital or surgery center   · Bring a picture ID.     · The area for surgery is often marked to make sure there are no errors.     · You will be kept comfortable and safe by your anesthesia provider. The anesthesia may make you sleep. Or it may just numb the area being worked on.     · The surgery can take 30 minutes to 12 hours. Going home   · Be sure you have someone to drive you home.  Anesthesia and pain medicine make it unsafe for you to drive.     · You will be given more specific instructions about recovering from your surgery. They will cover things like diet, wound care, follow-up care, driving, and getting back to your normal routine.     · You may need to go to a short-term rehabilitation center after you leave the hospital. This can help you learn to do the tasks you need to do after you go home. When should you call your doctor? · You have questions or concerns.     · You don't understand how to prepare for your surgery.     · You become ill before the surgery (such as fever, flu, or a cold).     · You need to reschedule or have changed your mind about having the surgery. Where can you learn more? Go to [email or web address]. org and sign in to your [employer] account. Enter A622 in the [employer] box to learn more about \"Craniotomy: Before Your Surgery. \"     If you do not have an account, please click on the \"Sign Up Now\" link. Current as of: September 26, 2018  Content Version: 12.0  © 0548-7950 Healthwise, Incorporated. Care instructions adapted under license by [facility] ([facility]). If you have questions about a medical condition or this instruction, always ask your healthcare professional. [place] [address] any warranty or liability for your use of this information.

## 2019-05-21 ENCOUNTER — TELEPHONE (OUTPATIENT)
Dept: NEUROSURGERY | Age: 44
End: 2019-05-21

## 2019-05-21 NOTE — TELEPHONE ENCOUNTER
Patient's wife called stating they would like to proceed with surgery with Dr Garrett with custom implant.   Patient's wife #  [phone]

## 2019-06-01 ENCOUNTER — APPOINTMENT (OUTPATIENT)
Dept: CT IMAGING | Age: 44
End: 2019-06-01
Payer: COMMERCIAL

## 2019-06-01 ENCOUNTER — HOSPITAL ENCOUNTER (OUTPATIENT)
Age: 44
Discharge: HOME | End: 2019-06-01
Payer: COMMERCIAL

## 2019-06-01 ENCOUNTER — HOSPITAL ENCOUNTER (INPATIENT)
Age: 44
LOS: 4 days | Discharge: NURSING, REHAB OR LONG-TERM CARE | DRG: 055 | End: 2019-06-05
Attending: INTERNAL MEDICINE | Admitting: INTERNAL MEDICINE
Payer: COMMERCIAL

## 2019-06-01 ENCOUNTER — HOSPITAL ENCOUNTER (EMERGENCY)
Age: 44
Discharge: NURSING, REHAB OR LONG-TERM CARE | End: 2019-06-01
Attending: EMERGENCY MEDICINE
Payer: COMMERCIAL

## 2019-06-01 VITALS
RESPIRATION RATE: 14 BRPM | DIASTOLIC BLOOD PRESSURE: 81 MMHG | OXYGEN SATURATION: 99 % | HEIGHT: 71 IN | BODY MASS INDEX: 23.98 KG/M2 | WEIGHT: 171.25 LBS | TEMPERATURE: 98.8 F | HEART RATE: 58 BPM | SYSTOLIC BLOOD PRESSURE: 128 MMHG

## 2019-06-01 DIAGNOSIS — I62.9 INTRACRANIAL HEMORRHAGE (HCC): Primary | ICD-10-CM

## 2019-06-01 PROBLEM — I62.00 SUBDURAL HEMORRHAGE (HCC): Status: ACTIVE | Noted: 2019-06-01

## 2019-06-01 LAB
ABO/RH: NORMAL
ALBUMIN SERPL-MCNC: 4.1 G/DL (ref 3.5–5.2)
ALP BLD-CCNC: 142 U/L (ref 40–129)
ALT SERPL-CCNC: 39 U/L (ref 0–40)
ANION GAP SERPL CALCULATED.3IONS-SCNC: 12 MMOL/L (ref 7–16)
ANTIBODY SCREEN: NORMAL
APTT: 35.4 SEC (ref 24.5–35.1)
AST SERPL-CCNC: 15 U/L (ref 0–39)
BILIRUB SERPL-MCNC: 0.3 MG/DL (ref 0–1.2)
BUN BLDV-MCNC: 11 MG/DL (ref 6–20)
CALCIUM SERPL-MCNC: 10.1 MG/DL (ref 8.6–10.2)
CHLORIDE BLD-SCNC: 103 MMOL/L (ref 98–107)
CO2: 29 MMOL/L (ref 22–29)
CREAT SERPL-MCNC: 0.6 MG/DL (ref 0.7–1.2)
GFR AFRICAN AMERICAN: >60
GFR NON-AFRICAN AMERICAN: >60 ML/MIN/1.73
GLUCOSE BLD-MCNC: 103 MG/DL (ref 74–99)
HCT VFR BLD CALC: 44.7 % (ref 37–54)
HEMOGLOBIN: 14.2 G/DL (ref 12.5–16.5)
INR BLD: 1.1
MCH RBC QN AUTO: 29.2 PG (ref 26–35)
MCHC RBC AUTO-ENTMCNC: 31.8 % (ref 32–34.5)
MCV RBC AUTO: 92 FL (ref 80–99.9)
PDW BLD-RTO: 14.6 FL (ref 11.5–15)
PLATELET # BLD: 245 E9/L (ref 130–450)
PMV BLD AUTO: 9.3 FL (ref 7–12)
POTASSIUM SERPL-SCNC: 4.2 MMOL/L (ref 3.5–5)
PROTHROMBIN TIME: 12.4 SEC (ref 9.3–12.4)
RBC # BLD: 4.86 E12/L (ref 3.8–5.8)
SODIUM BLD-SCNC: 144 MMOL/L (ref 132–146)
TOTAL PROTEIN: 7.4 G/DL (ref 6.4–8.3)
WBC # BLD: 7 E9/L (ref 4.5–11.5)

## 2019-06-01 PROCEDURE — 85610 PROTHROMBIN TIME: CPT

## 2019-06-01 PROCEDURE — A0426 ALS 1: HCPCS

## 2019-06-01 PROCEDURE — A0425 GROUND MILEAGE: HCPCS

## 2019-06-01 PROCEDURE — 80053 COMPREHEN METABOLIC PANEL: CPT

## 2019-06-01 PROCEDURE — 72125 CT NECK SPINE W/O DYE: CPT

## 2019-06-01 PROCEDURE — 70450 CT HEAD/BRAIN W/O DYE: CPT

## 2019-06-01 PROCEDURE — 86901 BLOOD TYPING SEROLOGIC RH(D): CPT

## 2019-06-01 PROCEDURE — 2060000000 HC ICU INTERMEDIATE R&B

## 2019-06-01 PROCEDURE — 86850 RBC ANTIBODY SCREEN: CPT

## 2019-06-01 PROCEDURE — 86900 BLOOD TYPING SEROLOGIC ABO: CPT

## 2019-06-01 PROCEDURE — 85730 THROMBOPLASTIN TIME PARTIAL: CPT

## 2019-06-01 PROCEDURE — 36415 COLL VENOUS BLD VENIPUNCTURE: CPT

## 2019-06-01 PROCEDURE — 99291 CRITICAL CARE FIRST HOUR: CPT

## 2019-06-01 PROCEDURE — 85027 COMPLETE CBC AUTOMATED: CPT

## 2019-06-01 RX ORDER — SERTRALINE HYDROCHLORIDE 25 MG/1
25 TABLET, FILM COATED ORAL EVERY EVENING
COMMUNITY
End: 2020-08-18 | Stop reason: DRUGHIGH

## 2019-06-01 RX ORDER — SODIUM CHLORIDE 0.9 % (FLUSH) 0.9 %
10 SYRINGE (ML) INJECTION PRN
Status: DISCONTINUED | OUTPATIENT
Start: 2019-06-01 | End: 2019-06-05 | Stop reason: HOSPADM

## 2019-06-01 RX ORDER — LANOLIN ALCOHOL/MO/W.PET/CERES
3 CREAM (GRAM) TOPICAL NIGHTLY PRN
COMMUNITY
End: 2021-06-15

## 2019-06-01 RX ORDER — FERROUS SULFATE 300 MG/5ML
300 LIQUID (ML) ORAL DAILY
COMMUNITY
End: 2020-10-29

## 2019-06-01 RX ORDER — SODIUM CHLORIDE 0.9 % (FLUSH) 0.9 %
10 SYRINGE (ML) INJECTION EVERY 12 HOURS SCHEDULED
Status: DISCONTINUED | OUTPATIENT
Start: 2019-06-01 | End: 2019-06-05 | Stop reason: HOSPADM

## 2019-06-01 RX ORDER — LACOSAMIDE 50 MG/1
150 TABLET ORAL 2 TIMES DAILY
Status: ON HOLD | COMMUNITY
End: 2019-06-04 | Stop reason: HOSPADM

## 2019-06-01 RX ORDER — LEVETIRACETAM 100 MG/ML
1500 SOLUTION ORAL 2 TIMES DAILY
Status: ON HOLD | COMMUNITY
End: 2019-06-04 | Stop reason: HOSPADM

## 2019-06-01 RX ORDER — CHLORHEXIDINE GLUCONATE 0.12 MG/ML
15 RINSE ORAL 2 TIMES DAILY
Status: ON HOLD | COMMUNITY
End: 2019-06-04 | Stop reason: HOSPADM

## 2019-06-01 RX ORDER — ONDANSETRON 2 MG/ML
4 INJECTION INTRAMUSCULAR; INTRAVENOUS EVERY 6 HOURS PRN
Status: DISCONTINUED | OUTPATIENT
Start: 2019-06-01 | End: 2019-06-05 | Stop reason: HOSPADM

## 2019-06-01 NOTE — ED PROVIDER NOTES
41-year-old male brought in as a fall from the nursing home. This was unwitnessed but was felt to be a roll out of bed. Patient has a history of prior traumatic brain injuries with 2 rings surgeries previously the most recent being a few months ago. He is on Lovenox injections as he has a history of testicular cancer with blood clots as well. He has a helmet on now but was not wearing it, reportedly, when he fell. He is awake and alert and oriented, at his baseline according to wife who is at bedside. Review of Systems   HENT:        Fall at the nursing home, concern for head injury   All other systems reviewed and are negative. Physical Exam   Constitutional: He is oriented to person, place, and time. He appears well-developed and well-nourished. No distress. HENT:   Prior craniotomies, flap was removed on the left side of his head with an obvious deformity, wearing a helmet upon arrival    No current bleeding, no evidence of contusion or ecchymosis, no lacerations or abrasions   Eyes: Pupils are equal, round, and reactive to light. Neck: Normal range of motion. Neck supple. No thyromegaly present. Cardiovascular: Normal rate and regular rhythm. Pulmonary/Chest: Effort normal and breath sounds normal. No respiratory distress. He has no wheezes. Abdominal: Soft. He exhibits no distension and no mass. There is no tenderness. There is no rebound and no guarding. Musculoskeletal: Normal range of motion. He exhibits no edema or tenderness. Neurological: He is alert and oriented to person, place, and time. No cranial nerve deficit. Skin: Skin is warm and dry. No erythema. Psychiatric: He has a normal mood and affect.        Procedures    [facility]    ED Course as of Jun 02 0008   Sat Jun 01, 2019   1720 Spoke with Dr. Rolf Ibanez who will see the patient once there transferred.    [SO]   [phone] with Dr. Aliya Valentine, she will admit the patient.    [SO]   1913 Pt remains at his baseline, spoke with him and his wife. Multiple family members now at bedside.    [SO]   1913 Did not order protamine/reversal for this patient as his morning dose of Lovenox was at 6:30 AM, there is no change in mental status, there is no evidence of shift or cardiogenic edema with this. He has a history of multiple blood clots and does have testicular cancer which was also a consideration in not giving urgent reversal. Patient has remained in no distress, awake and alert and oriented at his baseline, no evidence of hypo-or hypertension, no other acute abnormalities. Discussed with the wife the risks and benefits of anticoagulation and reversal.    [SO]      ED Course User Index  [SO] Stephen Rodriguez DO       ED Course as of Jun 02 0008   Sat Jun 01, 2019   1720 Spoke with Dr. Edmond Riley who will see the patient once there transferred.    [SO]   [phone] with Dr. Lamine Kumar, she will admit the patient.    [SO]   1913 Pt remains at his baseline, spoke with him and his wife. Multiple family members now at bedside.    [SO]   1913 Did not order protamine/reversal for this patient as his morning dose of Lovenox was at 6:30 AM, there is no change in mental status, there is no evidence of shift or cardiogenic edema with this. He has a history of multiple blood clots and does have testicular cancer which was also a consideration in not giving urgent reversal. Patient has remained in no distress, awake and alert and oriented at his baseline, no evidence of hypo-or hypertension, no other acute abnormalities.   Discussed with the wife the risks and benefits of anticoagulation and reversal.    [SO]      ED Course User Index  [SO] Stephen Rodriguez DO       --------------------------------------------- PAST HISTORY ---------------------------------------------  Past Medical History:  has a past medical history of Cancer ([address] Chronic back pain, Diverticulitis, History of blood transfusion, Hx of blood clots, Hyperlipidemia, Restless legs syndrome, Scrotal mass, Seizure ([facility] [address] and Testicular cancer ([facility] [address] Past Surgical History:  has a past surgical history that includes Testicle removal (Right, 02/03/2017); Cranioplasty (Right, 01/08/2018); pr insj tunneled ctr vad w/subq port age 10 yr/> (Left, 5/17/2018); other surgical history (12/10/2018); and Colonoscopy. Social History:  reports that he has been smoking cigars. He started smoking about 24 years ago. He has never used smokeless tobacco. He reports that he does not drink alcohol or use drugs. Family History: family history includes Cancer in his sister; No Known Problems in his brother; Schizophrenia in his father; Scoliosis in his father and mother. The patients home medications have been reviewed. Allergies: Patient has no known allergies.     -------------------------------------------------- RESULTS -------------------------------------------------    Lab  Results for orders placed or performed during the hospital encounter of 06/01/19   CBC   Result Value Ref Range    WBC 7.0 4.5 - 11.5 E9/L    RBC 4.86 3.80 - 5.80 E12/L    Hemoglobin 14.2 12.5 - 16.5 g/dL    Hematocrit 44.7 37.0 - 54.0 %    MCV 92.0 80.0 - 99.9 fL    MCH 29.2 26.0 - 35.0 pg    MCHC 31.8 (L) 32.0 - 34.5 %    RDW 14.6 11.5 - 15.0 fL    Platelets 609 299 - 266 E9/L    MPV 9.3 7.0 - 12.0 fL   APTT   Result Value Ref Range    aPTT 35.4 (H) 24.5 - 35.1 sec   Protime-INR   Result Value Ref Range    Protime 12.4 9.3 - 12.4 sec    INR 1.1    Comprehensive Metabolic Panel   Result Value Ref Range    Sodium 144 132 - 146 mmol/L    Potassium 4.2 3.5 - 5.0 mmol/L    Chloride 103 98 - 107 mmol/L    CO2 29 22 - 29 mmol/L    Anion Gap 12 7 - 16 mmol/L    Glucose 103 (H) 74 - 99 mg/dL    BUN 11 6 - 20 mg/dL    CREATININE 0.6 (L) 0.7 - 1.2 mg/dL    GFR Non-African American >60 >=60 mL/min/1.73    GFR African American >60     Calcium 10.1 8.6 - 10.2 mg/dL    Total Protein 7.4 6.4 - 8.3 g/dL    Alb 4.1 3.5 - 5.2 g/dL    Total Bilirubin 0.3 0.0 - 1.2 questions are answered at this time and they are agreeable with the plan. I have discussed the risks and benefits of transfer and they wish to proceed with the transfer. Multiple examinations at the bedside, no acute changes present. Urgent reversal of his anticoagulation was considered but held as the patient did not have any decline in mental status, has a significant risk of developing blood clots, and remained at his mental baseline.    --------------------------------- ADDITIONAL PROVIDER NOTES ---------------------------------  Consultations:  Spoke with Dr. Jenna Abdullahi (Neurosurgery). Discussed case. They will provide consultation. Spoke with Dr. Bradley Atkinson (Medicine). Discussed case. They will admit this patient. Reason for transfer: Neurosurgery availability. This patient's ED course included: a personal history and physicial examination, re-evaluation prior to disposition, multiple bedside re-evaluations, cardiac monitoring, continuous pulse oximetry and complex medical decision making and emergency management    This patient has remained hemodynamically stable, remained unchanged and been closely monitored during their ED course. Please note that the withdrawal or failure to initiate urgent interventions for this patient would likely result in a life threatening deterioration or permanent disability. Accordingly this patient received 30 minutes of critical care time, excluding separately billable procedures. Clinical Impression  1. Intracranial hemorrhage ([facility] [address]          Disposition  Patient's disposition: Transfer to [facility]. Transferred by: EMS. Patient's condition is serious.          Acosta Govea DO  06/02/19 0012

## 2019-06-02 PROBLEM — E43 SEVERE PROTEIN-CALORIE MALNUTRITION (HCC): Chronic | Status: ACTIVE | Noted: 2019-06-02

## 2019-06-02 LAB
ANION GAP SERPL CALCULATED.3IONS-SCNC: 16 MMOL/L (ref 7–16)
BUN BLDV-MCNC: 11 MG/DL (ref 6–20)
CALCIUM SERPL-MCNC: 10.1 MG/DL (ref 8.6–10.2)
CHLORIDE BLD-SCNC: 104 MMOL/L (ref 98–107)
CO2: 22 MMOL/L (ref 22–29)
CREAT SERPL-MCNC: 0.6 MG/DL (ref 0.7–1.2)
GFR AFRICAN AMERICAN: >60
GFR NON-AFRICAN AMERICAN: >60 ML/MIN/1.73
GLUCOSE BLD-MCNC: 93 MG/DL (ref 74–99)
HCT VFR BLD CALC: 43.6 % (ref 37–54)
HEMOGLOBIN: 13.8 G/DL (ref 12.5–16.5)
LV EF: 65 %
LVEF MODALITY: NORMAL
MCH RBC QN AUTO: 28.8 PG (ref 26–35)
MCHC RBC AUTO-ENTMCNC: 31.7 % (ref 32–34.5)
MCV RBC AUTO: 90.8 FL (ref 80–99.9)
PDW BLD-RTO: 14.9 FL (ref 11.5–15)
PLATELET # BLD: 225 E9/L (ref 130–450)
PMV BLD AUTO: 9.4 FL (ref 7–12)
POTASSIUM REFLEX MAGNESIUM: 4.1 MMOL/L (ref 3.5–5)
RBC # BLD: 4.8 E12/L (ref 3.8–5.8)
SODIUM BLD-SCNC: 142 MMOL/L (ref 132–146)
WBC # BLD: 7.5 E9/L (ref 4.5–11.5)

## 2019-06-02 PROCEDURE — 2060000000 HC ICU INTERMEDIATE R&B

## 2019-06-02 PROCEDURE — 36415 COLL VENOUS BLD VENIPUNCTURE: CPT

## 2019-06-02 PROCEDURE — 6370000000 HC RX 637 (ALT 250 FOR IP): Performed by: INTERNAL MEDICINE

## 2019-06-02 PROCEDURE — APPSS45 APP SPLIT SHARED TIME 31-45 MINUTES: Performed by: NURSE PRACTITIONER

## 2019-06-02 PROCEDURE — 80048 BASIC METABOLIC PNL TOTAL CA: CPT

## 2019-06-02 PROCEDURE — 99222 1ST HOSP IP/OBS MODERATE 55: CPT | Performed by: NEUROLOGICAL SURGERY

## 2019-06-02 PROCEDURE — 85027 COMPLETE CBC AUTOMATED: CPT

## 2019-06-02 PROCEDURE — 2580000003 HC RX 258: Performed by: INTERNAL MEDICINE

## 2019-06-02 PROCEDURE — 93306 TTE W/DOPPLER COMPLETE: CPT

## 2019-06-02 PROCEDURE — 99254 IP/OBS CNSLTJ NEW/EST MOD 60: CPT | Performed by: INTERNAL MEDICINE

## 2019-06-02 RX ORDER — FERROUS SULFATE 300 MG/5ML
300 LIQUID (ML) ORAL DAILY
Status: DISCONTINUED | OUTPATIENT
Start: 2019-06-02 | End: 2019-06-05 | Stop reason: HOSPADM

## 2019-06-02 RX ADMIN — NYSTATIN 500000 UNITS: 100000 SUSPENSION ORAL at 22:14

## 2019-06-02 RX ADMIN — Medication 10 ML: at 22:14

## 2019-06-02 RX ADMIN — NYSTATIN 500000 UNITS: 100000 SUSPENSION ORAL at 12:54

## 2019-06-02 RX ADMIN — NYSTATIN 500000 UNITS: 100000 SUSPENSION ORAL at 18:10

## 2019-06-02 RX ADMIN — Medication 10 ML: at 09:02

## 2019-06-02 RX ADMIN — SERTRALINE 25 MG: 50 TABLET, FILM COATED ORAL at 18:10

## 2019-06-02 RX ADMIN — NYSTATIN 500000 UNITS: 100000 SUSPENSION ORAL at 09:02

## 2019-06-02 ASSESSMENT — PAIN SCALES - GENERAL
PAINLEVEL_OUTOF10: 0
PAINLEVEL_OUTOF10: 0

## 2019-06-02 NOTE — CONSULTS
[address]                  [address] [facility], [address]                                  CONSULTATION    PATIENT NAME: Katherine Gonzales                        :        1975  MED REC NO:   [id number]                            ROOM:       8503  ACCOUNT NO:   [de-identified]                           ADMIT DATE: 2019  PROVIDER:     JOANA Ocampo    CONSULT DATE:  2019    REASON FOR CONSULTATION:  Subdural hematoma. HISTORY OF PRESENT ILLNESS:  This is a 79-year-old gentleman who  presented to the hospital after a fall out of bed at his nursing home  possibly striking his head. Head CT demonstrated interhemispheric  subdural hematoma, evidence of prior craniectomy, some encephalomalacia  from prior head injury as well as some developing hydrocephalus. At the  present time, the patient is aphasic. He is moving all of his  extremities and he is following commands. PAST MEDICAL HISTORY:  Significant for having a prior decompressive  hemicraniectomy and evacuation of subdural hematoma in 2017. He did  have a subsequent cranioplasty done in 2018 and did fairly well after  this. Unfortunately, he had another head injury and underwent a second  decompressive craniectomy in [address] [place] [address] earlier this year. He was  scheduled to have the cranioplasty done by Dr. Barajas Comes here in the near  future. His past medical history is also significant for testicular  cancer, placement of an inferior vena cava filter, history of pulmonary  embolism and he was taking Xarelto for this. MEDICATIONS:  Please see electronic medical record. SOCIAL HISTORY:  No current tobacco or alcohol use admitted to. He does  have a smoking history in the past as well as alcohol use in the past.    REVIEW OF SYSTEMS:  Really unable to obtain. The patient is aphasic. He does have a trach. PHYSICAL EXAMINATION:  NEUROLOGIC:  He is alert.   He is moving all his extremities. He is  following commands appropriately. HEENT:  His face is symmetrical.  Pupils are equal, round, reactive. He  does have the left craniectomy defect. SKIN:  Warm and dry. LUNGS:  No respiratory distress. ABDOMEN:  No significant abdominal distention. IMPRESSION:  A 48-year-old gentleman with acute interhemispheric  subdural hematoma in the setting of previous head injuries and a left  hemicraniectomy earlier this year done at an outside facility. PLAN:  We will hold all anticoagulation. Continue with serial  neurological examinations. No surgical intervention planned at this  time. We will get a followup head CT in 4 weeks. JOANA HILL    I have interviewed and examined the patient and agree with above. He has an interhemispheric subdural hematoma.   NO intervention Will follow    Rosa Huang      D: 06/02/2019 8:45:51       T: 06/02/2019 8:48:19     [email or web address]  Job#: [id number]     Doc#: [id number]    CC:

## 2019-06-02 NOTE — PROGRESS NOTES
Hospitalist Progress Note      Synopsis: Patient admitted on 6/1/2019. Pt with unfortunate hx of testicular cancer s/p chemo, DVT on lovenox s/p IVC filter, alcoholism, s/p head trauma and prior intracranial bleed, seizures, transferred from [address] [place] [address] following a fall at his SNF and worsening SDH. NSG consulted and now doing serial neurologic exams. Subjective    Clinically improving. Feeling better. Stable overnight. No other overnight issues reported. No CP, SOB, palpitations, blurred vision, HA, lightheadedness, LOC or focal neurological deficits    Exam:  /63   Pulse 56   Temp 98.1 °F (36.7 °C) (Temporal)   Resp 18   Ht 5' 11\" (1.803 m)   Wt 153 lb (69.4 kg)   SpO2 97%   BMI 21.34 kg/m²        General appearance: No apparent distress, appears stated age aoriented ×2NT: Normal cephastatus post craniectomy,njunctivae/corneas clear. Neck: Supple, No jugular venous distention. Respiratory:  Normal respiratory effort. Clear to auscultation, bilaterally without Rales/Wheezes/Rhonchi. Cardiovascular: bradycardic with normal S1/S2 without murmurs, rubs or gallops. Abdomen: Soft, non-tender, non-distended with normal bowel sounds. PEG tube  Musculoskeletal: No  edema bilaterally. Neuro: Slow to respond to questions, difficult to assess fully, not obeying all commands, appears to be moving both upper extremities at will.   Pychiatric: Alert and oriented  x  2       Medications:  Reviewed    Infusion Medications   Scheduled Medications    ferrous sulfate  300 mg Per G Tube Daily    nystatin  500,000 Units Oral 4x Daily    sertraline  25 mg PEG Tube QPM    sodium chloride flush  10 mL Intravenous 2 times per day     PRN Meds: perflutren lipid microspheres, sodium chloride flush, magnesium hydroxide, ondansetron    I/O    Intake/Output Summary (Last 24 hours) at 6/2/2019 1433  Last data filed at 6/2/2019 0552  Gross per 24 hour   Intake 0 ml   Output --   Net 0 ml       Labs:   Recent Labs 06/01/19  1743 06/02/19  0515   WBC 7.0 7.5   HGB 14.2 13.8   HCT 44.7 43.6    225       Recent Labs     06/01/19  1743 06/02/19  0514    142   K 4.2 4.1    104   CO2 29 22   BUN 11 11   CREATININE 0.6* 0.6*   CALCIUM 10.1 10.1       Recent Labs     06/01/19  1743   PROT 7.4   ALKPHOS 142*   ALT 39   AST 15   BILITOT 0.3       Recent Labs     06/01/19  1743   INR 1.1       No results for input(s): Malik Aver in the last 72 hours. Chronic labs:  Lab Results   Component Value Date    CHOL 190 01/30/2017    TRIG 232 (H) 01/30/2017    HDL 37 01/30/2017    LDLCALC 107 (H) 01/30/2017    TSH 1.220 02/03/2019    INR 1.1 06/01/2019       Radiology:  Imaging studies reviewed today. ASSESSMENT:    Active Problems:    Subdural hemorrhage (HCC)    Severe protein-calorie malnutrition ([address]  Resolved Problems:    * No resolved hospital problems. *  Bradycardia      PLAN:  Continue tube feeds  ECHO pending  Continue serial neurologic exams  Follow up NSG recommendations  Seizure precautions  Cardiology consulted for significant bradycardia    Diet: DIET TUBE FEED CONTINUOUS/CYCLIC NPO; 1.5 Calorie with Fiber; Gastrostomy; Continuous; 25; 60; 24  Diet Tube Feed Modular: Protein Modular  Code Status: Full Code  PT/OT Eval Status:   Ordered  DVT Prophylaxis:   scd  Recommended disposition at discharge:    SNF  +++++++++++++++++++++++++++++++++++++++++++++++++  Brit Clements MD   [facility].  +++++++++++++++++++++++++++++++++++++++++++++++++  NOTE: This report was transcribed using voice recognition software.  Every effort was made to ensure accuracy; however, inadvertent computerized transcription errors may be present.

## 2019-06-02 NOTE — PLAN OF CARE
Problem: Malnutrition  (NI-5.2)  Goal: Food and/or Nutrient Delivery  Description- Start EN support  Individualized approach for food/nutrient provision.   Outcome: Met This Shift

## 2019-06-02 NOTE — PROGRESS NOTES
chronic illness  · Etiology: related to Cognitive or neurological impairment     Signs and symptoms:  as evidenced by Diet history of poor intake, Severe loss of subcutaneous fat, Severe muscle loss    Objective Information:  · Nutrition-Focused Physical Findings: Pt alert/poor attention, h/o TBI, wife at bedside provides hx, fluid bal WNL/ no edema, weakness, active BS, trach/PEG      · Wound Type: (noted healed coccyx wound )     · Current Nutrition Therapies:  · Oral Diet Orders: NPO     · Anthropometric Measures:  · Ht: 5' 11\" (180.3 cm)   · Current Body Wt: 153 lb (69.4 kg)(6/2 bedscale - wife states is not accurate )  · Admission Body Wt: 171 lb (77.6 kg)(6/1 @ [facility]- wife states is CBW )  · Usual Body Wt: Noted variable actual wts per -193lb 1 year back   · % Weight Change:  Noted wt discrepancy of CBW. Wife reports wt loss pta however large wt fluctuations per EMR review x 1 year.  Unable to assess true changes   · Ideal Body Wt: 172 lb (78 kg), % Ideal Body 99%(using adm wt )  · BMI Classification: BMI 18.5 - 24.9 Normal Weight    Nutrition Interventions:   Continued Inpatient Monitoring, Coordination of Care, Education Initiated(Pt TF & diet h/o d/w wife- states pt just passed swallow eval PTA on modified diet + EN)    Nutrition Evaluation:   · Evaluation: Goals set   · Goals: Pt to tolerate TF at goal rate     · Monitoring: Nutrition Progression, TF Intake, TF Tolerance, Skin Integrity, I&O, Mental Status/Confusion, Weight, Pertinent Labs, Monitor Bowel Function      Electronically signed by Gwen Joy RD, LD on 6/2/19 at 2:09 PM    Contact Number: [phone]

## 2019-06-02 NOTE — PROGRESS NOTES
Patient pulled out entire trach- inner/outer cannula cleansed with peroxide/sterile water per procedure and reinserted. Patient already has a telesitter.

## 2019-06-02 NOTE — H&P
cervical    No Known Problems Brother        REVIEW OF SYSTEMS:   Pertinent positives as noted in the HPI. All other systems reviewed and negative. PHYSICAL EXAM:    /74   Pulse 62   Temp 97.2 °F (36.2 °C)   Resp 16   Ht 5' 11\" (1.803 m)   Wt 153 lb (69.4 kg)   SpO2 97%   BMI 21.34 kg/m²     General appearance: No apparent distress, appears stated age aoriented ×2NT: Normal cephastatus post craniectomy,njunctivae/corneas clear. Neck: Supple, No jugular venous distention. Respiratory:  Normal respiratory effort. Clear to auscultation, bilaterally without Rales/Wheezes/Rhonchi. Cardiovascular: Regular rate and rhythm with normal S1/S2 without murmurs, rubs or gallops. Abdomen: Soft, non-tender, non-distended with normal bowel sounds. PEG tube  Musculoskeletal: No  edema bilaterally. Neuro: Slow to respond to questions, difficult to assess fully, not obeying all commands, appears to be moving both upper extremities at will. Pychiatric: Alert and oriented  x  2         Labs:     Recent Labs     06/01/19  1743   WBC 7.0   HGB 14.2   HCT 44.7        Recent Labs     06/01/19  1743      K 4.2      CO2 29   BUN 11   CREATININE 0.6*   CALCIUM 10.1     Recent Labs     06/01/19  1743   AST 15   ALT 39   BILITOT 0.3   ALKPHOS 142*     Recent Labs     06/01/19  1743   INR 1.1     No results for input(s): Kenia Wise in the last 72 hours. Urinalysis:      Lab Results   Component Value Date    NITRU Negative 01/12/2019    WBCUA NONE 01/12/2019    BACTERIA NONE 01/12/2019    RBCUA 0-1 01/12/2019    BLOODU TRACE 01/12/2019    SPECGRAV >=1.030 01/12/2019    GLUCOSEU Negative 01/12/2019         ASSESSMENT:    Active Hospital Problems    Diagnosis Date Noted    Subdural hemorrhage ([facility] [address] [I62.00] 06/01/2019       Acute subdural hematoma: Per wife no new neurologic changes. Difficult to assess fully at this time. Will be seen by neurosurgery in the a.m.  Hold an off restarting Lovenox at this time. Close monitoring frequent neuro checks, seizure precautions.   Seizures : Continue home medications  History of DVT status post IVC filter on anticoagulation :      DVT Prophylaxis: As above  Diet: Diet NPO, After Midnight  Code Status: Full Code         Alex Gandhi MD

## 2019-06-02 NOTE — CONSULTS
Inpatient Cardiology Consultation      Reason for Consult: Bradycardia    Consulting Physician: Dr. Roxana Delgado    Requesting Physician: Dr. Ben Rivero    Date of Consultation: 6/2/2019    HISTORY OF PRESENT ILLNESS:     Mr. Real Brunson is a 39year old male, not previously known to [facility] with no previous cardiac history. He presented to [facility] ED after falling out of bed at the nursing home and striking his head. CT head demonstrated interhemispheric subdural hematoma. During the hospitalization he was placed on telemetry and noted to be bradycardic prompting this consultation. The patient is aphasic and therefore unable to provide history. Please note: past medical records were reviewed per electronic medical record (EMR) - see detailed reports under Past Medical/ Surgical History. Past Medical History:    Past Medical History:   Diagnosis Date    Cancer ([address]     Chronic back pain     Diverticulitis     History of blood transfusion     Hx of blood clots     Hyperlipidemia     had at age 24, not a current issue    Restless legs syndrome     Scrotal mass     right, for or 2/3/2017    Seizure ([address]     Testicular cancer ([facility]      1. Traumatic Brain Injury  · Out drinking and apparently lost his footing and fell. initial CT scan of his head that showed that he had a large right frontal intracranial contusion with traumatic subarachnoid hemorrhage and subdural hematoma. Right frontotemporal decompressive hemicraniectomy (11/26/2017)  · Right FT cranioplasty and Superficial sharp debridement of skin incision 1/8/2018  · Right frontotemporal cranioplasty with the use of the patient's own bone to repair a skull defect greater than 5 cm. Superficial sharp debridement of skin edges greater than 5 mm, but less than 5 cm. Placement of subgaleal drain.  1/9/2018  · New onset seizures 6/2018  · Subdural hematoma, secondary to mechanical fall from standing position: s/p Emergent Left hemicraniectomy 03/31/19 while inpatient. Bone flap is in [facility] in [place]. Patient to wear helmet when OOB    2. Acute hypoxic respiratory failure s/p tracheotomy. Now on IZZY since 04/23. 3. BLE DVT/ hx RLE DVT s/p IVC filter: on SC lovenox  4. Dysphagia: secondary to TBI. S/p PEG tube placement. 5. Hx seizure disorder: last seizure May 2018   6. Hx testicular cancer  7. Anemia, ABILIO    Past Surgical History:    Past Surgical History:   Procedure Laterality Date    COLONOSCOPY      CRANIOPLASTY Right 01/08/2018    Right Crainioplasty    OTHER SURGICAL HISTORY  12/10/2018    Dr Minh Granados - IVC Filter Retrieval - Unsuccessful    DC INSJ TUNNELED CTR VAD W/SUBQ PORT AGE 5 YR/> Left 5/17/2018    MEDIPORT CATHETER INSERTION performed by Bernard Trimble MD at [place] [address] Right 02/03/2017    right radical orchiectomy       Medications Prior to admit:  Prior to Admission medications    Medication Sig Start Date End Date Taking? Authorizing Provider   ferrous sulfate 300 (60 Fe) MG/5ML syrup 300 mg by Per G Tube route daily   Yes Historical Provider, MD   levETIRAcetam (KEPPRA) 100 MG/ML solution 1,500 mg by PEG Tube route 2 times daily   Yes Historical Provider, MD   melatonin 3 MG TABS tablet 3 mg by PEG Tube route nightly as needed   Yes Historical Provider, MD   nystatin (MYCOSTATIN) 255912 UNIT/ML suspension Take 500,000 Units by mouth 4 times daily   Yes Historical Provider, MD   chlorhexidine (PERIDEX) 0.12 % solution Take 15 mLs by mouth 2 times daily   Yes Historical Provider, MD   sertraline (ZOLOFT) 25 MG tablet 25 mg by PEG Tube route every evening   Yes Historical Provider, MD   lacosamide (VIMPAT) 50 MG TABS tablet 150 mg by PEG Tube route 2 times daily.    Yes Historical Provider, MD   enoxaparin (LOVENOX) 100 MG/ML injection Inject 90 mg into the skin 2 times daily   Yes Historical Provider, MD   levETIRAcetam (KEPPRA) 750 MG tablet Take 2 tablets by mouth 2 times daily 6/14/18   [facility] Rakesh Downing,        Current Medications:    Current Facility-Administered Medications: ferrous sulfate 300 (60 Fe) MG/5ML syrup 300 mg, 300 mg, Per G Tube, Daily  nystatin (MYCOSTATIN) 488754 UNIT/ML suspension 500,000 Units, 500,000 Units, Oral, 4x Daily  sertraline (ZOLOFT) tablet 25 mg, 25 mg, PEG Tube, QPM  sodium chloride flush 0.9 % injection 10 mL, 10 mL, Intravenous, 2 times per day  sodium chloride flush 0.9 % injection 10 mL, 10 mL, Intravenous, PRN  magnesium hydroxide (MILK OF MAGNESIA) 400 MG/5ML suspension 30 mL, 30 mL, Oral, Daily PRN  ondansetron (ZOFRAN) injection 4 mg, 4 mg, Intravenous, Q6H PRN    Allergies:  Patient has no known allergies.     Social History:    Social History     Socioeconomic History    Marital status:      Spouse name: Álvaro Marte Number of children: 2    Years of education: Not on file    Highest education level: Not on file   Occupational History    Not on file   Social Needs    Financial resource strain: Not on file    Food insecurity:     Worry: Not on file     Inability: Not on file    Transportation needs:     Medical: Not on file     Non-medical: Not on file   Tobacco Use    Smoking status: Current Some Day Smoker     Types: Cigars     Start date: 2/2/1995    Smokeless tobacco: Never Used    Tobacco comment: social smoker, occassional for 10yrs   Substance and Sexual Activity    Alcohol use: No    Drug use: No    Sexual activity: Yes     Partners: Female   Lifestyle    Physical activity:     Days per week: Not on file     Minutes per session: Not on file    Stress: Not on file   Relationships    Social connections:     Talks on phone: Not on file     Gets together: Not on file     Attends Yazidism service: Not on file     Active member of club or organization: Not on file     Attends meetings of clubs or organizations: Not on file     Relationship status: Not on file    Intimate partner violence:     Fear of current or ex partner: Not on file Emotionally abused: Not on file     Physically abused: Not on file     Forced sexual activity: Not on file   Other Topics Concern    Not on file   Social History Narrative    ** Merged History Encounter **            Family History:   Family History   Problem Relation Age of Onset    Scoliosis Mother     Schizophrenia Father     Scoliosis Father     Cancer Sister         cervical    No Known Problems Brother        REVIEW OF SYSTEMS:     · Unable to assess secondary to aphasia    PHYSICAL EXAM:   /63   Pulse 56   Temp 98.1 °F (36.7 °C) (Temporal)   Resp 18   Ht 5' 11\" (1.803 m)   Wt 153 lb (69.4 kg)   SpO2 97%   BMI 21.34 kg/m²   CONST:  Well developed,  male who appears of stated age. Awake, alert, asphasic  HEENT:   Head- Normocephalic, atraumatic   Eyes- Conjunctivae pink, anicteric  Throat- Oral mucosa pink and moist  Neck-  No stridor, trachea midline, no jugular venous distention. No carotid bruit. CHEST: Chest symmetrical and non-tender to palpation. No accessory muscle use or intercostal retractions  RESPIRATORY: Lung sounds - poor inspiratory effort, clear throughout fields   CARDIOVASCULAR:     Heart Inspection- shows no noted pulsations  Heart Palpation- no heaves or thrills; PMI is non-displaced   Heart Ausculation- Regular rate and rhythm, no murmur. No s3, s4 or rub   PV: No lower extremity edema. No varicosities. Pedal pulses palpable, no clubbing or cyanosis   ABDOMEN: Soft, non-tender to light palpation. Bowel sounds present. No palpable masses no organomegaly; no abdominal bruit  MS: Poor muscle strength and tone. No atrophy or abnormal movements. : Deferred  SKIN: Warm and dry no statis dermatitis or ulcers   NEURO / PSYCH: Aphasic     DATA:    ECG / Tele strips: please see HPI   Diagnostic:    CT Head (6/1/12019)  1. Acute subdural hemorrhage interhemispheric fissure.   2. Probable remote subdural hemorrhage/hygroma along the superior  margin of the left leaf of the tentorium. 3. Increasing ventricular dilatation sparing the fourth ventricle  raising possibility of obstruction of the sylvian aqueduct. 4. Other findings as discussed. CT Cervical Spine (6/1/2019)  Impression  No acute fracture. Remote left occipital fracture. Intake/Output Summary (Last 24 hours) at 6/2/2019 1340  Last data filed at 6/2/2019 0552  Gross per 24 hour   Intake 0 ml   Output --   Net 0 ml       Labs:   CBC:   Recent Labs     06/01/19  1743 06/02/19  0515   WBC 7.0 7.5   HGB 14.2 13.8   HCT 44.7 43.6    225     BMP:   Recent Labs     06/01/19  1743 06/02/19  0514    142   K 4.2 4.1   CO2 29 22   BUN 11 11   CREATININE 0.6* 0.6*   LABGLOM >60 >60   CALCIUM 10.1 10.1     PT/INR:   Recent Labs     06/01/19  1743   PROTIME 12.4   INR 1.1     APTT:  Recent Labs     06/01/19  1743   APTT 35.4*     FASTING LIPID PANEL:  Lab Results   Component Value Date    CHOL 190 01/30/2017    HDL 37 01/30/2017    LDLCALC 107 01/30/2017    TRIG 232 01/30/2017     LIVER PROFILE:  Recent Labs     06/01/19  1743   AST 15   ALT 39   LABALBU 4.1       Electronically signed by VENKATA Williamson CNP on 6/2/2019 at 1:40 PM      I have personally seen and evaluated the patient. I personally obtained the history and performed the physical exam.  I personally reviewed all of the above labs, history, review of systems, and data. All of the assessments and recommendations are from me. All of the above cardiac medical decisions are from me. Please see my additional contributions to the history, physical exam, assessment, and recommendations below. History of chief complaint:  He is a aphasic. He had a traumatic brain injury 11-17 with subarachnoid hemorrhage and subdural hemorrhage. Since then, he has had multiple recurring falls and recurring head traumas. He is admitted now with another fall from bed at his nursing home resulting in another head trauma.  His CAT scan now shows another subdural hematoma. Cardiology is consulted for bradycardia. Review of systems:     Unobtainable. Physical exam:  /63   Pulse 56   Temp 98.1 °F (36.7 °C) (Temporal)   Resp 18   Ht 5' 11\" (1.803 m)   Wt 153 lb (69.4 kg)   SpO2 97%   BMI 21.34 kg/m²   Constitutional: He follows with his eyes. He is a aphasic. He is reclining in bed. no acute distress. Eyes: extraocular muscles intact, PERRL. Normal lids & conjunctiva. No icterus. ENT: clear, no bleeding. No external masses. Lips normal formation. Neck: supple, full ROM, no JVD, no bruits, no lymphadenopathy. No masses. trachea midline. Heart: Distant. Regular bradycardic rhythm, normal S1 & S2.  I am not able to appreciate any murmurs or gallops. No heave. Lungs: CTA. No accessory muscles. Poor effort. Abd: soft, non-tender. Normal bowel sounds. Neuro: He slowly follows commands and squeezes balls with both hands. He moves his right lower extremity but did not move his left. EOMI, no tremors. EXT: No bilateral lower extremity edema  Skin: warm, dry, intact. Good turgor. Psych: As above. Patient seen and examined. Chart, labs & data reviewed. A:  1. Sinus bradycardia with a heart rate of 50 on the monitor. He drops down to 40 when sleeping. Probable some component of sleep apnea. He did previously have a tracheostomy for respiratory failure. The bradycardia may also secondary to CNS effects from his brain trauma. 2. Traumatic brain injury  3. Seizure disorder  4. Recurring subarachnoid and subdural hematomas. 5. Prior hypoxic respiratory failure with a tracheostomy. 6. Bilateral DVTs. He has an IVC filter and is on Lovenox. 7. Dysphagia and PEG tube. 8. Testicular cancer. 9. Anemia      Rec:  1. TSH  2. Brain injury and intracranial bleeds per others. 3. Sleep apnea workup per others. 4. Echocardiogram.  5. Avoid heart rate lowering medications. 6. Comorbidities per others.     Electronically signed by Danny Frank Flori Huffmanr, DO on 6/2/2019 at 2:35 PM

## 2019-06-02 NOTE — PROGRESS NOTES
[facility] where patient is from for medication list/diet/ other orders which were being carried out for patient there. Fax number given.  Med rec will be updated when received

## 2019-06-03 DIAGNOSIS — S02.0XXA: Primary | ICD-10-CM

## 2019-06-03 DIAGNOSIS — S06.0XAA: Primary | ICD-10-CM

## 2019-06-03 LAB — TSH SERPL DL<=0.05 MIU/L-ACNC: 2.35 UIU/ML (ref 0.27–4.2)

## 2019-06-03 PROCEDURE — 99232 SBSQ HOSP IP/OBS MODERATE 35: CPT | Performed by: NEUROLOGICAL SURGERY

## 2019-06-03 PROCEDURE — 2060000000 HC ICU INTERMEDIATE R&B

## 2019-06-03 PROCEDURE — 2580000003 HC RX 258: Performed by: INTERNAL MEDICINE

## 2019-06-03 PROCEDURE — 92523 SPEECH SOUND LANG COMPREHEN: CPT

## 2019-06-03 PROCEDURE — 84443 ASSAY THYROID STIM HORMONE: CPT

## 2019-06-03 PROCEDURE — 6370000000 HC RX 637 (ALT 250 FOR IP): Performed by: INTERNAL MEDICINE

## 2019-06-03 PROCEDURE — 36415 COLL VENOUS BLD VENIPUNCTURE: CPT

## 2019-06-03 RX ADMIN — NYSTATIN 500000 UNITS: 100000 SUSPENSION ORAL at 21:48

## 2019-06-03 RX ADMIN — SERTRALINE 25 MG: 50 TABLET, FILM COATED ORAL at 18:31

## 2019-06-03 RX ADMIN — NYSTATIN 500000 UNITS: 100000 SUSPENSION ORAL at 18:31

## 2019-06-03 RX ADMIN — Medication 10 ML: at 09:00

## 2019-06-03 RX ADMIN — Medication 10 ML: at 21:48

## 2019-06-03 ASSESSMENT — PAIN SCALES - GENERAL
PAINLEVEL_OUTOF10: 0

## 2019-06-03 NOTE — PROGRESS NOTES
SDH - stable    PLAN:  · No anticoagulants, history of xarelto use  · F/u in clinic in 4 weeks with head CT  · Serial neuro exams      Electronically signed by JOANA Arora on 6/3/2019 at 7:34 AM     I have interviewed and examined the patient and agree with above. PT/OT.   Helmet and D/C planning    Isaura Sánchez

## 2019-06-03 NOTE — PLAN OF CARE
Problem: Risk for Impaired Skin Integrity  Goal: Tissue integrity - skin and mucous membranes  Description  Structural intactness and normal physiological function of skin and  mucous membranes. Outcome: Met This Shift     Problem: Malnutrition  (NI-5.2)  Goal: Food and/or Nutrient Delivery  Description  Individualized approach for food/nutrient provision.   6/2/2019 1409 by Nati Sol RD, LD  Outcome: Met This Shift none

## 2019-06-03 NOTE — PROGRESS NOTES
PEECH/LANGUAGE PATHOLOGY  SPEECH/LANGUAGE/COGNITIVE EVALUATION      PATIENT NAME:  Nikcie Medeiros      :  1975      TODAY'S DATE:  6/3/2019      SPEECH PATHOLOGY DIAGNOSIS:    Mild-moderate cognitive linguistic deficits, pt at baseline    THERAPY RECOMMENDATIONS:   Speech Pathology intervention is not warranted at this time. MOTOR SPEECH       Oral Peripheral Examination   Right labiobuccal weakness    Parameters of Speech Production  Respiration:  Trach  Articulation:  Distortion  Resonance:  Could not test  Quality:   Breathy  Pitch:    Low  Intensity: Quiet  Fluency:  Intact  Prosody Intact    RECEPTIVE LANGUAGE    Comprehension of Yes/No Questions: Within normal limits    Process  Simple Verbal Commands:   Within normal limits  Process Intermediate Verbal Commands:   Within normal limits  Process Complex Verbal Commands:     Latent    Comprehension of Conversation:      Latent      EXPRESSIVE LANGUAGE     Serials: Functional    Imitation:  Words   Functional   Sentences Functional    Naming:  (Modality used:  Verbal)  Confrontation Naming  Functional  Functional Description  Impaired  Response Naming: Impaired    Conversation:      Confusion was noted during conversation    COGNITION     Attention/Orientation  Attention: Sustained attention   Orientation:  Oriented to Person, Place, Reason for hospitalization    Memory   Biographical:  recalled Birthdate and Age  Delayed recall:  recalled 1/3 words    CLINICAL OBSERVATIONS NOTED DURING THE EVALUATION  Latent responses and Inconsistent responses                    The admitting diagnosis and active problem list, as listed below have been reviewed prior to initiation of this evaluation.      ADMITTING DIAGNOSIS: Subdural hemorrhage ([facility] [I62.00]     ACTIVE PROBLEM LIST:   Patient Active Problem List   Diagnosis    SAH (subarachnoid hemorrhage) (HCC)    Subdural hematoma (HCC)    Acute respiratory failure (HCC)    Hypoalbuminemia    Electrolyte imbalance    Hypophosphatemia    Fall (on) (from) other stairs and steps, initial encounter    Pulmonary embolus ([address]    Tumor of testis of uncertain behavior    Seminoma of right testis, stage 1 ([address]    History of craniotomy    Uncal herniation (HCC)    Dysphagia    Hypotension    Hemiplegia (HCC)    Chronic respiratory failure (HCC)    History of DVT (deep vein thrombosis)    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

## 2019-06-03 NOTE — PROGRESS NOTES
Inpatient Cardiology Progress note     PATIENT IS BEING FOLLOWED FOR: Bradycardia     Monitor reviewed, continues mid 40-50-- sinus bradycardia. No clear indication for a PPM.   Continue with previous recommendations from yesterday. Echo ordered but not obtained. TSH WNL   Continue to avoid AV luz blocking agents.    Cardiology will sign off, please call regarding any further questions or concerns       Electronically signed by VENKATA Hopper CNP on 6/3/2019 at 3:11 PM

## 2019-06-03 NOTE — PROGRESS NOTES
06/01/19  1743   INR 1.1     No results for input(s): CKTOTAL, TROPONINI in the last 72 hours. Recent Labs     06/01/19  1743   AST 15   ALT 39   BILITOT 0.3   ALKPHOS 142*     No results for input(s): LACTA in the last 72 hours.   No results found for: Joanne Zacarias  No results found for: AMMONIA    Assessment:    Active Hospital Problems    Diagnosis Date Noted    Severe protein-calorie malnutrition ([facility] [E43] 06/02/2019    Bradycardia [R00.1]     Subdural hemorrhage (HCC) [I62.00] 06/01/2019   h/ o testicular cancer(s/p chemo)   h/o DVt, has JACOBY filter   seizures       Plan:  * neuro checks  Cont zoloft      DVT Prophylaxis:  scd  Diet: DIET TUBE FEED CONTINUOUS/CYCLIC NPO; 1.5 Calorie with Fiber; Gastrostomy; Continuous; 25; 60; 24  Diet Tube Feed Modular: Protein Modular  Code Status: Full Code    PT/OT Eval Status:  ordered    Dispo - *DALE      Electronically signed by Amanda Nunes DO on 6/3/2019 at 1:23 PM

## 2019-06-03 NOTE — CARE COORDINATION
Patient was admitted from [address] facility. Spoke with Estrada Kiran there who states patient is not a bedhold but they will accept him back. He will require therapy evals and a precert prior to discharge. LOWELL spoke with patients wife, Mouna Dean who states she would like for patient to return to [place] for rehab (he was there for 3 weeks and transferred to [facility] the day before admitting to hospital) if able. LOWELL explained that we can make a new referral to [facility] but if they are unable to accept or insurance denies we will need to keep a DALE for back up. She states understanding and does want him to return to [facility] in that event. Referral made to [facility] at [facility], await acceptance. Need therapy evals, and have notified Estrada Kiran at [facility] of above, she will also follow and accept back if unable to go to [facility].

## 2019-06-04 PROCEDURE — 6370000000 HC RX 637 (ALT 250 FOR IP): Performed by: INTERNAL MEDICINE

## 2019-06-04 PROCEDURE — 2060000000 HC ICU INTERMEDIATE R&B

## 2019-06-04 PROCEDURE — 97162 PT EVAL MOD COMPLEX 30 MIN: CPT

## 2019-06-04 PROCEDURE — 2580000003 HC RX 258: Performed by: INTERNAL MEDICINE

## 2019-06-04 PROCEDURE — 97530 THERAPEUTIC ACTIVITIES: CPT

## 2019-06-04 PROCEDURE — 97535 SELF CARE MNGMENT TRAINING: CPT

## 2019-06-04 PROCEDURE — 99232 SBSQ HOSP IP/OBS MODERATE 35: CPT | Performed by: NEUROLOGICAL SURGERY

## 2019-06-04 PROCEDURE — 97166 OT EVAL MOD COMPLEX 45 MIN: CPT

## 2019-06-04 RX ORDER — LEVETIRACETAM 500 MG/1
1500 TABLET ORAL 2 TIMES DAILY
Status: DISCONTINUED | OUTPATIENT
Start: 2019-06-04 | End: 2019-06-04 | Stop reason: SDUPTHER

## 2019-06-04 RX ORDER — LEVETIRACETAM 100 MG/ML
1500 SOLUTION ORAL 2 TIMES DAILY
Status: DISCONTINUED | OUTPATIENT
Start: 2019-06-04 | End: 2019-06-05 | Stop reason: HOSPADM

## 2019-06-04 RX ADMIN — NYSTATIN 500000 UNITS: 100000 SUSPENSION ORAL at 20:43

## 2019-06-04 RX ADMIN — NYSTATIN 500000 UNITS: 100000 SUSPENSION ORAL at 17:34

## 2019-06-04 RX ADMIN — MINERAL SUPPLEMENT IRON 300 MG / 5 ML STRENGTH LIQUID 100 PER BOX UNFLAVORED 300 MG: at 09:37

## 2019-06-04 RX ADMIN — LEVETIRACETAM 1500 MG: 500 SOLUTION ORAL at 20:43

## 2019-06-04 RX ADMIN — SERTRALINE 25 MG: 50 TABLET, FILM COATED ORAL at 17:34

## 2019-06-04 RX ADMIN — Medication 10 ML: at 09:37

## 2019-06-04 RX ADMIN — NYSTATIN 500000 UNITS: 100000 SUSPENSION ORAL at 09:37

## 2019-06-04 RX ADMIN — Medication 10 ML: at 20:43

## 2019-06-04 ASSESSMENT — PAIN SCALES - GENERAL
PAINLEVEL_OUTOF10: 0
PAINLEVEL_OUTOF10: 0

## 2019-06-04 NOTE — PLAN OF CARE
Problem: Falls - Risk of:  Goal: Absence of physical injury  Description  Absence of physical injury  Outcome: Met This Shift     Problem: Risk for Impaired Skin Integrity  Goal: Tissue integrity - skin and mucous membranes  Description  Structural intactness and normal physiological function of skin and  mucous membranes.   Outcome: Met This Shift

## 2019-06-04 NOTE — PROGRESS NOTES
Department of Neurosurgery  Progress Note    CHIEF COMPLAINT: pt seen for SDH    SUBJECTIVE:  Mild headache. REVIEW OF SYSTEMS :  No CP or SOB    OBJECTIVE:   VITALS:  /62   Pulse 66   Temp 98.2 °F (36.8 °C)   Resp 16   Ht 5' 11\" (1.803 m)   Wt 160 lb (72.6 kg)   SpO2 97%   BMI 22.32 kg/m²   PHYSICAL:  CONSTITUTIONAL:  awake, alert, cooperative, no apparent distress, and appears stated age. [place].       DATA:  CBC:   Lab Results   Component Value Date    WBC 7.5 06/02/2019    RBC 4.80 06/02/2019    HGB 13.8 06/02/2019    HCT 43.6 06/02/2019    MCV 90.8 06/02/2019    MCH 28.8 06/02/2019    MCHC 31.7 06/02/2019    RDW 14.9 06/02/2019     06/02/2019    MPV 9.4 06/02/2019     BMP:    Lab Results   Component Value Date     06/02/2019    K 4.1 06/02/2019     06/02/2019    CO2 22 06/02/2019    BUN 11 06/02/2019    LABALBU 4.1 06/01/2019    CREATININE 0.6 06/02/2019    CALCIUM 10.1 06/02/2019    GFRAA >60 06/02/2019    LABGLOM >60 06/02/2019    GLUCOSE 93 06/02/2019     PT/INR:    Lab Results   Component Value Date    PROTIME 12.4 06/01/2019    INR 1.1 06/01/2019     PTT:    Lab Results   Component Value Date    APTT 35.4 06/01/2019   [APTT}    Current Inpatient Medications  Current Facility-Administered Medications: ferrous sulfate 300 (60 Fe) MG/5ML syrup 300 mg, 300 mg, Per G Tube, Daily  nystatin (MYCOSTATIN) [id number] UNIT/ML suspension 500,000 Units, 500,000 Units, Oral, 4x Daily  sertraline (ZOLOFT) tablet 25 mg, 25 mg, PEG Tube, QPM  perflutren lipid microspheres (DEFINITY) injection 1.65 mg, 1.5 mL, Intravenous, ONCE PRN  sodium chloride flush 0.9 % injection 10 mL, 10 mL, Intravenous, 2 times per day  sodium chloride flush 0.9 % injection 10 mL, 10 mL, Intravenous, PRN  magnesium hydroxide (MILK OF MAGNESIA) 400 MG/5ML suspension 30 mL, 30 mL, Oral, Daily PRN  ondansetron (ZOFRAN) injection 4 mg, 4 mg, Intravenous, Q6H PRN    ASSESSMENT:   · 39year old male with interhemispheric SDH - stable    PLAN:  · No anticoagulants, history of xarelto use  · F/u in clinic in 4 weeks with head CT  · Serial neuro exams      Electronically signed by JOANA Adams on 6/4/2019 at 10:08 AM     I have interviewed and examined the patient and agree with above. No intervention.   D/C planning    Genia Beverly

## 2019-06-04 NOTE — PROGRESS NOTES
(-)   Sequencing:  fair (-)   Problem solving:  fair (-)   Judgement/safety:  fair (-)  Additional Comments:  Pt was pleasant, cooperative. Presented with a Flat Affect, no Change in expression. Good eye contact. Able to attend to task, however, required Mod-Max VCs for problem solving and Motor Planning  When presented with a Wash Cloth and Chapstick, pt was given the command \"Wash your face\". Pt grasped the Chapstick. Required Mod VCs for purpose of item, Max VCs to remove lid and Mod VCs to apply chapstick to lips. Pt was then given the wash cloth and given the Command \"Wash your Face\". Pt grasped the cloth and washed his hands. Able to wash his face after increased processing time and Max VCs/Encouragement from therapist and Wife       Functional Assessment:   Initial Eval Status  Date: 6-4-19 Treatment Status  Date: Short Term Goals  Treatment frequency: PRN 2-4 x/week   Feeding NPO    PEG     Grooming Min A/Max VCs    Wash face and hands w/ cloth while in semi-supine  SUP/Mod VCs   UB Dressing Mod A/Max VCs    [address] bed level   Min A/Mod VCs   LB Dressing Max A/Max VCs/Incresaed time    Required Max A to don socks in supine  Max A of 1 to facilitate Rolling, Max A of 1 to don undergarments bed-level  Mod A/Mod VCs   Bathing Max A/Max VCs/Increased time    Simulated - Bed level  Mod A/Mod VCs   Toileting Max A/Max VCs/Increased time    Simulated - Bed Level  Mod A/Mod VCs   Bed Mobility  Rolling:   Max A  Repositioning:  Max A of 2 toward HOB   Supine to Sit:  NT    Sit to Supine:  NT   NT for safety this session d/t pt's limited endurance/increased level of fatigue/high fall risk - recently transferred w/ PT     Max A   Functional Transfers Sit to stand:  NT  Stand to sit:  NT      Max A   Functional Mobility NT      Max A b/t surfaces   Balance Sitting:  NT    Standing:  NT     Activity Tolerance Tolerated Sitting:  NT  Tolerated Standing:  NT     Visual/  Perceptual WFL - pt reported seeing \"Splotches\"  Glasses:  No      Hearing WFL  Hearing Aids  No       Hand dominance: LEFT - has not been using d/t Left Lavelle-paresis    UE ROM: RUE:  WFL      LUE: Shoulder flex to ~ 150*, Distally WFL    Strength: RUE: grossly 4+/5     LUE: grossly 4/5     Strength: WNL Right UE, WFL L UE    Fine Motor Coordination: Fair (+) Right non-dominant UE, Fair Left Non-dominant UE    Sensation:  Denies numbness or tingling Shahram UEs  Tone:  WFL Shahram UEs  Edema:  None Noted                            Treatment:       -- Education:  Provided Pt/Family ed re: Benefits/Purpose of OT services;  OT Plan of Care;  Benefits of use of DME/AD/Adaptive equip/techs to increase safety/IND with Functional Ax; Techs to increase Safety/Safety Awareness w/ Functional Ax; Benefits of Cont'd Participation in OT services at D/C      Pt and/or Family verbalized/demonstrated a good understanding of education provided. Will Review PRN. Provided Skilled SUP/Assist w/ Pt safety, Proper Positioning, ADLs, Transfers and Functional Mobility as noted above, as well as set up and clean up for session. Skilled monitoring of Vitals and pts response to treatment. Consulted PT, RN, Family     [] Malnutrition indicators have been identified and nursing has been notified to ensure a dietitian consult is ordered. Comments/Treatment:  Upon arrival, pt was found semi-supine in bed. He was agreeable to participate in assessment ax. His Wife was present during assessment. Received permission from RN prior to engaging pt in assessment ax. At the end of the session, patient was properly positioned in semi-supine w/ pillows under Shahram LEs for pain mgmt of LBP - with call light and phone within reach, all lines and tubes intact. Oriented pt to call bell. Bed Alarm activated. Made all appropriate Environmental Modifications to facilitate pt's level of IND and safety. All needs met.   Wife Remained at B/S       Pt would benefit from continued skilled OT services to increase safety and independence with completion of ADL/IADL tasks for functional independence and quality of life    Eval Complexity: High    Assessment of current deficits   Functional mobility [x]  ADLs [x] Strength [x]  Cognition [x]  Functional transfers  [x] IADLs [x] Safety Awareness [x]  Endurance [x]  Fine Motor Coordination [x] Balance [x] Vision/perception [x] Sensation []   Gross Motor Coordination [x] ROM [x] Delirium []                  Motor Control []    Plan of Care:   ADL retraining [x]   Equipment needs [x]   Neuromuscular re-education [x] Energy Conservation Techniques [x]  Functional Transfer training [x] Patient and/or Family Education [x]  Functional Mobility training [x]  Environmental Modifications [x]  Cognitive re-training [x]   Compensatory techniques for ADLs [x]  Splinting Needs []   Positioning to improve overall function [x]   Therapeutic Activity [x]   Therapeutic Exercise  [x]  Visual/Perceptual: [x]    Delirium prevention/treatment  []  Other:  [x]    Rehab Potential:  Fair (+) for established goals    Patient / Family Goal:  \"Participate in as much therapy as possible\"     Patient and/or Family were instructed on Functional Diagnosis, Prognosis/Goals and OT Plan of Care. Demonstrated Good understanding. Evaluation Time includes thorough review of current medical information, gathering information on past medical history/social history and prior level of function, completion of standardized testing/informal observation of tasks, assessment of data and education on plan of care and goals.         High Evaluation + 25 timed treatment minutes  Tx Time in:  1324  Tx Time out:  Oren [address] [place] [address], OTR/L  # [id number]

## 2019-06-04 NOTE — DISCHARGE INSTR - COC
Continuity of Care Form    Patient Name: Jeri Thompson   :  1975  MRN:  [id number]    Admit date:  2019  Discharge date:  19    Code Status Order: Full Code   Advance Directives:   [facility] Documentation     Date/Time Healthcare Directive Type of Healthcare Directive Copy in [address] Agent's Name Healthcare Agent's Phone Number    [phone]  No, patient does not have an advance directive for healthcare treatment -- -- -- -- --          Admitting Physician:  Irene Campos MD  PCP: Young Bueno MD    Discharging Nurse: [facility] Unit/Room#: 8503/8503-B  Discharging Unit Phone Number: [phone]    Emergency Contact:   Extended Emergency Contact Information  Primary Emergency Contact: Krunal Marreroina  Address: [address] [place] [address] Phone: [phone]  Relation: Spouse  Secondary Emergency Contact: Porsha Galindo   [place] [address] [address]  Phone: [phone]  Relation: Brother/Sister    Past Surgical History:  Past Surgical History:   Procedure Laterality Date    COLONOSCOPY      CRANIOPLASTY Right 2018    Right Crainioplasty    OTHER SURGICAL HISTORY  12/10/2018    Dr Libia Rosa - IVC Filter Retrieval - Unsuccessful    RI INSJ TUNNELED CTR VAD W/SUBQ PORT AGE 5 YR/> Left 2018    MEDIPORT CATHETER INSERTION performed by Paolo Mnazo MD at [place] [address] Right 2017    right radical orchiectomy       Immunization History:   Immunization History   Administered Date(s) Administered    Influenza Virus Vaccine 10/02/2018       Active Problems:  Patient Active Problem List   Diagnosis Code    SAH (subarachnoid hemorrhage) ([address] I60.9    Subdural hematoma ([address] S06.5X9A    Acute respiratory failure ([address] J96.00    Hypoalbuminemia E88.09    Electrolyte imbalance E87.8    Hypophosphatemia E83.39    Fall (on) (from) other stairs and steps, initial encounter W10. 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 [place] of inferior vena cava filter Z95.828    Presence of inferior vena cava filter Z95.828    Acute deep vein thrombosis (DVT) of iliac vein of right lower extremity ([facility]) I82.421    Acute DVT (deep venous thrombosis) ([facility]) I82.409    Traumatic brain injury with depressed frontal skull fracture, sequela ([facility] S02. [place] Boyd    Epilepsy ([facility] G40.909    Subdural hemorrhage ([facility]) I62.00    Severe protein-calorie malnutrition ([facility]) E43    Bradycardia R00.1       Isolation/Infection:   Isolation          No Isolation            Nurse Assessment:  Last Vital Signs: /62   Pulse 66   Temp 98.2 °F (36.8 °C)   Resp 16   Ht 5' 11\" (1.803 m)   Wt 160 lb (72.6 kg)   SpO2 97%   BMI 22.32 kg/m²     Last documented pain score (0-10 scale): Pain Level: 0  Last Weight:   Wt Readings from Last 1 Encounters:   06/03/19 160 lb (72.6 kg)     Mental Status:  disoriented, oriented and alert    IV Access:  - None    Nursing Mobility/ADLs:  Walking   Dependent  Transfer  Dependent  Bathing  Dependent  Dressing  Dependent  Toileting  Dependent  Feeding  Dependent  Med Admin  Dependent  Med Delivery   crushed    Wound Care Documentation and Therapy:  Wound 06/01/19 Coccyx Mid;Posterior healed wound (Active)   Dressing Status Clean;Dry; Intact 6/3/2019  7:45 PM   Dressing/Treatment Foam 6/3/2019  7:45 PM   Wound Cleansed Rinsed/Irrigated with saline 6/1/2019  8:24 PM   Wound Length (cm) 0.5 cm 6/1/2019  8:24 PM   Wound Width (cm) 0.5 cm 6/1/2019  8:24 PM   Wound Depth (cm) 0.1 cm 6/1/2019  8:24 PM   Wound Surface Area (cm^2) 0.25 cm^2 6/1/2019  8:24 PM   Wound Volume (cm^3) 0.02 cm^3 6/1/2019  8:24 PM   Wound Assessment Red; Other (Comment) 6/3/2019  7:45 PM   Drainage Amount None 6/3/2019  7:45 PM   Leonila-wound Assessment Dry; Intact 6/3/2019  7:45 PM   Number of days: 2        Elimination:  Continence:   · Bowel: No  · Bladder: No  Urinary Catheter: None   Colostomy/Ileostomy/Ileal Conduit: No       Date of Last BM: 6/4/19    Intake/Output Summary (Last 24 hours) at 6/4/2019 0752  Last data filed at 6/4/2019 0507  Gross per 24 hour   Intake 1320 ml   Output --   Net 1320 ml     I/O last 3 completed shifts: In: 1320 [NG/GT:1320]  Out: -     Safety Concerns: At Risk for Falls    Impairments/Disabilities:      Speech    Nutrition Therapy:  Current Nutrition Therapy:   - Tube Feedings:  Standard with fiber    Routes of Feeding: Gastrostomy Tube  Liquids: No Liquids  Daily Fluid Restriction: no  Last Modified Barium Swallow with Video (Video Swallowing Test): not done    Treatments at the Time of Hospital Discharge:   Respiratory Treatments:   Oxygen Therapy:  is not on home oxygen therapy.   Ventilator:    - No ventilator support    Rehab Therapies: Physical Therapy, Occupational Therapy and Speech/Language Therapy  Weight Bearing Status/Restrictions: No weight bearing restirctions  Other Medical Equipment (for information only, NOT a DME order):  hospital bed  Other Treatments:     Patient's personal belongings (please select all that are sent with patient):  None    RN SIGNATURE:  Electronically signed by Pari Moreau RN on 6/5/19 at 2:09 PM    CASE MANAGEMENT/SOCIAL WORK SECTION    Inpatient Status Date: ***    Readmission Risk Assessment Score:  Readmission Risk              Risk of Unplanned Readmission:        15           Discharging to Facility/ Agency   · Name: · Address:  · Phone:  · Fax:    Dialysis Facility (if applicable)   · Name:  · Address:  · Dialysis Schedule:  · Phone:  · Fax:    / signature: {Esignature:[id number]}    PHYSICIAN SECTION    Prognosis: {Prognosis:1939143111}    Condition at Discharge: Mackenzie Smith Patient Condition:118011236}    Rehab Potential (if transferring to Rehab): {Prognosis:3440800478}    Recommended Labs or Other Treatments After Discharge: ***    Physician Certification: I certify the above information and transfer of Shun Keller  is necessary for the continuing treatment of the diagnosis listed and that he requires {Admit to Appropriate Level of Care:50159} for {GREATER/LESS:926737640} 30 days.      Update Admission H&P: {CHP DME Changes in FEOTK:284714585}    PHYSICIAN SIGNATURE:  Electronically signed by Gloria Glover DO on 6/4/19 at 7:52 AM

## 2019-06-04 NOTE — CARE COORDINATION
Therapy notes received. Spoke with Maricarmen Quach, liaison with [facility]. Authorization initiated for patient to transition to [facility] for rehab at discharge. Per Maricarmen Quach, we should have a determination tomorrow. Will continue to follow.      Abdelrahman Lin.

## 2019-06-04 NOTE — PROGRESS NOTES
Hospitalist Progress Note      PCP: Coni Quiros MD    Date of Admission: 6/1/2019    Chief Complaint: * fall     Hospital Course: *Sustained a SDH. Also has a prior history of ICH. Found to have bradycardia, TSH was normal,  No further neurosurgery eval  To restart keppra    **     Subjective: ** wants to go back to [facility]      Medications:  Reviewed    Infusion Medications   Scheduled Medications    ferrous sulfate  300 mg Per G Tube Daily    nystatin  500,000 Units Oral 4x Daily    sertraline  25 mg PEG Tube QPM    sodium chloride flush  10 mL Intravenous 2 times per day     PRN Meds: perflutren lipid microspheres, sodium chloride flush, magnesium hydroxide, ondansetron      Intake/Output Summary (Last 24 hours) at 6/4/2019 1450  Last data filed at 6/4/2019 0507  Gross per 24 hour   Intake 1320 ml   Output --   Net 1320 ml       Exam:    /62   Pulse 66   Temp 98.2 °F (36.8 °C)   Resp 16   Ht 5' 11\" (1.803 m)   Wt 160 lb (72.6 kg)   SpO2 97%   BMI 22.32 kg/m²       Gen: *well developed  HEENT: NC/AT, moist mucous membranes,  exudate deformity of left side of skull   Neck: supple, trachea midline, no anterior cervical or SC LAD  Heart:  Normal s1/s2, RRR, no murmurs, gallops, or rubs. Lungs:  *cta  bilaterally,   Abd: bowel sounds present, soft, nontender, nondistended, no masses  Extrem:  No clubbing, cyanosis,  No ** edema  Skin: no rashes or lesions  Psych: A & O x3  Neuro: grossly intact, moves all four extremities.     Capillary Refill: Brisk,< 3 seconds   Peripheral Pulses: +2 palpable, equal bilaterally                          Labs:   Recent Labs     06/01/19 1743 06/02/19  0515   WBC 7.0 7.5   HGB 14.2 13.8   HCT 44.7 43.6    225     Recent Labs     06/01/19  1743 06/02/19  0514    142   K 4.2 4.1    104   CO2 29 22   BUN 11 11   CREATININE 0.6* 0.6*   CALCIUM 10.1 10.1     Recent Labs     06/01/19  1743   AST 15   ALT 39   BILITOT 0.3   ALKPHOS 142* Recent Labs     06/01/19  1743   INR 1.1     No results for input(s): Katalina Jubilee in the last 72 hours. Recent Labs     06/01/19  1743   AST 15   ALT 39   BILITOT 0.3   ALKPHOS 142*     No results for input(s): LACTA in the last 72 hours.   No results found for: Cindia Mylar  No results found for: AMMONIA    Assessment:    Active Hospital Problems    Diagnosis Date Noted    Severe protein-calorie malnutrition ([facility] [E43] 06/02/2019    Bradycardia [R00.1]     Subdural hemorrhage (HCC) [I62.00] 06/01/2019   h/ o testicular cancer(s/p chemo)   h/o DVt, has JACOBY filter   seizures        Plan:  * neuro checks  Cont zoloft        DVT Prophylaxis:  scd  Diet: DIET TUBE FEED CONTINUOUS/CYCLIC NPO; 1.5 Calorie with Fiber; Gastrostomy; Continuous; 25; 60; 24  Diet Tube Feed Modular: Protein Modular  Code Status: Full Code     PT/OT Eval Status:  ordered     Dispo - *DALE             Electronically signed by Kasia Arizmendi DO on 6/4/2019 at 2:50 PM

## 2019-06-05 VITALS
OXYGEN SATURATION: 97 % | HEIGHT: 71 IN | TEMPERATURE: 97.8 F | HEART RATE: 58 BPM | WEIGHT: 160 LBS | DIASTOLIC BLOOD PRESSURE: 78 MMHG | RESPIRATION RATE: 18 BRPM | SYSTOLIC BLOOD PRESSURE: 125 MMHG | BODY MASS INDEX: 22.4 KG/M2

## 2019-06-05 PROCEDURE — 97535 SELF CARE MNGMENT TRAINING: CPT

## 2019-06-05 PROCEDURE — 97530 THERAPEUTIC ACTIVITIES: CPT

## 2019-06-05 PROCEDURE — 6370000000 HC RX 637 (ALT 250 FOR IP): Performed by: INTERNAL MEDICINE

## 2019-06-05 PROCEDURE — 2580000003 HC RX 258: Performed by: INTERNAL MEDICINE

## 2019-06-05 PROCEDURE — 99232 SBSQ HOSP IP/OBS MODERATE 35: CPT | Performed by: NEUROLOGICAL SURGERY

## 2019-06-05 RX ORDER — LEVETIRACETAM 100 MG/ML
1500 SOLUTION ORAL 2 TIMES DAILY
Refills: 3 | DISCHARGE
Start: 2019-06-05 | End: 2020-07-21

## 2019-06-05 RX ADMIN — MINERAL SUPPLEMENT IRON 300 MG / 5 ML STRENGTH LIQUID 100 PER BOX UNFLAVORED 300 MG: at 09:05

## 2019-06-05 RX ADMIN — LEVETIRACETAM 1500 MG: 500 SOLUTION ORAL at 09:05

## 2019-06-05 RX ADMIN — Medication 10 ML: at 09:09

## 2019-06-05 RX ADMIN — NYSTATIN 500000 UNITS: 100000 SUSPENSION ORAL at 09:05

## 2019-06-05 ASSESSMENT — PAIN SCALES - GENERAL: PAINLEVEL_OUTOF10: 0

## 2019-06-05 NOTE — CARE COORDINATION
[place] approved for [facility]. Patient to discharge to [facility] at 4:30p via [facility] ambulance. Wife was at bedside and notified. All discharge paperwork in soft chart.

## 2019-06-05 NOTE — PROGRESS NOTES
OT BEDSIDE TREATMENT NOTE      Date:2019  Patient Name: Shun Keller  MRN: [id number]  : 1975  Room: [facility]     Evaluating OT:  ISA Rosado, OTR/L  # [id number]     AM-PAC Daily Activity Raw Score:    Recommended Adaptive Equipment:  TBD as pt progresses     Reason for Admission:  Pt was admitted after falling OOB striking head. (+) Subdural Hematoma     Diagnosis:  Subdural Hematoma, Intracranial Hemorrhage     Procedures this admission:  None      Pertinent Medical History:  CA, Chronic Back Pain, Restless Leg Syndrome, Seizure, Right Cranioplasty 2018, Hemicraniectomy      Precautions:  Falls, Helmet when OOB, TSM, PEG    Per OT Eval:   Home Living: Pt has been in several Rehab facilities recently. Prior to initial TBI, Pt lived with his wife and 3 children in a 2-story house with 1 RENETTA and 0 HR/s. Bed/bath on the 2nd floor, Flight w/ Left HR. Half Bath on 1st floor   Bathroom setup:  Tub-Shower on 2nd floor, Walk-in Shower in [facility] [employer] owned:  ??     Available Family Assist:  Wife can provide assist PRN     Prior Level of Function:  Prior to initial TBI, pt was IND with ADLs, IADLs, Transfers and Mobility using No AD for ambulation. Since Original TBI, pt was required Trach/PEG and Has Required Assist of 2 for all Bed-level ADLs, Transfers and Mobility b/t surfaces. Most recently, he participated in therapy at [address] [place] [address] where he Transferred b/t surfaces with the use of a Mira-lift. Just transferred to a SNF the day of admission  Driving:  Not currently  Occupation:  Former Manager - Traveled Internationally for work    Pain: No c/o pain this date. Cognition: A & O x grossly x3. Follows 1 step commands with min cues and increased time for processing. Distractible requiring cues for attention to task. Min difficulty with motor planning noted throughout treatment.               Memory:  fair (-)              Sequencing:  fair (-)              Problem solving:  fair (-)              Judgement/safety:  fair (-)    Functional Assessment:    Initial Eval Status  Date: 6-4-19 Treatment Status  Date:  6/5/19 Short Term Goals  Treatment frequency: PRN 2-4 x/week   Feeding NPO     PEG  NPO     Grooming Min A/Max VCs     Wash face and hands w/ cloth while in semi-supine  Min A  Washing face seated at EOB with mod cues for sequencing and attention to task. SUP/Mod VCs   UB Dressing Mod A/Max VCs     Changing Hospital Gown bed level   Mod/Min A  Adjusting hospital gown over B shoulder and around trunk while seated at EOB. Min cues for sequencing required. Min A/Mod VCs   LB Dressing Max A/Max VCs/Incresaed time     Required Max A to don socks in supine  Max A of 1 to facilitate Rolling, Max A of 1 to don undergarments bed-level  Max A  Donning socks bed level with difficulty with motor planning and sequencing noted. Pt able assist with managing socks over heels with cues once initiated over toes. Mod A/Mod VCs   Bathing Max A/Max VCs/Increased time     Simulated - Bed level  Per Eval  Will continue to assess. Mod A/Mod VCs   Toileting Max A/Max VCs/Increased time     Simulated - Bed Level  Per Eval  Will continue to assess. Mod A/Mod VCs   Bed Mobility  Rolling: Max A  Repositioning:  Max A of 2 toward HOB   Supine to Sit:  NT    Sit to Supine:  NT   NT for safety this session d/t pt's limited endurance/increased level of fatigue/high fall risk - recently transferred w/ PT     Supine<>Sit: Dep x2  Rolling: Max A  Pt required assist for trunk control and B LE's during supine<>sit transfers. Rolling completed to L and R. Max A   Functional Transfers Sit to stand:  NT  Stand to sit:  NT       Sit<>Stand: Max A x2  Completed with B knees blocked and mod cues for sequencing/safety.   Max A   Functional Mobility NT        NT Max A b/t surfaces   Balance Sitting:  NT     Standing:  NT  Sitting: Max/Mod A  Standing:  Max A x2  Pt tolerated sitting at EOB x12 minutes, utilizing B UE's for support with assist required d/t frequent posterior LOB, unable to self correct with cues. Frequent cues for posture and head control required with fair- follow through. Pt required increased assist as pt fatigued. Pt tolerated standing <1 minute with B knees blocked. Forward flexed at hips in standing, unable to self correct with cues.       Activity Tolerance Tolerated Sitting:  NT  Tolerated Standing:  NT  Fair-  Pt fatigues quickly with light activity.     Visual/  Perceptual WFL - pt reported seeing \"Splotches\"  Glasses:  No        Hearing WFL  Hearing Aids  No          Comments: Upon arrival pt in supine with HOB elevated and wife present. Pt educated on transfer safety, body mechanics, posture, safety, fall prevention, adaptive techniques for ADl tasks, sequencing, B UE AROM exercises and benefits of increasing activity. Pt verbalized/demonstrated fair understanding, recommend continued education. Pt completed B UE AROM exercise chest press x10 reps with min cues for sequencing and min encouragement for participation with fair- tolerance. At end of session pt returned to supine with HOB elevated with all lines and tubes intact, call light within reach and bed alarm activated. · Pt has made fair- progress towards set goals.    · Continue with current plan of care    Time in: [facility]  Time out:1434  Total Tx Time: [address] [facility] [address] COVARRUBIAS/L [id number]

## 2019-06-05 NOTE — PROGRESS NOTES
Department of Neurosurgery  Progress Note    CHIEF COMPLAINT: pt seen for SDH    SUBJECTIVE:  Mild headache. No other concerns    REVIEW OF SYSTEMS :  No CP or SOB    OBJECTIVE:   VITALS:  /78   Pulse 58   Temp 97.8 °F (36.6 °C)   Resp 18   Ht 5' 11\" (1.803 m)   Wt 160 lb (72.6 kg)   SpO2 97%   BMI 22.32 kg/m²   PHYSICAL:  CONSTITUTIONAL:  awake, alert, cooperative, no apparent distress, and appears stated age. MCCULLOUGH.   FC    DATA:  CBC:   Lab Results   Component Value Date    WBC 7.5 06/02/2019    RBC 4.80 06/02/2019    HGB 13.8 06/02/2019    HCT 43.6 06/02/2019    MCV 90.8 06/02/2019    MCH 28.8 06/02/2019    MCHC 31.7 06/02/2019    RDW 14.9 06/02/2019     06/02/2019    MPV 9.4 06/02/2019     BMP:    Lab Results   Component Value Date     06/02/2019    K 4.1 06/02/2019     06/02/2019    CO2 22 06/02/2019    BUN 11 06/02/2019    LABALBU 4.1 06/01/2019    CREATININE 0.6 06/02/2019    CALCIUM 10.1 06/02/2019    GFRAA >60 06/02/2019    LABGLOM >60 06/02/2019    GLUCOSE 93 06/02/2019     PT/INR:    Lab Results   Component Value Date    PROTIME 12.4 06/01/2019    INR 1.1 06/01/2019     PTT:    Lab Results   Component Value Date    APTT 35.4 06/01/2019   [APTT}    Current Inpatient Medications  Current Facility-Administered Medications: levETIRAcetam (KEPPRA) 100 MG/ML solution 1,500 mg, 1,500 mg, Oral, BID  ferrous sulfate 300 (60 Fe) MG/5ML syrup 300 mg, 300 mg, Per G Tube, Daily  nystatin (MYCOSTATIN) 023601 UNIT/ML suspension 500,000 Units, 500,000 Units, Oral, 4x Daily  sertraline (ZOLOFT) tablet 25 mg, 25 mg, PEG Tube, QPM  perflutren lipid microspheres (DEFINITY) injection 1.65 mg, 1.5 mL, Intravenous, ONCE PRN  sodium chloride flush 0.9 % injection 10 mL, 10 mL, Intravenous, 2 times per day  sodium chloride flush 0.9 % injection 10 mL, 10 mL, Intravenous, PRN  magnesium hydroxide (MILK OF MAGNESIA) 400 MG/5ML suspension 30 mL, 30 mL, Oral, Daily PRN  ondansetron (ZOFRAN) injection 4 mg, 4 mg, Intravenous, Q6H PRN    ASSESSMENT:   · 39year old male with interhemispheric SDH - stable    PLAN:  · No anticoagulants, history of xarelto use  · F/u in clinic in 4 weeks with head CT  · Serial neuro exams      Electronically signed by JOANA Sewell on 6/5/2019 at 8:04 AM     I have interviewed and examined the patient and agree with above. No intervention.   Will plan for cranioplasty    Rebel Helms

## 2019-06-05 NOTE — PROGRESS NOTES
Physical Therapy  Treatment Note    Name: Deepthi Can  : 1975  MRN: [id number]    Date of Service: 2019    Evaluating PT: Adelaide Almanza, PT, DPT [id number]    Room #:  1398/6187-W    Diagnosis: SDH  Precautions: Fall risk, L craniectomy, helmet, tone, L UE and R LE weakness, trach, PEG, incontinent, TSM, alarm  PMHx: Testicular CA, TBI, hx of craniotomy; right FT cranioplasty and superficial sharp debridement of skin incision (18), hx recent craniectomy    Pt recently discharged to [place] [address] from [address] [place] [address]. Pt was at [place] [address] for 1 day where pt had fall OOB. Per chart, pt lives with wife in a 2 story house with 1 stair(s) and 0 rail(s) to enter. Bed is on the second floor and bath is on the second floor. Per wife, pt was nonambulatory at [facility] and performed stand pivot transfers. Pt stood in [facility] with therapists. Initial Evaluation  Date: 19 Treatment Date:19 Short Term/ Long Term   Goals   AM-PAC 6 Clicks     Was pt agreeable to Eval/treatment? Yes yes    Does pt have pain? No current complaints/indications of pain No c/o    Bed Mobility  Rolling: Max A  Supine to sit: Dependent x2  Sit to supine: Dependent x2  Scooting: Dependent x2 toward HOB maxA rolling   Supine to sit Dep +2  Sit to supine Dep +2  Scooting : Dep +2 Rolling: Min A  Supine to sit: Max A  Sit to supine: Max A  Scooting: Max A   Transfers Sit to stand: Max A x2 (partial stand)  Stand to sit: Max A x2  Stand pivot: NT Sit to stand MaxA +2  Stand to sit MaxA +2 Sit to stand: Max A  Stand to sit: Max A  Stand pivot: Max A with [employer]   Ambulation   NT N/T Sidestep 3 feet to L and  R with [employer] with Max A   Stair negotiation: NT  NA   ROM B UE: Refer to OT note  B LE: B LE flexor tone noted     Strength B UE: Refer to OT note  B LE: L quad 3+/5, R quad 0/5     Balance Sitting EOB: Max A  Dynamic standing: NT Sitting EOB MaxA   Standing MaxA +2 Sitting EOB: Min A  Dynamic standing:  Max A with [employer]     Pt is A & O x: 2 grossly to person and year. Pt appears confused to situation. Sensation: Unable to accurately assess due to pt's cognitive status. Coordination: NT  Edema: Unremarkable. Patient education  Pt educated on PT role in hospital setting. Patient response to education:   Pt verbalized understanding Pt demonstrated skill Pt requires further education in this area   Yes NA No     Comments:  Pt was supine in bed with wife present upon room entry, agreeable to PT tx. Pt is confused and delayed but pleasant and willing to participate. Helmet was comfortably donned. Pt required assistance for trunk and LEs for bed mobility. Pt required MaxA for sitting balance. Pt sat EOB for 12 minutes. Pt fatigues quickly. Cues to improve sitting and standing posture. Pt stood MaxA +2. Pt returned to bed and repositioned. Bed alarm reapplied. Pt given call light.         Time in: 14:00  Time out: 14:33    Candida Gleason [id number]

## 2019-06-05 NOTE — PROGRESS NOTES
Hospitalist Progress Note      PCP: Becca Colmenares MD    Date of Admission: 6/1/2019    Chief Complaint: fall     Hospital Course: *Sustained a SDH. Also has a prior history of ICH from a fall in 2015.   Found to have bradycardia, TSH was normal,  No further neurosurgery eval  To restart keppra    **     Subjective: **asleep, wife present , questions answered       Medications:  Reviewed    Infusion Medications   Scheduled Medications    levETIRAcetam  1,500 mg Oral BID    ferrous sulfate  300 mg Per G Tube Daily    nystatin  500,000 Units Oral 4x Daily    sertraline  25 mg PEG Tube QPM    sodium chloride flush  10 mL Intravenous 2 times per day     PRN Meds: perflutren lipid microspheres, sodium chloride flush, magnesium hydroxide, ondansetron      Intake/Output Summary (Last 24 hours) at 6/5/2019 1504  Last data filed at 6/5/2019 0426  Gross per 24 hour   Intake 1363 ml   Output --   Net 1363 ml       Exam:    /78   Pulse 58   Temp 97.8 °F (36.6 °C)   Resp 18   Ht 5' 11\" (1.803 m)   Wt 160 lb (72.6 kg)   SpO2 97%   BMI 22.32 kg/m²         Gen: *well developed  HEENT: NC/AT, moist mucous membranes,  exudate deformity of left side of skull   Neck: supple, trachea midline, no anterior cervical or SC LAD  Heart:  Normal s1/s2, RRR, no murmurs, gallops, or rubs. Lungs:  *cta  bilaterally,   Abd: bowel sounds present, soft, nontender, nondistended, no masses  Extrem:  No clubbing, cyanosis,  No ** edema  Skin: no rashes or lesions  Psych: A & O x3  Neuro: grossly intact, moves all four extremities.    Capillary Refill: Brisk,< 3 seconds   Peripheral Pulses: +2 palpable, equal bilaterally            Labs:   No results for input(s): WBC, HGB, HCT, PLT in the last 72 hours. No results for input(s): NA, K, CL, CO2, BUN, CREATININE, CALCIUM, PHOS in the last 72 hours. Invalid input(s): MAGNES  No results for input(s): AST, ALT, BILIDIR, BILITOT, ALKPHOS in the last 72 hours.   No results for input(s): INR in the last 72 hours. No results for input(s): [place] Person in the last 72 hours. No results for input(s): AST, ALT, ALB, BILIDIR, BILITOT, ALKPHOS in the last 72 hours. No results for input(s): LACTA in the last 72 hours.   No results found for: Colma Brimson  No results found for: AMMONIA    Assessment:    Active Hospital Problems    Diagnosis Date Noted    Severe protein-calorie malnutrition ([facility] [E43] 06/02/2019    Bradycardia [R00.1]     Subdural hemorrhage (HCC) [I62.00] 06/01/2019   h/ o testicular cancer(s/p chemo)   h/o DVt, has JACOBY filter   seizures           Plan:  Ras HUNTER  Electronically signed by Lillian Joaquin DO on 6/5/2019 at 3:04 PM

## 2019-06-06 NOTE — DISCHARGE SUMMARY
Hospital Medicine Discharge Summary    Patient: Claudia Alicia     Gender: male  : 1975   Age: 39 y.o. MRN: [id number]    Code Status:  Full code    Primary Care Provider: Kyree Sousa MD    Admit Date: 2019   Discharge Date: 2019      Admitting Physician: James Jesus MD  Discharge Physician: Giana Muñoz DO     Discharge Diagnoses: Active Hospital Problems    Diagnosis Date Noted    Severe protein-calorie malnutrition ([facility] [E43] 2019    Bradycardia [R00.1]     Subdural hemorrhage (HCC) [I62.00] 2019   h/ o testicular cancer(s/p chemo)   h/o DVt, has JACOBY filter  [facility] Course:   Pt fell, *Sustained a SDH. Also has a prior history of ICH from a fall in .   Found to have bradycardia, TSH was normal,  No further neurosurgery  Or cardiac eval . To restart keppra        Disposition:AR    Exam:     /78   Pulse 58   Temp 97.8 °F (36.6 °C)   Resp 18   Ht 5' 11\" (1.803 m)   Wt 160 lb (72.6 kg)   SpO2 97%   BMI 22.32 kg/m²   Gen: *well developed  HEENT: NC/AT, moist mucous membranes,  exudate deformity of left side of skull   Neck: supple, trachea midline, no anterior cervical or SC LAD  Heart:  Normal s1/s2, RRR, no murmurs, gallops, or rubs. Lungs:  *cta  bilaterally,   Abd: bowel sounds present, soft, nontender, nondistended, no masses  Extrem:  No clubbing, cyanosis,  No ** edema  Skin: no rashes or lesions  Psych: A & O x3  Neuro: grossly intact, moves all four extremities.    Capillary Refill: Brisk,< 3 seconds   Peripheral Pulses: +2 palpable, equal bilaterally             Consults:     IP CONSULT TO DIETITIAN  IP CONSULT TO NEUROSURGERY  IP CONSULT TO CARDIOLOGY    Labs:  For convenience and continuity at follow-up the following most recent labs are provided:    Lab Results   Component Value Date    WBC 7.5 2019    HGB 13.8 2019    HCT 43.6 2019    MCV 90.8 2019     2019     2019 K 4.1 06/02/2019     06/02/2019    CO2 22 06/02/2019    BUN 11 06/02/2019    CREATININE 0.6 06/02/2019    CALCIUM 10.1 06/02/2019    PHOS 3.2 04/29/2019    ALKPHOS 142 06/01/2019    ALT 39 06/01/2019    AST 15 06/01/2019    BILITOT 0.3 06/01/2019    BILIDIR <0.2 02/02/2019    LABALBU 4.1 06/01/2019    LDLCALC 107 01/30/2017    TRIG 232 01/30/2017     Lab Results   Component Value Date    INR 1.1 06/01/2019    INR 1.2 02/02/2019    INR 1.2 02/02/2019       Radiology:  No orders to display       Discharge Medications:   Discharge Medication List as of 6/5/2019  3:14 PM        Discharge Medication List as of 6/5/2019  3:14 PM      CONTINUE these medications which have CHANGED    Details   levETIRAcetam (KEPPRA) 100 MG/ML solution Take 15 mLs by mouth 2 times daily, R-3D to [facility]           Discharge Medication List as of 6/5/2019  3:14 PM      CONTINUE these medications which have NOT CHANGED    Details   ferrous sulfate 300 (60 Fe) MG/5ML syrup 300 mg by Per G Tube route dailyHistorical Med      melatonin 3 MG TABS tablet 3 mg by PEG Tube route nightly as neededHistorical Med      nystatin (MYCOSTATIN) 778541 UNIT/ML suspension Take 500,000 Units by mouth 4 times daily, Oral, 4 TIMES DAILY, Historical Med      sertraline (ZOLOFT) 25 MG tablet 25 mg by PEG Tube route every eveningHistorical Med           Discharge Medication List as of 6/5/2019  3:14 PM      STOP taking these medications       chlorhexidine (PERIDEX) 0.12 % solution Comments:   Reason for Stopping:         lacosamide (VIMPAT) 50 MG TABS tablet Comments:   Reason for Stopping:         enoxaparin (LOVENOX) 100 MG/ML injection Comments:   Reason for Stopping:         rivaroxaban (XARELTO) 20 MG TABS tablet Comments:   Reason for Stopping:         traZODone (DESYREL) 100 MG tablet Comments:   Reason for Stopping:         HYDROcodone-acetaminophen ([address]) 5-325 MG per tablet Comments:   Reason for Stopping:         levETIRAcetam (KEPPRA) 750 MG tablet Comments:   Reason for Stopping:         lacosamide (VIMPAT) 150 MG TABS tablet Comments:   Reason for Stopping:         LORazepam (ATIVAN) 0.5 MG tablet Comments:   Reason for Stopping:         phenytoin (DILANTIN) 100 MG ER capsule Comments:   Reason for Stopping: Follow-up appointments:  1 week    Provider Follow-up:    pmd    Condition at Discharge:  Stable    The patient was seen and examined on day of discharge and this discharge summary is in conjunction with any daily progress note from day of discharge. Time Spent on discharge is 45 minutes  in the examination, evaluation, counseling and review of medications and discharge plan. Signed:    Kyara Romeo DO   6/6/2019      Thank you Yessi Alba MD for the opportunity to be involved in this patient's care.  If you have any questions or concerns please feel free to contact me at [phone]

## 2019-06-11 DIAGNOSIS — S06.9XAA: Primary | ICD-10-CM

## 2019-06-11 DIAGNOSIS — S02.0XXA: Primary | ICD-10-CM

## 2019-06-13 ENCOUNTER — PREP FOR PROCEDURE (OUTPATIENT)
Dept: NEUROSURGERY | Age: 44
End: 2019-06-13

## 2019-06-13 DIAGNOSIS — M95.2 SKULL DEFECT: Primary | ICD-10-CM

## 2019-06-13 RX ORDER — SODIUM CHLORIDE 0.9 % (FLUSH) 0.9 %
10 SYRINGE (ML) INJECTION EVERY 12 HOURS SCHEDULED
Status: CANCELLED | OUTPATIENT
Start: 2019-06-13

## 2019-06-13 RX ORDER — SODIUM CHLORIDE 9 MG/ML
INJECTION, SOLUTION INTRAVENOUS CONTINUOUS
Status: CANCELLED | OUTPATIENT
Start: 2019-06-13

## 2019-06-13 RX ORDER — SODIUM CHLORIDE 0.9 % (FLUSH) 0.9 %
10 SYRINGE (ML) INJECTION PRN
Status: CANCELLED | OUTPATIENT
Start: 2019-06-13

## 2019-06-19 ENCOUNTER — TELEPHONE (OUTPATIENT)
Dept: CARDIOLOGY CLINIC | Age: 44
End: 2019-06-19

## 2019-06-20 ENCOUNTER — HOSPITAL ENCOUNTER (OUTPATIENT)
Dept: CT IMAGING | Age: 44
Discharge: HOME | End: 2019-06-22
Payer: COMMERCIAL

## 2019-06-20 DIAGNOSIS — S02.0XXA: ICD-10-CM

## 2019-06-20 DIAGNOSIS — S06.9XAA: ICD-10-CM

## 2019-06-20 PROCEDURE — 70450 CT HEAD/BRAIN W/O DYE: CPT

## 2019-06-20 NOTE — PROGRESS NOTES
[address] [facility] [address] REQUESTING ORDERS FOR OR. PATIENT IS NOT IN ISOLATION. HE DOES HAVE A TRACH BUT IT IS CAPPED. NO WOUNDS. I TOLD HER HE NEEDS TO BE AT HOSPITAL 0800 AM. TAKE SEIZURE MED AM OF OR AND USE NEBULIZER OR INHALER IF NEEDED. I FAXED OVER LAB, EKG AND CHEST XRAY ORDERS PER DAVID MCCARTNEY'S PRE-OP ORDERS.

## 2019-06-24 RX ORDER — ALBUTEROL SULFATE 2.5 MG/3ML
2.5 SOLUTION RESPIRATORY (INHALATION) 4 TIMES DAILY PRN
COMMUNITY
Start: 2019-04-29 | End: 2020-10-29

## 2019-06-24 NOTE — PROGRESS NOTES
Patient Access Representative. Please bring your photo ID and insurance card. A nurse will greet you in accordance to the time you are needed in the pre-op area to prepare you for surgery. Please do not be discouraged if you are not greeted in the order you arrive as there are many variables that are involved in patient preparation. Your patience is greatly appreciated as you wait for your nurse. Please bring in items such as: books, magazines, newspapers, electronics, or any other items  to occupy your time in the waiting area. [x]  Delays may occur with surgery and staff will make a sincere effort to keep you informed of delays. If any delays occur with your procedure, we apologize ahead of time for your inconvenience as we recognize the value of your time.

## 2019-06-26 RX ORDER — AMOXICILLIN 500 MG/1
500 CAPSULE ORAL 3 TIMES DAILY
COMMUNITY
End: 2019-08-20 | Stop reason: ALTCHOICE

## 2019-06-28 ENCOUNTER — HOSPITAL ENCOUNTER (INPATIENT)
Age: 44
LOS: 4 days | Discharge: NURSING, REHAB OR LONG-TERM CARE | DRG: 020 | End: 2019-07-02
Attending: NEUROLOGICAL SURGERY | Admitting: NEUROLOGICAL SURGERY
Payer: COMMERCIAL

## 2019-06-28 ENCOUNTER — ANESTHESIA EVENT (OUTPATIENT)
Dept: OPERATING ROOM | Age: 44
DRG: 020 | End: 2019-06-28
Payer: COMMERCIAL

## 2019-06-28 ENCOUNTER — ANESTHESIA (OUTPATIENT)
Dept: OPERATING ROOM | Age: 44
DRG: 020 | End: 2019-06-28
Payer: COMMERCIAL

## 2019-06-28 VITALS
DIASTOLIC BLOOD PRESSURE: 91 MMHG | RESPIRATION RATE: 3 BRPM | TEMPERATURE: 97 F | SYSTOLIC BLOOD PRESSURE: 125 MMHG | OXYGEN SATURATION: 99 %

## 2019-06-28 DIAGNOSIS — Z01.812 PRE-OPERATIVE LABORATORY EXAMINATION: Primary | ICD-10-CM

## 2019-06-28 DIAGNOSIS — M95.2 SKULL DEFECT: ICD-10-CM

## 2019-06-28 LAB
ABO/RH: NORMAL
ANTIBODY SCREEN: NORMAL

## 2019-06-28 PROCEDURE — 6370000000 HC RX 637 (ALT 250 FOR IP): Performed by: NEUROLOGICAL SURGERY

## 2019-06-28 PROCEDURE — 3700000000 HC ANESTHESIA ATTENDED CARE: Performed by: NEUROLOGICAL SURGERY

## 2019-06-28 PROCEDURE — 94640 AIRWAY INHALATION TREATMENT: CPT

## 2019-06-28 PROCEDURE — 6360000002 HC RX W HCPCS: Performed by: PHYSICIAN ASSISTANT

## 2019-06-28 PROCEDURE — 7100000001 HC PACU RECOVERY - ADDTL 15 MIN

## 2019-06-28 PROCEDURE — 36415 COLL VENOUS BLD VENIPUNCTURE: CPT

## 2019-06-28 PROCEDURE — 6360000002 HC RX W HCPCS: Performed by: NEUROLOGICAL SURGERY

## 2019-06-28 PROCEDURE — 2709999900 HC NON-CHARGEABLE SUPPLY: Performed by: NEUROLOGICAL SURGERY

## 2019-06-28 PROCEDURE — 96523 IRRIG DRUG DELIVERY DEVICE: CPT

## 2019-06-28 PROCEDURE — 86850 RBC ANTIBODY SCREEN: CPT

## 2019-06-28 PROCEDURE — 2580000003 HC RX 258: Performed by: NEUROLOGICAL SURGERY

## 2019-06-28 PROCEDURE — 3600000015 HC SURGERY LEVEL 5 ADDTL 15MIN: Performed by: NEUROLOGICAL SURGERY

## 2019-06-28 PROCEDURE — 0NQ60ZZ REPAIR LEFT TEMPORAL BONE, OPEN APPROACH: ICD-10-PCS | Performed by: NEUROLOGICAL SURGERY

## 2019-06-28 PROCEDURE — 62141 CRNOP SKULL DEFECT>5 CM DIAM: CPT | Performed by: NEUROLOGICAL SURGERY

## 2019-06-28 PROCEDURE — 94664 DEMO&/EVAL PT USE INHALER: CPT

## 2019-06-28 PROCEDURE — 86900 BLOOD TYPING SEROLOGIC ABO: CPT

## 2019-06-28 PROCEDURE — 62141 CRNOP SKULL DEFECT>5 CM DIAM: CPT | Performed by: PHYSICIAN ASSISTANT

## 2019-06-28 PROCEDURE — 2580000003 HC RX 258: Performed by: PHYSICIAN ASSISTANT

## 2019-06-28 PROCEDURE — C1713 ANCHOR/SCREW BN/BN,TIS/BN: HCPCS | Performed by: NEUROLOGICAL SURGERY

## 2019-06-28 PROCEDURE — 86901 BLOOD TYPING SEROLOGIC RH(D): CPT

## 2019-06-28 PROCEDURE — 6360000002 HC RX W HCPCS

## 2019-06-28 PROCEDURE — 2500000003 HC RX 250 WO HCPCS

## 2019-06-28 PROCEDURE — 3700000001 HC ADD 15 MINUTES (ANESTHESIA): Performed by: NEUROLOGICAL SURGERY

## 2019-06-28 PROCEDURE — 87081 CULTURE SCREEN ONLY: CPT

## 2019-06-28 PROCEDURE — 7100000000 HC PACU RECOVERY - FIRST 15 MIN

## 2019-06-28 PROCEDURE — 2000000000 HC ICU R&B

## 2019-06-28 PROCEDURE — 3600000005 HC SURGERY LEVEL 5 BASE: Performed by: NEUROLOGICAL SURGERY

## 2019-06-28 DEVICE — PLATE, RIGID
Type: IMPLANTABLE DEVICE | Site: CRANIAL | Status: FUNCTIONAL
Brand: UNIVERSAL NEURO 2, QUIKFLAP

## 2019-06-28 DEVICE — COLLAGEN DURAL REGENERATION MEMBRANE 4IN X 5IN (10.0CM X 12.5CM)
Type: IMPLANTABLE DEVICE | Site: CRANIAL | Status: FUNCTIONAL
Brand: DURAMATRIX-ONLAY PLUS

## 2019-06-28 DEVICE — SCREW UN3 SLFTP 1.5X4MM: Type: IMPLANTABLE DEVICE | Site: CRANIAL | Status: FUNCTIONAL

## 2019-06-28 RX ORDER — SODIUM PHOSPHATE, DIBASIC AND SODIUM PHOSPHATE, MONOBASIC 7; 19 G/133ML; G/133ML
1 ENEMA RECTAL DAILY PRN
Status: DISCONTINUED | OUTPATIENT
Start: 2019-06-28 | End: 2019-07-02 | Stop reason: HOSPADM

## 2019-06-28 RX ORDER — SODIUM CHLORIDE 0.9 % (FLUSH) 0.9 %
10 SYRINGE (ML) INJECTION EVERY 12 HOURS SCHEDULED
Status: DISCONTINUED | OUTPATIENT
Start: 2019-06-28 | End: 2019-06-28 | Stop reason: HOSPADM

## 2019-06-28 RX ORDER — SODIUM CHLORIDE 0.9 % (FLUSH) 0.9 %
10 SYRINGE (ML) INJECTION PRN
Status: DISCONTINUED | OUTPATIENT
Start: 2019-06-28 | End: 2019-06-28 | Stop reason: HOSPADM

## 2019-06-28 RX ORDER — MORPHINE SULFATE 2 MG/ML
1 INJECTION, SOLUTION INTRAMUSCULAR; INTRAVENOUS EVERY 5 MIN PRN
Status: DISCONTINUED | OUTPATIENT
Start: 2019-06-28 | End: 2019-06-28 | Stop reason: HOSPADM

## 2019-06-28 RX ORDER — ONDANSETRON 2 MG/ML
INJECTION INTRAMUSCULAR; INTRAVENOUS PRN
Status: DISCONTINUED | OUTPATIENT
Start: 2019-06-28 | End: 2019-06-28 | Stop reason: SDUPTHER

## 2019-06-28 RX ORDER — MORPHINE SULFATE 2 MG/ML
2 INJECTION, SOLUTION INTRAMUSCULAR; INTRAVENOUS EVERY 5 MIN PRN
Status: DISCONTINUED | OUTPATIENT
Start: 2019-06-28 | End: 2019-06-28 | Stop reason: HOSPADM

## 2019-06-28 RX ORDER — LANOLIN ALCOHOL/MO/W.PET/CERES
3 CREAM (GRAM) TOPICAL NIGHTLY PRN
Status: DISCONTINUED | OUTPATIENT
Start: 2019-06-28 | End: 2019-07-02 | Stop reason: HOSPADM

## 2019-06-28 RX ORDER — DOCUSATE SODIUM 50 MG/5ML
100 LIQUID ORAL 2 TIMES DAILY
Status: DISCONTINUED | OUTPATIENT
Start: 2019-06-28 | End: 2019-07-02 | Stop reason: HOSPADM

## 2019-06-28 RX ORDER — DIAPER,BRIEF,INFANT-TODD,DISP
EACH MISCELLANEOUS PRN
Status: DISCONTINUED | OUTPATIENT
Start: 2019-06-28 | End: 2019-06-28 | Stop reason: ALTCHOICE

## 2019-06-28 RX ORDER — SENNA AND DOCUSATE SODIUM 50; 8.6 MG/1; MG/1
1 TABLET, FILM COATED ORAL 2 TIMES DAILY
Status: DISCONTINUED | OUTPATIENT
Start: 2019-06-28 | End: 2019-07-02 | Stop reason: HOSPADM

## 2019-06-28 RX ORDER — ALBUTEROL SULFATE 2.5 MG/3ML
2.5 SOLUTION RESPIRATORY (INHALATION) 4 TIMES DAILY
Status: DISCONTINUED | OUTPATIENT
Start: 2019-06-28 | End: 2019-07-02 | Stop reason: HOSPADM

## 2019-06-28 RX ORDER — DOCUSATE SODIUM 100 MG/1
100 CAPSULE, LIQUID FILLED ORAL 2 TIMES DAILY
Status: DISCONTINUED | OUTPATIENT
Start: 2019-06-28 | End: 2019-06-28 | Stop reason: SDUPTHER

## 2019-06-28 RX ORDER — SODIUM CHLORIDE 0.9 % (FLUSH) 0.9 %
10 SYRINGE (ML) INJECTION PRN
Status: DISCONTINUED | OUTPATIENT
Start: 2019-06-28 | End: 2019-07-02 | Stop reason: HOSPADM

## 2019-06-28 RX ORDER — FERROUS SULFATE 300 MG/5ML
300 LIQUID (ML) ORAL DAILY
Status: DISCONTINUED | OUTPATIENT
Start: 2019-06-28 | End: 2019-07-02 | Stop reason: HOSPADM

## 2019-06-28 RX ORDER — NEOSTIGMINE METHYLSULFATE 1 MG/ML
INJECTION, SOLUTION INTRAVENOUS PRN
Status: DISCONTINUED | OUTPATIENT
Start: 2019-06-28 | End: 2019-06-28 | Stop reason: SDUPTHER

## 2019-06-28 RX ORDER — MORPHINE SULFATE 4 MG/ML
4 INJECTION, SOLUTION INTRAMUSCULAR; INTRAVENOUS
Status: DISCONTINUED | OUTPATIENT
Start: 2019-06-28 | End: 2019-07-02 | Stop reason: HOSPADM

## 2019-06-28 RX ORDER — MORPHINE SULFATE 2 MG/ML
2 INJECTION, SOLUTION INTRAMUSCULAR; INTRAVENOUS
Status: DISCONTINUED | OUTPATIENT
Start: 2019-06-28 | End: 2019-07-02 | Stop reason: HOSPADM

## 2019-06-28 RX ORDER — ONDANSETRON 2 MG/ML
4 INJECTION INTRAMUSCULAR; INTRAVENOUS EVERY 6 HOURS PRN
Status: DISCONTINUED | OUTPATIENT
Start: 2019-06-28 | End: 2019-07-02 | Stop reason: HOSPADM

## 2019-06-28 RX ORDER — OXYCODONE HYDROCHLORIDE 5 MG/1
5 TABLET ORAL EVERY 4 HOURS PRN
Status: DISCONTINUED | OUTPATIENT
Start: 2019-06-28 | End: 2019-07-02 | Stop reason: HOSPADM

## 2019-06-28 RX ORDER — SODIUM CHLORIDE 0.9 % (FLUSH) 0.9 %
10 SYRINGE (ML) INJECTION EVERY 12 HOURS SCHEDULED
Status: DISCONTINUED | OUTPATIENT
Start: 2019-06-28 | End: 2019-07-02 | Stop reason: HOSPADM

## 2019-06-28 RX ORDER — PROPOFOL 10 MG/ML
INJECTION, EMULSION INTRAVENOUS PRN
Status: DISCONTINUED | OUTPATIENT
Start: 2019-06-28 | End: 2019-06-28 | Stop reason: SDUPTHER

## 2019-06-28 RX ORDER — DEXAMETHASONE SODIUM PHOSPHATE 10 MG/ML
INJECTION INTRAMUSCULAR; INTRAVENOUS PRN
Status: DISCONTINUED | OUTPATIENT
Start: 2019-06-28 | End: 2019-06-28 | Stop reason: SDUPTHER

## 2019-06-28 RX ORDER — FENTANYL CITRATE 50 UG/ML
INJECTION, SOLUTION INTRAMUSCULAR; INTRAVENOUS PRN
Status: DISCONTINUED | OUTPATIENT
Start: 2019-06-28 | End: 2019-06-28 | Stop reason: SDUPTHER

## 2019-06-28 RX ORDER — ROCURONIUM BROMIDE 10 MG/ML
INJECTION, SOLUTION INTRAVENOUS PRN
Status: DISCONTINUED | OUTPATIENT
Start: 2019-06-28 | End: 2019-06-28 | Stop reason: SDUPTHER

## 2019-06-28 RX ORDER — GLYCOPYRROLATE 1 MG/5 ML
SYRINGE (ML) INTRAVENOUS PRN
Status: DISCONTINUED | OUTPATIENT
Start: 2019-06-28 | End: 2019-06-28 | Stop reason: SDUPTHER

## 2019-06-28 RX ORDER — SODIUM CHLORIDE 9 MG/ML
INJECTION, SOLUTION INTRAVENOUS CONTINUOUS
Status: DISCONTINUED | OUTPATIENT
Start: 2019-06-28 | End: 2019-06-28

## 2019-06-28 RX ORDER — AMOXICILLIN 250 MG/1
500 CAPSULE ORAL 3 TIMES DAILY
Status: DISCONTINUED | OUTPATIENT
Start: 2019-06-28 | End: 2019-06-28

## 2019-06-28 RX ORDER — LEVETIRACETAM 100 MG/ML
1500 SOLUTION ORAL 2 TIMES DAILY
Status: DISCONTINUED | OUTPATIENT
Start: 2019-06-28 | End: 2019-07-02 | Stop reason: HOSPADM

## 2019-06-28 RX ORDER — OXYCODONE HYDROCHLORIDE 5 MG/1
10 TABLET ORAL EVERY 4 HOURS PRN
Status: DISCONTINUED | OUTPATIENT
Start: 2019-06-28 | End: 2019-07-02 | Stop reason: HOSPADM

## 2019-06-28 RX ADMIN — SERTRALINE 25 MG: 50 TABLET, FILM COATED ORAL at 18:03

## 2019-06-28 RX ADMIN — Medication 0.6 MG: at 11:06

## 2019-06-28 RX ADMIN — PROPOFOL 70 MG: 10 INJECTION, EMULSION INTRAVENOUS at 09:39

## 2019-06-28 RX ADMIN — ROCURONIUM BROMIDE 20 MG: 10 INJECTION, SOLUTION INTRAVENOUS at 09:51

## 2019-06-28 RX ADMIN — DEXTROSE MONOHYDRATE 2 G: 50 INJECTION, SOLUTION INTRAVENOUS at 18:02

## 2019-06-28 RX ADMIN — SENNOSIDES,DOCUSATE SODIUM 1 TABLET: 8.6; 5 TABLET, FILM COATED ORAL at 21:34

## 2019-06-28 RX ADMIN — SODIUM CHLORIDE: 9 INJECTION, SOLUTION INTRAVENOUS at 09:23

## 2019-06-28 RX ADMIN — Medication 3 MG: at 11:06

## 2019-06-28 RX ADMIN — FENTANYL CITRATE 50 MCG: 50 INJECTION, SOLUTION INTRAMUSCULAR; INTRAVENOUS at 10:20

## 2019-06-28 RX ADMIN — ROCURONIUM BROMIDE 25 MG: 10 INJECTION, SOLUTION INTRAVENOUS at 09:39

## 2019-06-28 RX ADMIN — Medication 0.2 MG: at 09:56

## 2019-06-28 RX ADMIN — FENTANYL CITRATE 50 MCG: 50 INJECTION, SOLUTION INTRAMUSCULAR; INTRAVENOUS at 09:39

## 2019-06-28 RX ADMIN — NYSTATIN 500000 UNITS: 100000 SUSPENSION ORAL at 21:34

## 2019-06-28 RX ADMIN — ONDANSETRON HYDROCHLORIDE 4 MG: 2 INJECTION, SOLUTION INTRAMUSCULAR; INTRAVENOUS at 09:43

## 2019-06-28 RX ADMIN — DEXAMETHASONE SODIUM PHOSPHATE 10 MG: 10 INJECTION INTRAMUSCULAR; INTRAVENOUS at 09:43

## 2019-06-28 RX ADMIN — LEVETIRACETAM 1500 MG: 100 SOLUTION ORAL at 21:34

## 2019-06-28 RX ADMIN — ALBUTEROL SULFATE 2.5 MG: 2.5 SOLUTION RESPIRATORY (INHALATION) at 20:12

## 2019-06-28 RX ADMIN — Medication 2 G: at 09:41

## 2019-06-28 RX ADMIN — ALBUTEROL SULFATE 2.5 MG: 2.5 SOLUTION RESPIRATORY (INHALATION) at 16:35

## 2019-06-28 RX ADMIN — FENTANYL CITRATE 50 MCG: 50 INJECTION, SOLUTION INTRAMUSCULAR; INTRAVENOUS at 10:07

## 2019-06-28 RX ADMIN — DOCUSATE SODIUM 100 MG: 50 LIQUID ORAL at 21:35

## 2019-06-28 ASSESSMENT — PULMONARY FUNCTION TESTS
PIF_VALUE: 16
PIF_VALUE: 2
PIF_VALUE: 5
PIF_VALUE: 16
PIF_VALUE: 15
PIF_VALUE: 16
PIF_VALUE: 0
PIF_VALUE: 4
PIF_VALUE: 3
PIF_VALUE: 15
PIF_VALUE: 16
PIF_VALUE: 10
PIF_VALUE: 15
PIF_VALUE: 3
PIF_VALUE: 17
PIF_VALUE: 16
PIF_VALUE: 15
PIF_VALUE: 16
PIF_VALUE: 16
PIF_VALUE: 3
PIF_VALUE: 0
PIF_VALUE: 15
PIF_VALUE: 16
PIF_VALUE: 16
PIF_VALUE: 15
PIF_VALUE: 16
PIF_VALUE: 16
PIF_VALUE: 0
PIF_VALUE: 16
PIF_VALUE: 0
PIF_VALUE: 16
PIF_VALUE: 15
PIF_VALUE: 16
PIF_VALUE: 2
PIF_VALUE: 2
PIF_VALUE: 16
PIF_VALUE: 3
PIF_VALUE: 0
PIF_VALUE: 15
PIF_VALUE: 1
PIF_VALUE: 16
PIF_VALUE: 15
PIF_VALUE: 15
PIF_VALUE: 16
PIF_VALUE: 16
PIF_VALUE: 1
PIF_VALUE: 16
PIF_VALUE: 15
PIF_VALUE: 22
PIF_VALUE: 16
PIF_VALUE: 0
PIF_VALUE: 16
PIF_VALUE: 16
PIF_VALUE: 17
PIF_VALUE: 16
PIF_VALUE: 0
PIF_VALUE: 16
PIF_VALUE: 16
PIF_VALUE: 17
PIF_VALUE: 0
PIF_VALUE: 15
PIF_VALUE: 16
PIF_VALUE: 16
PIF_VALUE: 15
PIF_VALUE: 16
PIF_VALUE: 15
PIF_VALUE: 16
PIF_VALUE: 17
PIF_VALUE: 16
PIF_VALUE: 0
PIF_VALUE: 15
PIF_VALUE: 16
PIF_VALUE: 1
PIF_VALUE: 22
PIF_VALUE: 0
PIF_VALUE: 16
PIF_VALUE: 1
PIF_VALUE: 16
PIF_VALUE: 0
PIF_VALUE: 15
PIF_VALUE: 17
PIF_VALUE: 16
PIF_VALUE: 16
PIF_VALUE: 0
PIF_VALUE: 16
PIF_VALUE: 15
PIF_VALUE: 16
PIF_VALUE: 1
PIF_VALUE: 16
PIF_VALUE: 16
PIF_VALUE: 2
PIF_VALUE: 16
PIF_VALUE: 16
PIF_VALUE: 0
PIF_VALUE: 16
PIF_VALUE: 10
PIF_VALUE: 16

## 2019-06-28 ASSESSMENT — PAIN SCALES - GENERAL: PAINLEVEL_OUTOF10: 0

## 2019-06-28 ASSESSMENT — PAIN - FUNCTIONAL ASSESSMENT: PAIN_FUNCTIONAL_ASSESSMENT: 0-10

## 2019-06-28 NOTE — BRIEF OP NOTE
Brief Postoperative Note  ______________________________________________________________    Patient: Leon Munguia  YOB: 1975  MRN: [id number]  Date of Procedure: 6/28/2019    Pre-Op Diagnosis: LEFT SKULL DEFECT  GREATER THAN 5 CM    Post-Op Diagnosis: Same       Procedure(s):  LEFT CRANIOPLASTY WITH LETA IMPLANT    Anesthesia: General    Surgeon(s):  Flavia Ibanez MD    Assistant: Laura Chowdary    Estimated Blood Loss (mL): 075     Complications: None    Specimens:   * No specimens in log *    Implants:  Implant Name Type Inv. Item Serial No.  Lot No. LRB No. Used   LETA PEEK CUSTOMIZED PRIORITY L     [id number] Left 1   TANISHA GRAFT DURAMATRIX PLUS 4X5 Spine TANISHA GRAFT DURAMATRIX PLUS 4X5  LETA: ORTHOPAEDICS [id number] Left 1   ADHERUS AUTOSPRAY ET      [id number] Left 1   SCREW UN3 SLFTP 1.5X4MM Screw/Plate/Nail/Allen SCREW UN3 SLFTP 1.5X4MM  LETA: ORTHOPAEDICS  Left 10   PLATE LPROF DBL Y X RIGD 2 HL 12MM Screw/Plate/Nail/Allen PLATE LPROF DBL Y X RIGD 2 HL 12MM  LETA: ORTHOPAEDICS  Left 5         Drains:   Gastrostomy/Enterostomy/Jejunostomy Percutaneous Endoscopic Gastrostomy (PEG) RUQ (Active)   Site Description Healing 6/5/2019  4:26 AM   Drain Status Continuous 6/5/2019  4:26 AM   Surrounding Skin Dry; Intact 6/5/2019  4:26 AM   Dressing Status Clean;Dry; Intact 6/28/2019  7:55 AM   Dressing Type Split gauze 6/5/2019  4:26 AM   Tube Feeding Standard with Fiber 6/5/2019  4:26 AM   Rate/Schedule 60 mL/hr 6/5/2019  4:26 AM   Tube Feeding Intake (mL) 1303 ml 6/5/2019  4:26 AM   Free Water Flush (mL) 60 mL 6/5/2019  4:26 AM   Free Water Rate 60 q 4 hr 6/3/2019  7:45 PM   Residual Volume (ml) 0 ml 6/5/2019  4:26 AM       Findings: see dictated op note    Seema Haji MD  Date: 6/28/2019  Time: 10:55 AM

## 2019-06-28 NOTE — PROGRESS NOTES
Historical Provider, MD   levETIRAcetam (KEPPRA) 100 MG/ML solution Take 15 mLs by mouth 2 times daily 6/5/19  Yes Sky Herman,    ferrous sulfate 300 (60 Fe) MG/5ML syrup 300 mg by Per G Tube route daily   Yes Historical Provider, MD   melatonin 3 MG TABS tablet 3 mg by PEG Tube route nightly as needed   Yes Historical Provider, MD   nystatin (MYCOSTATIN) 029259 UNIT/ML suspension Take 500,000 Units by mouth 4 times daily   Yes Historical Provider, MD   sertraline (ZOLOFT) 25 MG tablet 25 mg by PEG Tube route every evening   Yes Historical Provider, MD   albuterol (PROVENTIL) (2.5 MG/3ML) 0.083% nebulizer solution Inhale 2.5 mg into the lungs 4 times daily as needed 4/29/19   Historical Provider, MD     Allergies:   No Known Allergies    Social History:  Social History     Socioeconomic History    Marital status:      Spouse name: Laure Thompson Number of children: 2    Years of education: Not on file    Highest education level: Not on file   Occupational History    Not on file   Social Needs    Financial resource strain: Not on file    Food insecurity:     Worry: Not on file     Inability: Not on file    Transportation needs:     Medical: Not on file     Non-medical: Not on file   Tobacco Use    Smoking status: Current Some Day Smoker     Types: Cigars     Start date: 2/2/1995    Smokeless tobacco: Never Used    Tobacco comment: social smoker, occassional for 10yrs   Substance and Sexual Activity    Alcohol use: No    Drug use: No    Sexual activity: Yes     Partners: Female   Lifestyle    Physical activity:     Days per week: Not on file     Minutes per session: Not on file    Stress: Not on file   Relationships    Social connections:     Talks on phone: Not on file     Gets together: Not on file     Attends [employer] service: Not on file     Active member of club or organization: Not on file     Attends meetings of clubs or organizations: Not on file     Relationship status: Not on

## 2019-06-28 NOTE — ANESTHESIA PRE PROCEDURE
Department of Anesthesiology  Preprocedure Note       Name:  Angus English   Age:  39 y.o.  :  1975                                          MRN:  [id number]         Date:  2019      Surgeon: Osvaldo Tellez):  Elizabeth Elliott MD    Procedure: LEFT CRANIOPLASTY WITH LETA IMPLANT (Left )    Medications prior to admission:   Prior to Admission medications    Medication Sig Start Date End Date Taking?  Authorizing Provider   amoxicillin (AMOXIL) 500 MG capsule Take 500 mg by mouth 3 times daily Indications: uti    Yes Historical Provider, MD   docusate (COLACE) 50 MG/5ML liquid Take 100 mg by mouth 2 times daily   Yes Historical Provider, MD   magnesium hydroxide (MILK OF MAGNESIA) 400 MG/5ML suspension 30 mLs by Per G Tube route daily as needed for Constipation   Yes Historical Provider, MD   levETIRAcetam (KEPPRA) 100 MG/ML solution Take 15 mLs by mouth 2 times daily 19  Yes Alba Johnson DO   ferrous sulfate 300 (60 Fe) MG/5ML syrup 300 mg by Per G Tube route daily   Yes Historical Provider, MD   melatonin 3 MG TABS tablet 3 mg by PEG Tube route nightly as needed   Yes Historical Provider, MD   nystatin (MYCOSTATIN) 162971 UNIT/ML suspension Take 500,000 Units by mouth 4 times daily   Yes Historical Provider, MD   sertraline (ZOLOFT) 25 MG tablet 25 mg by PEG Tube route every evening   Yes Historical Provider, MD   albuterol (PROVENTIL) (2.5 MG/3ML) 0.083% nebulizer solution Inhale 2.5 mg into the lungs 4 times daily as needed 19   Historical Provider, MD       Current medications:    Current Facility-Administered Medications   Medication Dose Route Frequency Provider Last Rate Last Dose    0.9 % sodium chloride infusion   Intravenous Continuous JOANA Washington        ceFAZolin (ANCEF) 2 g in dextrose 5 % 50 mL IVPB  2 g Intravenous On Call to JOANA Sahni        sodium chloride flush 0.9 % injection 10 mL  10 mL Intravenous 2 times per day JOANA Washington.Cheeks sodium chloride flush 0.9 % injection 10 mL  10 mL Intravenous PRN JOANA Smith           Allergies:  No Known Allergies    Problem List:    Patient Active Problem List   Diagnosis Code    SAH (subarachnoid hemorrhage) ([address] I60.9    Subdural hematoma ([address] S06.5X9A    Acute respiratory failure ([address] J96.00    Hypoalbuminemia E88.09    Electrolyte imbalance E87.8    Hypophosphatemia E83.39    Fall (on) (from) other stairs and steps, initial encounter W10. 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 Janel Slipper Presence of inferior vena cava filter Z95.828    Presence of inferior vena cava filter Z95.828    Acute deep vein thrombosis (DVT) of iliac vein of right lower extremity (HCC) I82.421    Acute DVT (deep venous thrombosis) (HCC) I82.409    Traumatic brain injury with depressed frontal skull fracture, sequela ([address] S02. Willetta Pain    Epilepsy ([address] G40.909    Subdural hemorrhage (HCC) I62.00    Severe protein-calorie malnutrition (HCC) E43    Bradycardia R00.1    Skull defect M95.2       Past Medical History:        Diagnosis Date    Cancer ([address]     Chronic back pain     Diverticulitis     History of blood transfusion     Hx of blood clots     Hyperlipidemia     had at age 24, not a current issue    Restless legs syndrome     Scrotal mass     right, for or 2/3/2017    Seizure ([address]     Testicular cancer [facility])        Past Surgical History:        Procedure Laterality Date    COLONOSCOPY      CRANIOPLASTY Right 01/08/2018    Right LABGLOM >60 06/02/2019    GLUCOSE 93 06/02/2019    PROT 7.4 06/01/2019    CALCIUM 10.1 06/02/2019    BILITOT 0.3 06/01/2019    ALKPHOS 142 06/01/2019    AST 15 06/01/2019    ALT 39 06/01/2019       POC Tests: No results for input(s): POCGLU, POCNA, POCK, POCCL, POCBUN, POCHEMO, POCHCT in the last 72 hours. Coags:   Lab Results   Component Value Date    PROTIME 12.4 06/01/2019    INR 1.1 06/01/2019    APTT 35.4 06/01/2019       HCG (If Applicable): No results found for: PREGTESTUR, PREGSERUM, HCG, HCGQUANT     ABGs: No results found for: PHART, PO2ART, TIU1HYF, ELX1AWJ, BEART, E4XCUHDV     Type & Screen (If Applicable):  No results found for: [facility]    Anesthesia Evaluation  Patient summary reviewed and Nursing notes reviewed no history of anesthetic complications:   Airway:        Comment: Tracheostomy  VISHAL airway   Dental:      Comment: VISHAL    Pulmonary: breath sounds clear to auscultation                            ROS comment: Uncuffed tracheostomy  Spontaneousventilation   Cardiovascular:Negative CV ROS            Rhythm: regular  Rate: normal                    Neuro/Psych:   (+) seizures (lastseizure 5/18. Had Ada JEFFRIES):,              ROS comment: S/PSDH 11/17, craniectomy. 1/18 cranioplasty  Decompressive craniectomy, [place], [place] for SDH. Now for repair of skull defect. Pt . resides at [facility] rehab. GI/Hepatic/Renal:             Endo/Other:    (+) malignancy/cancer. ROS comment: Testicular CA. S/P chemo  Port in place Abdominal:           Vascular:   + DVT (1/18), PE (1/18). ROS comment: IVC filter 1/18. Anesthesia Plan      general     ASA 4             Anesthetic plan and risks discussed with spouse and sibling. Use of blood products discussed with spouse and sibling whom consented to blood products. Plan discussed with CRNA. Annia Roger DO   6/28/2019      Pt. examined, chart reviewed.  Pt. Is

## 2019-06-28 NOTE — CONSULTS
Hospital Medicine  Consult History & Physical        Reason for consult:  Medical management    Date of Service: Pt seen/examined in consultation on 6/28/2019    History Of Present Illness:      39 y.o. male who we are asked to see/evaluate by Torey Medina MD for medical management of Cancer [facility]), Chronic back pain, Diverticulitis, History of blood transfusion, Hx of blood clots, Hyperlipidemia, Restless legs syndrome, Scrotal mass, Seizure ([address] and Testicular cancer ([facility] [address] Of note patient sustained a subdural hematoma after a fall with a prior history of intracranial hemorrhage in 2015. Patient was found to have a head injury with disorientation headaches memory loss and numbness. Patient suffered a skull defect after decompressive craniotomy on the left side and he is really admitted for a left skull defect correction status post cranioplasty and [place] implant. Patient is currently stable in the neuro intensive care unit    Past Medical History:        Diagnosis Date    Cancer [facility])     Chronic back pain     Diverticulitis     History of blood transfusion     Hx of blood clots     Hyperlipidemia     had at age 24, not a current issue    Restless legs syndrome     Scrotal mass     right, for or 2/3/2017    Seizure ([address]     Testicular cancer [facility])        Past Surgical History:        Procedure Laterality Date    COLONOSCOPY      CRANIOPLASTY Right 01/08/2018    Right Crainioplasty    OTHER SURGICAL HISTORY  12/10/2018    Dr Jany Del Rosario - IVC Filter Retrieval - Unsuccessful    VT INSJ TUNNELED CTR VAD W/SUBQ PORT AGE 5 YR/> Left 5/17/2018    MEDIPORT CATHETER INSERTION performed by Gabbie Ruiz MD at [place] [id number] Right 02/03/2017    right radical orchiectomy       Medications Prior to Admission:    Prior to Admission medications    Medication Sig Start Date End Date Taking?  Authorizing Provider   amoxicillin (AMOXIL) 500 MG capsule Take 500 mg by mouth 3 times daily with full range of motion. No jugular venous distention. Trachea midline. Respiratory:  Normal respiratory effort. Clear to auscultation, bilaterally without Rales/Wheezes/Rhonchi. Trach - O2 supplement via trach mask  Cardiovascular: Eric rate and regular rhythm with normal S1/S2 without murmurs, rubs or gallops. Abdomen: Soft, non-tender, non-distended with normal bowel sounds. + PEG  Musculoskeletal:  No clubbing, cyanosis or edema bilaterally. Skin: Skin color, texture, turgor normal.  No rashes or lesions. Neurologic:  deferred  Psychiatric: deferred  Labs:     CBC:   No results for input(s): WBC, RBC, HGB, HCT, MCV, RDW, PLT in the last 72 hours. BMP: No results for input(s): NA, K, CL, CO2, BUN, CREATININE, MG, PHOS in the last 72 hours. Invalid input(s): CA  LFT:  No results for input(s): PROT, ALB, ALKPHOS, ALT, AST, BILITOT, AMYLASE, LIPASE in the last 72 hours. CE:  No results for input(s): Zan Stacia in the last 72 hours. PT/INR: No results for input(s): INR, APTT in the last 72 hours. BNP: No results for input(s): BNP in the last 72 hours. Hgb A1C: No results found for: LABA1C  No results found for: EAG  ESR: No results found for: SEDRATE  CRP: No results found for: CRP  D Dimer: No results found for: DDIMER  Folate and B12:   Lab Results   Component Value Date    [id number] 1970 (H) 06/13/2018   ,   Lab Results   Component Value Date    FOLATE 11.0 06/13/2018     Lactic Acid:   Lab Results   Component Value Date    LACTA 1.4 06/09/2018     Thyroid Studies:   Lab Results   Component Value Date    TSH 2.350 06/03/2019         ASSESSMENT:    Left skull defect  -Status post cranioplasty with [place] implant  Severe protein calorie malnutrition  Bradycardia  Subdural hematoma  History of testicular cancer status post chemo  History of DVT status post IVC filter  Siezure    PLAN:    Continue further monitoring.   Continue with Keppra  Continue pain control and bowel regimen. Discontinue Amoxil. Outpatient treatment for UTI. Follow urine culture  DVT ppx    Thank you for consulting. We will follow peripherally.    +++++++++++++++++++++++++++++++++++++++++++++++++  Tylor Cormier MD, Hospitalist  +++++++++++++++++++++++++++++++++++++++++++++++++  NOTE: This report was transcribed using voice recognition software.  Every effort was made to ensure accuracy; however, inadvertent computerized transcription errors may be present.

## 2019-06-29 ENCOUNTER — APPOINTMENT (OUTPATIENT)
Dept: CT IMAGING | Age: 44
DRG: 020 | End: 2019-06-29
Attending: NEUROLOGICAL SURGERY
Payer: COMMERCIAL

## 2019-06-29 LAB
ALBUMIN SERPL-MCNC: 3.7 G/DL (ref 3.5–5.2)
ALP BLD-CCNC: 94 U/L (ref 40–129)
ALT SERPL-CCNC: 16 U/L (ref 0–40)
ANION GAP SERPL CALCULATED.3IONS-SCNC: 15 MMOL/L (ref 7–16)
AST SERPL-CCNC: 15 U/L (ref 0–39)
BILIRUB SERPL-MCNC: 0.7 MG/DL (ref 0–1.2)
BUN BLDV-MCNC: 16 MG/DL (ref 6–20)
CALCIUM SERPL-MCNC: 9.5 MG/DL (ref 8.6–10.2)
CHLORIDE BLD-SCNC: 105 MMOL/L (ref 98–107)
CO2: 22 MMOL/L (ref 22–29)
CREAT SERPL-MCNC: 0.8 MG/DL (ref 0.7–1.2)
GFR AFRICAN AMERICAN: >60
GFR NON-AFRICAN AMERICAN: >60 ML/MIN/1.73
GLUCOSE BLD-MCNC: 104 MG/DL (ref 74–99)
HCT VFR BLD CALC: 34.9 % (ref 37–54)
HEMOGLOBIN: 11.6 G/DL (ref 12.5–16.5)
MCH RBC QN AUTO: 29.4 PG (ref 26–35)
MCHC RBC AUTO-ENTMCNC: 33.2 % (ref 32–34.5)
MCV RBC AUTO: 88.6 FL (ref 80–99.9)
MRSA CULTURE ONLY: NORMAL
PDW BLD-RTO: 14.6 FL (ref 11.5–15)
PLATELET # BLD: 209 E9/L (ref 130–450)
PMV BLD AUTO: 10.5 FL (ref 7–12)
POTASSIUM REFLEX MAGNESIUM: 3.9 MMOL/L (ref 3.5–5)
RBC # BLD: 3.94 E12/L (ref 3.8–5.8)
SODIUM BLD-SCNC: 142 MMOL/L (ref 132–146)
TOTAL PROTEIN: 6.7 G/DL (ref 6.4–8.3)
WBC # BLD: 7.6 E9/L (ref 4.5–11.5)

## 2019-06-29 PROCEDURE — 97163 PT EVAL HIGH COMPLEX 45 MIN: CPT

## 2019-06-29 PROCEDURE — 97535 SELF CARE MNGMENT TRAINING: CPT

## 2019-06-29 PROCEDURE — 6360000002 HC RX W HCPCS: Performed by: NEUROLOGICAL SURGERY

## 2019-06-29 PROCEDURE — 97167 OT EVAL HIGH COMPLEX 60 MIN: CPT

## 2019-06-29 PROCEDURE — 36415 COLL VENOUS BLD VENIPUNCTURE: CPT

## 2019-06-29 PROCEDURE — 6370000000 HC RX 637 (ALT 250 FOR IP): Performed by: NEUROLOGICAL SURGERY

## 2019-06-29 PROCEDURE — 85027 COMPLETE CBC AUTOMATED: CPT

## 2019-06-29 PROCEDURE — 94640 AIRWAY INHALATION TREATMENT: CPT

## 2019-06-29 PROCEDURE — 2580000003 HC RX 258: Performed by: NEUROLOGICAL SURGERY

## 2019-06-29 PROCEDURE — 80053 COMPREHEN METABOLIC PANEL: CPT

## 2019-06-29 PROCEDURE — 97530 THERAPEUTIC ACTIVITIES: CPT

## 2019-06-29 PROCEDURE — 70450 CT HEAD/BRAIN W/O DYE: CPT

## 2019-06-29 PROCEDURE — 6360000002 HC RX W HCPCS

## 2019-06-29 PROCEDURE — 1200000000 HC SEMI PRIVATE

## 2019-06-29 RX ORDER — HEPARIN SODIUM (PORCINE) LOCK FLUSH IV SOLN 100 UNIT/ML 100 UNIT/ML
SOLUTION INTRAVENOUS
Status: COMPLETED
Start: 2019-06-29 | End: 2019-06-29

## 2019-06-29 RX ADMIN — MINERAL SUPPLEMENT IRON 300 MG / 5 ML STRENGTH LIQUID 100 PER BOX UNFLAVORED 300 MG: at 11:49

## 2019-06-29 RX ADMIN — Medication 10 ML: at 11:49

## 2019-06-29 RX ADMIN — DOCUSATE SODIUM 100 MG: 50 LIQUID ORAL at 10:58

## 2019-06-29 RX ADMIN — HEPARIN 500 UNITS: 100 SYRINGE at 11:50

## 2019-06-29 RX ADMIN — NYSTATIN 500000 UNITS: 100000 SUSPENSION ORAL at 10:58

## 2019-06-29 RX ADMIN — NYSTATIN 500000 UNITS: 100000 SUSPENSION ORAL at 18:49

## 2019-06-29 RX ADMIN — SERTRALINE 25 MG: 50 TABLET, FILM COATED ORAL at 18:49

## 2019-06-29 RX ADMIN — Medication 10 ML: at 21:09

## 2019-06-29 RX ADMIN — DOCUSATE SODIUM 100 MG: 50 LIQUID ORAL at 21:08

## 2019-06-29 RX ADMIN — LEVETIRACETAM 1500 MG: 100 SOLUTION ORAL at 10:58

## 2019-06-29 RX ADMIN — ALBUTEROL SULFATE 2.5 MG: 2.5 SOLUTION RESPIRATORY (INHALATION) at 09:59

## 2019-06-29 RX ADMIN — LEVETIRACETAM 1500 MG: 100 SOLUTION ORAL at 21:09

## 2019-06-29 RX ADMIN — Medication 10 ML: at 19:08

## 2019-06-29 RX ADMIN — DEXTROSE MONOHYDRATE 2 G: 50 INJECTION, SOLUTION INTRAVENOUS at 11:49

## 2019-06-29 RX ADMIN — ALBUTEROL SULFATE 2.5 MG: 2.5 SOLUTION RESPIRATORY (INHALATION) at 21:48

## 2019-06-29 RX ADMIN — SENNOSIDES,DOCUSATE SODIUM 1 TABLET: 8.6; 5 TABLET, FILM COATED ORAL at 10:59

## 2019-06-29 RX ADMIN — ALBUTEROL SULFATE 2.5 MG: 2.5 SOLUTION RESPIRATORY (INHALATION) at 12:59

## 2019-06-29 RX ADMIN — SENNOSIDES,DOCUSATE SODIUM 1 TABLET: 8.6; 5 TABLET, FILM COATED ORAL at 21:08

## 2019-06-29 RX ADMIN — DEXTROSE MONOHYDRATE 2 G: 50 INJECTION, SOLUTION INTRAVENOUS at 19:08

## 2019-06-29 RX ADMIN — DEXTROSE MONOHYDRATE 2 G: 50 INJECTION, SOLUTION INTRAVENOUS at 02:27

## 2019-06-29 RX ADMIN — ALBUTEROL SULFATE 2.5 MG: 2.5 SOLUTION RESPIRATORY (INHALATION) at 17:00

## 2019-06-29 ASSESSMENT — PAIN SCALES - GENERAL
PAINLEVEL_OUTOF10: 0
PAINLEVEL_OUTOF10: 0

## 2019-06-29 NOTE — PROGRESS NOTES
P     Functional Assessment:   Initial Eval Status  Date: 6/29/19 Treatment Status  Date: Short Term Goals  Treatment frequency: 1-3x/week on ICU; PRN on stepdown unit  -pt will improve. .. Feeding NPO  Trach/peg         Grooming Mod A  Max A for R posterior lean EOB; able to remove mouthwash cap, dip swab and bring to mouth with min A for thoroughness. G ability to wipe mouth. Applying deodorant mod A: assist to reach R arm pit d/t LUE proximal weakness    Min VC for initiation and sequencing     Min A   while seated EOB; G BUE functional use; G initiation, sequencing and follow-through     UB Dressing Mod A     Min A  while seated EOB;     G BUE functional use; G  initiation, sequencing and follow-through   LB Dressing DEP  Donning socks EOB    Max A   with AE/device PRN   Bathing UB-  Mod A  LB-  Max A  simulation    UB-  Min A  LB-  Mod A with AE/DME prn   Toileting DEP  Condom catheter use; incontinent     Max A  including clothing mgmt and toileting hygiene using DME/device prn   Bed Mobility  Supine to sit: Max A  Sit to supine: Max A x2  Rolling: NT   Mod A   in prep for EOB ADL tasks   Functional/  Bathroom  Transfers Sit to stand: NT  Stand to sit: NT  Stand pivot: NT   Max A  from varying surfaces using device/DME prn   Functional Mobility NT   Max A x   Bed > chair distance using device prn   Balance Sitting:     Static:  Max A R posterior lean    Dynamic:Max/dep  Standing:     Static:  NT    Dynamic:NT  demo Min A dynamic sitting balance EOB during ADL tasks;  Max A dynamic standing balance during ADL tasks using device prn   Endurance/  Activity Tolerance F activity tolerance/endurance during light ADL task ; sitting tolerance EOB >10 minutes  demo G activity tolerance/endurance during [id number] min ADL task     G demo of EC, pacing and proper breathing techniques   Visual/  Perceptual Glasses: none    Visual scanning: WFL  Depth perception: [employer] [facility]        Safety P+      G    BUE strength/endurance See below  G tolerance to BUE AAROM/AROM exercise to improve overall UE use during ADLs and functional tasks    Increase LUE strength x1/3 MMT grade to improve use during UB ADLs     Hand dominance: wrote with L hand      Strength ROM  Comments   RUE  Proximal: 4/5  Distal: 4/5 Proximal:  -PROM: WFL   -AROM: WFL  Distal: WFL G   G FMC/dexterity noted during ADL tasks   LUE Proximal: 2/5  Distal: 3+/5 Proximal:  -PROM: WFL   -AROM: 45* shoulder flexion  Distal: WFL F+   F FMC/dexterity noted during ADL tasks; slower      Hearing: WFL   Sensation:   Pt denies any numbness or tingling in BUE/BLE. Tone: noted LUE with elbow flexion/extension  Edema: L side head    Comments/Treatment Narrative: OK from RN to see patient. Thorough chart review and evaluation completed with PT collaboration; wife present during session and able to provide extensive medical history and PLOF. Upon arrival patient supine with HOB slightly elevated. Patient agreeable to session. Cognition assessed as stated above; pt alert, following commands with minimal to no difficulty with spatial orientation. After bed mobility, pt tolerated sitting EOB >10 minutes with sitting balance max A d/t R posterior lean. Pt required max VC for upright head/trunk posture and to use BUE at side for support- F carryover. Pt completed UB ADLs, UB ROM/strength and visual assessment, orientation assessment using choices on paper in front, and writing activity as stated above. Pt was assisted back into supine then placed in semi-chair position using pillows and bed mechanics to improve interaction with environment, increase upright sitting posture for ADLs and breathing, and decrease/prevent edema. After session, patient in position as stated above with all devices within reach, all lines and tubes intact. Pt required cues and education as noted above for safe facilitation and completion of tasks.  During EOB activity,  pt educated on proper hand placement

## 2019-06-29 NOTE — DISCHARGE INSTR - COC
patient):  {CHP DME Belongings:467241303}    RN SIGNATURE:  {Esignature:[id number]}    CASE MANAGEMENT/SOCIAL WORK SECTION    Inpatient Status Date: ***    Readmission Risk Assessment Score:  Readmission Risk              Risk of Unplanned Readmission:        15           Discharging to Facility/ Agency   · Name:   · Address:  · Phone:  · Fax:    Dialysis Facility (if applicable)   · Name:  · Address:  · Dialysis Schedule:  · Phone:  · Fax:    / signature: {Esignature:[id number]}    PHYSICIAN SECTION    Prognosis: {Prognosis:2402508871}    Condition at Discharge: [address] [place] [address] Patient Condition:035154239}    Rehab Potential (if transferring to Rehab): {Prognosis:0573277336}    Recommended Labs or Other Treatments After Discharge: ***    Physician Certification: I certify the above information and transfer of Darlene Chowdhury  is necessary for the continuing treatment of the diagnosis listed and that he requires {Admit to Appropriate Level of Care:91092} for {GREATER/LESS:813467842} 30 days.      Update Admission H&P: {CHP DME Changes in HDOLW:140657296}    PHYSICIAN SIGNATURE:  Electronically signed by JOANA Foreman on 6/29/19 at 7:19 AM

## 2019-06-29 NOTE — PROGRESS NOTES
0600  Gross per 24 hour   Intake 270 ml   Output 675 ml   Net -405 ml       Labs:   No results for input(s): WBC, HGB, HCT, PLT in the last 72 hours. No results for input(s): NA, K, CL, CO2, BUN, CREATININE, CALCIUM, PHOS in the last 72 hours. Invalid input(s): MAGNES    No results for input(s): PROT, ALB, ALKPHOS, ALT, AST, BILITOT, AMYLASE, LIPASE in the last 72 hours. No results for input(s): INR in the last 72 hours. No results for input(s): Iveth Callander in the last 72 hours. Chronic labs:  Lab Results   Component Value Date    CHOL 190 01/30/2017    TRIG 232 (H) 01/30/2017    HDL 37 01/30/2017    LDLCALC 107 (H) 01/30/2017    TSH 2.350 06/03/2019    INR 1.1 06/01/2019       Radiology:  Imaging studies reviewed today. ASSESSMENT:    Left skull defect  -Status post cranioplasty with [place] implant  Severe protein calorie malnutrition  Bradycardia  Subdural hematoma  History of testicular cancer status post chemo  History of DVT status post IVC filter  Siezure     PLAN:      Continue with Keppra  Continue pain control and bowel regimen. Discontinue Amoxil. Outpatient treatment for UTI. Follow urine culture  DVT ppx  Disposition planning per primary. Check CBC and CMP. Diet: DIET GENERAL;  Code Status: Full Code    +++++++++++++++++++++++++++++++++++++++++++++++++  Isiah Brown MD   [facility].  +++++++++++++++++++++++++++++++++++++++++++++++++  NOTE: This report was transcribed using voice recognition software. Every effort was made to ensure accuracy; however, inadvertent computerized transcription errors may be present.

## 2019-06-30 LAB
HCT VFR BLD CALC: 32 % (ref 37–54)
HEMOGLOBIN: 10.8 G/DL (ref 12.5–16.5)

## 2019-06-30 PROCEDURE — 6360000002 HC RX W HCPCS: Performed by: FAMILY MEDICINE

## 2019-06-30 PROCEDURE — 2700000000 HC OXYGEN THERAPY PER DAY

## 2019-06-30 PROCEDURE — 85018 HEMOGLOBIN: CPT

## 2019-06-30 PROCEDURE — 94640 AIRWAY INHALATION TREATMENT: CPT

## 2019-06-30 PROCEDURE — C9113 INJ PANTOPRAZOLE SODIUM, VIA: HCPCS | Performed by: FAMILY MEDICINE

## 2019-06-30 PROCEDURE — 2580000003 HC RX 258: Performed by: FAMILY MEDICINE

## 2019-06-30 PROCEDURE — 6360000002 HC RX W HCPCS: Performed by: NEUROLOGICAL SURGERY

## 2019-06-30 PROCEDURE — 1200000000 HC SEMI PRIVATE

## 2019-06-30 PROCEDURE — 85014 HEMATOCRIT: CPT

## 2019-06-30 PROCEDURE — 2580000003 HC RX 258: Performed by: NEUROLOGICAL SURGERY

## 2019-06-30 PROCEDURE — 6370000000 HC RX 637 (ALT 250 FOR IP): Performed by: NEUROLOGICAL SURGERY

## 2019-06-30 PROCEDURE — 36415 COLL VENOUS BLD VENIPUNCTURE: CPT

## 2019-06-30 RX ORDER — PANTOPRAZOLE SODIUM 40 MG/10ML
40 INJECTION, POWDER, LYOPHILIZED, FOR SOLUTION INTRAVENOUS 2 TIMES DAILY
Status: DISCONTINUED | OUTPATIENT
Start: 2019-06-30 | End: 2019-07-02 | Stop reason: HOSPADM

## 2019-06-30 RX ORDER — SODIUM CHLORIDE 9 MG/ML
INJECTION, SOLUTION INTRAVENOUS CONTINUOUS
Status: DISCONTINUED | OUTPATIENT
Start: 2019-06-30 | End: 2019-07-02 | Stop reason: HOSPADM

## 2019-06-30 RX ADMIN — ONDANSETRON HYDROCHLORIDE 4 MG: 2 SOLUTION INTRAMUSCULAR; INTRAVENOUS at 22:48

## 2019-06-30 RX ADMIN — DEXTROSE MONOHYDRATE 2 G: 50 INJECTION, SOLUTION INTRAVENOUS at 10:21

## 2019-06-30 RX ADMIN — DEXTROSE MONOHYDRATE 2 G: 50 INJECTION, SOLUTION INTRAVENOUS at 02:41

## 2019-06-30 RX ADMIN — ONDANSETRON HYDROCHLORIDE 4 MG: 2 SOLUTION INTRAMUSCULAR; INTRAVENOUS at 16:20

## 2019-06-30 RX ADMIN — ALBUTEROL SULFATE 2.5 MG: 2.5 SOLUTION RESPIRATORY (INHALATION) at 16:02

## 2019-06-30 RX ADMIN — SODIUM CHLORIDE: 9 INJECTION, SOLUTION INTRAVENOUS at 21:48

## 2019-06-30 RX ADMIN — Medication 10 ML: at 09:43

## 2019-06-30 RX ADMIN — LEVETIRACETAM 1500 MG: 100 SOLUTION ORAL at 09:42

## 2019-06-30 RX ADMIN — SERTRALINE 25 MG: 50 TABLET, FILM COATED ORAL at 18:18

## 2019-06-30 RX ADMIN — ALBUTEROL SULFATE 2.5 MG: 2.5 SOLUTION RESPIRATORY (INHALATION) at 22:08

## 2019-06-30 RX ADMIN — SENNOSIDES,DOCUSATE SODIUM 1 TABLET: 8.6; 5 TABLET, FILM COATED ORAL at 09:41

## 2019-06-30 RX ADMIN — PANTOPRAZOLE SODIUM 40 MG: 40 INJECTION, POWDER, FOR SOLUTION INTRAVENOUS at 21:47

## 2019-06-30 RX ADMIN — BISACODYL 5 MG: 5 TABLET, COATED ORAL at 09:41

## 2019-06-30 RX ADMIN — DOCUSATE SODIUM 100 MG: 50 LIQUID ORAL at 09:41

## 2019-06-30 RX ADMIN — LEVETIRACETAM 1500 MG: 100 SOLUTION ORAL at 23:29

## 2019-06-30 RX ADMIN — ALBUTEROL SULFATE 2.5 MG: 2.5 SOLUTION RESPIRATORY (INHALATION) at 08:11

## 2019-06-30 RX ADMIN — ALBUTEROL SULFATE 2.5 MG: 2.5 SOLUTION RESPIRATORY (INHALATION) at 11:50

## 2019-06-30 RX ADMIN — MINERAL SUPPLEMENT IRON 300 MG / 5 ML STRENGTH LIQUID 100 PER BOX UNFLAVORED 300 MG: at 09:42

## 2019-06-30 NOTE — PROGRESS NOTES
fat    Objective Information:  · Nutrition-Focused Physical Findings: nonverbal w/ h/o TBI, trach mask, soft abd, active BS, PEG RUQ w/ TF, periorbital edema s/p cranioplasty, +I/Os  · Wound Type: Surgical Wound  · Current Nutrition Therapies:  · Oral Diet Orders: NPO   · Tube Feeding (TF) Orders:   · Feeding Route: Gastrostomy  · Formula: 1.5 Calorie with Fiber  · Rate (ml/hr):45 ml/hr    · Volume (ml/day): 1080 ml tv  · Duration: Continuous  · Water Flushes: 100 ml q4 hr = 600 ml tv  · Current TF & Flush Orders Provides: 1620 kcal, 69 gm pro, 821 ml free water, 1421 ml total water  · Anthropometric Measures:  · Ht: 5' 11\" (180.3 cm)   · Current Body Wt: 162 lb (73.5 kg)(bed scale 6/30)  · Admission Body Wt: 165 lb (74.8 kg)(first measured 6/28)  · Usual Body Wt: (VISHAL, 160-193# over last year per EMR)  · Weight Change: noted large wt fluctuations over last year per EMR, VISHAL wt changes at this time   · Ideal Body Wt: 172 lb (78 kg), % Ideal Body 94%  · BMI Classification: BMI 18.5 - 24.9 Normal Weight    Nutrition Interventions:   Continued Inpatient Monitoring, Education Initiated, Coordination of Care(Reviewed TF w/ wife)    Nutrition Evaluation:   · Evaluation: Goals set   · Goals:  Tolerance to TF at goal rate   · Monitoring: TF Intake, TF Tolerance, Skin Integrity, Wound Healing, I&O, Mental Status/Confusion, Weight, Pertinent Labs, Monitor Bowel Function      Electronically signed by Anurag Jones MS, RD, LD on 6/30/19 at 3:02 PM    Contact Number: [phone]

## 2019-06-30 NOTE — PROGRESS NOTES
injection 10 mL, 10 mL, Intravenous, 2 times per day  sodium chloride flush 0.9 % injection 10 mL, 10 mL, Intravenous, PRN  ondansetron (ZOFRAN) injection 4 mg, 4 mg, Intravenous, Q6H PRN  ceFAZolin (ANCEF) 2 g in dextrose 5 % 50 mL IVPB, 2 g, Intravenous, Q8H  oxyCODONE (ROXICODONE) immediate release tablet 5 mg, 5 mg, Oral, Q4H PRN **OR** oxyCODONE (ROXICODONE) immediate release tablet 10 mg, 10 mg, Oral, Q4H PRN  morphine (PF) injection 2 mg, 2 mg, Intravenous, Q2H PRN **OR** morphine sulfate (PF) injection 4 mg, 4 mg, Intravenous, Q2H PRN  bisacodyl (DULCOLAX) EC tablet 5 mg, 5 mg, Oral, Daily  sennosides-docusate sodium (SENOKOT-S) 8.6-50 MG tablet 1 tablet, 1 tablet, Oral, BID  fleet rectal enema 1 enema, 1 enema, Rectal, Daily PRN    ASSESSMENT:   · 39year old male s/p left cranioplasty on 6/28 - stable    PLAN:  · PT/OT  · WBAT with assistance  · Pain control  · D/c planning      Electronically signed by JOANA Esteves on 6/30/2019 at 9:15 AM

## 2019-07-01 ENCOUNTER — ANESTHESIA EVENT (OUTPATIENT)
Dept: ENDOSCOPY | Age: 44
DRG: 020 | End: 2019-07-01
Payer: COMMERCIAL

## 2019-07-01 ENCOUNTER — ANESTHESIA (OUTPATIENT)
Dept: ENDOSCOPY | Age: 44
DRG: 020 | End: 2019-07-01
Payer: COMMERCIAL

## 2019-07-01 VITALS — OXYGEN SATURATION: 99 % | DIASTOLIC BLOOD PRESSURE: 57 MMHG | SYSTOLIC BLOOD PRESSURE: 90 MMHG

## 2019-07-01 PROBLEM — Z87.828 HISTORY OF SUBDURAL HEMATOMA (POST TRAUMATIC): Status: ACTIVE | Noted: 2019-07-01

## 2019-07-01 PROBLEM — D62 ACUTE BLOOD LOSS ANEMIA: Status: ACTIVE | Noted: 2019-07-01

## 2019-07-01 LAB
ANION GAP SERPL CALCULATED.3IONS-SCNC: 13 MMOL/L (ref 7–16)
BUN BLDV-MCNC: 13 MG/DL (ref 6–20)
CALCIUM SERPL-MCNC: 9.1 MG/DL (ref 8.6–10.2)
CHLORIDE BLD-SCNC: 103 MMOL/L (ref 98–107)
CO2: 27 MMOL/L (ref 22–29)
CREAT SERPL-MCNC: 0.7 MG/DL (ref 0.7–1.2)
GFR AFRICAN AMERICAN: >60
GFR NON-AFRICAN AMERICAN: >60 ML/MIN/1.73
GLUCOSE BLD-MCNC: 104 MG/DL (ref 74–99)
HCT VFR BLD CALC: 30 % (ref 37–54)
HEMOGLOBIN: 9.9 G/DL (ref 12.5–16.5)
MCH RBC QN AUTO: 29.4 PG (ref 26–35)
MCHC RBC AUTO-ENTMCNC: 33 % (ref 32–34.5)
MCV RBC AUTO: 89 FL (ref 80–99.9)
PDW BLD-RTO: 14.5 FL (ref 11.5–15)
PLATELET # BLD: 182 E9/L (ref 130–450)
PMV BLD AUTO: 9.9 FL (ref 7–12)
POTASSIUM SERPL-SCNC: 4.1 MMOL/L (ref 3.5–5)
RBC # BLD: 3.37 E12/L (ref 3.8–5.8)
SODIUM BLD-SCNC: 143 MMOL/L (ref 132–146)
WBC # BLD: 8.9 E9/L (ref 4.5–11.5)

## 2019-07-01 PROCEDURE — C9113 INJ PANTOPRAZOLE SODIUM, VIA: HCPCS | Performed by: FAMILY MEDICINE

## 2019-07-01 PROCEDURE — 99253 IP/OBS CNSLTJ NEW/EST LOW 45: CPT | Performed by: SURGERY

## 2019-07-01 PROCEDURE — 97530 THERAPEUTIC ACTIVITIES: CPT

## 2019-07-01 PROCEDURE — 6370000000 HC RX 637 (ALT 250 FOR IP): Performed by: SURGERY

## 2019-07-01 PROCEDURE — 7100000000 HC PACU RECOVERY - FIRST 15 MIN: Performed by: SURGERY

## 2019-07-01 PROCEDURE — 7100000001 HC PACU RECOVERY - ADDTL 15 MIN: Performed by: SURGERY

## 2019-07-01 PROCEDURE — 80048 BASIC METABOLIC PNL TOTAL CA: CPT

## 2019-07-01 PROCEDURE — 6370000000 HC RX 637 (ALT 250 FOR IP): Performed by: NEUROLOGICAL SURGERY

## 2019-07-01 PROCEDURE — 6360000002 HC RX W HCPCS: Performed by: ANESTHESIOLOGIST ASSISTANT

## 2019-07-01 PROCEDURE — 3700000000 HC ANESTHESIA ATTENDED CARE: Performed by: SURGERY

## 2019-07-01 PROCEDURE — 94640 AIRWAY INHALATION TREATMENT: CPT

## 2019-07-01 PROCEDURE — 85027 COMPLETE CBC AUTOMATED: CPT

## 2019-07-01 PROCEDURE — 86677 HELICOBACTER PYLORI ANTIBODY: CPT

## 2019-07-01 PROCEDURE — 2580000003 HC RX 258: Performed by: ANESTHESIOLOGIST ASSISTANT

## 2019-07-01 PROCEDURE — 3700000001 HC ADD 15 MINUTES (ANESTHESIA): Performed by: SURGERY

## 2019-07-01 PROCEDURE — 3609017100 HC EGD: Performed by: SURGERY

## 2019-07-01 PROCEDURE — 6370000000 HC RX 637 (ALT 250 FOR IP): Performed by: STUDENT IN AN ORGANIZED HEALTH CARE EDUCATION/TRAINING PROGRAM

## 2019-07-01 PROCEDURE — 36415 COLL VENOUS BLD VENIPUNCTURE: CPT

## 2019-07-01 PROCEDURE — 0DJ08ZZ INSPECTION OF UPPER INTESTINAL TRACT, VIA NATURAL OR ARTIFICIAL OPENING ENDOSCOPIC: ICD-10-PCS | Performed by: SURGERY

## 2019-07-01 PROCEDURE — 43235 EGD DIAGNOSTIC BRUSH WASH: CPT | Performed by: SURGERY

## 2019-07-01 PROCEDURE — 2700000000 HC OXYGEN THERAPY PER DAY

## 2019-07-01 PROCEDURE — 97535 SELF CARE MNGMENT TRAINING: CPT

## 2019-07-01 PROCEDURE — 2709999900 HC NON-CHARGEABLE SUPPLY: Performed by: SURGERY

## 2019-07-01 PROCEDURE — 6360000002 HC RX W HCPCS: Performed by: FAMILY MEDICINE

## 2019-07-01 PROCEDURE — 6360000002 HC RX W HCPCS: Performed by: NEUROLOGICAL SURGERY

## 2019-07-01 PROCEDURE — 1200000000 HC SEMI PRIVATE

## 2019-07-01 RX ORDER — SODIUM CHLORIDE 9 MG/ML
INJECTION, SOLUTION INTRAVENOUS CONTINUOUS PRN
Status: DISCONTINUED | OUTPATIENT
Start: 2019-07-01 | End: 2019-07-01 | Stop reason: SDUPTHER

## 2019-07-01 RX ORDER — SUCRALFATE 1 G/1
1 TABLET ORAL EVERY 6 HOURS SCHEDULED
Status: DISCONTINUED | OUTPATIENT
Start: 2019-07-01 | End: 2019-07-02 | Stop reason: HOSPADM

## 2019-07-01 RX ORDER — PROPOFOL 10 MG/ML
INJECTION, EMULSION INTRAVENOUS PRN
Status: DISCONTINUED | OUTPATIENT
Start: 2019-07-01 | End: 2019-07-01 | Stop reason: SDUPTHER

## 2019-07-01 RX ADMIN — SERTRALINE 25 MG: 50 TABLET, FILM COATED ORAL at 18:49

## 2019-07-01 RX ADMIN — DOCUSATE SODIUM 100 MG: 50 LIQUID ORAL at 21:04

## 2019-07-01 RX ADMIN — PANTOPRAZOLE SODIUM 40 MG: 40 INJECTION, POWDER, FOR SOLUTION INTRAVENOUS at 21:06

## 2019-07-01 RX ADMIN — SUCRALFATE 1 G: 1 TABLET ORAL at 22:08

## 2019-07-01 RX ADMIN — SENNOSIDES,DOCUSATE SODIUM 1 TABLET: 8.6; 5 TABLET, FILM COATED ORAL at 21:07

## 2019-07-01 RX ADMIN — ALBUTEROL SULFATE 2.5 MG: 2.5 SOLUTION RESPIRATORY (INHALATION) at 12:50

## 2019-07-01 RX ADMIN — LEVETIRACETAM 1500 MG: 100 SOLUTION ORAL at 10:09

## 2019-07-01 RX ADMIN — OXYCODONE HYDROCHLORIDE 10 MG: 5 TABLET ORAL at 22:07

## 2019-07-01 RX ADMIN — SUCRALFATE 1 G: 1 TABLET ORAL at 19:10

## 2019-07-01 RX ADMIN — PROPOFOL 100 MG: 10 INJECTION, EMULSION INTRAVENOUS at 10:53

## 2019-07-01 RX ADMIN — ALBUTEROL SULFATE 2.5 MG: 2.5 SOLUTION RESPIRATORY (INHALATION) at 17:12

## 2019-07-01 RX ADMIN — ALBUTEROL SULFATE 2.5 MG: 2.5 SOLUTION RESPIRATORY (INHALATION) at 21:20

## 2019-07-01 RX ADMIN — NYSTATIN 500000 UNITS: 100000 SUSPENSION ORAL at 21:04

## 2019-07-01 RX ADMIN — Medication 10 ML: at 13:54

## 2019-07-01 RX ADMIN — LEVETIRACETAM 1500 MG: 100 SOLUTION ORAL at 21:05

## 2019-07-01 RX ADMIN — SODIUM CHLORIDE: 9 INJECTION, SOLUTION INTRAVENOUS at 10:51

## 2019-07-01 RX ADMIN — Medication 3 MG: at 22:08

## 2019-07-01 RX ADMIN — PANTOPRAZOLE SODIUM 40 MG: 40 INJECTION, POWDER, FOR SOLUTION INTRAVENOUS at 09:52

## 2019-07-01 RX ADMIN — MINERAL SUPPLEMENT IRON 300 MG / 5 ML STRENGTH LIQUID 100 PER BOX UNFLAVORED 300 MG: at 10:09

## 2019-07-01 RX ADMIN — ALBUTEROL SULFATE 2.5 MG: 2.5 SOLUTION RESPIRATORY (INHALATION) at 07:16

## 2019-07-01 ASSESSMENT — PAIN SCALES - GENERAL
PAINLEVEL_OUTOF10: 0

## 2019-07-01 ASSESSMENT — LIFESTYLE VARIABLES: SMOKING_STATUS: 1

## 2019-07-01 NOTE — PROGRESS NOTES
general surgery  Continue home medications  Pain control  EGD today  Bowel regimen  Monitor labs  Transfuse PRN  I/Os  Neurovascular checks      DVT Prophylaxis: SCDs  Diet: Diet NPO Effective Now Exceptions are: Sips with Meds  Code Status: Full Code    PT/OT Eval Status: active and ongoing    Dispo - per primary    Rosana Reeves, APRN - CNS

## 2019-07-01 NOTE — PLAN OF CARE
Problem: Falls - Risk of:  Goal: Will remain free from falls  Description  Will remain free from falls  Outcome: Met This Shift  Goal: Absence of physical injury  Description  Absence of physical injury  Outcome: Met This Shift     Problem: Skin Integrity:  Goal: Will show no infection signs and symptoms  Description  Will show no infection signs and symptoms  Outcome: Met This Shift  Goal: Absence of new skin breakdown  Description  Absence of new skin breakdown  Outcome: Met This Shift

## 2019-07-01 NOTE — PROGRESS NOTES
Comments: Pt supine upon entering room. Pt recently returned from procedure;explained to wife about need for updated note to initiate auth for [place];pt's wife cleared pt for therapy to EOB. Pt sat EOB for 10 min. Pt coughing;requiring suction by nursing. Nursing consulted and provided suctioning for pt while sitting EOB. Pt scooting along EOB with jimmy avitia from [facility] and myself. Returned pt to bed supine with HOB elevated and all needs met. Pt follows simple commands and demo delayed response when answering. Pt call light left by patient. Time in: 1305  Time out: 1330    Pt is making limited progress toward established Physical Therapy goals. Continue with physical therapy current plan of care.     Gonzalez Wilder PTA  License Number: [id number]

## 2019-07-02 VITALS
WEIGHT: 162.7 LBS | HEIGHT: 71 IN | SYSTOLIC BLOOD PRESSURE: 110 MMHG | DIASTOLIC BLOOD PRESSURE: 69 MMHG | RESPIRATION RATE: 12 BRPM | BODY MASS INDEX: 22.78 KG/M2 | HEART RATE: 66 BPM | OXYGEN SATURATION: 95 % | TEMPERATURE: 97.5 F

## 2019-07-02 LAB
HELICOBACTER PYLORI IGG: NORMAL
METER GLUCOSE: 117 MG/DL (ref 74–99)
METER GLUCOSE: 122 MG/DL (ref 74–99)
METER GLUCOSE: 132 MG/DL (ref 74–99)

## 2019-07-02 PROCEDURE — 6360000002 HC RX W HCPCS: Performed by: NEUROLOGICAL SURGERY

## 2019-07-02 PROCEDURE — 51798 US URINE CAPACITY MEASURE: CPT

## 2019-07-02 PROCEDURE — 94640 AIRWAY INHALATION TREATMENT: CPT

## 2019-07-02 PROCEDURE — 2580000003 HC RX 258: Performed by: FAMILY MEDICINE

## 2019-07-02 PROCEDURE — 6370000000 HC RX 637 (ALT 250 FOR IP): Performed by: SURGERY

## 2019-07-02 PROCEDURE — 6370000000 HC RX 637 (ALT 250 FOR IP): Performed by: NEUROLOGICAL SURGERY

## 2019-07-02 PROCEDURE — C9113 INJ PANTOPRAZOLE SODIUM, VIA: HCPCS | Performed by: FAMILY MEDICINE

## 2019-07-02 PROCEDURE — 82962 GLUCOSE BLOOD TEST: CPT

## 2019-07-02 PROCEDURE — 6360000002 HC RX W HCPCS: Performed by: FAMILY MEDICINE

## 2019-07-02 PROCEDURE — 99231 SBSQ HOSP IP/OBS SF/LOW 25: CPT | Performed by: SURGERY

## 2019-07-02 PROCEDURE — 2580000003 HC RX 258: Performed by: NEUROLOGICAL SURGERY

## 2019-07-02 PROCEDURE — 2700000000 HC OXYGEN THERAPY PER DAY

## 2019-07-02 RX ADMIN — ALBUTEROL SULFATE 2.5 MG: 2.5 SOLUTION RESPIRATORY (INHALATION) at 17:39

## 2019-07-02 RX ADMIN — DOCUSATE SODIUM 100 MG: 50 LIQUID ORAL at 09:33

## 2019-07-02 RX ADMIN — LEVETIRACETAM 1500 MG: 100 SOLUTION ORAL at 09:34

## 2019-07-02 RX ADMIN — SENNOSIDES,DOCUSATE SODIUM 1 TABLET: 8.6; 5 TABLET, FILM COATED ORAL at 09:34

## 2019-07-02 RX ADMIN — MORPHINE SULFATE 2 MG: 2 INJECTION, SOLUTION INTRAMUSCULAR; INTRAVENOUS at 06:23

## 2019-07-02 RX ADMIN — BISACODYL 5 MG: 5 TABLET, COATED ORAL at 09:34

## 2019-07-02 RX ADMIN — MAGNESIUM HYDROXIDE 30 ML: 400 SUSPENSION ORAL at 09:40

## 2019-07-02 RX ADMIN — MINERAL SUPPLEMENT IRON 300 MG / 5 ML STRENGTH LIQUID 100 PER BOX UNFLAVORED 300 MG: at 09:34

## 2019-07-02 RX ADMIN — SUCRALFATE 1 G: 1 TABLET ORAL at 17:07

## 2019-07-02 RX ADMIN — SUCRALFATE 1 G: 1 TABLET ORAL at 13:19

## 2019-07-02 RX ADMIN — SERTRALINE 25 MG: 50 TABLET, FILM COATED ORAL at 18:52

## 2019-07-02 RX ADMIN — NYSTATIN 500000 UNITS: 100000 SUSPENSION ORAL at 17:07

## 2019-07-02 RX ADMIN — PANTOPRAZOLE SODIUM 40 MG: 40 INJECTION, POWDER, FOR SOLUTION INTRAVENOUS at 09:33

## 2019-07-02 RX ADMIN — NYSTATIN 500000 UNITS: 100000 SUSPENSION ORAL at 09:33

## 2019-07-02 RX ADMIN — ALBUTEROL SULFATE 2.5 MG: 2.5 SOLUTION RESPIRATORY (INHALATION) at 12:56

## 2019-07-02 RX ADMIN — NYSTATIN 500000 UNITS: 100000 SUSPENSION ORAL at 13:18

## 2019-07-02 RX ADMIN — SUCRALFATE 1 G: 1 TABLET ORAL at 09:35

## 2019-07-02 RX ADMIN — SODIUM CHLORIDE: 9 INJECTION, SOLUTION INTRAVENOUS at 16:50

## 2019-07-02 RX ADMIN — Medication 10 ML: at 09:35

## 2019-07-02 NOTE — PROGRESS NOTES
Patient resting comfortably. No acute issues.    No signs of bleeding   OK for DC from a surgical stand point     Nicole Camacho MD

## 2019-07-03 NOTE — DISCHARGE SUMMARY
Discharge Summary    [facility] SUMMARY:                The patient is a 39 y.o. male who was admitted to the hospital on 6/28/2019  7:05 AM for treatment of craniotomy defect. On the day of admission, a cranioplasty was performed. The patient's hospital course was uncomplicated and consisted of physical therapy, incision observation, and a return to normal oral intake. The patient was discharged on 7/2/2019  7:28 PM tolerating a diet, moving bowels, and urinating without difficulty. The incisions were clean and intact. The patient was discharged to skilled nursing facility in satisfactory condition with instructions to call the office for a follow up appointment. Hospital Problem List:  Principal Problem:    Skull defect  Active Problems:    History of craniotomy    History of DVT (deep vein thrombosis)    Severe protein-calorie malnutrition (HCC)    Acute blood loss anemia    History of subdural hematoma (post traumatic)    Pre-operative laboratory examination  Resolved Problems:    * No resolved hospital problems.  *     Procedure(s) (LRB):  EGD ESOPHAGOGASTRODUODENOSCOPY (N/A)    Discharge Medications:    Amalia Emery   Home Medication Instructions ORN:[id number]    Printed on:07/03/19 105   Medication Information                      albuterol (PROVENTIL) (2.5 MG/3ML) 0.083% nebulizer solution  Inhale 2.5 mg into the lungs 4 times daily as needed             amoxicillin (AMOXIL) 500 MG capsule  Take 500 mg by mouth 3 times daily Indications: uti 6-2019             docusate (COLACE) 50 MG/5ML liquid  Take 100 mg by mouth 2 times daily             ferrous sulfate 300 (60 Fe) MG/5ML syrup  300 mg by Per G Tube route daily             levETIRAcetam (KEPPRA) 100 MG/ML solution  Take 15 mLs by mouth 2 times daily             magnesium hydroxide (MILK OF MAGNESIA) 400 MG/5ML suspension  30 mLs by Per G Tube route daily as needed for Constipation             melatonin 3 MG TABS

## 2019-07-08 DIAGNOSIS — M95.2 SKULL DEFECT: Primary | ICD-10-CM

## 2019-07-12 ENCOUNTER — HOSPITAL ENCOUNTER (EMERGENCY)
Age: 44
Discharge: HOME | End: 2019-07-12
Attending: EMERGENCY MEDICINE
Payer: COMMERCIAL

## 2019-07-12 ENCOUNTER — APPOINTMENT (OUTPATIENT)
Dept: GENERAL RADIOLOGY | Age: 44
End: 2019-07-12
Payer: COMMERCIAL

## 2019-07-12 ENCOUNTER — APPOINTMENT (OUTPATIENT)
Dept: CT IMAGING | Age: 44
End: 2019-07-12
Payer: COMMERCIAL

## 2019-07-12 ENCOUNTER — TELEPHONE (OUTPATIENT)
Dept: NEUROSURGERY | Age: 44
End: 2019-07-12

## 2019-07-12 VITALS
SYSTOLIC BLOOD PRESSURE: 115 MMHG | HEIGHT: 71 IN | TEMPERATURE: 97.7 F | RESPIRATION RATE: 18 BRPM | DIASTOLIC BLOOD PRESSURE: 77 MMHG | WEIGHT: 175 LBS | OXYGEN SATURATION: 100 % | BODY MASS INDEX: 24.5 KG/M2 | HEART RATE: 78 BPM

## 2019-07-12 DIAGNOSIS — R31.9 HEMATURIA, UNSPECIFIED TYPE: ICD-10-CM

## 2019-07-12 DIAGNOSIS — Z98.890 S/P CRANIOTOMY: ICD-10-CM

## 2019-07-12 DIAGNOSIS — M95.2 SKULL DEFECT: Primary | ICD-10-CM

## 2019-07-12 DIAGNOSIS — Z98.890 POST-OPERATIVE STATE: Primary | ICD-10-CM

## 2019-07-12 DIAGNOSIS — M95.2 ACQUIRED DEFORMITY OF HEAD: ICD-10-CM

## 2019-07-12 LAB
ALBUMIN SERPL-MCNC: 4.4 G/DL (ref 3.5–5.2)
ALP BLD-CCNC: 104 U/L (ref 40–129)
ALT SERPL-CCNC: 17 U/L (ref 0–40)
ANION GAP SERPL CALCULATED.3IONS-SCNC: 10 MMOL/L (ref 7–16)
AST SERPL-CCNC: 17 U/L (ref 0–39)
BACTERIA: ABNORMAL /HPF
BASOPHILS ABSOLUTE: 0.06 E9/L (ref 0–0.2)
BASOPHILS RELATIVE PERCENT: 0.9 % (ref 0–2)
BILIRUB SERPL-MCNC: 0.5 MG/DL (ref 0–1.2)
BILIRUBIN URINE: NEGATIVE
BLOOD, URINE: ABNORMAL
BUN BLDV-MCNC: 10 MG/DL (ref 6–20)
CALCIUM SERPL-MCNC: 10.4 MG/DL (ref 8.6–10.2)
CHLORIDE BLD-SCNC: 102 MMOL/L (ref 98–107)
CLARITY: CLEAR
CO2: 29 MMOL/L (ref 22–29)
COLOR: YELLOW
CREAT SERPL-MCNC: 0.7 MG/DL (ref 0.7–1.2)
EOSINOPHILS ABSOLUTE: 0.28 E9/L (ref 0.05–0.5)
EOSINOPHILS RELATIVE PERCENT: 4 % (ref 0–6)
GFR AFRICAN AMERICAN: >60
GFR NON-AFRICAN AMERICAN: >60 ML/MIN/1.73
GLUCOSE BLD-MCNC: 84 MG/DL (ref 74–99)
GLUCOSE URINE: NEGATIVE MG/DL
HCT VFR BLD CALC: 36.4 % (ref 37–54)
HEMOGLOBIN: 11.8 G/DL (ref 12.5–16.5)
IMMATURE GRANULOCYTES #: 0.03 E9/L
IMMATURE GRANULOCYTES %: 0.4 % (ref 0–5)
KETONES, URINE: NEGATIVE MG/DL
LEUKOCYTE ESTERASE, URINE: NEGATIVE
LYMPHOCYTES ABSOLUTE: 2.11 E9/L (ref 1.5–4)
LYMPHOCYTES RELATIVE PERCENT: 30.4 % (ref 20–42)
MCH RBC QN AUTO: 29.4 PG (ref 26–35)
MCHC RBC AUTO-ENTMCNC: 32.4 % (ref 32–34.5)
MCV RBC AUTO: 90.5 FL (ref 80–99.9)
MONOCYTES ABSOLUTE: 0.52 E9/L (ref 0.1–0.95)
MONOCYTES RELATIVE PERCENT: 7.5 % (ref 2–12)
NEUTROPHILS ABSOLUTE: 3.95 E9/L (ref 1.8–7.3)
NEUTROPHILS RELATIVE PERCENT: 56.8 % (ref 43–80)
NITRITE, URINE: NEGATIVE
PDW BLD-RTO: 14.7 FL (ref 11.5–15)
PH UA: 8 (ref 5–9)
PLATELET # BLD: 365 E9/L (ref 130–450)
PMV BLD AUTO: 9.2 FL (ref 7–12)
POTASSIUM REFLEX MAGNESIUM: 4.3 MMOL/L (ref 3.5–5)
PROTEIN UA: NEGATIVE MG/DL
RBC # BLD: 4.02 E12/L (ref 3.8–5.8)
RBC UA: ABNORMAL /HPF (ref 0–2)
SODIUM BLD-SCNC: 141 MMOL/L (ref 132–146)
SPECIFIC GRAVITY UA: 1.01 (ref 1–1.03)
TOTAL PROTEIN: 7.6 G/DL (ref 6.4–8.3)
UROBILINOGEN, URINE: 0.2 E.U./DL
WBC # BLD: 7 E9/L (ref 4.5–11.5)
WBC UA: ABNORMAL /HPF (ref 0–5)

## 2019-07-12 PROCEDURE — 36415 COLL VENOUS BLD VENIPUNCTURE: CPT

## 2019-07-12 PROCEDURE — 80053 COMPREHEN METABOLIC PANEL: CPT

## 2019-07-12 PROCEDURE — 99285 EMERGENCY DEPT VISIT HI MDM: CPT

## 2019-07-12 PROCEDURE — 71045 X-RAY EXAM CHEST 1 VIEW: CPT

## 2019-07-12 PROCEDURE — 85025 COMPLETE CBC W/AUTO DIFF WBC: CPT

## 2019-07-12 PROCEDURE — 81001 URINALYSIS AUTO W/SCOPE: CPT

## 2019-07-12 PROCEDURE — 87088 URINE BACTERIA CULTURE: CPT

## 2019-07-12 PROCEDURE — 70450 CT HEAD/BRAIN W/O DYE: CPT

## 2019-07-12 ASSESSMENT — ENCOUNTER SYMPTOMS
ABDOMINAL PAIN: 0
CHEST TIGHTNESS: 0
BACK PAIN: 0
NAUSEA: 0
VOMITING: 0
PHOTOPHOBIA: 0
ABDOMINAL DISTENTION: 0
SHORTNESS OF BREATH: 0
DIARRHEA: 0

## 2019-07-12 NOTE — CARE COORDINATION
Social Work / Discharge Planning:    Pt presents to the ED secondary to wife noticing pt having \" a giant indent by his temple\" and pt \"seems to be more confused\". Pt is from [facility] acute rehab and per CINTHIA, [address] Dr Valentina Miller, a head ct will be ordered. LOWELL spoke to [place] with [facility], who reports pt is able to return to facility and will not need new insurance authorization if he returns to the building before 12am.      SW to follow.

## 2019-07-13 NOTE — ED NOTES
Dr Maame Alba in room and updated both patient and wife of discharge plans. Attempting to schedule transport back to facility at this time.       Patricia Pepe RN  07/12/19 2018

## 2019-07-14 LAB — URINE CULTURE, ROUTINE: NORMAL

## 2019-07-23 ENCOUNTER — HOSPITAL ENCOUNTER (OUTPATIENT)
Dept: CT IMAGING | Age: 44
Discharge: HOME | End: 2019-07-25
Payer: COMMERCIAL

## 2019-07-23 ENCOUNTER — OFFICE VISIT (OUTPATIENT)
Dept: NEUROSURGERY | Age: 44
End: 2019-07-23

## 2019-07-23 DIAGNOSIS — S06.5XAA SUBDURAL HEMATOMA: Primary | ICD-10-CM

## 2019-07-23 DIAGNOSIS — S06.369D TRAUMATIC HEMORRHAGE OF CEREBRUM WITH LOSS OF CONSCIOUSNESS, UNSPECIFIED LATERALITY, SUBSEQUENT ENCOUNTER: ICD-10-CM

## 2019-07-23 DIAGNOSIS — S06.5XAA SDH (SUBDURAL HEMATOMA): ICD-10-CM

## 2019-07-23 DIAGNOSIS — M95.2 SKULL DEFECT: ICD-10-CM

## 2019-07-23 PROCEDURE — 99024 POSTOP FOLLOW-UP VISIT: CPT | Performed by: PHYSICIAN ASSISTANT

## 2019-07-23 PROCEDURE — 70450 CT HEAD/BRAIN W/O DYE: CPT

## 2019-08-01 ENCOUNTER — OFFICE VISIT (OUTPATIENT)
Dept: NEUROLOGY | Age: 44
End: 2019-08-01
Payer: COMMERCIAL

## 2019-08-01 VITALS — WEIGHT: 164.24 LBS | BODY MASS INDEX: 22.99 KG/M2 | HEART RATE: 80 BPM | HEIGHT: 71 IN | RESPIRATION RATE: 18 BRPM

## 2019-08-01 DIAGNOSIS — R56.9 SEIZURE (HCC): Primary | ICD-10-CM

## 2019-08-01 PROCEDURE — G8420 CALC BMI NORM PARAMETERS: HCPCS | Performed by: CLINICAL NURSE SPECIALIST

## 2019-08-01 PROCEDURE — 1111F DSCHRG MED/CURRENT MED MERGE: CPT | Performed by: CLINICAL NURSE SPECIALIST

## 2019-08-01 PROCEDURE — 99215 OFFICE O/P EST HI 40 MIN: CPT | Performed by: CLINICAL NURSE SPECIALIST

## 2019-08-01 PROCEDURE — G8427 DOCREV CUR MEDS BY ELIG CLIN: HCPCS | Performed by: CLINICAL NURSE SPECIALIST

## 2019-08-01 PROCEDURE — 1036F TOBACCO NON-USER: CPT | Performed by: CLINICAL NURSE SPECIALIST

## 2019-08-01 RX ORDER — SUCRALFATE ORAL 1 G/10ML
1 SUSPENSION ORAL 4 TIMES DAILY
COMMUNITY
End: 2020-10-29

## 2019-08-01 NOTE — PROGRESS NOTES
Darlene Cabrera is a 38 y.o. right handed man    Patient was doing well until the evening of [place]. He was drinking heavily and fell down his basement stairs. He was there all night and in the morning his wife called EMS. He had right SDH/ICH and underwent a crani at the time. He was started on Keppra BID prophylaxis but his wife states he was in charge of his own medications. He was noncompliant with his morning dose but would take his evening dose.   He and his wife drove to the [employer] and arrived early morning at the end of April 2018, that day he felt \"funny\" asked his daughter to get his wife and she saw his face shaking   This continued until he had a full GTC seizure    He was flown to [facility] and was placed on numerous AEDs, including Keppra 1500mg BID, Vimpat 150mg BID and Dilantin 100mg BID  He was doing well and remained seizure free    Unfortunately, was dx with testicular cancer and started chemotherapy   During the course of tx, he developed a DVT and needed oral anticoagulation for 6 months   He was started on Xarelto it appears    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 at a [employer] when he suddenly collapsed    No seizure reported   CT demonstrated a large left SDH    He underwent crani and his bone was replaced with an artificial   No seizures during admission and his wife reports continues EEG    I was able to review and confirm there was no seizure activity witnessed on cEEG    He was transferred to [place] for rehab locally and is now attempting to get in a rehab in [place] for TBI     His wife is an excellent historian    Here today on a gurney     He is not driving    No chest pain or palpitations  No SOB  No vertigo, lightheadedness or loss of consciousness  No falls, tripping or stumbling  No incontinence of bowels or bladder  No itching or bruising appreciated    ROS otherwise negative Prior to Visit Medications    Medication Sig Taking?  Authorizing Provider   sucralfate (CARAFATE) 1 GM/10ML suspension Take 1 g by mouth 4 times daily Yes Historical Provider, MD   docusate (COLACE) 50 MG/5ML liquid Take 100 mg by mouth 2 times daily Yes Historical Provider, MD   magnesium hydroxide (MILK OF MAGNESIA) 400 MG/5ML suspension 30 mLs by Per G Tube route daily as needed for Constipation Yes Historical Provider, MD   levETIRAcetam (KEPPRA) 100 MG/ML solution Take 15 mLs by mouth 2 times daily Yes Read Devon Herman,    ferrous sulfate 300 (60 Fe) MG/5ML syrup 300 mg by Per G Tube route daily Yes Historical Provider, MD   melatonin 3 MG TABS tablet 3 mg by PEG Tube route nightly as needed Yes Historical Provider, MD   nystatin (MYCOSTATIN) 101458 UNIT/ML suspension Take 500,000 Units by mouth 4 times daily Yes Historical Provider, MD   sertraline (ZOLOFT) 25 MG tablet 25 mg by PEG Tube route every evening Yes Historical Provider, MD   amoxicillin (AMOXIL) 500 MG capsule Take 500 mg by mouth 3 times daily Indications: uti 6-2019  Historical Provider, MD   albuterol (PROVENTIL) (2.5 MG/3ML) 0.083% nebulizer solution Inhale 2.5 mg into the lungs 4 times daily as needed  Historical Provider, MD     Allergies as of 08/01/2019    (No Known Allergies)     Objective:    5' 10.87\" (1.8 m)   Wt 164 lb 3.9 oz (74.5 kg)   BMI 22.99 kg/m²      General appearance: alert, appears stated age and cooperative  Head: surgical scar noted right and left hemisphere   Neck: no adenopathy, bandage over track wound   Lungs: diminished   Heart: regular rate and rhythm  Extremities: no cyanosis or edema  Pulses: 2+ and symmetric  Skin: no rashes or lesions     Mental Status: Alert, oriented to person     Speech: dysarthric and minimal   Language: appropriate laconic - no anomia appreciated     Cranial Nerves:  I: smell    II: visual acuity     II: visual fields Full    II: pupils ARPAN   III,VII: ptosis None   III,IV,VI:

## 2019-08-02 ENCOUNTER — TELEPHONE (OUTPATIENT)
Dept: NEUROLOGY | Age: 44
End: 2019-08-02

## 2019-08-02 NOTE — TELEPHONE ENCOUNTER
Angela Newby [phone] No authorization is needed for EEG #[id number]. Both within network. Scheduled for 8/15 @ 1 pm arrival 12:45. Wife notified date/time. Letter faxed to [facility] with date/time and instruction to [facility] @ [phone].   Electronically signed by Gaye Bloch on 8/2/19 at 2:26 PM

## 2019-08-15 ENCOUNTER — HOSPITAL ENCOUNTER (OUTPATIENT)
Dept: NEUROLOGY | Age: 44
Discharge: HOME | End: 2019-08-15
Payer: COMMERCIAL

## 2019-08-15 DIAGNOSIS — R56.9 SEIZURE (HCC): ICD-10-CM

## 2019-08-15 PROCEDURE — 95816 EEG AWAKE AND DROWSY: CPT

## 2019-08-15 PROCEDURE — 95816 EEG AWAKE AND DROWSY: CPT | Performed by: PSYCHIATRY & NEUROLOGY

## 2019-08-20 ENCOUNTER — OFFICE VISIT (OUTPATIENT)
Dept: NEUROSURGERY | Age: 44
End: 2019-08-20

## 2019-08-20 ENCOUNTER — HOSPITAL ENCOUNTER (OUTPATIENT)
Dept: CT IMAGING | Age: 44
Discharge: HOME | End: 2019-08-22
Payer: COMMERCIAL

## 2019-08-20 VITALS
HEART RATE: 72 BPM | BODY MASS INDEX: 23.1 KG/M2 | WEIGHT: 165 LBS | DIASTOLIC BLOOD PRESSURE: 72 MMHG | SYSTOLIC BLOOD PRESSURE: 103 MMHG

## 2019-08-20 DIAGNOSIS — S06.5XAA SUBDURAL HEMATOMA: Primary | ICD-10-CM

## 2019-08-20 PROCEDURE — 99024 POSTOP FOLLOW-UP VISIT: CPT | Performed by: PHYSICIAN ASSISTANT

## 2019-08-20 PROCEDURE — 70450 CT HEAD/BRAIN W/O DYE: CPT

## 2019-08-21 ENCOUNTER — HOSPITAL ENCOUNTER (OUTPATIENT)
Dept: INFUSION THERAPY | Age: 44
Discharge: HOME | End: 2019-08-21
Payer: COMMERCIAL

## 2019-08-21 ENCOUNTER — OFFICE VISIT (OUTPATIENT)
Dept: ONCOLOGY | Age: 44
End: 2019-08-21
Payer: COMMERCIAL

## 2019-08-21 VITALS
HEART RATE: 55 BPM | OXYGEN SATURATION: 100 % | BODY MASS INDEX: 23.1 KG/M2 | HEIGHT: 71 IN | DIASTOLIC BLOOD PRESSURE: 66 MMHG | WEIGHT: 165 LBS | SYSTOLIC BLOOD PRESSURE: 106 MMHG | TEMPERATURE: 97.2 F

## 2019-08-21 DIAGNOSIS — D40.11 NEOPLASM OF UNCERTAIN BEHAVIOR OF RIGHT TESTIS: ICD-10-CM

## 2019-08-21 DIAGNOSIS — C62.91 SEMINOMA OF RIGHT TESTIS, STAGE 1 (HCC): Primary | ICD-10-CM

## 2019-08-21 DIAGNOSIS — D50.9 IRON DEFICIENCY ANEMIA, UNSPECIFIED IRON DEFICIENCY ANEMIA TYPE: ICD-10-CM

## 2019-08-21 DIAGNOSIS — Z86.718 HISTORY OF DVT (DEEP VEIN THROMBOSIS): ICD-10-CM

## 2019-08-21 DIAGNOSIS — Z98.890 HISTORY OF CRANIOTOMY: ICD-10-CM

## 2019-08-21 DIAGNOSIS — S06.9XAS TRAUMATIC BRAIN INJURY WITH DEPRESSED FRONTAL SKULL FRACTURE, SEQUELA (HCC): ICD-10-CM

## 2019-08-21 DIAGNOSIS — S02.0XXS TRAUMATIC BRAIN INJURY WITH DEPRESSED FRONTAL SKULL FRACTURE, SEQUELA (HCC): ICD-10-CM

## 2019-08-21 LAB
ALBUMIN SERPL-MCNC: 4.7 G/DL (ref 3.5–5.2)
ALP BLD-CCNC: 98 U/L (ref 40–129)
ALT SERPL-CCNC: 15 U/L (ref 0–40)
ANION GAP SERPL CALCULATED.3IONS-SCNC: 11 MMOL/L (ref 7–16)
AST SERPL-CCNC: 15 U/L (ref 0–39)
BASOPHILS ABSOLUTE: 0.05 E9/L (ref 0–0.2)
BASOPHILS RELATIVE PERCENT: 0.9 % (ref 0–2)
BILIRUB SERPL-MCNC: 0.6 MG/DL (ref 0–1.2)
BUN BLDV-MCNC: 10 MG/DL (ref 6–20)
CALCIUM SERPL-MCNC: 9.8 MG/DL (ref 8.6–10.2)
CHLORIDE BLD-SCNC: 106 MMOL/L (ref 98–107)
CO2: 26 MMOL/L (ref 22–29)
CREAT SERPL-MCNC: 0.9 MG/DL (ref 0.7–1.2)
EOSINOPHILS ABSOLUTE: 0.18 E9/L (ref 0.05–0.5)
EOSINOPHILS RELATIVE PERCENT: 3.1 % (ref 0–6)
FERRITIN: 228 NG/ML
GFR AFRICAN AMERICAN: >60
GFR NON-AFRICAN AMERICAN: >60 ML/MIN/1.73
GLUCOSE BLD-MCNC: 73 MG/DL (ref 74–99)
HCT VFR BLD CALC: 41.2 % (ref 37–54)
HEMOGLOBIN: 13.4 G/DL (ref 12.5–16.5)
IMMATURE GRANULOCYTES #: 0.02 E9/L
IMMATURE GRANULOCYTES %: 0.3 % (ref 0–5)
IRON SATURATION: 24 % (ref 20–55)
IRON: 66 MCG/DL (ref 59–158)
LYMPHOCYTES ABSOLUTE: 1.59 E9/L (ref 1.5–4)
LYMPHOCYTES RELATIVE PERCENT: 27.7 % (ref 20–42)
MCH RBC QN AUTO: 30.2 PG (ref 26–35)
MCHC RBC AUTO-ENTMCNC: 32.5 % (ref 32–34.5)
MCV RBC AUTO: 92.8 FL (ref 80–99.9)
MONOCYTES ABSOLUTE: 0.4 E9/L (ref 0.1–0.95)
MONOCYTES RELATIVE PERCENT: 7 % (ref 2–12)
NEUTROPHILS ABSOLUTE: 3.49 E9/L (ref 1.8–7.3)
NEUTROPHILS RELATIVE PERCENT: 61 % (ref 43–80)
PDW BLD-RTO: 13 FL (ref 11.5–15)
PLATELET # BLD: 251 E9/L (ref 130–450)
PMV BLD AUTO: 9.2 FL (ref 7–12)
POTASSIUM SERPL-SCNC: 4 MMOL/L (ref 3.5–5)
RBC # BLD: 4.44 E12/L (ref 3.8–5.8)
SODIUM BLD-SCNC: 143 MMOL/L (ref 132–146)
TOTAL IRON BINDING CAPACITY: 275 MCG/DL (ref 250–450)
TOTAL PROTEIN: 7.1 G/DL (ref 6.4–8.3)
WBC # BLD: 5.7 E9/L (ref 4.5–11.5)

## 2019-08-21 PROCEDURE — 83550 IRON BINDING TEST: CPT

## 2019-08-21 PROCEDURE — 85025 COMPLETE CBC W/AUTO DIFF WBC: CPT

## 2019-08-21 PROCEDURE — 83540 ASSAY OF IRON: CPT

## 2019-08-21 PROCEDURE — 82728 ASSAY OF FERRITIN: CPT

## 2019-08-21 PROCEDURE — 80053 COMPREHEN METABOLIC PANEL: CPT

## 2019-08-21 PROCEDURE — 82105 ALPHA-FETOPROTEIN SERUM: CPT

## 2019-08-21 PROCEDURE — 99214 OFFICE O/P EST MOD 30 MIN: CPT | Performed by: INTERNAL MEDICINE

## 2019-08-21 PROCEDURE — 84702 CHORIONIC GONADOTROPIN TEST: CPT

## 2019-08-21 RX ORDER — HEPARIN SODIUM (PORCINE) LOCK FLUSH IV SOLN 100 UNIT/ML 100 UNIT/ML
500 SOLUTION INTRAVENOUS PRN
Status: DISCONTINUED | OUTPATIENT
Start: 2019-08-21 | End: 2019-08-22 | Stop reason: HOSPADM

## 2019-08-21 RX ORDER — HEPARIN SODIUM (PORCINE) LOCK FLUSH IV SOLN 100 UNIT/ML 100 UNIT/ML
500 SOLUTION INTRAVENOUS PRN
Status: CANCELLED | OUTPATIENT
Start: 2019-08-21

## 2019-08-21 RX ORDER — SODIUM CHLORIDE 0.9 % (FLUSH) 0.9 %
10 SYRINGE (ML) INJECTION PRN
Status: CANCELLED | OUTPATIENT
Start: 2019-08-21

## 2019-08-21 RX ORDER — SODIUM CHLORIDE 0.9 % (FLUSH) 0.9 %
10 SYRINGE (ML) INJECTION PRN
Status: DISCONTINUED | OUTPATIENT
Start: 2019-08-21 | End: 2019-08-22 | Stop reason: HOSPADM

## 2019-08-21 NOTE — PROGRESS NOTES
Patient presents to clinic for labs today. Drawn peripherally by choice after stating they couldn't get blood from [facility].

## 2019-08-21 NOTE — PROGRESS NOTES
29.4 07/12/2019    MCHC 32.4 07/12/2019    RDW 14.7 07/12/2019    NEUTOPHILPCT 56.8 07/12/2019    MONOPCT 7.5 07/12/2019    BASOPCT 0.9 07/12/2019    NEUTROABS 3.95 07/12/2019    LYMPHSABS 2.11 07/12/2019    MONOSABS 0.52 07/12/2019    EOSABS 0.28 07/12/2019    BASOSABS 0.06 07/12/2019       Lab Results   Component Value Date     07/12/2019    K 4.3 07/12/2019     07/12/2019    CO2 29 07/12/2019    BUN 10 07/12/2019    CREATININE 0.7 07/12/2019    GLUCOSE 84 07/12/2019    CALCIUM 10.4 (H) 07/12/2019    PROT 7.6 07/12/2019    LABALBU 4.4 07/12/2019    BILITOT 0.5 07/12/2019    ALKPHOS 104 07/12/2019    AST 17 07/12/2019    ALT 17 07/12/2019    LABGLOM >60 07/12/2019    GFRAA >60 07/12/2019         Lab Results   Component Value Date    IRON 35 (L) 06/13/2018    TIBC 148 (L) 06/13/2018    FERRITIN 1,082 06/13/2018         Radiology-  MRI LEFT FEMUR:  2/4/19  1.  MR findings are most consistent with abductor alvin hemorrhage   with multiple areas of hematoma formation. No drainable fluid   collections. No mass lesions. CT LEFT FEMUR:  2/4/19    1. Area of heterogeneous enhancement within the right adductor alvin   muscle, possibly related to myositis, abscess, resolving hematoma or   underlying neoplasm. Further evaluation with MRI with contrast is   recommended. 2. Diffuse hypoattenuation with enlargement of the deep venous system   of the left lower extremity, compatible with the patient's history of   DVT. US SCROTUM AND TESTICLES: 2/2/19  1. No evidence of testicular torsion or intratesticular mass. US DOPPLER LOWER EXTREMITIES:  2/2/19  Extensive occlusive DVT in the right lower extremity. CT Abdomen Pelvis W Iv Contrast:  1/9/19  1. No evidence of recurrent retroperitoneal adenopathy/mass. 2. Diverticulosis with mural thickening of the sigmoid segment of   colon which could be related to hypertrophy, low-grade inflammation,   neoplasm.    3. Limitation in that enteric

## 2019-08-22 DIAGNOSIS — S06.5XAA SUBDURAL HEMATOMA: Primary | ICD-10-CM

## 2019-08-23 LAB
AFP-TUMOR MARKER: 2 NG/ML (ref 0–9)
HCG TUMOR MARKER: <1 IU/L (ref 0–3)

## 2019-10-23 ENCOUNTER — HOSPITAL ENCOUNTER (OUTPATIENT)
Dept: INFUSION THERAPY | Age: 44
Discharge: HOME | End: 2019-10-23
Payer: COMMERCIAL

## 2019-10-23 ENCOUNTER — HOSPITAL ENCOUNTER (OUTPATIENT)
Dept: CT IMAGING | Age: 44
Discharge: HOME | End: 2019-10-25
Payer: COMMERCIAL

## 2019-10-23 ENCOUNTER — OFFICE VISIT (OUTPATIENT)
Dept: NEUROSURGERY | Age: 44
End: 2019-10-23
Payer: COMMERCIAL

## 2019-10-23 ENCOUNTER — OFFICE VISIT (OUTPATIENT)
Dept: ONCOLOGY | Age: 44
End: 2019-10-23
Payer: COMMERCIAL

## 2019-10-23 VITALS
HEIGHT: 71 IN | SYSTOLIC BLOOD PRESSURE: 101 MMHG | WEIGHT: 165 LBS | DIASTOLIC BLOOD PRESSURE: 60 MMHG | OXYGEN SATURATION: 100 % | TEMPERATURE: 97.1 F | HEART RATE: 51 BPM | BODY MASS INDEX: 23.1 KG/M2

## 2019-10-23 DIAGNOSIS — D50.9 IRON DEFICIENCY ANEMIA, UNSPECIFIED IRON DEFICIENCY ANEMIA TYPE: ICD-10-CM

## 2019-10-23 DIAGNOSIS — C62.91 SEMINOMA OF RIGHT TESTIS, STAGE 1 (HCC): Primary | ICD-10-CM

## 2019-10-23 DIAGNOSIS — Z86.718 HISTORY OF DVT (DEEP VEIN THROMBOSIS): ICD-10-CM

## 2019-10-23 DIAGNOSIS — S06.5XAA SUBDURAL HEMATOMA: ICD-10-CM

## 2019-10-23 DIAGNOSIS — S02.0XXS TRAUMATIC BRAIN INJURY WITH DEPRESSED FRONTAL SKULL FRACTURE, SEQUELA (HCC): ICD-10-CM

## 2019-10-23 DIAGNOSIS — K59.03 DRUG-INDUCED CONSTIPATION: ICD-10-CM

## 2019-10-23 DIAGNOSIS — D40.11 NEOPLASM OF UNCERTAIN BEHAVIOR OF RIGHT TESTIS: ICD-10-CM

## 2019-10-23 DIAGNOSIS — S06.9XAS TRAUMATIC BRAIN INJURY WITH DEPRESSED FRONTAL SKULL FRACTURE, SEQUELA (HCC): ICD-10-CM

## 2019-10-23 DIAGNOSIS — Z98.890 HISTORY OF CRANIOPLASTY: Primary | ICD-10-CM

## 2019-10-23 LAB
ALBUMIN SERPL-MCNC: 4 G/DL (ref 3.5–5.2)
ALP BLD-CCNC: 86 U/L (ref 40–129)
ALT SERPL-CCNC: 17 U/L (ref 0–40)
ANION GAP SERPL CALCULATED.3IONS-SCNC: 9 MMOL/L (ref 7–16)
AST SERPL-CCNC: 19 U/L (ref 0–39)
BASOPHILS ABSOLUTE: 0.05 E9/L (ref 0–0.2)
BASOPHILS RELATIVE PERCENT: 0.8 % (ref 0–2)
BILIRUB SERPL-MCNC: 0.5 MG/DL (ref 0–1.2)
BUN BLDV-MCNC: 11 MG/DL (ref 6–20)
CALCIUM SERPL-MCNC: 9.3 MG/DL (ref 8.6–10.2)
CHLORIDE BLD-SCNC: 106 MMOL/L (ref 98–107)
CO2: 26 MMOL/L (ref 22–29)
CREAT SERPL-MCNC: 0.7 MG/DL (ref 0.7–1.2)
EOSINOPHILS ABSOLUTE: 0.15 E9/L (ref 0.05–0.5)
EOSINOPHILS RELATIVE PERCENT: 2.5 % (ref 0–6)
GFR AFRICAN AMERICAN: >60
GFR NON-AFRICAN AMERICAN: >60 ML/MIN/1.73
GLUCOSE BLD-MCNC: 92 MG/DL (ref 74–99)
HCT VFR BLD CALC: 41.1 % (ref 37–54)
HEMOGLOBIN: 13.4 G/DL (ref 12.5–16.5)
IMMATURE GRANULOCYTES #: 0.01 E9/L
IMMATURE GRANULOCYTES %: 0.2 % (ref 0–5)
LACTATE DEHYDROGENASE: 167 U/L (ref 135–225)
LYMPHOCYTES ABSOLUTE: 1.3 E9/L (ref 1.5–4)
LYMPHOCYTES RELATIVE PERCENT: 22 % (ref 20–42)
MCH RBC QN AUTO: 29.4 PG (ref 26–35)
MCHC RBC AUTO-ENTMCNC: 32.6 % (ref 32–34.5)
MCV RBC AUTO: 90.1 FL (ref 80–99.9)
MONOCYTES ABSOLUTE: 0.53 E9/L (ref 0.1–0.95)
MONOCYTES RELATIVE PERCENT: 9 % (ref 2–12)
NEUTROPHILS ABSOLUTE: 3.87 E9/L (ref 1.8–7.3)
NEUTROPHILS RELATIVE PERCENT: 65.5 % (ref 43–80)
PDW BLD-RTO: 12.6 FL (ref 11.5–15)
PLATELET # BLD: 228 E9/L (ref 130–450)
PMV BLD AUTO: 8.9 FL (ref 7–12)
POTASSIUM SERPL-SCNC: 4 MMOL/L (ref 3.5–5)
RBC # BLD: 4.56 E12/L (ref 3.8–5.8)
SODIUM BLD-SCNC: 141 MMOL/L (ref 132–146)
TOTAL PROTEIN: 6.7 G/DL (ref 6.4–8.3)
WBC # BLD: 5.9 E9/L (ref 4.5–11.5)

## 2019-10-23 PROCEDURE — 70450 CT HEAD/BRAIN W/O DYE: CPT

## 2019-10-23 PROCEDURE — G8420 CALC BMI NORM PARAMETERS: HCPCS | Performed by: NEUROLOGICAL SURGERY

## 2019-10-23 PROCEDURE — G8484 FLU IMMUNIZE NO ADMIN: HCPCS | Performed by: NEUROLOGICAL SURGERY

## 2019-10-23 PROCEDURE — G8427 DOCREV CUR MEDS BY ELIG CLIN: HCPCS | Performed by: NEUROLOGICAL SURGERY

## 2019-10-23 PROCEDURE — 99214 OFFICE O/P EST MOD 30 MIN: CPT

## 2019-10-23 PROCEDURE — 6360000002 HC RX W HCPCS

## 2019-10-23 PROCEDURE — 2580000003 HC RX 258: Performed by: INTERNAL MEDICINE

## 2019-10-23 PROCEDURE — 85025 COMPLETE CBC W/AUTO DIFF WBC: CPT

## 2019-10-23 PROCEDURE — 1036F TOBACCO NON-USER: CPT | Performed by: NEUROLOGICAL SURGERY

## 2019-10-23 PROCEDURE — 99213 OFFICE O/P EST LOW 20 MIN: CPT | Performed by: NEUROLOGICAL SURGERY

## 2019-10-23 PROCEDURE — 83615 LACTATE (LD) (LDH) ENZYME: CPT

## 2019-10-23 PROCEDURE — 80053 COMPREHEN METABOLIC PANEL: CPT

## 2019-10-23 PROCEDURE — 82105 ALPHA-FETOPROTEIN SERUM: CPT

## 2019-10-23 PROCEDURE — 84702 CHORIONIC GONADOTROPIN TEST: CPT

## 2019-10-23 RX ORDER — PANTOPRAZOLE SODIUM 40 MG/1
40 TABLET, DELAYED RELEASE ORAL DAILY
COMMUNITY
Start: 2019-09-20 | End: 2021-02-22 | Stop reason: SDUPTHER

## 2019-10-23 RX ORDER — SODIUM CHLORIDE 0.9 % (FLUSH) 0.9 %
10 SYRINGE (ML) INJECTION PRN
Status: DISCONTINUED | OUTPATIENT
Start: 2019-10-23 | End: 2019-10-24 | Stop reason: HOSPADM

## 2019-10-23 RX ORDER — BACLOFEN 5 MG/1
5 TABLET ORAL 2 TIMES DAILY
COMMUNITY
Start: 2019-10-05 | End: 2020-08-18 | Stop reason: DRUGHIGH

## 2019-10-23 RX ORDER — HEPARIN SODIUM (PORCINE) LOCK FLUSH IV SOLN 100 UNIT/ML 100 UNIT/ML
SOLUTION INTRAVENOUS
Status: COMPLETED
Start: 2019-10-23 | End: 2019-10-23

## 2019-10-23 RX ORDER — HEPARIN SODIUM (PORCINE) LOCK FLUSH IV SOLN 100 UNIT/ML 100 UNIT/ML
500 SOLUTION INTRAVENOUS PRN
Status: DISCONTINUED | OUTPATIENT
Start: 2019-10-23 | End: 2019-10-24 | Stop reason: HOSPADM

## 2019-10-23 RX ORDER — HEPARIN SODIUM (PORCINE) LOCK FLUSH IV SOLN 100 UNIT/ML 100 UNIT/ML
500 SOLUTION INTRAVENOUS PRN
Status: CANCELLED | OUTPATIENT
Start: 2019-10-23

## 2019-10-23 RX ORDER — SODIUM CHLORIDE 0.9 % (FLUSH) 0.9 %
10 SYRINGE (ML) INJECTION PRN
Status: CANCELLED | OUTPATIENT
Start: 2019-10-23

## 2019-10-23 RX ADMIN — Medication 10 ML: at 11:14

## 2019-10-23 RX ADMIN — HEPARIN SODIUM (PORCINE) LOCK FLUSH IV SOLN 100 UNIT/ML 500 UNITS: 100 SOLUTION at 11:14

## 2019-10-23 RX ADMIN — Medication 500 UNITS: at 11:14

## 2019-10-25 LAB
AFP-TUMOR MARKER: 2 NG/ML (ref 0–9)
HCG TUMOR MARKER: <1 IU/L (ref 0–3)

## 2019-11-07 ENCOUNTER — OFFICE VISIT (OUTPATIENT)
Dept: NEUROLOGY | Age: 44
End: 2019-11-07
Payer: COMMERCIAL

## 2019-11-07 VITALS
HEIGHT: 71 IN | RESPIRATION RATE: 16 BRPM | WEIGHT: 160.6 LBS | DIASTOLIC BLOOD PRESSURE: 66 MMHG | OXYGEN SATURATION: 97 % | BODY MASS INDEX: 22.48 KG/M2 | SYSTOLIC BLOOD PRESSURE: 117 MMHG | HEART RATE: 63 BPM

## 2019-11-07 DIAGNOSIS — R56.9 SEIZURE (HCC): Primary | ICD-10-CM

## 2019-11-07 PROCEDURE — 99215 OFFICE O/P EST HI 40 MIN: CPT | Performed by: CLINICAL NURSE SPECIALIST

## 2019-11-07 PROCEDURE — G8484 FLU IMMUNIZE NO ADMIN: HCPCS | Performed by: CLINICAL NURSE SPECIALIST

## 2019-11-07 PROCEDURE — 1036F TOBACCO NON-USER: CPT | Performed by: CLINICAL NURSE SPECIALIST

## 2019-11-07 PROCEDURE — G8427 DOCREV CUR MEDS BY ELIG CLIN: HCPCS | Performed by: CLINICAL NURSE SPECIALIST

## 2019-11-07 PROCEDURE — G8420 CALC BMI NORM PARAMETERS: HCPCS | Performed by: CLINICAL NURSE SPECIALIST

## 2019-11-07 RX ORDER — METHYLPHENIDATE HYDROCHLORIDE 5 MG/1
5 TABLET ORAL 2 TIMES DAILY
COMMUNITY
Start: 2019-10-31 | End: 2021-01-18

## 2020-02-03 ENCOUNTER — OFFICE VISIT (OUTPATIENT)
Dept: SURGERY | Age: 45
End: 2020-02-03
Payer: COMMERCIAL

## 2020-02-03 VITALS
SYSTOLIC BLOOD PRESSURE: 115 MMHG | DIASTOLIC BLOOD PRESSURE: 80 MMHG | TEMPERATURE: 98.2 F | BODY MASS INDEX: 22.4 KG/M2 | HEIGHT: 71 IN | WEIGHT: 160 LBS | HEART RATE: 46 BPM

## 2020-02-03 PROCEDURE — G8427 DOCREV CUR MEDS BY ELIG CLIN: HCPCS | Performed by: SURGERY

## 2020-02-03 PROCEDURE — G8420 CALC BMI NORM PARAMETERS: HCPCS | Performed by: SURGERY

## 2020-02-03 PROCEDURE — G8484 FLU IMMUNIZE NO ADMIN: HCPCS | Performed by: SURGERY

## 2020-02-03 PROCEDURE — 99204 OFFICE O/P NEW MOD 45 MIN: CPT | Performed by: SURGERY

## 2020-02-03 PROCEDURE — 1036F TOBACCO NON-USER: CPT | Performed by: SURGERY

## 2020-02-03 RX ORDER — LACTOSE-REDUCED FOOD
LIQUID (ML) ORAL
COMMUNITY
Start: 2019-12-12 | End: 2021-06-15

## 2020-02-03 RX ORDER — DOCUSATE SODIUM 50 MG/5ML
LIQUID ORAL
COMMUNITY
Start: 2020-01-11 | End: 2021-09-03 | Stop reason: ALTCHOICE

## 2020-02-03 NOTE — PROGRESS NOTES
General Surgery Consult    Patient's Name/Date of Birth: Alireza Sorensen / 1975    Date: February 3, 2020     Consulting Surgeon: Walt Alicia M.D.    PCP: Alex Rodriguez MD     Chief Complaint: failure to take adequate po, dysphagia    HPI:   Alireza Sorensen is a 38 y.o. male who presents for  evaluation of dysphagia and failure to take adequate po.  Peg in place but needs replaced      Past Medical History:   Diagnosis Date    [facility] ([facility] [address]     Chronic back pain     Diverticulitis     History of blood transfusion     Hx of blood clots     Hyperlipidemia     had at age 24, not a current issue    Restless legs syndrome     Scrotal mass     right, for or 2/3/2017    Seizure ([facility] [address]     Testicular cancer [facility])        Past Surgical History:   Procedure Laterality Date    COLONOSCOPY      CRANIOPLASTY Right 01/08/2018    Right Crainioplasty    CRANIOPLASTY Left 6/28/2019    LEFT CRANIOPLASTY WITH LETA IMPLANT performed by Maura Roy MD at [facility] 6  12/10/2018    Dr Raudel Leonard - IVC Filter Retrieval - Unsuccessful    [place] INSJ TUNNELED CTR VAD W/SUBQ PORT AGE 5 YR/> Left 5/17/2018    MEDIPORT CATHETER INSERTION performed by Junior Baugh MD at [facility] Right 02/03/2017    right radical orchiectomy    UPPER GASTROINTESTINAL ENDOSCOPY N/A 7/1/2019    EGD ESOPHAGOGASTRODUODENOSCOPY performed by Amara Delgado MD at [facility] ENDOSCOPY       Current Outpatient Medications   Medication Sig Dispense Refill    methylphenidate (RITALIN) 5 MG tablet       pantoprazole (PROTONIX) 40 MG tablet Take 40 mg by mouth daily      Baclofen 5 MG TABS Take 5 mg by mouth 3 times daily      sucralfate (CARAFATE) 1 GM/10ML suspension Take 1 g by mouth 4 times daily      magnesium hydroxide (MILK OF MAGNESIA) 400 MG/5ML suspension 30 mLs by Per G Tube route daily as needed for Constipation      levETIRAcetam (KEPPRA) 100 MG/ML solution Take 15 mLs by mouth 2 times daily  3  melatonin 3 MG TABS tablet 3 mg by PEG Tube route nightly as needed      sertraline (ZOLOFT) 25 MG tablet 25 mg by PEG Tube route every evening      DOCU 50 MG/5ML liquid       Nutritional Supplements (BOOST) LIQD       albuterol (PROVENTIL) (2.5 MG/3ML) 0.083% nebulizer solution Inhale 2.5 mg into the lungs 4 times daily as needed      ferrous sulfate 300 (60 Fe) MG/5ML syrup 300 mg by Per G Tube route daily      nystatin (MYCOSTATIN) 492906 UNIT/ML suspension Take 500,000 Units by mouth 4 times daily       No current facility-administered medications for this visit. No Known Allergies    The patient has a family history that is negative for severe cardiovascular or respiratory issues, negative for reaction to anesthesia.     Social History     Socioeconomic History    Marital status:      Spouse name: Hong Chance Number of children: 2    Years of education: Not on file    Highest education level: Not on file   Occupational History    Not on file   Social Needs    Financial resource strain: Not on file    Food insecurity:     Worry: Not on file     Inability: Not on file    Transportation needs:     Medical: Not on file     Non-medical: Not on file   Tobacco Use    Smoking status: Former Smoker     Types: Cigars     Start date: 2/2/1995    Smokeless tobacco: Never Used    Tobacco comment: social smoker, occassional for 10yrs   Substance and Sexual Activity    Alcohol use: No    Drug use: No    Sexual activity: Yes     Partners: Female   Lifestyle    Physical activity:     Days per week: Not on file     Minutes per session: Not on file    Stress: Not on file   Relationships    Social connections:     Talks on phone: Not on file     Gets together: Not on file     Attends Druze service: Not on file     Active member of club or organization: Not on file     Attends meetings of clubs or organizations: Not on file     Relationship status: Not on file    Intimate partner violence:     Fear of current or ex partner: Not on file     Emotionally abused: Not on file     Physically abused: Not on file     Forced sexual activity: Not on file   Other Topics Concern    Not on file   Social History Narrative    ** Merged History Encounter **                Review of Systems  General ROS: negative for - chills, fatigue or fever  ENT ROS: negative for - headaches, hearing change or nasal congestion  Endocrine ROS: negative for - breast changes or galactorrhea, no polyuria  Breast ROS: negative for - new or changing breast lumps   Respiratory ROS: negative for - hemoptysis, orthopnea or pleuritic pain  Cardiovascular ROS: negative for - dyspnea on exertion, edema or loss of consciousness  Gastrointestinal ROS: negative for - blood in stools, heartburn, hematemesis or melena  Musculoskeletal ROS: negative for - gait disturbance  Dermatological ROS: negative for - acne, dry skin or eczema  Neuro ROS: no recent memory loss or mood swings.     Physical exam:   /80   Pulse (!) 46   Temp 98.2 °F (36.8 °C) (Oral)   Ht 5' 11\" (1.803 m)   Wt 160 lb (72.6 kg)   BMI 22.32 kg/m²   General appearance:  NAD  Head: NCAT, PERRLA, EOMI, red conjunctiva  Neck: supple, no masses  Lungs: CTAB, equal chest rise bilateral  Heart: Reg rate  Abdomen: soft, nontender, nondistended,  Skin; no lesions  Gu: no cva tenderness  Extremities: extremities normal, atraumatic, no cyanosis or edema    Assessment:  38 y.o. male with dysphagia, failure to take adequate po and peg malfunction    Plan:  Peg replaced bedside without issue        Debbie Dudley MD  2/3/2020  2:01 PM

## 2020-02-14 ENCOUNTER — HOSPITAL ENCOUNTER (OUTPATIENT)
Dept: CT IMAGING | Age: 45
Discharge: HOME | End: 2020-02-16
Payer: COMMERCIAL

## 2020-02-14 ENCOUNTER — OFFICE VISIT (OUTPATIENT)
Dept: NEUROSURGERY | Age: 45
End: 2020-02-14
Payer: COMMERCIAL

## 2020-02-14 VITALS — HEIGHT: 71 IN | WEIGHT: 170 LBS | BODY MASS INDEX: 23.8 KG/M2

## 2020-02-14 PROCEDURE — 70450 CT HEAD/BRAIN W/O DYE: CPT

## 2020-02-14 PROCEDURE — G8427 DOCREV CUR MEDS BY ELIG CLIN: HCPCS | Performed by: NEUROLOGICAL SURGERY

## 2020-02-14 PROCEDURE — G8420 CALC BMI NORM PARAMETERS: HCPCS | Performed by: NEUROLOGICAL SURGERY

## 2020-02-14 PROCEDURE — 99213 OFFICE O/P EST LOW 20 MIN: CPT | Performed by: NEUROLOGICAL SURGERY

## 2020-02-14 PROCEDURE — 1036F TOBACCO NON-USER: CPT | Performed by: NEUROLOGICAL SURGERY

## 2020-02-14 PROCEDURE — G8484 FLU IMMUNIZE NO ADMIN: HCPCS | Performed by: NEUROLOGICAL SURGERY

## 2020-02-15 NOTE — PROGRESS NOTES
Patient is here for follow up consult for: TBI, SDH and ventriculomegaly. Physical exam  Alert and Oriented X2  PERRLA, EOMI  MCCULLOUGH 4-/5  Sensation intact to LT and PP  Reflexes are 2+ and symmetric    A/P: patient is here for follow up for: TBI. His head CT is stable. Continue therapy. He ahs ventriculomegaly. Follow up in 1 year.     Kim Roque

## 2020-03-25 PROBLEM — Z95.828 PRESENCE OF INFERIOR VENA CAVA FILTER: Status: RESOLVED | Noted: 2018-11-14 | Resolved: 2020-03-24

## 2020-03-26 ENCOUNTER — HOSPITAL ENCOUNTER (EMERGENCY)
Age: 45
Discharge: HOME | End: 2020-03-27
Attending: EMERGENCY MEDICINE
Payer: COMMERCIAL

## 2020-03-26 ENCOUNTER — APPOINTMENT (OUTPATIENT)
Dept: GENERAL RADIOLOGY | Age: 45
End: 2020-03-26
Payer: COMMERCIAL

## 2020-03-26 PROCEDURE — 36415 COLL VENOUS BLD VENIPUNCTURE: CPT

## 2020-03-26 PROCEDURE — 2580000003 HC RX 258: Performed by: EMERGENCY MEDICINE

## 2020-03-26 PROCEDURE — 83605 ASSAY OF LACTIC ACID: CPT

## 2020-03-26 PROCEDURE — 99283 EMERGENCY DEPT VISIT LOW MDM: CPT

## 2020-03-26 PROCEDURE — 80048 BASIC METABOLIC PNL TOTAL CA: CPT

## 2020-03-26 PROCEDURE — 71045 X-RAY EXAM CHEST 1 VIEW: CPT

## 2020-03-26 PROCEDURE — 85025 COMPLETE CBC W/AUTO DIFF WBC: CPT

## 2020-03-26 RX ORDER — 0.9 % SODIUM CHLORIDE 0.9 %
1000 INTRAVENOUS SOLUTION INTRAVENOUS ONCE
Status: COMPLETED | OUTPATIENT
Start: 2020-03-26 | End: 2020-03-27

## 2020-03-26 RX ADMIN — SODIUM CHLORIDE 1000 ML: 9 INJECTION, SOLUTION INTRAVENOUS at 23:44

## 2020-03-27 VITALS
DIASTOLIC BLOOD PRESSURE: 64 MMHG | HEIGHT: 71 IN | RESPIRATION RATE: 20 BRPM | OXYGEN SATURATION: 99 % | HEART RATE: 81 BPM | BODY MASS INDEX: 28.7 KG/M2 | SYSTOLIC BLOOD PRESSURE: 113 MMHG | WEIGHT: 205 LBS | TEMPERATURE: 98 F

## 2020-03-27 LAB
ANION GAP SERPL CALCULATED.3IONS-SCNC: 12 MMOL/L (ref 7–16)
BASOPHILS ABSOLUTE: 0 E9/L (ref 0–0.2)
BASOPHILS RELATIVE PERCENT: 0.2 % (ref 0–2)
BUN BLDV-MCNC: 21 MG/DL (ref 6–20)
CALCIUM SERPL-MCNC: 9.6 MG/DL (ref 8.6–10.2)
CHLORIDE BLD-SCNC: 107 MMOL/L (ref 98–107)
CO2: 21 MMOL/L (ref 22–29)
CREAT SERPL-MCNC: 0.8 MG/DL (ref 0.7–1.2)
EOSINOPHILS ABSOLUTE: 0 E9/L (ref 0.05–0.5)
EOSINOPHILS RELATIVE PERCENT: 0 % (ref 0–6)
GFR AFRICAN AMERICAN: >60
GFR NON-AFRICAN AMERICAN: >60 ML/MIN/1.73
GLUCOSE BLD-MCNC: 106 MG/DL (ref 74–99)
HCT VFR BLD CALC: 46.7 % (ref 37–54)
HEMOGLOBIN: 15.7 G/DL (ref 12.5–16.5)
LACTIC ACID: 1.8 MMOL/L (ref 0.5–2.2)
LYMPHOCYTES ABSOLUTE: 0.17 E9/L (ref 1.5–4)
LYMPHOCYTES RELATIVE PERCENT: 1.7 % (ref 20–42)
MCH RBC QN AUTO: 30.8 PG (ref 26–35)
MCHC RBC AUTO-ENTMCNC: 33.6 % (ref 32–34.5)
MCV RBC AUTO: 91.6 FL (ref 80–99.9)
MONOCYTES ABSOLUTE: 0.17 E9/L (ref 0.1–0.95)
MONOCYTES RELATIVE PERCENT: 1.7 % (ref 2–12)
NEUTROPHILS ABSOLUTE: 8.05 E9/L (ref 1.8–7.3)
NEUTROPHILS RELATIVE PERCENT: 96.5 % (ref 43–80)
OVALOCYTES: ABNORMAL
PDW BLD-RTO: 12.4 FL (ref 11.5–15)
PLATELET # BLD: 238 E9/L (ref 130–450)
PMV BLD AUTO: 9.2 FL (ref 7–12)
POIKILOCYTES: ABNORMAL
POTASSIUM REFLEX MAGNESIUM: 5.6 MMOL/L (ref 3.5–5)
RBC # BLD: 5.1 E12/L (ref 3.8–5.8)
SODIUM BLD-SCNC: 140 MMOL/L (ref 132–146)
WBC # BLD: 8.3 E9/L (ref 4.5–11.5)

## 2020-03-27 PROCEDURE — 96374 THER/PROPH/DIAG INJ IV PUSH: CPT

## 2020-03-27 PROCEDURE — 6360000002 HC RX W HCPCS: Performed by: EMERGENCY MEDICINE

## 2020-03-27 RX ORDER — ONDANSETRON 2 MG/ML
4 INJECTION INTRAMUSCULAR; INTRAVENOUS ONCE
Status: COMPLETED | OUTPATIENT
Start: 2020-03-27 | End: 2020-03-27

## 2020-03-27 RX ORDER — ONDANSETRON 2 MG/ML
INJECTION INTRAMUSCULAR; INTRAVENOUS
Status: DISCONTINUED
Start: 2020-03-27 | End: 2020-03-27 | Stop reason: HOSPADM

## 2020-03-27 RX ORDER — ONDANSETRON 4 MG/1
4 TABLET, ORALLY DISINTEGRATING ORAL EVERY 6 HOURS PRN
Qty: 20 TABLET | Refills: 0 | Status: SHIPPED | OUTPATIENT
Start: 2020-03-27 | End: 2022-02-25

## 2020-03-27 RX ADMIN — ONDANSETRON 4 MG: 2 INJECTION INTRAMUSCULAR; INTRAVENOUS at 01:27

## 2020-03-27 NOTE — ED NOTES
Pt resting in bed with eyes closed. NAD noted. Will continue to monitor.       Ronel Thomason RN  03/27/20 0049

## 2020-03-27 NOTE — ED NOTES
Pt had 2 bouts of emesis. Pt cleaned and linens changed. Dr. Miley Fernández made aware.      Jimi Farah RN  03/27/20 0122

## 2020-03-27 NOTE — ED NOTES
Physician's at bedside taking over care of pt at this time. Report given to stephanie.       Joanna Boo RN  03/27/20 1922

## 2020-03-28 ENCOUNTER — CARE COORDINATION (OUTPATIENT)
Dept: CARE COORDINATION | Age: 45
End: 2020-03-28

## 2020-03-28 NOTE — CARE COORDINATION
[facility]'s second attempt to contact patient was unsuccessful. M left a voice message with contact information requesting a return call. PLAN  Continue to attempt to contact patient.

## 2020-03-28 NOTE — CARE COORDINATION
ACM attempted to contact patient as a follow up to his ER visit on 03/27/20. ACM left a voice message with contact information asking patient to return the call.      PLAN  Continue to attempt to contact patient

## 2020-03-29 ENCOUNTER — CARE COORDINATION (OUTPATIENT)
Dept: CARE COORDINATION | Age: 45
End: 2020-03-29

## 2020-04-13 ENCOUNTER — CARE COORDINATION (OUTPATIENT)
Dept: CARE COORDINATION | Age: 45
End: 2020-04-13

## 2020-04-13 NOTE — CARE COORDINATION
[facility]'s final attempt to contact patient was unsuccessful. ACM left a voice message with call back information asking for a return call.      PLAN  D/T patient did not return call, and ACM was unable to contact patient, COVID 19 Monitoring episode will be closed and [facility] will remove name from care team.

## 2020-04-20 ENCOUNTER — HOSPITAL ENCOUNTER (OUTPATIENT)
Dept: CT IMAGING | Age: 45
Discharge: HOME | End: 2020-04-20
Payer: COMMERCIAL

## 2020-04-20 PROCEDURE — 74177 CT ABD & PELVIS W/CONTRAST: CPT

## 2020-04-20 PROCEDURE — 6360000004 HC RX CONTRAST MEDICATION: Performed by: RADIOLOGY

## 2020-04-20 PROCEDURE — 71260 CT THORAX DX C+: CPT

## 2020-04-20 RX ADMIN — IOPAMIDOL 75 ML: 755 INJECTION, SOLUTION INTRAVENOUS at 13:29

## 2020-04-22 ENCOUNTER — HOSPITAL ENCOUNTER (OUTPATIENT)
Dept: INFUSION THERAPY | Age: 45
Discharge: HOME | End: 2020-04-22
Payer: COMMERCIAL

## 2020-04-22 ENCOUNTER — OFFICE VISIT (OUTPATIENT)
Dept: ONCOLOGY | Age: 45
End: 2020-04-22
Payer: COMMERCIAL

## 2020-04-22 VITALS
DIASTOLIC BLOOD PRESSURE: 71 MMHG | SYSTOLIC BLOOD PRESSURE: 98 MMHG | BODY MASS INDEX: 28.59 KG/M2 | HEIGHT: 71 IN | OXYGEN SATURATION: 100 % | HEART RATE: 58 BPM | TEMPERATURE: 97.7 F

## 2020-04-22 DIAGNOSIS — C62.91 SEMINOMA OF RIGHT TESTIS, STAGE 1 (HCC): Primary | ICD-10-CM

## 2020-04-22 DIAGNOSIS — S02.0XXS TRAUMATIC BRAIN INJURY WITH DEPRESSED FRONTAL SKULL FRACTURE, SEQUELA (HCC): ICD-10-CM

## 2020-04-22 DIAGNOSIS — D40.11 NEOPLASM OF UNCERTAIN BEHAVIOR OF RIGHT TESTIS: ICD-10-CM

## 2020-04-22 DIAGNOSIS — S06.9XAS TRAUMATIC BRAIN INJURY WITH DEPRESSED FRONTAL SKULL FRACTURE, SEQUELA (HCC): ICD-10-CM

## 2020-04-22 LAB
ALBUMIN SERPL-MCNC: 4.4 G/DL (ref 3.5–5.2)
ALP BLD-CCNC: 89 U/L (ref 40–129)
ALT SERPL-CCNC: 31 U/L (ref 0–40)
ANION GAP SERPL CALCULATED.3IONS-SCNC: 10 MMOL/L (ref 7–16)
AST SERPL-CCNC: 18 U/L (ref 0–39)
BASOPHILS ABSOLUTE: 0.04 E9/L (ref 0–0.2)
BASOPHILS RELATIVE PERCENT: 0.9 % (ref 0–2)
BILIRUB SERPL-MCNC: 0.8 MG/DL (ref 0–1.2)
BUN BLDV-MCNC: 14 MG/DL (ref 6–20)
CALCIUM SERPL-MCNC: 9.4 MG/DL (ref 8.6–10.2)
CHLORIDE BLD-SCNC: 108 MMOL/L (ref 98–107)
CO2: 25 MMOL/L (ref 22–29)
CREAT SERPL-MCNC: 0.7 MG/DL (ref 0.7–1.2)
EOSINOPHILS ABSOLUTE: 0.1 E9/L (ref 0.05–0.5)
EOSINOPHILS RELATIVE PERCENT: 2.2 % (ref 0–6)
GFR AFRICAN AMERICAN: >60
GFR NON-AFRICAN AMERICAN: >60 ML/MIN/1.73
GLUCOSE BLD-MCNC: 105 MG/DL (ref 74–99)
HCT VFR BLD CALC: 41.1 % (ref 37–54)
HEMOGLOBIN: 13.7 G/DL (ref 12.5–16.5)
IMMATURE GRANULOCYTES #: 0.01 E9/L
IMMATURE GRANULOCYTES %: 0.2 % (ref 0–5)
LACTATE DEHYDROGENASE: 167 U/L (ref 135–225)
LYMPHOCYTES ABSOLUTE: 1.26 E9/L (ref 1.5–4)
LYMPHOCYTES RELATIVE PERCENT: 27.4 % (ref 20–42)
MCH RBC QN AUTO: 30.4 PG (ref 26–35)
MCHC RBC AUTO-ENTMCNC: 33.3 % (ref 32–34.5)
MCV RBC AUTO: 91.3 FL (ref 80–99.9)
MONOCYTES ABSOLUTE: 0.45 E9/L (ref 0.1–0.95)
MONOCYTES RELATIVE PERCENT: 9.8 % (ref 2–12)
NEUTROPHILS ABSOLUTE: 2.74 E9/L (ref 1.8–7.3)
NEUTROPHILS RELATIVE PERCENT: 59.5 % (ref 43–80)
PDW BLD-RTO: 12.7 FL (ref 11.5–15)
PLATELET # BLD: 193 E9/L (ref 130–450)
PMV BLD AUTO: 9.4 FL (ref 7–12)
POTASSIUM SERPL-SCNC: 3.8 MMOL/L (ref 3.5–5)
RBC # BLD: 4.5 E12/L (ref 3.8–5.8)
SODIUM BLD-SCNC: 143 MMOL/L (ref 132–146)
TOTAL PROTEIN: 7 G/DL (ref 6.4–8.3)
WBC # BLD: 4.6 E9/L (ref 4.5–11.5)

## 2020-04-22 PROCEDURE — 99214 OFFICE O/P EST MOD 30 MIN: CPT | Performed by: INTERNAL MEDICINE

## 2020-04-22 PROCEDURE — 85025 COMPLETE CBC W/AUTO DIFF WBC: CPT

## 2020-04-22 PROCEDURE — 2580000003 HC RX 258: Performed by: INTERNAL MEDICINE

## 2020-04-22 PROCEDURE — 80053 COMPREHEN METABOLIC PANEL: CPT

## 2020-04-22 PROCEDURE — 83615 LACTATE (LD) (LDH) ENZYME: CPT

## 2020-04-22 PROCEDURE — 84702 CHORIONIC GONADOTROPIN TEST: CPT

## 2020-04-22 PROCEDURE — 36591 DRAW BLOOD OFF VENOUS DEVICE: CPT

## 2020-04-22 PROCEDURE — 6360000002 HC RX W HCPCS: Performed by: INTERNAL MEDICINE

## 2020-04-22 PROCEDURE — 82105 ALPHA-FETOPROTEIN SERUM: CPT

## 2020-04-22 RX ORDER — SODIUM CHLORIDE 0.9 % (FLUSH) 0.9 %
10 SYRINGE (ML) INJECTION PRN
Status: DISCONTINUED | OUTPATIENT
Start: 2020-04-22 | End: 2020-04-23 | Stop reason: HOSPADM

## 2020-04-22 RX ORDER — HEPARIN SODIUM (PORCINE) LOCK FLUSH IV SOLN 100 UNIT/ML 100 UNIT/ML
500 SOLUTION INTRAVENOUS PRN
Status: DISCONTINUED | OUTPATIENT
Start: 2020-04-22 | End: 2020-04-23 | Stop reason: HOSPADM

## 2020-04-22 RX ORDER — SODIUM CHLORIDE 0.9 % (FLUSH) 0.9 %
10 SYRINGE (ML) INJECTION PRN
Status: CANCELLED | OUTPATIENT
Start: 2020-04-22

## 2020-04-22 RX ORDER — HEPARIN SODIUM (PORCINE) LOCK FLUSH IV SOLN 100 UNIT/ML 100 UNIT/ML
500 SOLUTION INTRAVENOUS PRN
Status: CANCELLED | OUTPATIENT
Start: 2020-04-22

## 2020-04-22 RX ADMIN — SODIUM CHLORIDE, PRESERVATIVE FREE 10 ML: 5 INJECTION INTRAVENOUS at 12:27

## 2020-04-22 RX ADMIN — Medication 500 UNITS: at 12:27

## 2020-04-22 NOTE — PROGRESS NOTES
[address] [facility] [facility]        Pt Name: Chad Donaldson: 1975  Date of evaluation: 4/22/2020  Primary Care Physician: Checo Castro MD  Reason for evaluation:   Chief Complaint   Patient presents with    Cancer     Adenoma of right testis, stage I    Other     Craniotomy    Anemia    6 Month Follow-Up        Subjective:  Here for results of CT Scans and follow-up. He is in a wheelchair and in company of his wife. Seen in ER 3/26/2020 with cough. Had been diagnosed with pneumonia and has been coughing for 1 month. Chest X-Ray was Negative. He was discharged to home. He completed rehab at [facility] and was going to go to out-patient PT but cannot go due to coronavirus closing the facility. Was seen for follow up appointment with Dr. Rossy Hanson 2/14/2020. CT Head was stable. Will return in 1 year for follow up. .         OBJECTIVE:  VITALS:  height is 5' 11\" (1.803 m). His temporal temperature is 97.7 °F (36.5 °C). His blood pressure is 98/71 and his pulse is 58. His oxygen saturation is 100%. Physical Exam:  Performance Status: 0  Well developed, well nourished male  Eyes:  No gross abnormalities. and PERRL  Heart:  RRR, no murmur. Lungs:  Normal expansion.  Clear to auscultation.  No wheezing. Abdomen:  Soft, Non-Tender   Extremities: no swelling, no edema, no calf tenderness  Neurologic: CNs grossly intact, no slurred speech, A&O x3  Skin:  Skin color normal. No rashes or lesions. Medications  Prior to Admission medications    Medication Sig Start Date End Date Taking?  Authorizing Provider   ondansetron (ZOFRAN-ODT) 4 MG disintegrating tablet Take 1 tablet by mouth every 6 hours as needed for Nausea or Vomiting 3/27/20  Yes Justin Uribe DO   DOCU 50 MG/5ML liquid  1/11/20  Yes Historical Provider, MD   methylphenidate (RITALIN) 5 MG tablet  10/31/19  Yes Historical Provider, MD   pantoprazole (PROTONIX) 40 MG tablet Take 40 mg by mouth daily 9/20/19  Yes Historical Provider, MD   Baclofen 5 MG TABS Take 5 mg by mouth 3 times daily 10/5/19  Yes Historical Provider, MD   sucralfate (CARAFATE) 1 GM/10ML suspension Take 1 g by mouth 4 times daily   Yes Historical Provider, MD   levETIRAcetam (KEPPRA) 100 MG/ML solution Take 15 mLs by mouth 2 times daily  Patient taking differently: Take 10 mg/kg by mouth 2 times daily  6/5/19  Yes Jacqueline Broad, DO   melatonin 3 MG TABS tablet 3 mg by PEG Tube route nightly as needed   Yes Historical Provider, MD   sertraline (ZOLOFT) 25 MG tablet 25 mg by PEG Tube route every evening   Yes Historical Provider, MD   Nutritional Supplements (BOOST) LIQD  12/12/19   Historical Provider, MD   albuterol (PROVENTIL) (2.5 MG/3ML) 0.083% nebulizer solution Inhale 2.5 mg into the lungs 4 times daily as needed 4/29/19   Historical Provider, MD   magnesium hydroxide (MILK OF MAGNESIA) 400 MG/5ML suspension 30 mLs by Per G Tube route daily as needed for Constipation    Historical Provider, MD   ferrous sulfate 300 (60 Fe) MG/5ML syrup 300 mg by Per G Tube route daily    Historical Provider, MD   nystatin (MYCOSTATIN) [id number] UNIT/ML suspension Take 500,000 Units by mouth 4 times daily    Historical Provider, MD    Scheduled Meds:  Continuous Infusions:  PRN Meds:.        Recent Laboratory Data-     Lab Results   Component Value Date    WBC 4.6 04/22/2020    HGB 13.7 04/22/2020    HCT 41.1 04/22/2020    MCV 91.3 04/22/2020     04/22/2020    LYMPHOPCT 27.4 04/22/2020    RBC 4.50 04/22/2020    MCH 30.4 04/22/2020    MCHC 33.3 04/22/2020    RDW 12.7 04/22/2020    NEUTOPHILPCT 59.5 04/22/2020    MONOPCT 9.8 04/22/2020    BASOPCT 0.9 04/22/2020    NEUTROABS 2.74 04/22/2020    LYMPHSABS 1.26 (L) 04/22/2020    MONOSABS 0.45 04/22/2020    EOSABS 0.10 04/22/2020    BASOSABS 0.04 04/22/2020       Lab Results   Component Value Date     03/26/2020    K 5.6 (H) 03/26/2020     03/26/2020    CO2 21 (L) 03/26/2020    BUN 21 (H) 03/26/2020    CREATININE 0.8 03/26/2020    GLUCOSE 106 (H) 03/26/2020    CALCIUM 9.6 03/26/2020    PROT 6.7 10/23/2019    LABALBU 4.0 10/23/2019    BILITOT 0.5 10/23/2019    ALKPHOS 86 10/23/2019    AST 19 10/23/2019    ALT 17 10/23/2019    LABGLOM >60 03/26/2020    GFRAA >60 03/26/2020         Lab Results   Component Value Date    IRON 35 (L) 06/13/2018    TIBC 148 (L) 06/13/2018    FERRITIN 1,082 06/13/2018            Ref. Range 9/6/2017 16:22 4/9/2018 13:36 2/5/2019 05:23 7/1/2019 15:14 8/21/2019 14:32   AFP-Tumor Marker Latest Ref Range: 0 - 9 ng/mL 3 3   2   hCG Tumor Marker Latest Ref Range: 0 - 3 IU/L  26 (H)   <1           Radiology-  CT CHEST:  4/20/2020  No findings diagnostic of metastatic disease to the chest.       No acute abnormality in the chest.           CT ABDOMEN AND PELVIS:  4/20/2020  Slight increase in the retroperitoneal and pelvic lymph nodes.  Given the   patient's history, developing metastatic luz tissue is favored.       Developing heterogeneity of the bone density, most notable in the lower   pelvis and proximal femurs.  This is concerning for developing metastatic   disease to the bones.  Correlate with bone scan.       Left renal calculus without obstruction       Numerous renal cysts       CHEST X-RAY:  3/27/2020  No acute cardiopulmonary disease.           CT HEAD:  2/14/2020  1. Progressive enlargement of the left lateral ventricle is 2019, with   surrounding white matter hypodensity possibly representing   transudation of CSF in the setting of high pressure hydrocephalus. 2. Grossly stable prominent enlargement of the right lateral   ventricle,, with prominent ex vacuo dilation of the right frontal   horn. 3. Funnel-shaped appearance of the aqueduct of Sylvius raise concern   for ductal stenosis.    4. Stable small subdural hydrocephalus to the left of the posterior   interhemispheric falx, along the left occipital lobe, as well as in   the anteromedial aspect of the

## 2020-04-22 NOTE — PROGRESS NOTES
Patient presents to clinic for labs today. Left chest single  SQ port accessed per policy using 34G [id number] Keller needle for good blood return. Aspirate for waste and specimen sent to lab. Site flushed easily with 10 mL NSS followed by 5 mL Heparin solution 100 units/ml rinse prior to de-access. Dry sterile dressing to area. Tolerated procedure well. Encouraged to schedule port flush every 4 weeks.

## 2020-04-24 LAB
AFP-TUMOR MARKER: 2 NG/ML (ref 0–9)
HCG TUMOR MARKER: <1 IU/L (ref 0–3)

## 2020-06-04 ENCOUNTER — TELEPHONE (OUTPATIENT)
Dept: NEUROSURGERY | Age: 45
End: 2020-06-04

## 2020-07-21 ENCOUNTER — OFFICE VISIT (OUTPATIENT)
Dept: NEUROLOGY | Age: 45
End: 2020-07-21
Payer: MEDICARE

## 2020-07-21 VITALS
TEMPERATURE: 98.2 F | OXYGEN SATURATION: 99 % | RESPIRATION RATE: 18 BRPM | SYSTOLIC BLOOD PRESSURE: 111 MMHG | BODY MASS INDEX: 28.59 KG/M2 | WEIGHT: 205 LBS | HEART RATE: 55 BPM | DIASTOLIC BLOOD PRESSURE: 83 MMHG

## 2020-07-21 PROCEDURE — 99215 OFFICE O/P EST HI 40 MIN: CPT | Performed by: CLINICAL NURSE SPECIALIST

## 2020-07-21 RX ORDER — BACLOFEN 10 MG/1
TABLET ORAL
COMMUNITY
Start: 2020-06-27 | End: 2020-07-21 | Stop reason: DRUGHIGH

## 2020-07-21 RX ORDER — METHYLPHENIDATE HYDROCHLORIDE 10 MG/1
TABLET ORAL
COMMUNITY
Start: 2020-05-25 | End: 2020-07-21 | Stop reason: DRUGHIGH

## 2020-07-21 RX ORDER — LEVETIRACETAM 100 MG/ML
SOLUTION ORAL
Qty: 1 BOTTLE | Refills: 3 | Status: SHIPPED
Start: 2020-07-21 | End: 2020-08-11

## 2020-07-21 NOTE — PROGRESS NOTES
Ruy Del Rosario is a 54 y.o. right handed man    Patient was doing well until the evening of the [place]. He was drinking heavily and fell down his basement stairs. He was there all night and in the morning his wife called EMS. He had right SDH/ICH and underwent a crani at the time. He was started on Keppra BID prophylaxis but his wife states he was in charge of his own medications. He was noncompliant with his morning dose but would take his evening dose.   He and his wife drove to the [employer] and arrived early morning at the end of April 2018, that day he felt \"funny\" asked his daughter to get his wife and she saw his face shaking   This continued until he had a full GTC seizure  He was flown to [facility] and was placed on numerous AEDs, including Keppra 1500mg BID, Vimpat 150mg BID and Dilantin 100mg BID  Unfortunately, he returned to my office last appt only on Keppra   He was reportedly seizure free therefore medications were not adjusted     Wife now reporting periods of confusion/spacing out and not paying attention to her   She also notes heavy breathing -- rhythmically for 10-20 seconds   He did this a number of times during our appt      Unfortunately, was dx with testicular cancer and started chemotherapy   During the course of tx, he developed a DVT and needed oral anticoagulation for 6 months    He was entertaining the idea of returning to work 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   He was in [facility] at a [employer] when he suddenly collapsed    No seizure reported at the time    CT demonstrated a large left SDH    He underwent crani and his bone was replaced with an artificial   No seizures during admission and his wife reports continues EEG    I was able to review and confirm there was no seizure activity witnessed on cEEG    His wife is an excellent historian as is his sister     Here today in a wheelchair    Spasticity worsening   Unable to tolerate oral Baclofen and being evaluated for a pump   Agreeable to PM&R     No chest pain or palpitations  No SOB  No vertigo, lightheadedness or loss of consciousness  No falls, tripping or stumbling  No incontinence of bowels or bladder  No itching or bruising appreciated    ROS otherwise negative     Prior to Visit Medications    Medication Sig Taking? Authorizing Provider   levETIRAcetam (KEPPRA) 100 MG/ML solution 1000mg BID Yes Travis Adler APRN - CNS   ondansetron (ZOFRAN-ODT) 4 MG disintegrating tablet Take 1 tablet by mouth every 6 hours as needed for Nausea or Vomiting Yes Thor Adler DO   DOCU 50 MG/5ML liquid  Yes Historical Provider, MD   Nutritional Supplements (BOOST) LIQD  Yes Historical Provider, MD   methylphenidate (RITALIN) 5 MG tablet 5 mg 2 times daily.   Yes Historical Provider, MD   pantoprazole (PROTONIX) 40 MG tablet Take 40 mg by mouth daily Yes Historical Provider, MD   Baclofen 5 MG TABS Take 5 mg by mouth 2 times daily  Yes Historical Provider, MD   sucralfate (CARAFATE) 1 GM/10ML suspension Take 1 g by mouth 4 times daily Yes Historical Provider, MD   albuterol (PROVENTIL) (2.5 MG/3ML) 0.083% nebulizer solution Inhale 2.5 mg into the lungs 4 times daily as needed Yes Historical Provider, MD   magnesium hydroxide (MILK OF MAGNESIA) 400 MG/5ML suspension 30 mLs by Per G Tube route daily as needed for Constipation Yes Historical Provider, MD   ferrous sulfate 300 (60 Fe) MG/5ML syrup 300 mg by Per G Tube route daily Yes Historical Provider, MD   melatonin 3 MG TABS tablet 3 mg by PEG Tube route nightly as needed Yes Historical Provider, MD   nystatin (MYCOSTATIN) [id number] UNIT/ML suspension Take 500,000 Units by mouth 4 times daily Yes Historical Provider, MD   sertraline (ZOLOFT) 25 MG tablet 25 mg by PEG Tube route every evening Yes Historical Provider, MD     Allergies as of 07/21/2020    (No Known Allergies)     Objective:   /83 (Site: Left Upper Arm, Position: Sitting, Cuff Size: Medium Adult)   Pulse 55   Temp 98.2 °F (36.8 °C) (Oral)   Resp 18   Wt 205 lb (93 kg)   SpO2 99%   BMI 28.59 kg/m²      General appearance: alert, appears stated age and cooperative  Head: surgical scar noted right and left hemisphere   Extremities: no cyanosis or edema  Pulses: 2+ and symmetric  Skin: no rashes or lesions     Mental Status: Alert, oriented to person     Speech: dysarthric and minimal   Language: appropriate laconic - no anomia appreciated     Cranial Nerves:  I: smell    II: visual acuity     II: visual fields Full    II: pupils ARPAN   III,VII: ptosis None   III,IV,VI: extraocular muscles  EOMI without nystagmus    V: mastication Normal   V: facial light touch sensation  Normal   V,VII: corneal reflex  Present   VII: facial muscle function - upper     VII: facial muscle function - lower Left 7th LMN    VIII: hearing Normal   IX: soft palate elevation  Normal   IX,X: gag reflex Present   XI: trapezius strength     XI: sternocleidomastoid strength    XI: neck extension strength     XII: tongue strength  Normal     Motor:  4/5 both biceps and triceps  4/5 right   -4/5 right IPS  4/5 left IPS  1/5 gastrocs    Spastic arms     Sensory:  LT normal    DTR:   Right Brachioradialis reflex 2+  Left Brachioradialis reflex 2+  Right Biceps reflex 3+  Left Biceps reflex 3+  Right Quadriceps reflex 2+  Left Quadriceps reflex 2+  Right Achilles reflex 1+  Left Achilles reflex 1+    No Laird's     During exma -- at leats 8 times, he would rhythmically breathe for 15 seconds -- not conversive during spells and did not seem lethargic afterwards     Laboratory/Radiology:     CBC with Differential:    Lab Results   Component Value Date    WBC 4.6 04/22/2020    RBC 4.50 04/22/2020    HGB 13.7 04/22/2020    HCT 41.1 04/22/2020     04/22/2020    MCV 91.3 04/22/2020    MCH 30.4 04/22/2020    MCHC 33.3 04/22/2020    RDW 12.7 04/22/2020    METASPCT 1.0 05/29/2018    LYMPHOPCT 27.4 04/22/2020    MONOPCT 9.8 04/22/2020    MYELOPCT 1.5 05/29/2018    BASOPCT 0.9 04/22/2020    MONOSABS 0.45 04/22/2020    LYMPHSABS 1.26 04/22/2020    EOSABS 0.10 04/22/2020    BASOSABS 0.04 04/22/2020     CMP:    Lab Results   Component Value Date     04/22/2020    K 3.8 04/22/2020    K 5.6 03/26/2020     04/22/2020    CO2 25 04/22/2020    BUN 14 04/22/2020    CREATININE 0.7 04/22/2020    GFRAA >60 04/22/2020    LABGLOM >60 04/22/2020    GLUCOSE 105 04/22/2020    PROT 7.0 04/22/2020    LABALBU 4.4 04/22/2020    CALCIUM 9.4 04/22/2020    BILITOT 0.8 04/22/2020    ALKPHOS 89 04/22/2020    AST 18 04/22/2020    ALT 31 04/22/2020     MRI Brain:  Redemonstration of postsurgical changes from prior right  frontoparietal craniotomy with stable subjacent extra-axial collection  overlying the frontoparietal convexity measuring up to 10 mm in  maximal dimension. Previous EEG (2018) unremarkable     EEG August 2019 normal     I independently reviewed the labs and imaging studies at today's appointment. 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

## 2020-07-22 ENCOUNTER — TELEPHONE (OUTPATIENT)
Dept: NEUROLOGY | Age: 45
End: 2020-07-22

## 2020-07-22 ENCOUNTER — HOSPITAL ENCOUNTER (OUTPATIENT)
Dept: NUCLEAR MEDICINE | Age: 45
Discharge: HOME | End: 2020-07-22
Payer: MEDICARE

## 2020-07-22 NOTE — TELEPHONE ENCOUNTER
Medicaid    Scheduled 7/30 @ 1 pm w/arrival time 12:45 pm Letter in office    Confirmed w/wife date/time and letter   Electronically signed by Nicole Macedo on 7/22/20 at 9:58 AM EDT

## 2020-07-28 ENCOUNTER — TELEPHONE (OUTPATIENT)
Dept: NEUROLOGY | Age: 45
End: 2020-07-28

## 2020-07-28 NOTE — TELEPHONE ENCOUNTER
Flash from Dr. Edgar Ayoub called wanting clarification on the referral sent to their office. Please advise.

## 2020-07-29 ENCOUNTER — HOSPITAL ENCOUNTER (OUTPATIENT)
Dept: PET IMAGING | Age: 45
Discharge: HOME | End: 2020-07-31
Payer: MEDICARE

## 2020-07-29 PROCEDURE — 3430000000 HC RX DIAGNOSTIC RADIOPHARMACEUTICAL: Performed by: RADIOLOGY

## 2020-07-29 PROCEDURE — A9552 F18 FDG: HCPCS | Performed by: RADIOLOGY

## 2020-07-29 RX ORDER — FLUDEOXYGLUCOSE F 18 200 MCI/ML
15 INJECTION, SOLUTION INTRAVENOUS
Status: COMPLETED | OUTPATIENT
Start: 2020-07-29 | End: 2020-07-29

## 2020-07-29 RX ADMIN — FLUDEOXYGLUCOSE F 18 15 MILLICURIE: 200 INJECTION, SOLUTION INTRAVENOUS at 13:16

## 2020-07-31 ENCOUNTER — HOSPITAL ENCOUNTER (OUTPATIENT)
Dept: NEUROLOGY | Age: 45
Discharge: HOME | End: 2020-07-31
Payer: MEDICARE

## 2020-07-31 PROCEDURE — 95816 EEG AWAKE AND DROWSY: CPT | Performed by: PSYCHIATRY & NEUROLOGY

## 2020-07-31 PROCEDURE — 95816 EEG AWAKE AND DROWSY: CPT

## 2020-07-31 NOTE — PROCEDURES
EEG report    This 50-year-old man on Keppra, methylphenidate, Protonix, baclofen, sucralfate, inhalers, melatonin and sertraline, displayed the following underlying rhythms--- disorganized, asynchronous, 5 to 8 Hz, 30 to 40 µV, polymorphic, theta and alpha rhythms in both anterior and posterior regions. Superimposed, 15 Hz, 20 to 30 µV beta rhythms were noted in both anterior and posterior regions. There was no checking reactivity to eye opening and eye closing. Hyperventilation and photic stimulation were not performed. The patient did not fall asleep. Throughout this recording, there were no focal abnormalities or epileptiform discharges. Impression--- abnormal awake EEG, with diffuse theta slowing    Comments--- this theta slowing may be seen with diffuse destructive lesions, metabolic abnormalities or drug effects. Clinical correlation was highly advised.

## 2020-08-04 ENCOUNTER — TELEPHONE (OUTPATIENT)
Dept: NEUROLOGY | Age: 45
End: 2020-08-04

## 2020-08-04 NOTE — TELEPHONE ENCOUNTER
Patient's wife Nirmala Do called in stating that Ramona Cabral had an episode. She said that per her 13year old son Ramona Cabral was breathing heavy and that his arms got stiff and his eyes were moving around funny. She wants to know if she should call 911 to take him to the hospital or what she should do. She is wanting to know if Dick Jiménez could please give her a call for some guidance. She can be reached at [phone].

## 2020-08-04 NOTE — PROGRESS NOTES
Baclofen 5 MG TABS Take 5 mg by mouth 2 times daily  10/5/19   Historical Provider, MD   sucralfate (CARAFATE) 1 GM/10ML suspension Take 1 g by mouth 4 times daily    Historical Provider, MD   albuterol (PROVENTIL) (2.5 MG/3ML) 0.083% nebulizer solution Inhale 2.5 mg into the lungs 4 times daily as needed 4/29/19   Historical Provider, MD   magnesium hydroxide (MILK OF MAGNESIA) 400 MG/5ML suspension 30 mLs by Per G Tube route daily as needed for Constipation    Historical Provider, MD   ferrous sulfate 300 (60 Fe) MG/5ML syrup 300 mg by Per G Tube route daily    Historical Provider, MD   melatonin 3 MG TABS tablet 3 mg by PEG Tube route nightly as needed    Historical Provider, MD   nystatin (MYCOSTATIN) 000392 UNIT/ML suspension Take 500,000 Units by mouth 4 times daily    Historical Provider, MD   sertraline (ZOLOFT) 25 MG tablet 25 mg by PEG Tube route every evening    Historical Provider, MD    Scheduled Meds:  Continuous Infusions:  PRN Meds:.        Recent Laboratory Data-     Lab Results   Component Value Date    WBC 5.5 08/05/2020    HGB 14.0 08/05/2020    HCT 41.8 08/05/2020    MCV 93.1 08/05/2020     08/05/2020    LYMPHOPCT 25.8 08/05/2020    RBC 4.49 08/05/2020    MCH 31.2 08/05/2020    MCHC 33.5 08/05/2020    RDW 12.7 08/05/2020    NEUTOPHILPCT 61.7 08/05/2020    MONOPCT 9.4 08/05/2020    BASOPCT 0.7 08/05/2020    NEUTROABS 3.40 08/05/2020    LYMPHSABS 1.42 (L) 08/05/2020    MONOSABS 0.52 08/05/2020    EOSABS 0.11 08/05/2020    BASOSABS 0.04 08/05/2020       Lab Results   Component Value Date     04/22/2020    K 3.8 04/22/2020     (H) 04/22/2020    CO2 25 04/22/2020    BUN 14 04/22/2020    CREATININE 0.7 04/22/2020    GLUCOSE 105 (H) 04/22/2020    CALCIUM 9.4 04/22/2020    PROT 7.0 04/22/2020    LABALBU 4.4 04/22/2020    BILITOT 0.8 04/22/2020    ALKPHOS 89 04/22/2020    AST 18 04/22/2020    ALT 31 04/22/2020    LABGLOM >60 04/22/2020    GFRAA >60 04/22/2020         Lab Results retroperitoneal mass abutting the IVC and measuring  5.5×3.9 cm within additional retrocaval node measuring 1.2 cm     Clinically In April 2018 he had stage IIB recurrent seminoma and was recommended 4 cycles of systemic chemotherapy with cis-platinum and -16. All potential side effects were discussed     Received Day #1 of Cycle #1  cis-platinum and -16  on 4/16/18 through 4/20/18 with G-CSF prophylaxis with Neulasta  He completed so far 3 cycles of chemotherapy last chemo was on 5/29/2018      2- History of fall In November 2017 with had trauma complicated by a right frontotemporal traumatic subarachnoid hemorrhage and subdural hematoma status post decompressive hemicraniectomy and duraplasty. In January 2018 he required right cranioplasty and sharp debridement of the skin incision and had external hydrocephalus. Few weeks ago he had  seizures while On vacation in [place]  He was referred to neurology and neurosurgery for evaluation of his recurrent seizures.       Received Cycle #1  cis-platinum and -16  on 4/16/18 through 4/20/18 with G-CSF prophylaxis with Neulasta      He  received  IV hydration with normal saline bolus 500 mL over one hour on 4/23/18  Received  Cycle #2 cis-platinum and -16  on May 7, 2018.     He  received Cycle #3 cis-platinum and -16 on 5/29/2018 through Saturday, June 1, 2018 .        Lately he has had worsening intractable headaches with vertigo and falls  Case discussed with Dr. Ang Guthrie  He is was  admitted for MRI of the brain and was seen by neurology and neurosurgical for re-evaluation.     MRI on 6/11 showed subacute right transverse sinus thrombus, encephalomalacia related to previous trauma, right mastoid effusion and redemonstration of s/p frontoparietal craniotomy with 10mm  Fluid collection. Neurosurgery evaluation appreciated. He is scheduled to follow up with Neurology next week.  His headache has also improved.         His hemoglobin went  down to 7.5 and he was very symptomatic with his anemia and his platelets are 78  He was transfused with 2 units of packed RBCs for symptomatic anemia and his  last cycle of chemotherapy was postponed until next week.     Received Cycle #4 cis-platinum and -16:  Started on 6/25/18  and completed on Friday, June 29, 2018 .  [facility] has achieved a complete remission without any residual pathologic lymphadenopathy    Deep Vein Thrombosis of the RLE:  -Reports improvements in pain, swelling of the RLE; no difficulty ambulating. This is his 2nd episode of DVT. His 1st episode was an December 2017 and at that time it was provoked by his surgery  -Has IVC filter in place back in [address] 2017, Was never retrieved, Antithrombin III normal at 102, platelets stable at 929; pending results of Antiphospholipid antibody titers andThrombophilia Panel  -Okay per Neurology to use anticoagulation since it has been 14 months since he had his Intracranial bleed and He is now on Lovenox        Recommend Lovenox for up to 2 weeks till hematoma in left thigh resolves prior to transitioning him to oral Eliquis or Xarelto to decrease risk of hematoma expansion         Hematoma of the LLE:  -Confirmed on CT and MRI of left thigh     History of Traumatic Brain Injury/Bleed: s/p craniectomy  -IVC Filter in place due to prior contraindication to anticoagulation and history of blood clots. Unsuccessful attempt at the retrieval of the IVC filter  -Managed with anti-seizure agents per Neurology     History of Seminoma:  -Ultrasound on 2/2/2019 was negative for mass or torsion. 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   It was explained to him that his odds for cure with surveillance only on in the vicinity of 85% with only 15% chance of relapse and if he relapses he would be cured with either para-aortic radiation or systemic chemotherapy. He is in agreement for surveillance and will have a follow-up serum LDH as well as CT scan of abdomen and pelvis at 3 month and then again at six-month and then every 6 months for 2 years  He has been reassured.     His follow-up CT scan of chest abdomen and pelvis done on 5/23/2017 were negative for recurrent or metastatic disease. Extensive left sigmoid diverticulosis was described     He was scheduled for follow up CT scans to be done in November 2017 , but because of trauma, hospitalizations and surgeries he missed his appointment and finally had a follow-up CT scan of abdomen and pelvis on 4/5/2018 which unfortunately showed recurrent disease with large retroperitoneal mass abutting the IVC and measuring 5.5×3.9 cm within additional retrocaval node measuring 1.2 cm. Clinically In April 2018 he had stage IIB recurrent seminoma and was recommended 4 cycles of systemic chemotherapy with cis-platinum and -16. He had a follow-up CT Chest, Abdomen and Pelvis on 10/28/2018  Were negative  He remains in remission. Had a CT scan of  Abdomen and Pelvis on 1/9/19 Which showed suggestion of cystitis as well as diverticulosis. Was hospitalized several months ago with DVT and was on anticoagulation with Xarelto which resulted in recurrent subarachnoid hemorrhage and anticoagulants have been permanently discontinued  He has had craniotomy and cranioplasty and evacuation of his bleed and appears much slower and is undergoing rehab    His last CT scan of abdomen and pelvis April 2019 showed no evidence of recurrent or metastatic disease  Tumor markers will be rechecked today and     He had a  follow-up CT Chest, Abdomen and Pelvis on October 10,2019  It showed stool impaction but no evidence of lymphadenopathy or disease recurrence    His Colace was increased to twice daily for constipation  Will continue on PT and rehab. Pati Hines     He completed rehab at [facility] and was going to go to out-patient PT but cannot go due to coronavirus closing the facility. Was seen for follow up appointment with Dr. Cathi Nelson 2/14/2020. CT Head was stable. Will return in 1 year for follow up. His follow-up CT Chest, Abdomen and Pelvis done on 4/20/2020 were reviewed. The chest was unremarkable. CT scan of abdomen and pelvis shows slight retroperitoneal lymphadenopathy nonpathologic by size largest measuring 1.6 x 1.2 cm slightly more prominent than prior likely reactive. Doubt recurrent malignancy. His serum LDH is normal.       He  had a follow-up PET CT scan on 7/29/2020 and it was completely negative without any abnormal metabolic activity in his retroperitoneal node and he is in clinical remission. To continue surveillance. Edel Matthew. Andres Jean Baptiste M.D., F.A.C.P.   Electronically signed 8/5/2020 at 9:43 AM

## 2020-08-05 ENCOUNTER — OFFICE VISIT (OUTPATIENT)
Dept: ONCOLOGY | Age: 45
End: 2020-08-05
Payer: MEDICARE

## 2020-08-05 ENCOUNTER — HOSPITAL ENCOUNTER (OUTPATIENT)
Dept: INFUSION THERAPY | Age: 45
Discharge: HOME | End: 2020-08-05
Payer: MEDICARE

## 2020-08-05 VITALS
TEMPERATURE: 97.1 F | OXYGEN SATURATION: 100 % | DIASTOLIC BLOOD PRESSURE: 62 MMHG | HEART RATE: 55 BPM | SYSTOLIC BLOOD PRESSURE: 104 MMHG

## 2020-08-05 DIAGNOSIS — C62.91 SEMINOMA OF RIGHT TESTIS, STAGE 1 (HCC): Primary | ICD-10-CM

## 2020-08-05 DIAGNOSIS — S06.9XAS TRAUMATIC BRAIN INJURY WITH DEPRESSED FRONTAL SKULL FRACTURE, SEQUELA (HCC): ICD-10-CM

## 2020-08-05 DIAGNOSIS — S02.0XXS TRAUMATIC BRAIN INJURY WITH DEPRESSED FRONTAL SKULL FRACTURE, SEQUELA (HCC): ICD-10-CM

## 2020-08-05 DIAGNOSIS — D40.11 NEOPLASM OF UNCERTAIN BEHAVIOR OF RIGHT TESTIS: ICD-10-CM

## 2020-08-05 LAB
ALBUMIN SERPL-MCNC: 4 G/DL (ref 3.5–5.2)
ALP BLD-CCNC: 82 U/L (ref 40–129)
ALT SERPL-CCNC: 32 U/L (ref 0–40)
ANION GAP SERPL CALCULATED.3IONS-SCNC: 11 MMOL/L (ref 7–16)
AST SERPL-CCNC: 23 U/L (ref 0–39)
BASOPHILS ABSOLUTE: 0.04 E9/L (ref 0–0.2)
BASOPHILS RELATIVE PERCENT: 0.7 % (ref 0–2)
BILIRUB SERPL-MCNC: 0.6 MG/DL (ref 0–1.2)
BUN BLDV-MCNC: 22 MG/DL (ref 6–20)
CALCIUM SERPL-MCNC: 9.3 MG/DL (ref 8.6–10.2)
CHLORIDE BLD-SCNC: 108 MMOL/L (ref 98–107)
CO2: 25 MMOL/L (ref 22–29)
CREAT SERPL-MCNC: 0.7 MG/DL (ref 0.7–1.2)
EOSINOPHILS ABSOLUTE: 0.11 E9/L (ref 0.05–0.5)
EOSINOPHILS RELATIVE PERCENT: 2 % (ref 0–6)
GFR AFRICAN AMERICAN: >60
GFR NON-AFRICAN AMERICAN: >60 ML/MIN/1.73
GLUCOSE BLD-MCNC: 94 MG/DL (ref 74–99)
HCT VFR BLD CALC: 41.8 % (ref 37–54)
HEMOGLOBIN: 14 G/DL (ref 12.5–16.5)
IMMATURE GRANULOCYTES #: 0.02 E9/L
IMMATURE GRANULOCYTES %: 0.4 % (ref 0–5)
LACTATE DEHYDROGENASE: 155 U/L (ref 135–225)
LYMPHOCYTES ABSOLUTE: 1.42 E9/L (ref 1.5–4)
LYMPHOCYTES RELATIVE PERCENT: 25.8 % (ref 20–42)
MCH RBC QN AUTO: 31.2 PG (ref 26–35)
MCHC RBC AUTO-ENTMCNC: 33.5 % (ref 32–34.5)
MCV RBC AUTO: 93.1 FL (ref 80–99.9)
MONOCYTES ABSOLUTE: 0.52 E9/L (ref 0.1–0.95)
MONOCYTES RELATIVE PERCENT: 9.4 % (ref 2–12)
NEUTROPHILS ABSOLUTE: 3.4 E9/L (ref 1.8–7.3)
NEUTROPHILS RELATIVE PERCENT: 61.7 % (ref 43–80)
PDW BLD-RTO: 12.7 FL (ref 11.5–15)
PLATELET # BLD: 204 E9/L (ref 130–450)
PMV BLD AUTO: 8.7 FL (ref 7–12)
POTASSIUM SERPL-SCNC: 4.1 MMOL/L (ref 3.5–5)
RBC # BLD: 4.49 E12/L (ref 3.8–5.8)
SODIUM BLD-SCNC: 144 MMOL/L (ref 132–146)
TOTAL PROTEIN: 7 G/DL (ref 6.4–8.3)
WBC # BLD: 5.5 E9/L (ref 4.5–11.5)

## 2020-08-05 PROCEDURE — 85025 COMPLETE CBC W/AUTO DIFF WBC: CPT

## 2020-08-05 PROCEDURE — 83615 LACTATE (LD) (LDH) ENZYME: CPT

## 2020-08-05 PROCEDURE — 2580000003 HC RX 258: Performed by: INTERNAL MEDICINE

## 2020-08-05 PROCEDURE — 99214 OFFICE O/P EST MOD 30 MIN: CPT | Performed by: INTERNAL MEDICINE

## 2020-08-05 PROCEDURE — 80053 COMPREHEN METABOLIC PANEL: CPT

## 2020-08-05 PROCEDURE — 6360000002 HC RX W HCPCS: Performed by: INTERNAL MEDICINE

## 2020-08-05 RX ORDER — SODIUM CHLORIDE 0.9 % (FLUSH) 0.9 %
10 SYRINGE (ML) INJECTION PRN
Status: DISCONTINUED | OUTPATIENT
Start: 2020-08-05 | End: 2020-08-06 | Stop reason: HOSPADM

## 2020-08-05 RX ORDER — HEPARIN SODIUM (PORCINE) LOCK FLUSH IV SOLN 100 UNIT/ML 100 UNIT/ML
500 SOLUTION INTRAVENOUS PRN
Status: DISCONTINUED | OUTPATIENT
Start: 2020-08-05 | End: 2020-08-06 | Stop reason: HOSPADM

## 2020-08-05 RX ORDER — HEPARIN SODIUM (PORCINE) LOCK FLUSH IV SOLN 100 UNIT/ML 100 UNIT/ML
500 SOLUTION INTRAVENOUS PRN
Status: CANCELLED | OUTPATIENT
Start: 2020-08-05

## 2020-08-05 RX ORDER — SODIUM CHLORIDE 0.9 % (FLUSH) 0.9 %
10 SYRINGE (ML) INJECTION PRN
Status: CANCELLED | OUTPATIENT
Start: 2020-08-05

## 2020-08-05 RX ADMIN — Medication 500 UNITS: at 09:03

## 2020-08-05 RX ADMIN — SODIUM CHLORIDE, PRESERVATIVE FREE 10 ML: 5 INJECTION INTRAVENOUS at 09:03

## 2020-08-05 NOTE — PROGRESS NOTES
Patient presents to clinic for labs today. Left chest  SQ port accessed per policy using 73I 2.26\" Keller needle for good blood return. Aspirate for waste and specimen sent to lab. Site flushed easily with 10 mL NSS followed by 5 mL Heparin solution 100 units/ml rinse prior to de-access. Dry sterile dressing to area. Tolerated procedure well. Encouraged to schedule port flush every 4 weeks.

## 2020-08-06 ENCOUNTER — OFFICE VISIT (OUTPATIENT)
Dept: PHYSICAL MEDICINE AND REHAB | Age: 45
End: 2020-08-06
Payer: MEDICARE

## 2020-08-06 VITALS — SYSTOLIC BLOOD PRESSURE: 108 MMHG | HEIGHT: 71 IN | BODY MASS INDEX: 28.59 KG/M2 | DIASTOLIC BLOOD PRESSURE: 64 MMHG

## 2020-08-06 PROCEDURE — 99204 OFFICE O/P NEW MOD 45 MIN: CPT | Performed by: PHYSICAL MEDICINE & REHABILITATION

## 2020-08-06 PROCEDURE — 1036F TOBACCO NON-USER: CPT | Performed by: PHYSICAL MEDICINE & REHABILITATION

## 2020-08-06 PROCEDURE — G8419 CALC BMI OUT NRM PARAM NOF/U: HCPCS | Performed by: PHYSICAL MEDICINE & REHABILITATION

## 2020-08-06 PROCEDURE — G8427 DOCREV CUR MEDS BY ELIG CLIN: HCPCS | Performed by: PHYSICAL MEDICINE & REHABILITATION

## 2020-08-06 NOTE — PROGRESS NOTES
Clarice Dubois, [address] [facility] Physical Medicine and Rehabilitation  [address]  Phone: [phone]  Fax: [phone]    PCP: Irina Muñoz MD  Date of visit: 8/6/20    Chief Complaint   Patient presents with    Head Injury     New Patient       Dear Thiago Carcamo,     Thank you for referring your patient to be seen. As you know,  Claus Curry is a 54 y.o. male with past medical history as below who presents with history of traumatic brain injury, s/p SDH/ICH with crani in 2017. He was discharged at that time from the hospital to [facility] and then eventually home. He was back to driving. He unfortunately then suffered a large left SDH s/p crani, artificial bone flap in 2019. He then went to [facility] again for acute rehab and then spent some time at [facility] from [place] through January. He was referred to [facility] again but then had home PT with [facility]. He completed PT/OT/ST and was referred to [facility] outpatient therapies but covid hit. He recently completed home physical therapy and outpatient therapy was recommended at [facility] again. He lives in a ranch style home, ramp to enter. He is full assist for all care except to eat, nectar thick. He denies pain. He has a PEG tube and mediport. He is incontinent. He is on oral baclofen for spasticity. She is looking into a baclofen pump. He has been experiencing seizures recently. No Known Allergies    Current Outpatient Medications   Medication Sig Dispense Refill    levETIRAcetam (KEPPRA) 100 MG/ML solution 1000mg BID (Patient taking differently: Indications: pt is taken 15mg twice a day 1000mg BID) 1 Bottle 3    ondansetron (ZOFRAN-ODT) 4 MG disintegrating tablet Take 1 tablet by mouth every 6 hours as needed for Nausea or Vomiting 20 tablet 0    DOCU 50 MG/5ML liquid       Nutritional Supplements (BOOST) LIQD       methylphenidate (RITALIN) 5 MG tablet 5 mg 2 times daily.  pantoprazole (PROTONIX) 40 MG tablet Take 40 mg by mouth daily      Baclofen 5 MG TABS Take 5 mg by mouth 2 times daily       sucralfate (CARAFATE) 1 GM/10ML suspension Take 1 g by mouth 4 times daily      albuterol (PROVENTIL) (2.5 MG/3ML) 0.083% nebulizer solution Inhale 2.5 mg into the lungs 4 times daily as needed      magnesium hydroxide (MILK OF MAGNESIA) 400 MG/5ML suspension 30 mLs by Per G Tube route daily as needed for Constipation      ferrous sulfate 300 (60 Fe) MG/5ML syrup 300 mg by Per G Tube route daily      melatonin 3 MG TABS tablet 3 mg by PEG Tube route nightly as needed      nystatin (MYCOSTATIN) 819401 UNIT/ML suspension Take 500,000 Units by mouth 4 times daily      sertraline (ZOLOFT) 25 MG tablet 25 mg by PEG Tube route every evening       No current facility-administered medications for this visit.         Past Medical History:   Diagnosis Date    Cancer ([facility] [address]     Chronic back pain     Diverticulitis     History of blood transfusion     Hx of blood clots     Hyperlipidemia     had at age 24, not a current issue    Restless legs syndrome     Scrotal mass     right, for or 2/3/2017    Seizure ([facility] [address] [facility]     Testicular cancer [facility])        Past Surgical History:   Procedure Laterality Date    COLONOSCOPY      CRANIOPLASTY Right 01/08/2018    Right Crainioplasty    CRANIOPLASTY Left 6/28/2019    LEFT CRANIOPLASTY WITH LETA IMPLANT performed by Kami Ryan MD at [address] [address]  12/10/2018    Dr Norberto Thompson - IVC Filter Retrieval - Unsuccessful    IN INSJ TUNNELED CTR VAD W/SUBQ PORT AGE 5 YR/> Left 5/17/2018    MEDIPORT CATHETER INSERTION performed by Tien Feldman MD at [facility] Right 02/03/2017    right radical orchiectomy    UPPER GASTROINTESTINAL ENDOSCOPY N/A 7/1/2019    EGD ESOPHAGOGASTRODUODENOSCOPY performed by Jasmin Dill MD at [facility] ENDOSCOPY       Family History   Problem Relation Age of Onset    Scoliosis Mother     Schizophrenia Father     Scoliosis Father     Cancer Sister         cervical    No Known Problems Brother        Social History     Tobacco Use    Smoking status: Former Smoker     Types: Cigars     Start date: 2/2/1995    Smokeless tobacco: Never Used    Tobacco comment: social smoker, occassional for 10yrs   Substance Use Topics    Alcohol use: No    Drug use: No           ROS: some ROS answered by his wife    Constitutional: Denies fevers, chills, night sweats    Skin: Denies rash or skin changes     Eyes: Denies vision change    Respiratory: Denies SOB or cough     Cardiovascular: Denies CP, palpitations, edema      Gastrointestinal: Denies abdominal pain    Genitourinary: incontinent     Neurologic: See HPI.     MSK: See HPI. Psychiatric: increase in drowsiness    Hematologic/Lymphatic/Immunologic: Denies bruising       Physical Exam:   Blood pressure 108/64, height 5' 11\" (1.803 m). General: well developed and well nourished in no acute distress. Body habitus is thin  HEENT: No rhinorrhea, sneezing, yawning, or lacrimation. No scleral icterus or conjunctival injection. Resp: symmetrical chest expansion, unlabored breathing, respirations unlabored. CV: Heart rate is regular. Peripheral pulses are palpable  Lymph: No visible regional lymphadenopathy. Skin: No rashes or ecchymosis. Normal turgor. Ext: No edema noted     Neurological Exam:  Oriented to person. Not to place or time. Speech is dysarthric      Strength:   R  L  Deltoid   4 4  Biceps   4 4  Triceps  4 4  Hip Flex  1 1  Knee Ext  1 1  Ankle dorsi  1 1  EHL   1 1  Ankle Plantar  1 1    Sensory:  Intact for light touch in all upper and lower extremity dermatomes. Reflexes:   R  L  Biceps   (3+) (3+)  Triceps  (3+) (3+)  BR   (3+) (3+)  Patellar  (3+) (3+)    Spasticity BUE and BLE         Impression:   Tyrell Jimenez is a 54 y.o. male     1. Traumatic brain injury with loss of consciousness, sequela ([facility] [address]    2. Spasticity    3. Spastic quadriparesis ([address]        Plan:   · I had a long discussion with Tl's wife about the prognosis of his diagnosis which is poor. Discussed the idea of \"if you don't lose it, you lose it\" and how important it is for Wade Singh to have maintenance therapy. I recommended outpatient therapy, PT/OT/ST as recommended by home therapy. I recommend he go to [facility] as they are familiar with him. I placed a referral. He should follow with physiatrist there as a collaborative rehab team.      The patient was educated about the diagnosis, prognosis, indications, risks and benefits of treatment. An opportunity to ask questions was given to the patient and questions were answered. The patient agreed to proceed with the recommended treatment as described above. Thank you for the consultation and for allowing me to participate in the care of this patient.         Sincerely,     Lady Monique DO, [facility]   Board Certified Physical Medicine and Rehabilitation

## 2020-08-11 RX ORDER — LEVETIRACETAM 100 MG/ML
SOLUTION ORAL
Qty: 600 ML | Refills: 0 | Status: SHIPPED
Start: 2020-08-11 | End: 2020-08-18 | Stop reason: SDUPTHER

## 2020-08-14 ENCOUNTER — APPOINTMENT (OUTPATIENT)
Dept: GENERAL RADIOLOGY | Age: 45
End: 2020-08-14
Payer: MEDICARE

## 2020-08-14 ENCOUNTER — HOSPITAL ENCOUNTER (EMERGENCY)
Age: 45
Discharge: HOME | End: 2020-08-14
Attending: EMERGENCY MEDICINE
Payer: MEDICARE

## 2020-08-14 VITALS
HEART RATE: 45 BPM | DIASTOLIC BLOOD PRESSURE: 66 MMHG | WEIGHT: 172.6 LBS | RESPIRATION RATE: 16 BRPM | BODY MASS INDEX: 24.07 KG/M2 | OXYGEN SATURATION: 100 % | SYSTOLIC BLOOD PRESSURE: 95 MMHG | TEMPERATURE: 97.7 F

## 2020-08-14 PROCEDURE — 43762 RPLC GTUBE NO REVJ TRC: CPT

## 2020-08-14 PROCEDURE — 6360000002 HC RX W HCPCS

## 2020-08-14 PROCEDURE — 99284 EMERGENCY DEPT VISIT MOD MDM: CPT

## 2020-08-14 PROCEDURE — 74018 RADEX ABDOMEN 1 VIEW: CPT

## 2020-08-14 PROCEDURE — 6360000004 HC RX CONTRAST MEDICATION: Performed by: PHYSICIAN ASSISTANT

## 2020-08-14 PROCEDURE — 99285 EMERGENCY DEPT VISIT HI MDM: CPT

## 2020-08-14 RX ORDER — LORAZEPAM 2 MG/ML
INJECTION INTRAMUSCULAR
Status: COMPLETED
Start: 2020-08-14 | End: 2020-08-14

## 2020-08-14 RX ORDER — SODIUM CHLORIDE 0.9 % (FLUSH) 0.9 %
SYRINGE (ML) INJECTION
Status: DISCONTINUED
Start: 2020-08-14 | End: 2020-08-14 | Stop reason: HOSPADM

## 2020-08-14 RX ADMIN — LORAZEPAM 1 MG: 2 INJECTION, SOLUTION INTRAMUSCULAR; INTRAVENOUS at 12:34

## 2020-08-14 RX ADMIN — DIATRIZOATE MEGLUMINE AND DIATRIZOATE SODIUM 30 ML: 600; 100 SOLUTION ORAL; RECTAL at 14:36

## 2020-08-14 NOTE — ED PROVIDER NOTES
ED Attending  CC: Myriam     Department of Emergency Medicine   ED  Provider Note  Admit Date/RoomTime: 8/14/2020 11:05 AM  ED Room: 19/19   Chief Complaint   Other (PEG tube dislodged )    History of Present Illness   Source of history provided by:  patient. History/Exam Limitations: chronic condition and communication barrier. Ebonie Baker is a 54 y.o. old male with a past medical history of:   Past Medical History:   Diagnosis Date    Cancer ([address]     Chronic back pain     Diverticulitis     History of blood transfusion     Hx of blood clots     Hyperlipidemia     had at age 24, not a current issue    Restless legs syndrome     Scrotal mass     right, for or 2/3/2017    Seizure [facility])     Testicular cancer [facility])       presents to the emergency department from home with support, for Gastrostomy tube evaluation due to Spontaneous dislodgement, which occured last night during his sleep last night prior to arrival.  He has a permanent Gastrostomy tube. The patient has no associated symptoms. ROS   Pertinent positives and negatives are stated within HPI, all other systems reviewed and are negative. Past Surgical History:   Procedure Laterality Date    COLONOSCOPY      CRANIOPLASTY Right 01/08/2018    Right Crainioplasty    CRANIOPLASTY Left 6/28/2019    LEFT CRANIOPLASTY WITH LETA IMPLANT performed by Zita Lilly MD at [address] [place] [address]  12/10/2018    Dr Jessica Vance - IVC Filter Retrieval - Unsuccessful    NC INSJ TUNNELED CTR VAD W/SUBQ PORT AGE 5 YR/> Left 5/17/2018    MEDIPORT CATHETER INSERTION performed by Nick Wisdom MD at [place] [address] Right 02/03/2017    right radical orchiectomy    UPPER GASTROINTESTINAL ENDOSCOPY N/A 7/1/2019    EGD ESOPHAGOGASTRODUODENOSCOPY performed by Sebastien Price MD at [address] [place] [address] History:  reports that he has quit smoking. His smoking use included cigars. He started smoking about 25 years ago.  He has never used smokeless tobacco. He reports that he does not drink alcohol or use drugs. Family History: family history includes Cancer in his sister; No Known Problems in his brother; Schizophrenia in his father; Scoliosis in his father and mother. Allergies: Patient has no known allergies. Physical Exam         ED Triage Vitals   BP Temp Temp src Pulse Resp SpO2 Height Weight   -- -- -- -- -- -- -- --     Oxygen Saturation Interpretation: Normal.    Constitutional:  Alert, development consistent with age. Neck:  Normal ROM. Supple. Respiratory:  Clear to auscultation and breath sounds equal.    CV: Regular rate and rhythm, normal heart sounds, without pathological murmurs, ectopy, gallops, or rubs. GI:  There is a/an absent feeding tube located right upper quadrant. The ostomy site is without signs of infection. There is no unusual drainage at ostomy site. The remainder of abdomen is soft, nontender, with good bowel sounds. No firm or pulsatile mass. Integument:  No rashes, erythema present. Lymphatics: No lymphangitis or adenopathy noted. Neurological:  Oriented. Motor functions intact. Lab / Imaging Results   (All laboratory and radiology results have been personally reviewed by myself)  Labs:  No results found for this visit on 08/14/20. Imaging: All Radiology results interpreted by Radiologist unless otherwise noted. XR ABDOMEN FOR NG/OG/NE TUBE PLACEMENT   Final Result   1. The PEG tube is confirmed within the stomach.              ED Course / Medical Decision Making     Medications   sodium chloride flush 0.9 % injection (  Canceled Entry 8/14/20 1245)   diatrizoate meglumine-sodium (GASTROGRAFIN) 66-10 % solution 30 mL (30 mLs PEG Tube Given 8/14/20 1436)   LORazepam (ATIVAN) 2 MG/ML injection (1 mg  Given 8/14/20 1234)     ED Course as of Aug 14 1445   Fri Aug 14, 2020   1248 ATTENDING PROVIDER ATTESTATION:     Jo Brandon presented to the emergency department for evaluation of [unfilled]  I have reviewed and discussed the case, including pertinent history (medical, surgical, family and social) and exam findings with the Midlevel and the Nurse assigned to Ruy Del Rosario. I have personally performed and/or participated in the history, exam, medical decision making, and procedures and agree with all pertinent clinical information. I have reviewed my findings and recommendations with Ruy Del Rosario and members of family present at the time of disposition. My findings/plan: Patient is a 50-year-old male who presents with a dislodged PEG tube. His wife is at bedside who is his primary caregiver. Patient does use his PEG tube for 90% of his intake. The wife states that around midnight the PEG tube may have been dislodged. The patient did have a PEG tube since 2019, it was replaced in March 2020. They have had no complications since. On my examination the patient is contracted. Clear breath sounds in all lung fields. Bradycardia, regular rhythm. No abdominal tenderness palpation. Mild blood coming from the PEG tube site after attempts made by the midlevel provider to reinsert the PEG tube. Abdomen is soft, nondistended. No guarding rigidity. Patient is at his normal baseline. I did try multiple attempts at placing a replacement PEG tube but was unsuccessful with a 20 [place]. I can place a coudé 18 Burmese catheter and with no problem, however, I am not able to place an 25 Burmese PEG tube. Call was placed to general surgery. Esthela Frias DO      [MS]   [phone] with Antoinette Huerta. Discussed case. He will be down to the emergency department to assist in replacing the PEG tube, he requests curved hemostats. He will be down soon. [MS]      ED Course User Index  [MS] Laurel Royal DO     Re-examinations:  8/14/2020  Time: 18  Dr. Toribio Ochoa at bedside at this time. Consult(s):   None See Dr. Luis Miguel Joseph ED course note [phone].      Procedure(s):  PROCEDURE NOTE  8/14/20       Time: 1100  FEEDING TUBE REPLACEMENT  Risks, benefits and alternatives (for applicable procedures below) described. Performed By: Mandy Snell PA-C. Indication: Tube fell out. Informed consent obtained: yes   Procedure: A feeding tube was unable to be replaced and a consult was obtained. Post-Procedure: n/a. Complications:  None. PROCEDURE  8/14/20       Time: 1130    FEEDING TUBE REPLACEMENT  Risks, benefits and alternatives (for applicable procedures below) described. Performed By: Dr. Melba Osgood. Indication: Tube fell out. Informed consent obtained: yes   Procedure: A feeding tube was unable to be replaced and a consult was obtained. Post-Procedure: n/a. Complications:  None. MDM:   Patient presents to the emergency department because his J-tube spontaneously dislodged. General surgery was consulted and the patient's feeding tube was replaced at bedside. X-ray confirms satisfactory placement. Specific conditions for emergent return have been discussed and the patient verbalized understanding to return immediately for new or worsening symptoms. Counseling: The emergency provider has spoken with the patient and wife and discussed todays results, in addition to providing specific details for the plan of care and counseling regarding the diagnosis and prognosis. Questions are answered at this time and they are agreeable with the plan. Assessment      1. PEG tube malfunction [facility])       Plan   Discharge to home  Patient condition is good    New Medications     New Prescriptions    No medications on file     Electronically signed by Mandy Snell PA-C   DD: 8/14/20  **This report was transcribed using voice recognition software. Every effort was made to ensure accuracy; however, inadvertent computerized transcription errors may be present.   END OF ED PROVIDER NOTE        Mandy Snell PA-C  08/14/20 [id number]

## 2020-08-14 NOTE — ED NOTES
Instructed for staff to arrange transportation home when ready per family request. [facility]ing [facility].      Esteban Andrade RN  08/14/20 [id number]

## 2020-08-14 NOTE — ED NOTES
Lunch tray provided while awaiting his transport home. Ate 100%.       Nancy Cuellar RN  08/14/20 7785

## 2020-08-14 NOTE — ED NOTES
Dr. Bandar Orta here to attempt to insert PEG tube at bedside.      Esteban Andrade RN  08/14/20 7784

## 2020-08-14 NOTE — ED NOTES
Visitor has the home PEG tube here, it is a 20 F 7-10 ML balloon. Call to [facility] and to set appropriate tube aside. The area is scabbed over, there is no redness to site. No drainage. Patient denies pain. He states he took his am medications, assuming the PEG is supplemental only, the patient is not 100% reliant on his tube for nutritional purposes.       Mylinda Sicard, RN  08/14/20 6054

## 2020-08-14 NOTE — ED NOTES
[facility] here. Discharge instructions reviewed with family member and verbalized understanding.       Toby Harris RN  08/14/20 3027

## 2020-08-16 ENCOUNTER — APPOINTMENT (OUTPATIENT)
Dept: GENERAL RADIOLOGY | Age: 45
End: 2020-08-16
Payer: MEDICARE

## 2020-08-16 ENCOUNTER — HOSPITAL ENCOUNTER (EMERGENCY)
Age: 45
Discharge: HOME | End: 2020-08-16
Attending: EMERGENCY MEDICINE
Payer: MEDICARE

## 2020-08-16 VITALS
RESPIRATION RATE: 16 BRPM | TEMPERATURE: 98 F | HEART RATE: 50 BPM | OXYGEN SATURATION: 98 % | SYSTOLIC BLOOD PRESSURE: 95 MMHG | DIASTOLIC BLOOD PRESSURE: 63 MMHG

## 2020-08-16 PROCEDURE — 43762 RPLC GTUBE NO REVJ TRC: CPT

## 2020-08-16 PROCEDURE — 99283 EMERGENCY DEPT VISIT LOW MDM: CPT

## 2020-08-16 PROCEDURE — 6360000004 HC RX CONTRAST MEDICATION: Performed by: RADIOLOGY

## 2020-08-16 PROCEDURE — 74018 RADEX ABDOMEN 1 VIEW: CPT

## 2020-08-16 RX ADMIN — DIATRIZOATE MEGLUMINE AND DIATRIZOATE SODIUM 50 ML: 600; 100 SOLUTION ORAL; RECTAL at 16:14

## 2020-08-16 ASSESSMENT — ENCOUNTER SYMPTOMS
COUGH: 0
EYE DISCHARGE: 0
EYE PAIN: 0
NAUSEA: 0
SINUS PRESSURE: 0
BACK PAIN: 0
EYE REDNESS: 0
SORE THROAT: 0
VOMITING: 0
SHORTNESS OF BREATH: 0
DIARRHEA: 0
WHEEZING: 0
ABDOMINAL PAIN: 0

## 2020-08-18 ENCOUNTER — VIRTUAL VISIT (OUTPATIENT)
Dept: NEUROLOGY | Age: 45
End: 2020-08-18
Payer: MEDICARE

## 2020-08-18 PROCEDURE — 99443 PR PHYS/QHP TELEPHONE EVALUATION 21-30 MIN: CPT | Performed by: CLINICAL NURSE SPECIALIST

## 2020-08-18 RX ORDER — LORAZEPAM 0.5 MG/1
TABLET ORAL
COMMUNITY
Start: 2020-07-28 | End: 2022-02-09 | Stop reason: SDUPTHER

## 2020-08-18 RX ORDER — LACOSAMIDE 50 MG/1
50 TABLET ORAL 2 TIMES DAILY
Qty: 60 TABLET | Refills: 2 | Status: SHIPPED
Start: 2020-08-18 | End: 2020-11-09 | Stop reason: SDUPTHER

## 2020-08-18 RX ORDER — BACLOFEN 10 MG/1
TABLET ORAL
COMMUNITY
Start: 2020-07-25 | End: 2021-02-22 | Stop reason: SDUPTHER

## 2020-08-18 RX ORDER — LEVETIRACETAM 100 MG/ML
SOLUTION ORAL
Qty: 600 ML | Refills: 0 | Status: SHIPPED
Start: 2020-08-18 | End: 2020-10-12 | Stop reason: SDUPTHER

## 2020-08-18 NOTE — PROGRESS NOTES
Kathi Guerin was read the following message We want to confirm that, for purposes of billing, this is a virtual visit with your provider for which we will submit a claim for reimbursement with your insurance company. You will be responsible for any copays, coinsurance amounts or other amounts not covered by your insurance company. If you do not accept this, unfortunately we will not be able to schedule a virtual visit with the provider. Do you accept? Dangelo Bell responded Yes .

## 2020-08-18 NOTE — PROGRESS NOTES
Trell Willis is a 54 y.o. right handed man    evaluated via telephone on 8/18/2020. Consent:  He and/or health care decision maker is aware that that he may receive a bill for this telephone service, depending on his insurance coverage, and has provided verbal consent to proceed: Yes    I affirm this is a Patient Initiated Episode with an Established Patient who has not had a related appointment within my department in the past 7 days or scheduled within the next 24 hours. The patient's identity was verified at the start of the call. Others involved in call: wife Melody Flores     Total Time: minutes: 21-30 minutes    Consent:  The patient and/or health care decision maker is aware that that he may receive a bill for this telephone service, depending on his insurance coverage, and has provided verbal consent to proceed: Yes      Patient advised regarding steps to help prevent the spread of COVID-19   SOURCE - https://janette-james.info/. html     1-Stay home except to get medical care  2-Clean your hands often for atleast 20 secnds, avoid touching: Avoid touching your eyes, nose, and mouth with unwashed hands. 3-Seek medical attention: Seek prompt medical attention if your illness is worsening (e.g., difficulty breathing). Call you doctor first.  3-Wear a facemask if you are sick   4-Cover your coughs and sneezes          Note: not billable if this call serves to triage the patient into an appointment for the relevant concern    Patient was doing well until the evening of the [place]. He was drinking heavily and fell down his basement stairs. He was there all night and in the morning his wife called EMS. He had right SDH/ICH and underwent a crani at the time. He was started on Keppra BID prophylaxis but his wife states he was in charge of his own medications. He was noncompliant with his morning dose but would take his evening dose.   He and his wife drove to the [employer] and arrived early morning at the end of April 2018, that day he felt \"funny\" asked his daughter to get his wife and she saw his face shaking   This continued until he had a full GTC seizure  He was [facility] and was placed on numerous AEDs, including Keppra 1500mg BID, Vimpat 150mg BID and Dilantin 100mg BID  Unfortunately, he returned to my office last appt only on Keppra   He was reportedly seizure free therefore medications were not adjusted     Wife was reporting periods of confusion/spacing out and not paying attention to her   She also notes heavy breathing -- rhythmically for 10-20 seconds   He did this a number of times during our appt earlier this month -- we titrated his Keppra and he is back to 1500mg BID   We obtained EEG which did not reveal seizure activity     Unfortunately, he during the course of recovery, he was dx with testicular cancer and underwent chemotherapy   During the course of tx, he developed a DVT and needed oral anticoagulation for 6 months    He was entertaining the idea of returning to work 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   He was in [facility] at a [facility] when he suddenly collapsed    No seizure reported at the time    CT demonstrated a large left SDH    He underwent crani and his bone was replaced with an artificial   No seizures during admission and his wife reports continues EEG    I was able to review and confirm there was no seizure activity witnessed on cEEG    His wife is an excellent historian    States he is much more lethargic since increasing Keppra  Open to adding alt agent if we can lower Keppra dose -- again, previously on 3 AEDs   She does occasionally have to give him Ativan during day for seizure breakthroughs     Looking for new PCP -- agreeable to Malina    He followed with PM&R and is to undergo PT evaluation and possible inpatient rehab stay     No chest pain or palpitations  No SOB  No vertigo, lightheadedness or loss of consciousness  No falls, tripping or stumbling  No incontinence of bowels or bladder  No itching or bruising appreciated    ROS otherwise negative     Prior to Visit Medications    Medication Sig Taking? Authorizing Provider   levETIRAcetam (KEPPRA) 100 MG/ML solution TAKE 10 MLS BY MOUTH TWICE A DAY  JOANA Taveras   ondansetron (ZOFRAN-ODT) 4 MG disintegrating tablet Take 1 tablet by mouth every 6 hours as needed for Nausea or Vomiting  Andreina Mercado DO   DOCU 50 MG/5ML liquid   Historical Provider, MD   Nutritional Supplements (BOOST) LIQD   Historical Provider, MD   methylphenidate (RITALIN) 5 MG tablet 5 mg 2 times daily.    Historical Provider, MD   pantoprazole (PROTONIX) 40 MG tablet Take 40 mg by mouth daily  Historical Provider, MD   Baclofen 5 MG TABS Take 5 mg by mouth 2 times daily   Historical Provider, MD   sucralfate (CARAFATE) 1 GM/10ML suspension Take 1 g by mouth 4 times daily  Historical Provider, MD   albuterol (PROVENTIL) (2.5 MG/3ML) 0.083% nebulizer solution Inhale 2.5 mg into the lungs 4 times daily as needed  Historical Provider, MD   magnesium hydroxide (MILK OF MAGNESIA) 400 MG/5ML suspension 30 mLs by Per G Tube route daily as needed for Constipation  Historical Provider, MD   ferrous sulfate 300 (60 Fe) MG/5ML syrup 300 mg by Per G Tube route daily  Historical Provider, MD   melatonin 3 MG TABS tablet 3 mg by PEG Tube route nightly as needed  Historical Provider, MD   nystatin (MYCOSTATIN) 986353 UNIT/ML suspension Take 500,000 Units by mouth 4 times daily  Historical Provider, MD   sertraline (ZOLOFT) 25 MG tablet 25 mg by PEG Tube route every evening  Historical Provider, MD     Allergies as of 08/18/2020    (No Known Allergies)     Objective:          Laboratory/Radiology:     CBC with Differential:    Lab Results   Component Value Date    WBC 5.5 08/05/2020    RBC 4.49 08/05/2020    HGB 14.0 08/05/2020    HCT 41.8 08/05/2020     08/05/2020    MCV 93.1 08/05/2020    MCH 31.2 08/05/2020    MCHC 33.5 08/05/2020    RDW 12.7 08/05/2020    METASPCT 1.0 05/29/2018    LYMPHOPCT 25.8 08/05/2020    MONOPCT 9.4 08/05/2020    MYELOPCT 1.5 05/29/2018    BASOPCT 0.7 08/05/2020    MONOSABS 0.52 08/05/2020    LYMPHSABS 1.42 08/05/2020    EOSABS 0.11 08/05/2020    BASOSABS 0.04 08/05/2020     CMP:    Lab Results   Component Value Date     08/05/2020    K 4.1 08/05/2020    K 5.6 03/26/2020     08/05/2020    CO2 25 08/05/2020    BUN 22 08/05/2020    CREATININE 0.7 08/05/2020    GFRAA >60 08/05/2020    LABGLOM >60 08/05/2020    GLUCOSE 94 08/05/2020    PROT 7.0 08/05/2020    LABALBU 4.0 08/05/2020    CALCIUM 9.3 08/05/2020    BILITOT 0.6 08/05/2020    ALKPHOS 82 08/05/2020    AST 23 08/05/2020    ALT 32 08/05/2020     MRI Brain:  Redemonstration of postsurgical changes from prior right  frontoparietal craniotomy with stable subjacent extra-axial collection  overlying the frontoparietal convexity measuring up to 10 mm in  maximal dimension. Previous EEG (2018) unremarkable     EEG August 2020  Impression--- abnormal awake EEG, with diffuse theta slowing  Comments--- this theta slowing may be seen with diffuse destructive lesions, metabolic abnormalities or drug effects. Clinical correlation was highly advised. I independently reviewed the labs and imaging studies at today's appointment. 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

## 2020-10-12 RX ORDER — LEVETIRACETAM 100 MG/ML
SOLUTION ORAL
Qty: 600 ML | Refills: 0 | Status: SHIPPED
Start: 2020-10-12 | End: 2020-11-09 | Stop reason: SDUPTHER

## 2020-10-12 NOTE — TELEPHONE ENCOUNTER
Pt's wife, Tim Early, called today inquiring about status of Keppra Rx requested on 10/8 as pt ran out of med 10/9. MA informed Tim Early that Rx was sent to pharmacy this morning. MA also advised to call office again in the future if Rx is not filled and pt is going to run out of med.   Electronically signed by Remedios Travis on 10/12/20 at 11:00 AM EDT

## 2020-10-29 ENCOUNTER — OFFICE VISIT (OUTPATIENT)
Dept: FAMILY MEDICINE CLINIC | Age: 45
End: 2020-10-29
Payer: MEDICARE

## 2020-10-29 VITALS
HEART RATE: 74 BPM | DIASTOLIC BLOOD PRESSURE: 62 MMHG | HEIGHT: 71 IN | SYSTOLIC BLOOD PRESSURE: 98 MMHG | BODY MASS INDEX: 24.07 KG/M2 | RESPIRATION RATE: 18 BRPM | TEMPERATURE: 98.3 F

## 2020-10-29 PROBLEM — G40.909 SEIZURE DISORDER (HCC): Status: ACTIVE | Noted: 2019-04-20

## 2020-10-29 PROBLEM — I82.409 ACUTE DVT (DEEP VENOUS THROMBOSIS) (HCC): Status: RESOLVED | Noted: 2019-02-02 | Resolved: 2020-10-29

## 2020-10-29 PROBLEM — J96.10 CHRONIC RESPIRATORY FAILURE (HCC): Status: RESOLVED | Noted: 2018-01-08 | Resolved: 2020-10-29

## 2020-10-29 PROBLEM — R63.30 FEEDING DIFFICULTIES: Status: ACTIVE | Noted: 2019-03-31

## 2020-10-29 PROBLEM — Z86.718 HISTORY OF DVT (DEEP VEIN THROMBOSIS): Status: RESOLVED | Noted: 2018-01-08 | Resolved: 2020-10-29

## 2020-10-29 PROBLEM — Z91.89 AT HIGH RISK FOR IMPAIRED SKIN INTEGRITY: Status: ACTIVE | Noted: 2020-10-29

## 2020-10-29 PROBLEM — I26.99 PULMONARY EMBOLUS (HCC): Status: RESOLVED | Noted: 2017-12-13 | Resolved: 2020-10-29

## 2020-10-29 PROBLEM — D62 ACUTE BLOOD LOSS ANEMIA: Status: RESOLVED | Noted: 2019-07-01 | Resolved: 2020-10-29

## 2020-10-29 PROBLEM — R51.9 INTRACTABLE HEADACHE: Status: RESOLVED | Noted: 2018-06-11 | Resolved: 2020-10-29

## 2020-10-29 PROBLEM — Z95.828 S/P INSERTION OF IVC (INFERIOR VENA CAVAL) FILTER: Status: ACTIVE | Noted: 2018-05-04

## 2020-10-29 PROBLEM — J96.00 ACUTE RESPIRATORY FAILURE (HCC): Status: RESOLVED | Noted: 2017-11-27 | Resolved: 2020-10-29

## 2020-10-29 PROBLEM — I82.421 ACUTE DEEP VEIN THROMBOSIS (DVT) OF ILIAC VEIN OF RIGHT LOWER EXTREMITY (HCC): Status: RESOLVED | Noted: 2019-02-02 | Resolved: 2020-10-29

## 2020-10-29 PROCEDURE — 99203 OFFICE O/P NEW LOW 30 MIN: CPT | Performed by: NURSE PRACTITIONER

## 2020-10-29 PROCEDURE — G8420 CALC BMI NORM PARAMETERS: HCPCS | Performed by: NURSE PRACTITIONER

## 2020-10-29 PROCEDURE — G8484 FLU IMMUNIZE NO ADMIN: HCPCS | Performed by: NURSE PRACTITIONER

## 2020-10-29 PROCEDURE — 1036F TOBACCO NON-USER: CPT | Performed by: NURSE PRACTITIONER

## 2020-10-29 PROCEDURE — G8427 DOCREV CUR MEDS BY ELIG CLIN: HCPCS | Performed by: NURSE PRACTITIONER

## 2020-10-29 ASSESSMENT — ENCOUNTER SYMPTOMS
RHINORRHEA: 0
SORE THROAT: 0
ABDOMINAL PAIN: 0
TROUBLE SWALLOWING: 0
SHORTNESS OF BREATH: 0
CHEST TIGHTNESS: 0
VOICE CHANGE: 0
BACK PAIN: 0
NAUSEA: 0
DIARRHEA: 0
FACIAL SWELLING: 0
VOMITING: 0
WHEEZING: 0
CONSTIPATION: 0
SINUS PRESSURE: 0
COLOR CHANGE: 1
COUGH: 0
SINUS PAIN: 0

## 2020-10-29 ASSESSMENT — PATIENT HEALTH QUESTIONNAIRE - PHQ9: DEPRESSION UNABLE TO ASSESS: FUNCTIONAL CAPACITY MOTIVATION LIMITS ACCURACY

## 2020-10-29 NOTE — PROGRESS NOTES
skin breakdown. He did have a small coccyx ulcer which is now healed. She is very diligent about turning him and getting him up in the chair and to bed but not in either one for long time to prevent his skin breakdown. He does favor the left side and leans to the left in the chair. He does have the Waiver program [employer]. [facility]  [phone] ([phone]) She has trouble with the ozzie at home trying to maneuver by herself and reposition her . He has a G tube and is able to drink nectar thick liquids and is asking about getting a Rx for the boost to be covered by insurance. He is non verbal most of the time but does say a couple of words in the office today \"I wish it was over\", then back to a babble. Unable to do depression screening today. Current Outpatient Medications:     Nutritional Supplements (BOOST MAX PROTEIN) LIQD, Take 1 Bottle by mouth three times daily, Disp: 90 Bottle, Rfl: 11    levETIRAcetam (KEPPRA) 100 MG/ML solution, TAKE 10 MLS BY MOUTH TWICE A DAY, Disp: 600 mL, Rfl: 0    LORazepam (ATIVAN) 0.5 MG tablet, TAKE 1 TABLET BY MOUTH THREE TIMES A DAY AS NEEDED, Disp: , Rfl:     baclofen (LIORESAL) 10 MG tablet, TAKE 1 TABLET BY MOUTH TWICE A DAY AS NEEDED, Disp: , Rfl:     sertraline (ZOLOFT) 50 MG tablet, TAKE 1 TABLET BY MOUTH EVERY DAY, Disp: , Rfl:     lacosamide (VIMPAT) 50 MG TABS tablet, Take 1 tablet by mouth 2 times daily for 90 days. , Disp: 60 tablet, Rfl: 2    ondansetron (ZOFRAN-ODT) 4 MG disintegrating tablet, Take 1 tablet by mouth every 6 hours as needed for Nausea or Vomiting, Disp: 20 tablet, Rfl: 0    DOCU 50 MG/5ML liquid, , Disp: , Rfl:     Nutritional Supplements (BOOST) LIQD, , Disp: , Rfl:     methylphenidate (RITALIN) 5 MG tablet, 5 mg 2 times daily.  , Disp: , Rfl:     pantoprazole (PROTONIX) 40 MG tablet, Take 40 mg by mouth daily, Disp: , Rfl:     melatonin 3 MG TABS tablet, 3 mg by PEG Tube route nightly as needed, Disp: , Rfl:   Social History     Socioeconomic History    Marital status:      Spouse name: Alban Emerson Number of children: 2    Years of education: None    Highest education level: None   Occupational History    None   Social Needs    Financial resource strain: None    Food insecurity     Worry: None     Inability: None    Transportation needs     Medical: None     Non-medical: None   Tobacco Use    Smoking status: Former Smoker     Types: Cigars     Start date: 2/2/1995    Smokeless tobacco: Never Used   Substance and Sexual Activity    Alcohol use: No    Drug use: No    Sexual activity: Yes     Partners: Female   Lifestyle    Physical activity     Days per week: None     Minutes per session: None    Stress: None   Relationships    Social connections     Talks on phone: None     Gets together: None     Attends Religion service: None     Active member of club or organization: None     Attends meetings of clubs or organizations: None     Relationship status: None    Intimate partner violence     Fear of current or ex partner: None     Emotionally abused: None     Physically abused: None     Forced sexual activity: None   Other Topics Concern    None   Social History Narrative    ** Merged History Encounter **            I have reviewed Tl's allergies, medications, problem list, medical, social and family history and have updated as needed in the electronic medical record    Review of Systems   Constitutional: Positive for activity change. Negative for appetite change, chills, diaphoresis, fatigue, fever and unexpected weight change. HENT: Positive for drooling. Negative for congestion, dental problem, ear discharge, ear pain, facial swelling, hearing loss, mouth sores, nosebleeds, postnasal drip, rhinorrhea, sinus pressure, sinus pain, sneezing, sore throat, tinnitus, trouble swallowing and voice change. Eyes: Negative for visual disturbance.    Respiratory: Negative for cough, chest tightness, shortness of breath and wheezing. Cardiovascular: Negative for chest pain, palpitations and leg swelling. Gastrointestinal: Negative for abdominal pain, constipation, diarrhea, nausea and vomiting. Incontinence with bowels   Endocrine: Negative for cold intolerance, heat intolerance, polydipsia, polyphagia and polyuria. Genitourinary: Negative for difficulty urinating, frequency and urgency. Urinary  incontinence   Musculoskeletal: Negative for arthralgias, back pain, gait problem, joint swelling, myalgias, neck pain and neck stiffness. WC bound   Skin: Positive for color change. Negative for pallor, rash and wound. Allergic/Immunologic: Negative for environmental allergies, food allergies and immunocompromised state. Neurological: Positive for speech difficulty and weakness. Negative for dizziness, tremors, seizures, syncope, facial asymmetry, light-headedness, numbness and headaches. Hematological: Negative for adenopathy. Does not bruise/bleed easily. Psychiatric/Behavioral: Negative for agitation, behavioral problems, confusion, decreased concentration, dysphoric mood, hallucinations, self-injury, sleep disturbance and suicidal ideas. The patient is not nervous/anxious and is not hyperactive. OBJECTIVE:     VS:  Wt Readings from Last 3 Encounters:   08/14/20 172 lb 9.6 oz (78.3 kg)   07/21/20 205 lb (93 kg)   03/26/20 205 lb (93 kg)                       Vitals:    10/29/20 0846   BP: 98/62   Pulse: 74   Resp: 18   Temp: 98.3 °F (36.8 °C)   Height: 5' 11\" (1.803 m)       General: Alert and oriented to person makes eye contact when calling him by name, babbles and one time during the visit speech was clear.  Unable to determine if oriented to place, and time, well developed and well nourished, in no acute distress  SKIN: Warm and dry, intact without any rash, masses or lesions, left buttock has discolored patch per wife unable to observe today due to him being in [facility] and requires ozzie lift. HEAD: normocephalic, skull defect noted from Craniotomy's bilateral.   Eyes: sclera/conjunctiva clear, PERRLA, EOMI's intact  ENT: tympanic membranes, external ear and ear canal normal bilaterally, normal hearing, Nose without deformity, nasal mucosa and turbinates normal without polyps   Throat: clear, tongue midline, thick  drainage, no masses or lesions noted, good dentition  Neck: supple and non-tender without mass, trachea midline, no cervical lymphadenopathy, no bruit, no thyromegaly or nodules  Cardiovascular: regular rate and regular rhythm, normal S1 and S2,  no murmurs, rubs, clicks, or gallop. Distal pulses intact, no carotid bruits. No edema  Pulmonary/Chest: clear to auscultation bilaterally, no wheezes, rales or rhonchi, normal air movement, no respiratory distress  Abdomen: g tube with intact skin, no drainage, soft, non-tender, non-distended, normal bowel sounds, no masses or hepatosplenomegaly  Musculoskeletal: limited ROM in legs and arms due to spasticity, no joint swelling, deformity or tenderness   Neurologic: he is WC bound, aphasic, spastic quadriparesis legs more than arms, speech babbles but at times is clear  Extremities: no clubbing, cyanosis, or edema. Psychiatric: Good eye contact, normal mood and affect, unable to answers questions appropriately    ASSESSMENT/PLAN   Arya Cotton was seen today for establish care.     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 ([facility] [address] New  -     DME Order for Wheel Chair Cushion as OP  -     DME Order for Alternating Pressure Pump and Pad as OP  -     External Referral To Physical Therapy    Severe protein-calorie malnutrition (HCC) New  -     Nutritional Supplements (BOOST MAX PROTEIN) LIQD; Take 1 Bottle by mouth three times daily  -     CBC Auto Differential; Future  - Comprehensive Metabolic Panel; Future  -     Lipid Panel; Future  -     TSH without Reflex; Future  -     T4, Free; Future  -     PREALBUMIN; Future  -     DME Order for Wheel Chair Cushion as OP  -     DME Order for Alternating Pressure Pump and Pad as OP  -     External Referral To Physical Therapy    Feeding difficulties New    At high risk for impaired skin integrity New  -     DME Order for Wheel Chair Cushion as OP  -     DME Order for Alternating Pressure Pump and Pad as OP  -     External Referral To Physical Therapy    Nonintractable epilepsy without status epilepticus, unspecified [facility] New  Stable continue current medications and keep follow up appointment with Virgil Willis CNS    I explained to wife and patient I am unable to RX his ativan and he would need to continue to get from [facility]. Spent 45 minutes with patient and wife coordinating care and reviewing notes from Virgil Willis 8/18/2020, Dr Monika Regan 8/6/2020    I called Kenroy Morales  [phone] and discussed the possibility of the patient getting an air mattress, chair pad and if possible an electric bed and electric ozzie lift. She stated since the bed and ozzie are not a year old most likely would be denied. She also told me the patient has an aide 6 hours a day 6 days a week. The shower chair has been approved. I then called the wife and she stated she has only had help for 5 hours a day 4 days a week, I asked her to call Immanuel Vernon if that is the case and she said she gives her a hard time. I explained everything has to be approved by the insurance company and she is only the . I will send referral for PTE to [facility] for possible air mattress for his bed and RX for DME sent for air mattress and WC pad. Verbalized understanding. Return in about 3 months (around 1/29/2021) for checkup .      Discussed taking medications as directed and adverse effects        I have reviewed my findings and recommendations with Ramiro Simons.     Charlynn Bloch, NP-C, FNP-BC

## 2020-11-03 ENCOUNTER — TELEMEDICINE (OUTPATIENT)
Dept: FAMILY MEDICINE CLINIC | Age: 45
End: 2020-11-03
Payer: MEDICARE

## 2020-11-03 PROBLEM — I69.052: Status: ACTIVE | Noted: 2020-11-03

## 2020-11-03 PROBLEM — I69.398 SPASTICITY AS LATE EFFECT OF CEREBROVASCULAR ACCIDENT (CVA): Status: ACTIVE | Noted: 2020-11-03

## 2020-11-03 PROBLEM — S31.829A OPEN WOUND OF LEFT BUTTOCK: Status: ACTIVE | Noted: 2020-11-03

## 2020-11-03 PROBLEM — R25.2 SPASTICITY AS LATE EFFECT OF CEREBROVASCULAR ACCIDENT (CVA): Status: ACTIVE | Noted: 2020-11-03

## 2020-11-03 PROCEDURE — 99214 OFFICE O/P EST MOD 30 MIN: CPT | Performed by: NURSE PRACTITIONER

## 2020-11-03 PROCEDURE — G8427 DOCREV CUR MEDS BY ELIG CLIN: HCPCS | Performed by: NURSE PRACTITIONER

## 2020-11-03 RX ORDER — MENTHOL AND ZINC OXIDE .44; 20.625 G/100G; G/100G
OINTMENT TOPICAL DAILY
Qty: 113 G | Refills: 4 | Status: SHIPPED | OUTPATIENT
Start: 2020-11-03 | End: 2020-11-10

## 2020-11-03 RX ORDER — OSTOMY SUPPLY 1"
1 EACH MISCELLANEOUS
Qty: 10 EACH | Refills: 3 | Status: SHIPPED | OUTPATIENT
Start: 2020-11-03 | End: 2022-02-25

## 2020-11-03 ASSESSMENT — ENCOUNTER SYMPTOMS
ROS SKIN COMMENTS: LEFT BUTTOCK
COUGH: 0
WHEEZING: 0
SHORTNESS OF BREATH: 0
COLOR CHANGE: 1
BACK PAIN: 0

## 2020-11-03 NOTE — PROGRESS NOTES
OFFICE PROGRESS NOTE  [address]  [address] [facility] [address]  Dept: [phone]   Chief Complaint   Patient presents with   Zhen Leighasadi Jorge Luis     discuss hospital bed         HPI:   11/3/2020  TeleMedicine Video Visit    This visit was performed as a virtual video visit using a synchronous, two-way, audio-video telehealth technology platform. Patient identification was verified at the start of the visit, including the patient's telephone number and physical location. I discussed with the patient the nature of our telehealth visits, that:     1. Due to the nature of an audio- video modality, the only components of a physical exam that could be done are the elements supported by direct observation. 2. I would evaluate the patient and recommend diagnostics and treatments based on my assessment. 3. If it was felt that the patient should be evaluated in clinic or an emergency room setting, then they would be directed there. 4. Our sessions are not being recorded and that personal health information is protected. 5. Our team would provide follow up care in person if/when the patient needs it. Patient does agree to proceed with telemedicine consultation. Patient's location: home address in [facility]  Physician  location [facility] other people involved in call wife, patient and Aide      Time spent: Greater than 25 minutes    This visit was completed virtually using Doxy. me          TELEHEALTH EVALUATION -- Audio/Visual (During BCZAM-18 public health emergency)    HPI:    Visit today specifically for documentation of his need for his hospital bed due to his chronic flaccid hemiplegia affecting left dominant side, spasticity. he is unable to move himself in bed and requires his wife to reposition him. He has been in a hospital bed since January 2020. He will require this hospital bed for the rest of his life.  He has had one area on his coccyx which was History    Not on file   Social Needs    Financial resource strain: Not on file    Food insecurity     Worry: Not on file     Inability: Not on file    Transportation needs     Medical: Not on file     Non-medical: Not on file   Tobacco Use    Smoking status: Former Smoker     Types: Cigars     Start date: 2/2/1995    Smokeless tobacco: Never Used   Substance and Sexual Activity    Alcohol use: No    Drug use: No    Sexual activity: Yes     Partners: Female   Lifestyle    Physical activity     Days per week: Not on file     Minutes per session: Not on file    Stress: Not on file   Relationships    Social connections     Talks on phone: Not on file     Gets together: Not on file     Attends Advent service: Not on file     Active member of club or organization: Not on file     Attends meetings of clubs or organizations: Not on file     Relationship status: Not on file    Intimate partner violence     Fear of current or ex partner: Not on file     Emotionally abused: Not on file     Physically abused: Not on file     Forced sexual activity: Not on file   Other Topics Concern    Not on file   Social History Narrative    ** Merged History Encounter **            I have reviewed Tl's allergies, medications, problem list, medical, social and family history and have updated as needed in the electronic medical record    Review of Systems   Constitutional: Positive for activity change. Negative for appetite change, chills, diaphoresis, fatigue, fever and unexpected weight change. Respiratory: Negative for cough, shortness of breath and wheezing. Cardiovascular: Negative for chest pain, palpitations and leg swelling. Musculoskeletal: Positive for gait problem. Negative for arthralgias, back pain, joint swelling, myalgias, neck pain and neck stiffness. Hemiplegia and spasticity   Skin: Positive for color change and wound. Negative for pallor and rash.         Left buttock   Neurological: Positive for seizures ( history of seizures) and speech difficulty. OBJECTIVE:     VS:  Wt Readings from Last 3 Encounters:   08/14/20 172 lb 9.6 oz (78.3 kg)   07/21/20 205 lb (93 kg)   03/26/20 205 lb (93 kg)                       There were no vitals filed for this visit. General: Alert and oriented to person, laying in bed thin, well developed and well nourished, in no acute distress  SKIN: Left buttock with a small superficial abrasion with redness around the area approximately 4 cm unable to tell on video, no other open areas noted any rash, masses or lesions  HEAD: normocephalic, atraumatic  Eyes: sclera/conjunctiva clear  ENT: normal hearing,   Pulmonary/Chest: no audible wheezing,  normal air movement, no respiratory distress  Psychiatric: Good eye contact, normal mood and affect,    ASSESSMENT/PLAN   Howe Agent was seen today for other. Diagnoses and all orders for this visit:    At high risk for impaired skin integrity  -     menthol-zinc oxide (CALMOSEPTINE) 0.44-20.625 % OINT ointment; Apply topically daily for 7 days Max 30 ml per day. -     Control Gel Formula Dressing (DUODERM CGF EXTRA THIN) MISC; Apply 1 Device topically every 3 days    Open wound of left buttock, initial encounter  -     menthol-zinc oxide (CALMOSEPTINE) 0.44-20.625 % OINT ointment; Apply topically daily for 7 days Max 30 ml per day. 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 Florian Miguel is a 54 y.o. male being evaluated by a Virtual Visit (video visit) encounter to address concerns as mentioned above. A caregiver was present when appropriate.  Due to this being a TeleHealth encounter (During KNFEW-12 public health emergency), evaluation of the following organ systems was limited: Vitals/Constitutional/EENT/Resp/CV/GI//MS/Neuro/Skin/Heme-Lymph-Imm. Pursuant to the emergency declaration under the [facility], [address] [place] [address] and the [employer] Act, this Virtual Visit was conducted with patient's (and/or legal guardian's) consent, to reduce the patient's risk of exposure to COVID-19 and provide necessary medical care. The patient (and/or legal guardian) has also been advised to contact this office for worsening conditions or problems, and seek emergency medical treatment and/or call 911 if deemed necessary. Services were provided through a video synchronous discussion virtually to substitute for in-person clinic visit. Patient and provider were located at their individual homes. --Debborah Kocher, APRN - CNP on 11/3/2020 at 8:37 AM    An electronic signature was used to authenticate this note. I have reviewed my findings and recommendations with Surya Burden.     Debborah Kocher, NP-C, FNP-BC

## 2020-11-04 ENCOUNTER — TELEPHONE (OUTPATIENT)
Dept: FAMILY MEDICINE CLINIC | Age: 45
End: 2020-11-04

## 2020-11-04 NOTE — TELEPHONE ENCOUNTER
Called [id number] for acct number and fax number. Pt account number [de-identified]  [employer] fax number [phone]    When sending fax number need to include pt acct number will process the order faster.

## 2020-11-09 ENCOUNTER — OFFICE VISIT (OUTPATIENT)
Dept: NEUROLOGY | Age: 45
End: 2020-11-09
Payer: MEDICARE

## 2020-11-09 VITALS
HEIGHT: 71 IN | HEART RATE: 61 BPM | OXYGEN SATURATION: 95 % | BODY MASS INDEX: 24.08 KG/M2 | WEIGHT: 172 LBS | DIASTOLIC BLOOD PRESSURE: 78 MMHG | SYSTOLIC BLOOD PRESSURE: 110 MMHG | TEMPERATURE: 97.7 F | RESPIRATION RATE: 20 BRPM

## 2020-11-09 PROBLEM — Z98.890 HX OF COLONOSCOPY: Status: ACTIVE | Noted: 2020-11-09

## 2020-11-09 PROCEDURE — G8427 DOCREV CUR MEDS BY ELIG CLIN: HCPCS | Performed by: CLINICAL NURSE SPECIALIST

## 2020-11-09 PROCEDURE — G8484 FLU IMMUNIZE NO ADMIN: HCPCS | Performed by: CLINICAL NURSE SPECIALIST

## 2020-11-09 PROCEDURE — 1036F TOBACCO NON-USER: CPT | Performed by: CLINICAL NURSE SPECIALIST

## 2020-11-09 PROCEDURE — G8420 CALC BMI NORM PARAMETERS: HCPCS | Performed by: CLINICAL NURSE SPECIALIST

## 2020-11-09 PROCEDURE — 99214 OFFICE O/P EST MOD 30 MIN: CPT | Performed by: CLINICAL NURSE SPECIALIST

## 2020-11-09 RX ORDER — LACOSAMIDE 100 MG/1
100 TABLET ORAL 2 TIMES DAILY
Qty: 60 TABLET | Refills: 2 | Status: SHIPPED | OUTPATIENT
Start: 2020-11-09 | End: 2021-01-05 | Stop reason: SDUPTHER

## 2020-11-09 RX ORDER — LEVETIRACETAM 100 MG/ML
SOLUTION ORAL
Qty: 600 ML | Refills: 5 | Status: SHIPPED
Start: 2020-11-09 | End: 2021-06-16

## 2020-11-09 NOTE — PROGRESS NOTES
Zia Chatterjee is a 54 y.o. right handed man    Patient was doing well until the evening of the [place]. He was drinking heavily and fell down his basement stairs. He was there all night and in the morning his wife called EMS. He had right SDH/ICH and underwent a crani at the time. He was started on Keppra BID prophylaxis but his wife states he was in charge of his own medications. He was noncompliant with his morning dose but would take his evening dose.   He and his wife drove to the [employer] and arrived early morning at the end of April 2018, that day he felt \"funny\" asked his daughter to get his wife and she saw his face shaking   This continued until he had a full GTC seizure  He was flown to [facility] and was placed on numerous AEDs, including Keppra 1500mg BID, Vimpat 150mg BID and Dilantin 100mg BID  Unfortunately, he returned to my office only on [address]   He was reportedly seizure free therefore medications were not adjusted     Wife reported periods of confusion/spacing out and not paying attention to her   We titrated Keppra and eventually restarted Vimpat as well and he is now on 50mg BID     Unfortunately, was dx with testicular cancer and started chemotherapy   During the course of tx, he developed a DVT and needed oral anticoagulation for 6 months  He was entertaining the idea of returning to work 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   He was in [facility] at a [employer] when he suddenly collapsed    No seizure reported at the time    CT demonstrated a large left SDH    He underwent crani and his bone was replaced with an artificial   No seizures during admission and his wife reports continuous EEG    I was able to review and confirm there was no seizure activity witnessed on cEEG  His wife is an excellent historian     Here today in a wheelchair    Spasticity worsening   Unable to tolerate oral Baclofen and being evaluated for a pump    No chest pain or palpitations  No SOB  No vertigo, lightheadedness or loss of consciousness  No falls, tripping or stumbling  No incontinence of bowels or bladder  No itching or bruising appreciated    ROS otherwise negative     Prior to Visit Medications    Medication Sig Taking? Authorizing Provider   lacosamide (VIMPAT) 100 MG TABS tablet Take 1 tablet by mouth 2 times daily for 90 days. Yes VENKATA Randolph   levETIRAcetam (KEPPRA) 100 MG/ML solution TAKE 10 MLS BY MOUTH TWICE A DAY Yes VENKATA Randolph   menthol-zinc oxide (CALMOSEPTINE) 0.44-20.625 % OINT ointment Apply topically daily for 7 days Max 30 ml per day. Yes VENKATA Bowling CNP   Control Gel Formula Dressing (DUODERM CGF EXTRA THIN) MISC Apply 1 Device topically every 3 days Yes VENKATA Bowling CNP   Nutritional Supplements (BOOST MAX PROTEIN) LIQD Take 1 Bottle by mouth three times daily Yes VENKATA Potts CNP   LORazepam (ATIVAN) 0.5 MG tablet TAKE 1 TABLET BY MOUTH THREE TIMES A DAY AS NEEDED Yes Historical Provider, MD   baclofen (LIORESAL) 10 MG tablet TAKE 1 TABLET BY MOUTH TWICE A DAY AS NEEDED Yes Historical Provider, MD   sertraline (ZOLOFT) 50 MG tablet TAKE 1 TABLET BY MOUTH EVERY DAY Yes Historical Provider, MD   ondansetron (ZOFRAN-ODT) 4 MG disintegrating tablet Take 1 tablet by mouth every 6 hours as needed for Nausea or Vomiting Yes Jennifer Jiang DO   DOCU 50 MG/5ML liquid  Yes Historical Provider, MD   Nutritional Supplements (BOOST) LIQD  Yes Historical Provider, MD   methylphenidate (RITALIN) 5 MG tablet 5 mg 2 times daily.   Yes Historical Provider, MD   pantoprazole (PROTONIX) 40 MG tablet Take 40 mg by mouth daily Yes Historical Provider, MD   melatonin 3 MG TABS tablet 3 mg by PEG Tube route nightly as needed Yes Historical Provider, MD     Allergies as of 11/09/2020    (No Known Allergies)     Objective:   /78 (Site: Left Upper Arm, Position: Sitting, Cuff Size: Medium Adult)   Pulse 61 Temp 97.7 °F (36.5 °C)   Resp 20   Ht 5' 11\" (1.803 m)   Wt 172 lb (78 kg)   SpO2 95%   BMI 23.99 kg/m²      General appearance: alert, appears stated age and cooperative  Head: surgical scar noted right and left hemisphere   Extremities: no cyanosis or edema  Pulses: 2+ and symmetric  Skin: no rashes or lesions      Rhythmic breathing and grunting during appt     Mental Status: Alert, oriented to person     Speech: dysarthric and minimal   Language: appropriate laconic - no anomia appreciated     Cranial Nerves:  I: smell    II: visual acuity     II: visual fields Full    II: pupils ARPAN   III,VII: ptosis None   III,IV,VI: extraocular muscles  EOMI without nystagmus    V: mastication Normal   V: facial light touch sensation  Normal   V,VII: corneal reflex  Present   VII: facial muscle function - upper     VII: facial muscle function - lower Left 7th LMN    VIII: hearing Normal   IX: soft palate elevation  Normal   IX,X: gag reflex Present   XI: trapezius strength     XI: sternocleidomastoid strength    XI: neck extension strength     XII: tongue strength  Normal     Motor:  4/5 both biceps and triceps  4/5 right   -4/5 right IPS  4/5 left IPS  1/5 gastrocs    Spastic arms     Sensory:  LT normal    DTR:   Right Brachioradialis reflex 2+  Left Brachioradialis reflex 2+  Right Biceps reflex 3+  Left Biceps reflex 3+  Right Quadriceps reflex 2+  Left Quadriceps reflex 2+  Right Achilles reflex 1+  Left Achilles reflex 1+    No Laird's     Laboratory/Radiology:     CBC with Differential:    Lab Results   Component Value Date    WBC 5.5 08/05/2020    RBC 4.49 08/05/2020    HGB 14.0 08/05/2020    HCT 41.8 08/05/2020     08/05/2020    MCV 93.1 08/05/2020    MCH 31.2 08/05/2020    MCHC 33.5 08/05/2020    RDW 12.7 08/05/2020    METASPCT 1.0 05/29/2018    LYMPHOPCT 25.8 08/05/2020    MONOPCT 9.4 08/05/2020    MYELOPCT 1.5 05/29/2018    BASOPCT 0.7 08/05/2020    MONOSABS 0.52 08/05/2020    LYMPHSABS 1.42 08/05/2020    EOSABS 0.11 08/05/2020    BASOSABS 0.04 08/05/2020     CMP:    Lab Results   Component Value Date     08/05/2020    K 4.1 08/05/2020    K 5.6 03/26/2020     08/05/2020    CO2 25 08/05/2020    BUN 22 08/05/2020    CREATININE 0.7 08/05/2020    GFRAA >60 08/05/2020    LABGLOM >60 08/05/2020    GLUCOSE 94 08/05/2020    PROT 7.0 08/05/2020    LABALBU 4.0 08/05/2020    CALCIUM 9.3 08/05/2020    BILITOT 0.6 08/05/2020    ALKPHOS 82 08/05/2020    AST 23 08/05/2020    ALT 32 08/05/2020     MRI Brain:  Redemonstration of postsurgical changes from prior right  frontoparietal craniotomy with stable subjacent extra-axial collection  overlying the frontoparietal convexity measuring up to 10 mm in  maximal dimension. Previous EEG (2018) unremarkable     EEG August 2019 normal     I independently reviewed the labs and imaging studies at today's appointment. Assessment: In 2017, he suffered a fall down steps causing a SDH and ICH   He recovered well and was attempting to return to work   He was dx with testicular cancer and underwent chemo    Shortly thereafter developed a DVT and was started on Xarelto   Unfortunately, developed a left SDH in [address] and is now 4 months post crani    Post-traumatic epilepsy    Previously maintained on 3 AEDs -- Keppra, Vimpat and Dilantin    Now taking only Keppra since others were discontinued in rehab and recently restarted Vimpat    I suspect continuous partial seizures and will titrate accordingly    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

## 2020-11-25 ENCOUNTER — HOSPITAL ENCOUNTER (OUTPATIENT)
Dept: INFUSION THERAPY | Age: 45
Discharge: HOME | End: 2020-11-25
Payer: MEDICARE

## 2020-11-25 ENCOUNTER — OFFICE VISIT (OUTPATIENT)
Dept: ONCOLOGY | Age: 45
End: 2020-11-25
Payer: MEDICARE

## 2020-11-25 VITALS
OXYGEN SATURATION: 100 % | HEIGHT: 71 IN | TEMPERATURE: 97.7 F | DIASTOLIC BLOOD PRESSURE: 70 MMHG | BODY MASS INDEX: 23.99 KG/M2 | SYSTOLIC BLOOD PRESSURE: 105 MMHG | HEART RATE: 60 BPM

## 2020-11-25 DIAGNOSIS — Z86.718 HISTORY OF DVT (DEEP VEIN THROMBOSIS): ICD-10-CM

## 2020-11-25 DIAGNOSIS — S02.0XXS TRAUMATIC BRAIN INJURY WITH DEPRESSED FRONTAL SKULL FRACTURE, SEQUELA (HCC): ICD-10-CM

## 2020-11-25 DIAGNOSIS — D40.11 NEOPLASM OF UNCERTAIN BEHAVIOR OF RIGHT TESTIS: ICD-10-CM

## 2020-11-25 DIAGNOSIS — C62.91 SEMINOMA OF RIGHT TESTIS, STAGE 1 (HCC): Primary | ICD-10-CM

## 2020-11-25 DIAGNOSIS — S06.9XAS TRAUMATIC BRAIN INJURY WITH DEPRESSED FRONTAL SKULL FRACTURE, SEQUELA (HCC): ICD-10-CM

## 2020-11-25 LAB
ALBUMIN SERPL-MCNC: 4.1 G/DL (ref 3.5–5.2)
ALP BLD-CCNC: 70 U/L (ref 40–129)
ALT SERPL-CCNC: 14 U/L (ref 0–40)
ANION GAP SERPL CALCULATED.3IONS-SCNC: 8 MMOL/L (ref 7–16)
AST SERPL-CCNC: 12 U/L (ref 0–39)
BASOPHILS ABSOLUTE: 0.03 E9/L (ref 0–0.2)
BASOPHILS RELATIVE PERCENT: 0.5 % (ref 0–2)
BILIRUB SERPL-MCNC: 0.2 MG/DL (ref 0–1.2)
BUN BLDV-MCNC: 17 MG/DL (ref 6–20)
CALCIUM SERPL-MCNC: 9.3 MG/DL (ref 8.6–10.2)
CHLORIDE BLD-SCNC: 112 MMOL/L (ref 98–107)
CO2: 25 MMOL/L (ref 22–29)
CREAT SERPL-MCNC: 0.8 MG/DL (ref 0.7–1.2)
EOSINOPHILS ABSOLUTE: 0.14 E9/L (ref 0.05–0.5)
EOSINOPHILS RELATIVE PERCENT: 2.1 % (ref 0–6)
GFR AFRICAN AMERICAN: >60
GFR NON-AFRICAN AMERICAN: >60 ML/MIN/1.73
GLUCOSE BLD-MCNC: 54 MG/DL (ref 74–99)
HCT VFR BLD CALC: 43.2 % (ref 37–54)
HEMOGLOBIN: 13.5 G/DL (ref 12.5–16.5)
IMMATURE GRANULOCYTES #: 0.03 E9/L
IMMATURE GRANULOCYTES %: 0.5 % (ref 0–5)
LACTATE DEHYDROGENASE: 208 U/L (ref 135–225)
LYMPHOCYTES ABSOLUTE: 1.54 E9/L (ref 1.5–4)
LYMPHOCYTES RELATIVE PERCENT: 23.4 % (ref 20–42)
MCH RBC QN AUTO: 30.3 PG (ref 26–35)
MCHC RBC AUTO-ENTMCNC: 31.3 % (ref 32–34.5)
MCV RBC AUTO: 97.1 FL (ref 80–99.9)
MONOCYTES ABSOLUTE: 0.64 E9/L (ref 0.1–0.95)
MONOCYTES RELATIVE PERCENT: 9.7 % (ref 2–12)
NEUTROPHILS ABSOLUTE: 4.21 E9/L (ref 1.8–7.3)
NEUTROPHILS RELATIVE PERCENT: 63.8 % (ref 43–80)
PDW BLD-RTO: 12.3 FL (ref 11.5–15)
PLATELET # BLD: 245 E9/L (ref 130–450)
PMV BLD AUTO: 8.6 FL (ref 7–12)
POTASSIUM SERPL-SCNC: 4.2 MMOL/L (ref 3.5–5)
RBC # BLD: 4.45 E12/L (ref 3.8–5.8)
SODIUM BLD-SCNC: 145 MMOL/L (ref 132–146)
TOTAL PROTEIN: 6.8 G/DL (ref 6.4–8.3)
WBC # BLD: 6.6 E9/L (ref 4.5–11.5)

## 2020-11-25 PROCEDURE — 83615 LACTATE (LD) (LDH) ENZYME: CPT

## 2020-11-25 PROCEDURE — 82105 ALPHA-FETOPROTEIN SERUM: CPT

## 2020-11-25 PROCEDURE — 84702 CHORIONIC GONADOTROPIN TEST: CPT

## 2020-11-25 PROCEDURE — 2580000003 HC RX 258: Performed by: INTERNAL MEDICINE

## 2020-11-25 PROCEDURE — 85025 COMPLETE CBC W/AUTO DIFF WBC: CPT

## 2020-11-25 PROCEDURE — 6360000002 HC RX W HCPCS

## 2020-11-25 PROCEDURE — 80053 COMPREHEN METABOLIC PANEL: CPT

## 2020-11-25 PROCEDURE — 96523 IRRIG DRUG DELIVERY DEVICE: CPT

## 2020-11-25 PROCEDURE — 36415 COLL VENOUS BLD VENIPUNCTURE: CPT

## 2020-11-25 PROCEDURE — 99214 OFFICE O/P EST MOD 30 MIN: CPT | Performed by: INTERNAL MEDICINE

## 2020-11-25 RX ORDER — SODIUM CHLORIDE 0.9 % (FLUSH) 0.9 %
10 SYRINGE (ML) INJECTION PRN
Status: CANCELLED | OUTPATIENT
Start: 2020-11-25

## 2020-11-25 RX ORDER — SODIUM CHLORIDE 0.9 % (FLUSH) 0.9 %
10 SYRINGE (ML) INJECTION PRN
Status: DISCONTINUED | OUTPATIENT
Start: 2020-11-25 | End: 2020-11-26 | Stop reason: HOSPADM

## 2020-11-25 RX ORDER — HEPARIN SODIUM (PORCINE) LOCK FLUSH IV SOLN 100 UNIT/ML 100 UNIT/ML
500 SOLUTION INTRAVENOUS PRN
Status: DISCONTINUED | OUTPATIENT
Start: 2020-11-25 | End: 2020-11-26 | Stop reason: HOSPADM

## 2020-11-25 RX ORDER — HEPARIN SODIUM (PORCINE) LOCK FLUSH IV SOLN 100 UNIT/ML 100 UNIT/ML
SOLUTION INTRAVENOUS
Status: COMPLETED
Start: 2020-11-25 | End: 2020-11-25

## 2020-11-25 RX ORDER — HEPARIN SODIUM (PORCINE) LOCK FLUSH IV SOLN 100 UNIT/ML 100 UNIT/ML
500 SOLUTION INTRAVENOUS PRN
Status: CANCELLED | OUTPATIENT
Start: 2020-11-25

## 2020-11-25 RX ADMIN — SODIUM CHLORIDE, PRESERVATIVE FREE 10 ML: 5 INJECTION INTRAVENOUS at 12:17

## 2020-11-25 RX ADMIN — HEPARIN SODIUM (PORCINE) LOCK FLUSH IV SOLN 100 UNIT/ML 500 UNITS: 100 SOLUTION at 12:17

## 2020-11-25 RX ADMIN — Medication 500 UNITS: at 12:17

## 2020-11-25 NOTE — PROGRESS NOTES
[address] [facility] [address] [facility]        Pt Name: Judie Rae: 1975  Date of evaluation: 11/25/2020  Primary Care Physician: VENKATA Mansfield CNP  Reason for evaluation:   Chief Complaint   Patient presents with    Follow-up            Subjective:  Has had seizures lately and seen by Neurology with increase dosing of Vimpat    He is in a wheelchair and in company of his wife. He has had frequent seizures and he follows with neurology and his Keppra dose has been increased to 1500 mg twice daily and he continues on baclofen and lorazepam.      OBJECTIVE:  VITALS:  height is 5' 11\" (1.803 m). His temporal temperature is 97.7 °F (36.5 °C). His blood pressure is 105/70 and his pulse is 60. His oxygen saturation is 100%. Physical Exam:  Performance Status: 0  Well developed, well nourished male  Eyes:  No gross abnormalities. and PERRL  Heart:  RRR, no murmur. Lungs:  Normal expansion.  Clear to auscultation.  No wheezing. Abdomen:  Soft, Non-Tender   Extremities: no swelling, no edema, no calf tenderness  Neurologic: CNs grossly intact, no slurred speech, A&O x3  Skin:  Skin color normal. No rashes or lesions. Medications  Prior to Admission medications    Medication Sig Start Date End Date Taking? Authorizing Provider   lacosamide (VIMPAT) 100 MG TABS tablet Take 1 tablet by mouth 2 times daily for 90 days.  11/9/20 2/7/21 Yes VENKATA Dailey   levETIRAcetam (KEPPRA) 100 MG/ML solution TAKE 10 MLS BY MOUTH TWICE A DAY 11/9/20  Yes VENKATA Dailey   Control Gel Formula Dressing (DUODERM CGF EXTRA THIN) MISC Apply 1 Device topically every 3 days 11/3/20  Yes VENKATA Webster CNP   Nutritional Supplements (BOOST MAX PROTEIN) LIQD Take 1 Bottle by mouth three times daily 10/29/20  Yes VENKATA Webster CNP   LORazepam (ATIVAN) 0.5 MG tablet TAKE 1 TABLET BY MOUTH THREE TIMES A DAY AS NEEDED 7/28/20  Yes Historical Provider, MD   baclofen (LIORESAL) 10 MG tablet TAKE 1 TABLET BY MOUTH TWICE A DAY AS NEEDED 7/25/20  Yes Historical Provider, MD   sertraline (ZOLOFT) 50 MG tablet TAKE 1 TABLET BY MOUTH EVERY DAY 7/25/20  Yes Historical Provider, MD   ondansetron (ZOFRAN-ODT) 4 MG disintegrating tablet Take 1 tablet by mouth every 6 hours as needed for Nausea or Vomiting 3/27/20  Yes Santos Recinos DO   DOCU 50 MG/5ML liquid  1/11/20  Yes Historical Provider, MD   Nutritional Supplements (BOOST) LIQD  12/12/19  Yes Historical Provider, MD   methylphenidate (RITALIN) 5 MG tablet 5 mg 2 times daily.   10/31/19  Yes Historical Provider, MD   pantoprazole (PROTONIX) 40 MG tablet Take 40 mg by mouth daily 9/20/19  Yes Historical Provider, MD   melatonin 3 MG TABS tablet 3 mg by PEG Tube route nightly as needed   Yes Historical Provider, MD    Scheduled Meds:  Continuous Infusions:  PRN Meds:.        Recent Laboratory Data-     Lab Results   Component Value Date    WBC 5.5 08/05/2020    HGB 14.0 08/05/2020    HCT 41.8 08/05/2020    MCV 93.1 08/05/2020     08/05/2020    LYMPHOPCT 25.8 08/05/2020    RBC 4.49 08/05/2020    MCH 31.2 08/05/2020    MCHC 33.5 08/05/2020    RDW 12.7 08/05/2020    NEUTOPHILPCT 61.7 08/05/2020    MONOPCT 9.4 08/05/2020    BASOPCT 0.7 08/05/2020    NEUTROABS 3.40 08/05/2020    LYMPHSABS 1.42 (L) 08/05/2020    MONOSABS 0.52 08/05/2020    EOSABS 0.11 08/05/2020    BASOSABS 0.04 08/05/2020       Lab Results   Component Value Date     08/05/2020    K 4.1 08/05/2020     (H) 08/05/2020    CO2 25 08/05/2020    BUN 22 (H) 08/05/2020    CREATININE 0.7 08/05/2020    GLUCOSE 94 08/05/2020    CALCIUM 9.3 08/05/2020    PROT 7.0 08/05/2020    LABALBU 4.0 08/05/2020    BILITOT 0.6 08/05/2020    ALKPHOS 82 08/05/2020    AST 23 08/05/2020    ALT 32 08/05/2020    LABGLOM >60 08/05/2020    GFRAA >60 08/05/2020         Lab Results   Component Value Date    IRON 35 (L) 06/13/2018    TIBC 148 (L) 06/13/2018    FERRITIN 1,082 measuring 1.2 cm     Clinically In April 2018 he had stage IIB recurrent seminoma and was recommended 4 cycles of systemic chemotherapy with cis-platinum and -16. All potential side effects were discussed     Received Day #1 of Cycle #1  cis-platinum and -16  on 4/16/18 through 4/20/18 with G-CSF prophylaxis with Neulasta  He completed so far 3 cycles of chemotherapy last chemo was on 5/29/2018      2- History of fall In November 2017 with had trauma complicated by a right frontotemporal traumatic subarachnoid hemorrhage and subdural hematoma status post decompressive hemicraniectomy and duraplasty. In January 2018 he required right cranioplasty and sharp debridement of the skin incision and had external hydrocephalus. Few weeks ago he had  seizures while On vacation in [place]  He was referred to neurology and neurosurgery for evaluation of his recurrent seizures.       Received Cycle #1  cis-platinum and -16  on 4/16/18 through 4/20/18 with G-CSF prophylaxis with Neulasta      He  received  IV hydration with normal saline bolus 500 mL over one hour on 4/23/18  Received  Cycle #2 cis-platinum and -16  on May 7, 2018.     He  received Cycle #3 cis-platinum and -16 on 5/29/2018 through Saturday, June 1, 2018 .        Lately he has had worsening intractable headaches with vertigo and falls  Case discussed with Dr. Rafael Arredondo  He is was  admitted for MRI of the brain and was seen by neurology and neurosurgical for re-evaluation.     MRI on 6/11 showed subacute right transverse sinus thrombus, encephalomalacia related to previous trauma, right mastoid effusion and redemonstration of s/p frontoparietal craniotomy with 10mm  Fluid collection. Neurosurgery evaluation appreciated. He is scheduled to follow up with Neurology next week.  His headache has also improved.         His hemoglobin went  down to 7.5 and he was very symptomatic with his anemia and his platelets are 78  He was transfused with 2 units of packed RBCs for symptomatic anemia and his  last cycle of chemotherapy was postponed until next week.     Received Cycle #4 cis-platinum and -16:  Started on 6/25/18  and completed on Friday, June 29, 2018 .  Yolis Mcleod has achieved a complete remission without any residual pathologic lymphadenopathy    Deep Vein Thrombosis of the RLE:  -Reports improvements in pain, swelling of the RLE; no difficulty ambulating. This is his 2nd episode of DVT. His 1st episode was an December 2017 and at that time it was provoked by his surgery  -Has IVC filter in place back in [address] 2017, Was never retrieved, Antithrombin III normal at 102, platelets stable at 800; pending results of Antiphospholipid antibody titers andThrombophilia Panel  -Okay per Neurology to use anticoagulation since it has been 14 months since he had his Intracranial bleed and He is now on Lovenox        Recommend Lovenox for up to 2 weeks till hematoma in left thigh resolves prior to transitioning him to oral Eliquis or Xarelto to decrease risk of hematoma expansion         Hematoma of the LLE:  -Confirmed on CT and MRI of left thigh     History of Traumatic Brain Injury/Bleed: s/p craniectomy  -IVC Filter in place due to prior contraindication to anticoagulation and history of blood clots. Unsuccessful attempt at the retrieval of the IVC filter  -Managed with anti-seizure agents per Neurology     History of Seminoma:  -Ultrasound on 2/2/2019 was negative for mass or torsion. 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 It was explained to him that his odds for cure with surveillance only on in the vicinity of 85% with only 15% chance of relapse and if he relapses he would be cured with either para-aortic radiation or systemic chemotherapy.   He is in agreement for surveillance and will have a follow-up serum LDH as well as CT [place] 2/14/2020. CT Head was stable. Will return in 1 year for follow up. His follow-up CT Chest, Abdomen and Pelvis done on 4/20/2020 were reviewed. The chest was unremarkable. CT scan of abdomen and pelvis shows slight retroperitoneal lymphadenopathy nonpathologic by size largest measuring 1.6 x 1.2 cm slightly more prominent than prior likely reactive. Doubt recurrent malignancy. His serum LDH is normal.       He  had a follow-up PET CT scan on 7/29/2020 and it was completely negative without any abnormal metabolic activity in his retroperitoneal node and he is in clinical remission. To continue surveillance. 11/4/2020  Seen recently by neurology and his Vimpat was increased to 100 mg twice daily and he is maintained on Keppra 1000 mg twice daily. He has been of anticoagulation and he continues to follow with neurosurgery. Fran Kong. Alex Capone M.D., F.A.C.P.   Electronically signed 11/25/2020 at 12:21 PM

## 2020-11-28 LAB
AFP-TUMOR MARKER: 2 NG/ML (ref 0–9)
HCG TUMOR MARKER: <1 IU/L (ref 0–3)

## 2020-12-07 ENCOUNTER — TELEPHONE (OUTPATIENT)
Dept: ADMINISTRATIVE | Age: 45
End: 2020-12-07

## 2020-12-07 NOTE — TELEPHONE ENCOUNTER
Left message for wife he will have to see either pain management or neurosurgery/neurology for baclofen pump.

## 2020-12-07 NOTE — TELEPHONE ENCOUNTER
Pt's wife called and inquired how pt would be able to get a Baclofen pump. She said they had discussed it previously with pcp. Please contact pt's wife.

## 2020-12-07 NOTE — TELEPHONE ENCOUNTER
Spoke with DR Domenica Carey and she will get me the name of the person at [facility] for evaluation and then I will make the referral no one locally inserts these pumps.

## 2020-12-08 ENCOUNTER — PATIENT MESSAGE (OUTPATIENT)
Dept: FAMILY MEDICINE CLINIC | Age: 45
End: 2020-12-08

## 2021-01-05 ENCOUNTER — TELEMEDICINE (OUTPATIENT)
Dept: NEUROLOGY | Age: 46
End: 2021-01-05
Payer: MEDICARE

## 2021-01-05 DIAGNOSIS — R56.9 SEIZURE (HCC): ICD-10-CM

## 2021-01-05 PROCEDURE — 99443 PR PHYS/QHP TELEPHONE EVALUATION 21-30 MIN: CPT | Performed by: CLINICAL NURSE SPECIALIST

## 2021-01-05 RX ORDER — LACOSAMIDE 100 MG/1
100 TABLET ORAL 2 TIMES DAILY
Qty: 60 TABLET | Refills: 2 | Status: SHIPPED
Start: 2021-01-05 | End: 2021-05-24 | Stop reason: SDUPTHER

## 2021-01-05 NOTE — PROGRESS NOTES
Preethi Mcdonald is a 54 y.o. right handed man    evaluated via telephone on 1/5/2021. Consent:  He and/or health care decision maker is aware that that he may receive a bill for this telephone service, depending on his insurance coverage, and has provided verbal consent to proceed: Yes    I affirm this is a Patient Initiated Episode with an Established Patient who has not had a related appointment within my department in the past 7 days or scheduled within the next 24 hours. The patient's identity was verified at the start of the call. Others involved in call: sister Zak Weir and caregiver -- Aundra Campa as well in background   Total Time: minutes: 21-30 minutes    Consent:  The patient and/or health care decision maker is aware that that he may receive a bill for this telephone service, depending on his insurance coverage, and has provided verbal consent to proceed: Yes      Patient advised regarding steps to help prevent the spread of COVID-19   SOURCE - [email or web address].[email or web address] html     1-Stay home except to get medical care  2-Clean your hands often for atleast 20 secnds, avoid touching: Avoid touching your eyes, nose, and mouth with unwashed hands. 3-Seek medical attention: Seek prompt medical attention if your illness is worsening (e.g., difficulty breathing). Call you doctor first.  3-Wear a facemask if you are sick   4-Cover your coughs and sneezes          Note: not billable if this call serves to triage the patient into an appointment for the relevant concern    Patient was doing well until the evening of the [address] [place] [address] /[place] game 2017. He was drinking heavily and fell down his basement stairs. He was there all night and in the morning his wife called EMS. He had right SDH/ICH and underwent a crani at the time. He was started on Keppra BID prophylaxis but his wife states he was in charge of his own medications.    He was noncompliant with his morning dose but would take his evening dose. He and his wife drove to the [place] and arrived early morning at the end of April 2018, that day he felt \"funny\" asked his daughter to get his wife and she saw his face shaking   This continued until he had a full GTC seizure  He was flown to [facility] and was placed on numerous AEDs, including Keppra 1500mg BID, Vimpat 150mg BID and Dilantin 100mg BID  Unfortunately, he returned to my office only on [address]   He was reportedly seizure free therefore medications were not adjusted     Wife reported periods of confusion/spacing out and not paying attention to her   We titrated Keppra and eventually restarted Vimpat as well and he is now on 100mg BID     Unfortunately, was dx with testicular cancer and started chemotherapy   During the course of tx, he developed a DVT and needed oral anticoagulation for 6 months  He was entertaining the idea of returning to work 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   He was in [place] at a [employer] when he suddenly collapsed    No seizure reported at the time    CT demonstrated a large left SDH    He underwent crani and his bone was replaced with an artificial  I was able to review and confirm there was no seizure activity witnessed on cEEG during admission     He now lives with sister -- doing well - no complaints    Working with therapy -- coming to house  Working on getting a Baclofen pump   Working on getting equipment to help in and around home     Prior to Visit Medications    Medication Sig Taking? Authorizing Provider   lacosamide (VIMPAT) 100 MG TABS tablet Take 1 tablet by mouth 2 times daily for 90 days.   VENKATA Dodge - CNS   levETIRAcetam (KEPPRA) 100 MG/ML solution TAKE 10 MLS BY MOUTH TWICE A DAY  VENKATA Dodge - CNS   Control Gel Formula Dressing (DUODERM CGF EXTRA THIN) MISC Apply 1 Device topically every 3 days  VENKATA Potts - CNP   Nutritional Supplements (BOOST MAX PROTEIN) LIQD Take 1 Bottle by mouth three times daily  Malina Murray, APRN - CNP   LORazepam (ATIVAN) 0.5 MG tablet TAKE 1 TABLET BY MOUTH THREE TIMES A DAY AS NEEDED  Historical Provider, MD   baclofen (LIORESAL) 10 MG tablet TAKE 1 TABLET BY MOUTH TWICE A DAY AS NEEDED  Historical Provider, MD   sertraline (ZOLOFT) 50 MG tablet TAKE 1 TABLET BY MOUTH EVERY DAY  Historical Provider, MD   ondansetron (ZOFRAN-ODT) 4 MG disintegrating tablet Take 1 tablet by mouth every 6 hours as needed for Nausea or Vomiting  Mich Cevallos DO   DOCU 48 MG/5ML liquid   Historical Provider, MD   Nutritional Supplements (BOOST) LIQD   Historical Provider, MD   methylphenidate (RITALIN) 5 MG tablet 5 mg 2 times daily.    Historical Provider, MD   pantoprazole (PROTONIX) 40 MG tablet Take 40 mg by mouth daily  Historical Provider, MD   melatonin 3 MG TABS tablet 3 mg by PEG Tube route nightly as needed  Historical Provider, MD     Allergies as of 01/05/2021    (No Known Allergies)     Objective:     Mental Status: Alert    Speech: dysarthric   Language: laconic     Laboratory/Radiology:     CBC with Differential:    Lab Results   Component Value Date    WBC 6.6 11/25/2020    RBC 4.45 11/25/2020    HGB 13.5 11/25/2020    HCT 43.2 11/25/2020     11/25/2020    MCV 97.1 11/25/2020    MCH 30.3 11/25/2020    MCHC 31.3 11/25/2020    RDW 12.3 11/25/2020    METASPCT 1.0 05/29/2018    LYMPHOPCT 23.4 11/25/2020    MONOPCT 9.7 11/25/2020    MYELOPCT 1.5 05/29/2018    BASOPCT 0.5 11/25/2020    MONOSABS 0.64 11/25/2020    LYMPHSABS 1.54 11/25/2020    EOSABS 0.14 11/25/2020    BASOSABS 0.03 11/25/2020     CMP:    Lab Results   Component Value Date     11/25/2020    K 4.2 11/25/2020    K 5.6 03/26/2020     11/25/2020    CO2 25 11/25/2020    BUN 17 11/25/2020    CREATININE 0.8 11/25/2020    GFRAA >60 11/25/2020    LABGLOM >60 11/25/2020    GLUCOSE 54 11/25/2020    PROT 6.8 11/25/2020    LABALBU 4.1 11/25/2020    CALCIUM 9.3 11/25/2020    BILITOT 0.2 11/25/2020

## 2021-01-05 NOTE — PROGRESS NOTES
Juan Glynn was read the following message We want to confirm that, for purposes of billing, this is a virtual visit with your provider for which we will submit a claim for reimbursement with your insurance company. You will be responsible for any copays, coinsurance amounts or other amounts not covered by your insurance company. If you do not accept this, unfortunately we will not be able to schedule or proceed with a virtual visit with the provider. Do you accept? Tam Yang responded Yes .

## 2021-01-08 ENCOUNTER — TELEPHONE (OUTPATIENT)
Dept: FAMILY MEDICINE CLINIC | Age: 46
End: 2021-01-08

## 2021-01-08 DIAGNOSIS — I69.398 SPASTICITY AS LATE EFFECT OF CEREBROVASCULAR ACCIDENT (CVA): Primary | ICD-10-CM

## 2021-01-08 DIAGNOSIS — I69.052: ICD-10-CM

## 2021-01-08 DIAGNOSIS — R25.2 SPASTICITY AS LATE EFFECT OF CEREBROVASCULAR ACCIDENT (CVA): Primary | ICD-10-CM

## 2021-01-08 NOTE — TELEPHONE ENCOUNTER
Pt wife called they are looking for someone to manage the baclofen pump locally.     Last seen 11/3/2020  Next appt 3/2/2021

## 2021-01-08 NOTE — TELEPHONE ENCOUNTER
Can you call [facility] and ask who and how we make referral to the NP who takes care of the pumps? When did he have it put in?      Thanks

## 2021-01-11 ENCOUNTER — TELEPHONE (OUTPATIENT)
Dept: FAMILY MEDICINE CLINIC | Age: 46
End: 2021-01-11

## 2021-01-11 DIAGNOSIS — K94.23 LEAKING PEG TUBE (HCC): Primary | ICD-10-CM

## 2021-01-11 NOTE — TELEPHONE ENCOUNTER
Dr Nick Gil what was the NP's name from [facility] who did the Baclofen pumps? We called and they said they don't do this?    Thanks,  Av Esposito

## 2021-01-13 ENCOUNTER — TELEPHONE (OUTPATIENT)
Dept: FAMILY MEDICINE CLINIC | Age: 46
End: 2021-01-13

## 2021-01-13 NOTE — TELEPHONE ENCOUNTER
They can remove the peg tube, cancel the surgical consult, I don't have a way to order the pad can they fax something over to be signed?

## 2021-01-13 NOTE — TELEPHONE ENCOUNTER
I called [facility] [phone] and left message regarding baclofen pump for Tam Render had to leave message and asked for return call to my cell.

## 2021-01-14 ENCOUNTER — TELEPHONE (OUTPATIENT)
Dept: FAMILY MEDICINE CLINIC | Age: 46
End: 2021-01-14

## 2021-01-14 DIAGNOSIS — I69.398 SPASTICITY AS LATE EFFECT OF CEREBROVASCULAR ACCIDENT (CVA): ICD-10-CM

## 2021-01-14 DIAGNOSIS — I69.052: Primary | ICD-10-CM

## 2021-01-14 DIAGNOSIS — R25.2 SPASTICITY AS LATE EFFECT OF CEREBROVASCULAR ACCIDENT (CVA): ICD-10-CM

## 2021-01-14 NOTE — TELEPHONE ENCOUNTER
Called pt wife and she would like the order for the ozzie pad to go to [facility]. Called Dr. Lilly Bermudez office canceled order for removal, and replacement and gave verbal for removal only, [facility] will note apt notes, .

## 2021-01-14 NOTE — TELEPHONE ENCOUNTER
Cristine Chung, called to inform that patient's insurance will not pay for another ozzie lift pad since patient ordered one on 1/23/20.

## 2021-01-15 NOTE — TELEPHONE ENCOUNTER
Called pt wife left message that insurance will not cover another pad, advised her to give insurance company a call,

## 2021-01-18 ENCOUNTER — OFFICE VISIT (OUTPATIENT)
Dept: SURGERY | Age: 46
End: 2021-01-18
Payer: MEDICARE

## 2021-01-18 VITALS
DIASTOLIC BLOOD PRESSURE: 82 MMHG | WEIGHT: 172 LBS | TEMPERATURE: 98.1 F | HEART RATE: 73 BPM | HEIGHT: 71 IN | SYSTOLIC BLOOD PRESSURE: 111 MMHG | BODY MASS INDEX: 24.08 KG/M2

## 2021-01-18 DIAGNOSIS — K94.23 PEG TUBE MALFUNCTION (HCC): Primary | ICD-10-CM

## 2021-01-18 PROCEDURE — 1036F TOBACCO NON-USER: CPT | Performed by: SURGERY

## 2021-01-18 PROCEDURE — G8420 CALC BMI NORM PARAMETERS: HCPCS | Performed by: SURGERY

## 2021-01-18 PROCEDURE — G8484 FLU IMMUNIZE NO ADMIN: HCPCS | Performed by: SURGERY

## 2021-01-18 PROCEDURE — G8427 DOCREV CUR MEDS BY ELIG CLIN: HCPCS | Performed by: SURGERY

## 2021-01-18 PROCEDURE — 99213 OFFICE O/P EST LOW 20 MIN: CPT | Performed by: SURGERY

## 2021-01-18 NOTE — PROGRESS NOTES
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    Falls, initial encounter    Presence of inferior vena cava filter    Traumatic brain injury with depressed frontal skull fracture, sequela (HCC)    Nonintractable epilepsy without status epilepticus ([address]    Subdural hemorrhage ([address]    Severe protein-calorie malnutrition ([address]    Skull defect    History of subdural hematoma (post traumatic)    Seizure disorder (HCC)    Feeding difficulties    S/P insertion of IVC (inferior vena caval) filter    At high risk for impaired skin integrity    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 Invalid input(s): PLR  Lab Results   Component Value Date    CREATININE 0.8 11/25/2020    BUN 17 11/25/2020     11/25/2020    K 4.2 11/25/2020     (H) 11/25/2020    CO2 25 11/25/2020     No results for input(s): LIPASE, AMYLASE in the last 72 hours.     Physical exam:   /82   Pulse 73   Temp 98.1 °F (36.7 °C) (Temporal)   Ht 5' 11\" (1.803 m)   Wt 172 lb (78 kg)   BMI 23.99 kg/m²   General appearance: NAD  Head: NCAT  Neck: supple, no masses  Lungs: equal chest rise bilateral  Heart: S1S2 present  Abdomen: soft, nontender, nondistended,  Peg in place  Skin; no lesions  Gu: no cva tenderness  Extremities: extremities normal, atraumatic, no cyanosis or edema    A:  Peg in place and no longer using    P: removed bedside with tension/countertension, no issues    Tariq Talbot MD  1/18/2021

## 2021-01-22 NOTE — TELEPHONE ENCOUNTER
Please try to Call [facility] and see if DR Susanna Ganser would manage a Baclofen pump, the patient already has the device just needs someone to manage.

## 2021-01-25 NOTE — TELEPHONE ENCOUNTER
Got a hold of reception at They stated that they usually only handle pain pumps, but to send over the Referral for Dr. Romualdo Curling to review for possible Management.

## 2021-01-28 DIAGNOSIS — C62.01 MALIGNANT NEOPLASM OF UNDESCENDED RIGHT TESTIS (HCC): ICD-10-CM

## 2021-01-28 DIAGNOSIS — C62.12 MALIGNANT NEOPLASM OF DESCENDED LEFT TESTIS (HCC): Primary | ICD-10-CM

## 2021-01-28 DIAGNOSIS — C62.11 MALIGNANT NEOPLASM OF DESCENDED RIGHT TESTIS (HCC): ICD-10-CM

## 2021-02-01 ENCOUNTER — TELEPHONE (OUTPATIENT)
Dept: FAMILY MEDICINE CLINIC | Age: 46
End: 2021-02-01

## 2021-02-01 DIAGNOSIS — I69.398 SPASTICITY AS LATE EFFECT OF CEREBROVASCULAR ACCIDENT (CVA): Primary | ICD-10-CM

## 2021-02-01 DIAGNOSIS — R25.2 SPASTICITY AS LATE EFFECT OF CEREBROVASCULAR ACCIDENT (CVA): Primary | ICD-10-CM

## 2021-02-01 DIAGNOSIS — I69.052: ICD-10-CM

## 2021-02-01 NOTE — TELEPHONE ENCOUNTER
They can check with Dr Brown Elaine, I thought she had transportation for him via [employer]? Not aware she takes him by herself. Have they heard anything from Dr Hazel Stafford regarding the baclofen pump?

## 2021-02-22 ENCOUNTER — HOSPITAL ENCOUNTER (OUTPATIENT)
Dept: INFUSION THERAPY | Age: 46
Discharge: HOME | End: 2021-02-22
Payer: MEDICARE

## 2021-02-22 DIAGNOSIS — C62.10 MALIGNANT NEOPLASM OF DESCENDED TESTIS, UNSPECIFIED LATERALITY (HCC): ICD-10-CM

## 2021-02-22 LAB
ALBUMIN SERPL-MCNC: 4.3 G/DL (ref 3.5–5.2)
ALP BLD-CCNC: 76 U/L (ref 40–129)
ALT SERPL-CCNC: 16 U/L (ref 0–40)
ANION GAP SERPL CALCULATED.3IONS-SCNC: 8 MMOL/L (ref 7–16)
AST SERPL-CCNC: 15 U/L (ref 0–39)
BASOPHILS ABSOLUTE: 0.06 E9/L (ref 0–0.2)
BASOPHILS RELATIVE PERCENT: 1.1 % (ref 0–2)
BILIRUB SERPL-MCNC: 0.5 MG/DL (ref 0–1.2)
BUN BLDV-MCNC: 18 MG/DL (ref 6–20)
CALCIUM SERPL-MCNC: 9.6 MG/DL (ref 8.6–10.2)
CHLORIDE BLD-SCNC: 105 MMOL/L (ref 98–107)
CO2: 27 MMOL/L (ref 22–29)
CREAT SERPL-MCNC: 0.9 MG/DL (ref 0.7–1.2)
EOSINOPHILS ABSOLUTE: 0.1 E9/L (ref 0.05–0.5)
EOSINOPHILS RELATIVE PERCENT: 1.8 % (ref 0–6)
GFR AFRICAN AMERICAN: >60
GFR NON-AFRICAN AMERICAN: >60 ML/MIN/1.73
GLUCOSE BLD-MCNC: 52 MG/DL (ref 74–99)
HCT VFR BLD CALC: 44.7 % (ref 37–54)
HEMOGLOBIN: 14.3 G/DL (ref 12.5–16.5)
IMMATURE GRANULOCYTES #: 0.02 E9/L
IMMATURE GRANULOCYTES %: 0.4 % (ref 0–5)
LACTATE DEHYDROGENASE: 191 U/L (ref 135–225)
LYMPHOCYTES ABSOLUTE: 1.54 E9/L (ref 1.5–4)
LYMPHOCYTES RELATIVE PERCENT: 27 % (ref 20–42)
MCH RBC QN AUTO: 30.7 PG (ref 26–35)
MCHC RBC AUTO-ENTMCNC: 32 % (ref 32–34.5)
MCV RBC AUTO: 95.9 FL (ref 80–99.9)
MONOCYTES ABSOLUTE: 0.55 E9/L (ref 0.1–0.95)
MONOCYTES RELATIVE PERCENT: 9.6 % (ref 2–12)
NEUTROPHILS ABSOLUTE: 3.44 E9/L (ref 1.8–7.3)
NEUTROPHILS RELATIVE PERCENT: 60.1 % (ref 43–80)
PDW BLD-RTO: 13.1 FL (ref 11.5–15)
PLATELET # BLD: 235 E9/L (ref 130–450)
PMV BLD AUTO: 8.9 FL (ref 7–12)
POTASSIUM SERPL-SCNC: 4.1 MMOL/L (ref 3.5–5)
RBC # BLD: 4.66 E12/L (ref 3.8–5.8)
SODIUM BLD-SCNC: 140 MMOL/L (ref 132–146)
TOTAL PROTEIN: 7.2 G/DL (ref 6.4–8.3)
WBC # BLD: 5.7 E9/L (ref 4.5–11.5)

## 2021-02-22 PROCEDURE — 83615 LACTATE (LD) (LDH) ENZYME: CPT

## 2021-02-22 PROCEDURE — 84702 CHORIONIC GONADOTROPIN TEST: CPT

## 2021-02-22 PROCEDURE — 80053 COMPREHEN METABOLIC PANEL: CPT

## 2021-02-22 PROCEDURE — 85025 COMPLETE CBC W/AUTO DIFF WBC: CPT

## 2021-02-22 PROCEDURE — 82105 ALPHA-FETOPROTEIN SERUM: CPT

## 2021-02-22 PROCEDURE — 36415 COLL VENOUS BLD VENIPUNCTURE: CPT

## 2021-02-22 RX ORDER — PANTOPRAZOLE SODIUM 40 MG/1
40 TABLET, DELAYED RELEASE ORAL DAILY
Qty: 30 TABLET | Refills: 0 | Status: SHIPPED
Start: 2021-02-22 | End: 2021-03-22

## 2021-02-22 RX ORDER — BACLOFEN 10 MG/1
TABLET ORAL
Qty: 60 TABLET | Refills: 0 | Status: SHIPPED
Start: 2021-02-22 | End: 2021-03-22

## 2021-02-23 ENCOUNTER — PATIENT MESSAGE (OUTPATIENT)
Dept: FAMILY MEDICINE CLINIC | Age: 46
End: 2021-02-23

## 2021-02-23 ENCOUNTER — HOSPITAL ENCOUNTER (OUTPATIENT)
Dept: PET IMAGING | Age: 46
Discharge: HOME | End: 2021-02-25
Payer: MEDICARE

## 2021-02-23 DIAGNOSIS — Z01.812 ENCOUNTER FOR PREOPERATIVE SCREENING LABORATORY TESTING FOR COVID-19 VIRUS: Primary | ICD-10-CM

## 2021-02-23 DIAGNOSIS — C62.11 MALIGNANT NEOPLASM OF DESCENDED RIGHT TESTIS (HCC): ICD-10-CM

## 2021-02-23 DIAGNOSIS — Z20.822 ENCOUNTER FOR PREOPERATIVE SCREENING LABORATORY TESTING FOR COVID-19 VIRUS: Primary | ICD-10-CM

## 2021-02-23 LAB — METER GLUCOSE: 83 MG/DL (ref 74–99)

## 2021-02-23 PROCEDURE — 78815 PET IMAGE W/CT SKULL-THIGH: CPT

## 2021-02-23 PROCEDURE — 82962 GLUCOSE BLOOD TEST: CPT

## 2021-02-23 PROCEDURE — A9552 F18 FDG: HCPCS | Performed by: RADIOLOGY

## 2021-02-23 PROCEDURE — 3430000000 HC RX DIAGNOSTIC RADIOPHARMACEUTICAL: Performed by: RADIOLOGY

## 2021-02-23 RX ORDER — FLUDEOXYGLUCOSE F 18 200 MCI/ML
15 INJECTION, SOLUTION INTRAVENOUS
Status: COMPLETED | OUTPATIENT
Start: 2021-02-23 | End: 2021-02-23

## 2021-02-23 RX ADMIN — FLUDEOXYGLUCOSE F 18 15 MILLICURIE: 200 INJECTION, SOLUTION INTRAVENOUS at 13:00

## 2021-02-24 ENCOUNTER — OFFICE VISIT (OUTPATIENT)
Dept: ONCOLOGY | Age: 46
End: 2021-02-24
Payer: MEDICARE

## 2021-02-24 ENCOUNTER — HOSPITAL ENCOUNTER (EMERGENCY)
Age: 46
Discharge: HOME | End: 2021-02-25
Attending: EMERGENCY MEDICINE
Payer: MEDICARE

## 2021-02-24 ENCOUNTER — APPOINTMENT (OUTPATIENT)
Dept: CT IMAGING | Age: 46
End: 2021-02-24
Payer: MEDICARE

## 2021-02-24 VITALS
HEIGHT: 71 IN | DIASTOLIC BLOOD PRESSURE: 42 MMHG | TEMPERATURE: 99.5 F | OXYGEN SATURATION: 98 % | SYSTOLIC BLOOD PRESSURE: 117 MMHG | BODY MASS INDEX: 23.99 KG/M2 | HEART RATE: 53 BPM

## 2021-02-24 VITALS
OXYGEN SATURATION: 98 % | TEMPERATURE: 96.9 F | HEIGHT: 71 IN | DIASTOLIC BLOOD PRESSURE: 74 MMHG | RESPIRATION RATE: 16 BRPM | HEART RATE: 69 BPM | SYSTOLIC BLOOD PRESSURE: 113 MMHG | WEIGHT: 180 LBS | BODY MASS INDEX: 25.2 KG/M2

## 2021-02-24 DIAGNOSIS — C62.11 MALIGNANT NEOPLASM OF DESCENDED RIGHT TESTIS (HCC): Primary | ICD-10-CM

## 2021-02-24 DIAGNOSIS — G40.919 BREAKTHROUGH SEIZURE (HCC): Primary | ICD-10-CM

## 2021-02-24 DIAGNOSIS — C62.91 SEMINOMA OF RIGHT TESTIS, STAGE 1 (HCC): ICD-10-CM

## 2021-02-24 LAB
AFP-TUMOR MARKER: 4 NG/ML (ref 0–9)
ANION GAP SERPL CALCULATED.3IONS-SCNC: 10 MMOL/L (ref 7–16)
BASOPHILS ABSOLUTE: 0.06 E9/L (ref 0–0.2)
BASOPHILS RELATIVE PERCENT: 1.1 % (ref 0–2)
BUN BLDV-MCNC: 15 MG/DL (ref 6–20)
CALCIUM SERPL-MCNC: 9.4 MG/DL (ref 8.6–10.2)
CHLORIDE BLD-SCNC: 106 MMOL/L (ref 98–107)
CO2: 24 MMOL/L (ref 22–29)
CREAT SERPL-MCNC: 0.8 MG/DL (ref 0.7–1.2)
EOSINOPHILS ABSOLUTE: 0.13 E9/L (ref 0.05–0.5)
EOSINOPHILS RELATIVE PERCENT: 2.3 % (ref 0–6)
GFR AFRICAN AMERICAN: >60
GFR NON-AFRICAN AMERICAN: >60 ML/MIN/1.73
GLUCOSE BLD-MCNC: 92 MG/DL (ref 74–99)
HCG TUMOR MARKER: <1 IU/L (ref 0–3)
HCT VFR BLD CALC: 41.5 % (ref 37–54)
HEMOGLOBIN: 13.5 G/DL (ref 12.5–16.5)
IMMATURE GRANULOCYTES #: 0.04 E9/L
IMMATURE GRANULOCYTES %: 0.7 % (ref 0–5)
LYMPHOCYTES ABSOLUTE: 1.86 E9/L (ref 1.5–4)
LYMPHOCYTES RELATIVE PERCENT: 33.3 % (ref 20–42)
MCH RBC QN AUTO: 30.5 PG (ref 26–35)
MCHC RBC AUTO-ENTMCNC: 32.5 % (ref 32–34.5)
MCV RBC AUTO: 93.7 FL (ref 80–99.9)
MONOCYTES ABSOLUTE: 0.55 E9/L (ref 0.1–0.95)
MONOCYTES RELATIVE PERCENT: 9.9 % (ref 2–12)
NEUTROPHILS ABSOLUTE: 2.94 E9/L (ref 1.8–7.3)
NEUTROPHILS RELATIVE PERCENT: 52.7 % (ref 43–80)
PDW BLD-RTO: 13 FL (ref 11.5–15)
PLATELET # BLD: 217 E9/L (ref 130–450)
PMV BLD AUTO: 8.9 FL (ref 7–12)
POTASSIUM REFLEX MAGNESIUM: 4.3 MMOL/L (ref 3.5–5)
RBC # BLD: 4.43 E12/L (ref 3.8–5.8)
SODIUM BLD-SCNC: 140 MMOL/L (ref 132–146)
WBC # BLD: 5.6 E9/L (ref 4.5–11.5)

## 2021-02-24 PROCEDURE — 70450 CT HEAD/BRAIN W/O DYE: CPT

## 2021-02-24 PROCEDURE — 96366 THER/PROPH/DIAG IV INF ADDON: CPT

## 2021-02-24 PROCEDURE — 96365 THER/PROPH/DIAG IV INF INIT: CPT

## 2021-02-24 PROCEDURE — 93005 ELECTROCARDIOGRAM TRACING: CPT | Performed by: EMERGENCY MEDICINE

## 2021-02-24 PROCEDURE — 80048 BASIC METABOLIC PNL TOTAL CA: CPT

## 2021-02-24 PROCEDURE — 6360000002 HC RX W HCPCS: Performed by: EMERGENCY MEDICINE

## 2021-02-24 PROCEDURE — 99285 EMERGENCY DEPT VISIT HI MDM: CPT

## 2021-02-24 PROCEDURE — 85025 COMPLETE CBC W/AUTO DIFF WBC: CPT

## 2021-02-24 PROCEDURE — 99212 OFFICE O/P EST SF 10 MIN: CPT

## 2021-02-24 RX ORDER — DOXYCYCLINE HYCLATE 100 MG
100 TABLET ORAL 2 TIMES DAILY
Qty: 10 TABLET | Refills: 0 | Status: SHIPPED
Start: 2021-02-24 | End: 2021-02-25 | Stop reason: SDUPTHER

## 2021-02-24 RX ORDER — LEVETIRACETAM 15 MG/ML
1500 INJECTION INTRAVASCULAR ONCE
Status: COMPLETED | OUTPATIENT
Start: 2021-02-24 | End: 2021-02-25

## 2021-02-24 RX ADMIN — LEVETIRACETAM 1500 MG: 15 INJECTION INTRAVENOUS at 21:58

## 2021-02-24 NOTE — PROGRESS NOTES
[facility]. [facility]        Pt Name: [place]ño [place]: 1975  Date of evaluation: 2/24/2021  Primary Care Physician: VENKATA Haas CNP  Reason for evaluation:   Chief Complaint   Patient presents with    Cancer     Malignant neoplasm of descended testes right     Other     Seminoma of right testis, stage I    Follow-up            Subjective: Here for results of PET/CT and follow up. He is in a wheelchair and in company of his wife. He has had frequent seizures and he follows with neurology. On  Vimpat  100 mg twice daily. On  Keppra 1500 mg twice daily and he continues on baclofen and lorazepam.      OBJECTIVE:  VITALS:  vitals were not taken for this visit. Physical Exam:  Performance Status: 0  Well developed, well nourished male  Eyes:  No gross abnormalities. and PERRL  Heart:  RRR, no murmur. Lungs:  Normal expansion.  Clear to auscultation.  No wheezing. Abdomen:  Soft, Non-Tender   Extremities: no swelling, no edema, no calf tenderness  In wheelchair. Neurologic: CNs grossly intact, no slurred speech, A&O x3  Skin:  Skin color normal. No rashes or lesions. Medications  Prior to Admission medications    Medication Sig Start Date End Date Taking? Authorizing Provider   pantoprazole (PROTONIX) 40 MG tablet Take 1 tablet by mouth daily 2/22/21   VENKATA Haas CNP   baclofen (LIORESAL) 10 MG tablet TAKE 1 TABLET BY MOUTH TWICE A DAY AS NEEDED 2/22/21   VENKATA Haas CNP   sertraline (ZOLOFT) 50 MG tablet TAKE 1 TABLET BY MOUTH EVERY DAY 2/22/21   VENKATA Haas CNP   Handicap Placard MISC by Does not apply route Start 2/2/2021 and end 2/1/2025 2/2/21   VENKATA Haas CNP   lacosamide (VIMPAT) 100 MG TABS tablet Take 1 tablet by mouth 2 times daily for 90 days.  1/5/21 4/5/21  VENKATA Oneil   levETIRAcetam (KEPPRA) 100 MG/ML solution TAKE 10 MLS BY MOUTH TWICE A DAY 11/9/20   Ricky Fleischer. VENKATA Wayne - CNS   Control Gel Formula Dressing (DUODERM CGF EXTRA THIN) MISC Apply 1 Device topically every 3 days 11/3/20   VENKATA Montez CNP   Nutritional Supplements (BOOST MAX PROTEIN) LIQD Take 1 Bottle by mouth three times daily 10/29/20   VENKATA Montez CNP   LORazepam (ATIVAN) 0.5 MG tablet TAKE 1 TABLET BY MOUTH THREE TIMES A DAY AS NEEDED 7/28/20   Historical Provider, MD   ondansetron (ZOFRAN-ODT) 4 MG disintegrating tablet Take 1 tablet by mouth every 6 hours as needed for Nausea or Vomiting 3/27/20   Ramon Singleton DO   DOCU 50 MG/5ML liquid  1/11/20   Historical Provider, MD   Nutritional Supplements (BOOST) LIQD  12/12/19   Historical Provider, MD   melatonin 3 MG TABS tablet 3 mg by PEG Tube route nightly as needed    Historical Provider, MD    Scheduled Meds:  Continuous Infusions:  PRN Meds:.        Recent Laboratory Data-     Lab Results   Component Value Date    WBC 5.7 02/22/2021    HGB 14.3 02/22/2021    HCT 44.7 02/22/2021    MCV 95.9 02/22/2021     02/22/2021    LYMPHOPCT 27.0 02/22/2021    RBC 4.66 02/22/2021    MCH 30.7 02/22/2021    MCHC 32.0 02/22/2021    RDW 13.1 02/22/2021    NEUTOPHILPCT 60.1 02/22/2021    MONOPCT 9.6 02/22/2021    BASOPCT 1.1 02/22/2021    NEUTROABS 3.44 02/22/2021    LYMPHSABS 1.54 02/22/2021    MONOSABS 0.55 02/22/2021    EOSABS 0.10 02/22/2021    BASOSABS 0.06 02/22/2021       Lab Results   Component Value Date     02/22/2021    K 4.1 02/22/2021     02/22/2021    CO2 27 02/22/2021    BUN 18 02/22/2021    CREATININE 0.9 02/22/2021    GLUCOSE 52 (L) 02/22/2021    CALCIUM 9.6 02/22/2021    PROT 7.2 02/22/2021    LABALBU 4.3 02/22/2021    BILITOT 0.5 02/22/2021    ALKPHOS 76 02/22/2021    AST 15 02/22/2021    ALT 16 02/22/2021    LABGLOM >60 02/22/2021    GFRAA >60 02/22/2021         Lab Results   Component Value Date    IRON 35 (L) 06/13/2018    TIBC 148 (L) 06/13/2018    FERRITIN 1,082 06/13/2018           Radiology-  PET/CT SCAN:  0/47/81  Hypermetabolic heterogeneous and ground-glass opacity within the left lower   lobe, likely reflecting pneumonia.  CT follow-up to resolution recommended. Activity within a nonenlarged mediastinal lymph node can be reactive   secondary to the airspace disease, less likely metastatic given lack of   widespread findings elsewhere.  4 mm nodule along the plane of the minor   fissure is too small to characterize by PET. [place] too can be reassessed at   the time of follow-up CT. No findings of FDG avid disease within the abdomen or pelvis. Large stool ball at the rectum; correlate for fecal impaction. Hydrocephalus, partially imaged. 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 It was explained to him that his odds for cure with surveillance only on in the vicinity of 85% with only 15% chance of relapse and if he relapses he would be cured with either para-aortic radiation or systemic chemotherapy. He is in agreement for surveillance and will have a follow-up serum LDH as well as CT scan of abdomen and pelvis at 3 month and then again at six-month and then every 6 months for 2 years  He has been reassured.     His follow-up CT scan of chest abdomen and pelvis done on 5/23/2017 were negative for recurrent or metastatic disease.  Extensive left sigmoid diverticulosis was described     He was scheduled for follow up CT scans to be done in November, but because of trauma, hospitalizations and surgeries he missed his appointment and finally had a follow-up CT scan of abdomen and pelvis on 4/5/2018 which unfortunately showed recurrent disease with large retroperitoneal mass abutting the IVC and measuring  5.5×3.9 cm within additional retrocaval node measuring 1.2 cm     Clinically In April 2018 he had stage IIB recurrent seminoma and was recommended 4 cycles of systemic chemotherapy with cis-platinum and -16. All potential side effects were discussed     Received Day #1 of Cycle #1  cis-platinum and -16  on 4/16/18 through 4/20/18 with G-CSF prophylaxis with Neulasta  He completed so far 3 cycles of chemotherapy last chemo was on 5/29/2018      2- History of fall In November 2017 with had trauma complicated by a right frontotemporal traumatic subarachnoid hemorrhage and subdural hematoma status post decompressive hemicraniectomy and duraplasty. In January 2018 he required right cranioplasty and sharp debridement of the skin incision and had external hydrocephalus. Few weeks ago he had  seizures while On vacation in [place]  He was referred to neurology and neurosurgery for evaluation of his recurrent seizures.       Received Cycle #1  cis-platinum and -16  on 4/16/18 through 4/20/18 with G-CSF prophylaxis with Neulasta      He  received  IV hydration with normal saline bolus 500 mL over one hour on 4/23/18  Received  Cycle #2 cis-platinum and -16  on May 7, 2018.     He  received Cycle #3 cis-platinum and -16 on 5/29/2018 through Saturday, June 1, 2018 .        Lately he has had worsening intractable headaches with vertigo and falls  Case discussed with Dr. Minor  He is was  admitted for MRI of the brain and was seen by neurology and neurosurgical for re-evaluation.     MRI on 6/11 showed subacute right transverse sinus thrombus, encephalomalacia related to previous trauma, right mastoid effusion and redemonstration of s/p frontoparietal craniotomy with 10mm  Fluid collection. Neurosurgery evaluation appreciated. He is scheduled to follow up with Neurology next week.  His headache has also improved.         His hemoglobin went  down to 7.5 and he was very symptomatic with his anemia and his platelets are 78  He was transfused with 2 units of packed RBCs for symptomatic anemia and his  last cycle of chemotherapy was postponed until next week.     Received Cycle #4 cis-platinum and -16:  Started on 6/25/18  and completed on Friday, June 29, 2018 .  [facility] has achieved a complete remission without any residual pathologic lymphadenopathy    Deep Vein Thrombosis of the RLE:  -Reports improvements in pain, swelling of the RLE; no difficulty ambulating. This is his 2nd episode of DVT. His 1st episode was an December 2017 and at that time it was provoked by his surgery  -Has IVC filter in place back in [address] [facility] [address] 2017, Was never retrieved, Antithrombin III normal at 102, platelets stable at 150; pending results of Antiphospholipid antibody titers andThrombophilia Panel  -Okay per Neurology to use anticoagulation since it has been 14 months since he had his Intracranial bleed and He is now on Lovenox        Recommend Lovenox for up to 2 weeks till hematoma in left thigh resolves prior to transitioning him to oral Eliquis or Xarelto to decrease risk of hematoma expansion         Hematoma of the LLE:  -Confirmed on CT and MRI of left thigh     History of Traumatic Brain Injury/Bleed: s/p craniectomy  -IVC Filter in place due to prior contraindication to anticoagulation and history of blood clots. Unsuccessful attempt at the retrieval of the IVC filter  -Managed with anti-seizure agents per Neurology     History of Seminoma:  -Ultrasound on 2/2/2019 was negative for mass or torsion. 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 It was explained to him that his odds for cure with surveillance only on in the vicinity of 85% with only 15% chance of relapse and if he relapses he would be cured with either para-aortic radiation or systemic chemotherapy.   He is in agreement for surveillance and will have a follow-up serum LDH as well as CT scan of abdomen and pelvis at 3 month and then again at six-month and then every 6 months for 2 years  He has been reassured.     His follow-up CT scan of chest abdomen and pelvis done on 5/23/2017 were negative for recurrent or metastatic disease. Extensive left sigmoid diverticulosis was described     He was scheduled for follow up CT scans to be done in November 2017 , but because of trauma, hospitalizations and surgeries he missed his appointment and finally had a follow-up CT scan of abdomen and pelvis on 4/5/2018 which unfortunately showed recurrent disease with large retroperitoneal mass abutting the IVC and measuring 5.5×3.9 cm within additional retrocaval node measuring 1.2 cm. Clinically In April 2018 he had stage IIB recurrent seminoma and was recommended 4 cycles of systemic chemotherapy with cis-platinum and -16. He had a follow-up CT Chest, Abdomen and Pelvis on 10/28/2018  Were negative  He remains in remission. Had a CT scan of  Abdomen and Pelvis on 1/9/19 Which showed suggestion of cystitis as well as diverticulosis. Was hospitalized several months ago with DVT and was on anticoagulation with Xarelto which resulted in recurrent subarachnoid hemorrhage and anticoagulants have been permanently discontinued  He has had craniotomy and cranioplasty and evacuation of his bleed and appears much slower and is undergoing rehab    His last CT scan of abdomen and pelvis April 2019 showed no evidence of recurrent or metastatic disease  Tumor markers will be rechecked today and     He had a  follow-up CT Chest, Abdomen and Pelvis on October 10,2019  It showed stool impaction but no evidence of lymphadenopathy or disease recurrence    His Colace was increased to twice daily for constipation  Will continue on PT and rehab. Vira Libman He completed rehab at [place] and was going to go to out-patient PT but cannot go due to coronavirus closing the facility. Was seen for follow up appointment with Dr. Geo Desai 2/14/2020. CT Head was stable. Will return in 1 year for follow up. His follow-up CT Chest, Abdomen and Pelvis done on 4/20/2020 were reviewed. The chest was unremarkable. CT scan of abdomen and pelvis shows slight retroperitoneal lymphadenopathy nonpathologic by size largest measuring 1.6 x 1.2 cm slightly more prominent than prior likely reactive. Doubt recurrent malignancy. His serum LDH is normal.     He  had a follow-up PET CT scan on 7/29/2020 and it was completely negative without any abnormal metabolic activity in his retroperitoneal node and he is in clinical remission. To continue surveillance. 11/4/2020  Seen recently by neurology and his Vimpat was increased to 100 mg twice daily and he is maintained on Keppra 1000 mg twice daily. He has been of anticoagulation and he continues to follow with neurosurgery. 2/24/2021  PET/CT done 2/23/2021 shows no evidence of recurrent or metastatic disease. However left lower lobe infiltrates were described consistent with pneumonia. He has history of aspirations in the past with his frequent seizures and tube feeding. He has no fever or chills and no cough per his wife. He will be given a course of oral antibiotics with doxycycline and will have a follow-up chest x-ray in 10 days  He will be having his baclofen pump placed in 2 to 3 weeks      Olena Sevilla M.D., F.A.C.P.   Electronically signed 2/24/2021 at 1:15 PM

## 2021-02-25 ENCOUNTER — TELEPHONE (OUTPATIENT)
Dept: ADMINISTRATIVE | Age: 46
End: 2021-02-25

## 2021-02-25 ENCOUNTER — TELEPHONE (OUTPATIENT)
Dept: FAMILY MEDICINE CLINIC | Age: 46
End: 2021-02-25

## 2021-02-25 ENCOUNTER — VIRTUAL VISIT (OUTPATIENT)
Dept: FAMILY MEDICINE CLINIC | Age: 46
End: 2021-02-25
Payer: MEDICARE

## 2021-02-25 DIAGNOSIS — G82.50 SPASTIC QUADRIPARESIS (HCC): ICD-10-CM

## 2021-02-25 DIAGNOSIS — G40.919 BREAKTHROUGH SEIZURE (HCC): ICD-10-CM

## 2021-02-25 DIAGNOSIS — J18.9 PNEUMONIA OF LEFT LOWER LOBE DUE TO INFECTIOUS ORGANISM: Primary | ICD-10-CM

## 2021-02-25 LAB
EKG ATRIAL RATE: 71 BPM
EKG P AXIS: 41 DEGREES
EKG P-R INTERVAL: 194 MS
EKG Q-T INTERVAL: 376 MS
EKG QRS DURATION: 80 MS
EKG QTC CALCULATION (BAZETT): 408 MS
EKG R AXIS: 24 DEGREES
EKG T AXIS: 33 DEGREES
EKG VENTRICULAR RATE: 71 BPM

## 2021-02-25 PROCEDURE — 99214 OFFICE O/P EST MOD 30 MIN: CPT | Performed by: NURSE PRACTITIONER

## 2021-02-25 PROCEDURE — 93010 ELECTROCARDIOGRAM REPORT: CPT | Performed by: INTERNAL MEDICINE

## 2021-02-25 PROCEDURE — G8427 DOCREV CUR MEDS BY ELIG CLIN: HCPCS | Performed by: NURSE PRACTITIONER

## 2021-02-25 PROCEDURE — 96366 THER/PROPH/DIAG IV INF ADDON: CPT

## 2021-02-25 RX ORDER — DOXYCYCLINE HYCLATE 100 MG
100 TABLET ORAL 2 TIMES DAILY
Qty: 10 TABLET | Refills: 0 | Status: SHIPPED | OUTPATIENT
Start: 2021-02-25 | End: 2021-03-02

## 2021-02-25 ASSESSMENT — ENCOUNTER SYMPTOMS
DIARRHEA: 0
SHORTNESS OF BREATH: 0
SINUS PRESSURE: 0
VOICE CHANGE: 0
TROUBLE SWALLOWING: 0
COLOR CHANGE: 0
NAUSEA: 0
VOMITING: 0
WHEEZING: 0
RHINORRHEA: 0
ABDOMINAL PAIN: 0
FACIAL SWELLING: 0
SINUS PAIN: 0
COUGH: 0
CONSTIPATION: 0
SORE THROAT: 0

## 2021-02-25 ASSESSMENT — PATIENT HEALTH QUESTIONNAIRE - PHQ9
SUM OF ALL RESPONSES TO PHQ QUESTIONS 1-9: 0
SUM OF ALL RESPONSES TO PHQ9 QUESTIONS 1 & 2: 0
SUM OF ALL RESPONSES TO PHQ QUESTIONS 1-9: 0
2. FEELING DOWN, DEPRESSED OR HOPELESS: 0

## 2021-02-25 NOTE — ED PROVIDER NOTES
Department of Emergency Medicine   ED  Provider Note  Admit Date/RoomTime: 2/24/2021  9:34 PM  ED Room: 16/16          History of Present Illness:  2/24/21, Time: 9:35 PM EST  Chief Complaint   Patient presents with    Seizures     had seizure at home lasting approx 2 minutes. sister called EMS because seizure was \"different that the other seizures that he has had\". has hx of TBI, seizure and left side weakness. on seizure meds and is compliant with taking them                Heather Stewart is a 54 y.o. male presenting to the ED for seizure activity, beginning less than 1 hour ago. Patient has history of TBI with seizures, currently takes Keppra. Patient denies missing any doses. Patient was at home when family witnessed seizure activity that lasted approximately 2 minutes. EMS was summoned. Upon EMS arrival patient was awake somewhat confused. Family has difficulty describing the seizure, states it appeared somewhat different than his seizures in the past.  Patient states he has seizures approximately every 8-10 days. He typically does not come to the hospital, family had concern with the difference of the seizure. There has been no fall associated with the seizure, no recent fall or trauma. Patient denies any headache or dizziness. No recent nausea/vomiting/diarrhea, chest pain or shortness of breath. Patient states that he is feeling \"back to normal.\"  Patient denies missing any doses of his Keppra.     Review of Systems:   Pertinent positives and negatives are stated within HPI, all other systems reviewed and are negative.        --------------------------------------------- PAST HISTORY ---------------------------------------------  Past Medical History:  has a past medical history of Acute blood loss anemia, Acute deep vein thrombosis (DVT) of iliac vein of right lower extremity ([facility] Acute DVT (deep venous thrombosis) ([facility] [facility] Acute respiratory failure ([facility] [facility] Bradycardia, Cancer ([facility] [address] [facility] Chronic back pain, Chronic respiratory failure ([facility] Depression, Diverticulitis, Fall (on) (from) other stairs and steps, initial encounter, Headache, History of blood transfusion, History of DVT (deep vein thrombosis), Hx of blood clots, Hx of colonoscopy, Hyperlipidemia, Intractable headache, Irritable bowel syndrome, Restless legs syndrome, Scrotal mass, Seizure ([facility] [address] and [facility] cancer ([facility] Past Surgical History:  has a past surgical history that includes Testicle removal (Right, 02/03/2017); Cranioplasty (Right, 01/08/2018); pr insj tunneled ctr vad w/subq port age 10 yr/> (Left, 5/17/2018); other surgical history (12/10/2018); Colonoscopy; Cranioplasty (Left, 6/28/2019); and Upper gastrointestinal endoscopy (N/A, 7/1/2019). Social History:  reports that he has been smoking cigarettes. He started smoking about 26 years ago. He has been smoking about 1.00 pack per day. He has never used smokeless tobacco. He reports that he does not drink alcohol or use drugs. Family History: family history includes Cancer in his sister; No Known Problems in his brother; Schizophrenia in his father; Scoliosis in his father and mother. . Unless otherwise noted, family history is non contributory    The patients home medications have been reviewed. Allergies: Patient has no known allergies. ---------------------------------------------------PHYSICAL EXAM--------------------------------------    Constitutional/General: Alert and oriented x3  Head: Normocephalic and atraumatic  Eyes: PERRL, EOMI, sclera non icteric  Mouth: Oropharynx clear, handling secretions, no trismus, no asymmetry of the posterior oropharynx or uvular edema  Neck: Supple, full ROM, no stridor, no meningeal signs  Respiratory: Lungs clear to auscultation bilaterally, no wheezes, rales, or rhonchi. Not in respiratory distress  Cardiovascular:  Regular rate. Regular rhythm. No murmurs, no aortic murmurs, no gallops, or rubs. 2+ distal pulses. Equal extremity pulses. Chest: No chest wall tenderness  GI:  Abdomen Soft, Non tender, Non distended. No rebound, guarding, or rigidity. No pulsatile masses. Musculoskeletal: Warm and well perfused, no clubbing, cyanosis, or edema. Capillary refill <3 seconds  Integument: skin warm and dry. No rashes. Neurologic: GCS 15, no focal deficits, symmetric strength 5/5 in the upper and lower extremities bilaterally  Psychiatric: Normal Affect          -------------------------------------------------- RESULTS -------------------------------------------------  I have personally reviewed all laboratory and imaging results for this patient. Results are listed below.      LABS: (Lab results interpreted by me)  Results for orders placed or performed during the hospital encounter of 02/24/21   CBC Auto Differential   Result Value Ref Range    WBC 5.6 4.5 - 11.5 E9/L    RBC 4.43 3.80 - 5.80 E12/L    Hemoglobin 13.5 12.5 - 16.5 g/dL    Hematocrit 41.5 37.0 - 54.0 %    MCV 93.7 80.0 - 99.9 fL    MCH 30.5 26.0 - 35.0 pg    MCHC 32.5 32.0 - 34.5 %    RDW 13.0 11.5 - 15.0 fL    Platelets 534 761 - 983 E9/L    MPV 8.9 7.0 - 12.0 fL    Neutrophils % 52.7 43.0 - 80.0 %    Immature Granulocytes % 0.7 0.0 - 5.0 %    Lymphocytes % 33.3 20.0 - 42.0 %    Monocytes % 9.9 2.0 - 12.0 %    Eosinophils % 2.3 0.0 - 6.0 %    Basophils % 1.1 0.0 - 2.0 %    Neutrophils Absolute 2.94 1.80 - 7.30 E9/L    Immature Granulocytes # 0.04 E9/L    Lymphocytes Absolute 1.86 1.50 - 4.00 E9/L    Monocytes Absolute 0.55 0.10 - 0.95 E9/L    Eosinophils Absolute 0.13 0.05 - 0.50 E9/L    Basophils Absolute 0.06 0.00 - 0.20 G1/N   Basic Metabolic Panel w/ Reflex to MG   Result Value Ref Range    Sodium 140 132 - 146 mmol/L    Potassium reflex Magnesium 4.3 3.5 - 5.0 mmol/L    Chloride 106 98 - 107 mmol/L    CO2 24 22 - 29 mmol/L    Anion Gap 10 7 - 16 mmol/L    Glucose 92 74 - 99 mg/dL    BUN 15 6 - 20 mg/dL    CREATININE 0.8 0.7 - 1.2 mg/dL    GFR Non- American >60 >=60 mL/min/1.73    GFR African American >60     Calcium 9.4 8.6 - 10.2 mg/dL   ,       RADIOLOGY:  Interpreted by Radiologist unless otherwise specified  CT Head WO Contrast   Final Result   No acute intracranial abnormality. Stable appearance of enlarged ventricles, small subdural collection to the   left of the posterior interhemispheric falx, craniotomy and craniectomy   changes. EKG Interpretation  Interpreted by emergency department physician, Dr. Tiffanie Read    Date of EK21  Time:     Rhythm: normal sinus   Rate: 71  Axis: normal  Conduction: normal  ST Segments: no acute change  T Waves: no acute change    Clinical Impression: No findings suggestive of acute ischemia or injury  Comparison to prior EKG: stable as compared to patient's most recent EKG      ------------------------- NURSING NOTES AND VITALS REVIEWED ---------------------------   The nursing notes within the ED encounter and vital signs as below have been reviewed by myself  /74   Pulse 69   Temp 96.9 °F (36.1 °C)   Resp 16   Ht 5' 11\" (1.803 m)   Wt 180 lb (81.6 kg)   SpO2 98%   BMI 25.10 kg/m²     Oxygen Saturation Interpretation: Normal    The patients available past medical records and past encounters were reviewed. ------------------------------ ED COURSE/MEDICAL DECISION MAKING----------------------  Medications   levetiracetam (KEPPRA) 1500 mg/100 mL IVPB (1,500 mg Intravenous New Bag 21 7706)           The cardiac monitor revealed sinus rhythm with a heart rate in the 70s as interpreted by me. The cardiac monitor was ordered secondary to the patient's chest pain and to monitor for patient for dysrhythmia. CPT 52503           Medical Decision Making:     I, Dr. Alex Finnegan, am the primary provider of record    29-year-old male with history of TBI and seizure disorder presenting with breakthrough seizure. No recent fall or trauma.   Patient arrives awake and alert, no acute distress, hemodynamically stable. EKG shows no signs of ischemia or injury. Patient will be given 1500 mg IV Keppra. Metabolic panel is within acceptable limits, no leukocytosis or anemia. CT head shows no acute interval changes. Patient rested comfortably during his ED course, family is at bedside. Comfortable with discharge home and PCP follow-up. Encouraged to return for any changes in condition or new symptoms. Re-Evaluations: This patient's ED course included: a personal history and physicial examination, re-evaluation prior to disposition, IV medications, cardiac monitoring and continuous pulse oximetry    This patient has remained hemodynamically stable and been closely monitored during their ED course. Counseling: The emergency provider has spoken with the patient and family member sister and discussed todays results, in addition to providing specific details for the plan of care and counseling regarding the diagnosis and prognosis. Questions are answered at this time and they are agreeable with the plan.       --------------------------------- IMPRESSION AND DISPOSITION ---------------------------------    IMPRESSION  1. Breakthrough seizure ([address]        DISPOSITION  Disposition: Discharge to home  Patient condition is stable        NOTE: This report was transcribed using voice recognition software.  Every effort was made to ensure accuracy; however, inadvertent computerized transcription errors may be present        Wilma Ochoa DO  02/24/21 [id number]

## 2021-02-25 NOTE — PROGRESS NOTES
OFFICE PROGRESS NOTE  [address] [place] [address] Rd  [phone] [id number]  Dept: [phone]   Chief Complaint   Patient presents with   [facility] ED Follow-up     has been having fever, ed for seziure, this morning fatigue and fever         HPI:   2/25/2021    TELEHEALTH EVALUATION -- Audio/Visual (During [id number] public health emergency)  TeleMedicine Video Visit    This visit was performed as a virtual video visit using a synchronous, two-way, audio-video telehealth technology platform. Patient identification was verified at the start of the visit, including the patient's telephone number and physical location. I discussed with the patient the nature of our telehealth visits, that:     1. Due to the nature of an audio- video modality, the only components of a physical exam that could be done are the elements supported by direct observation. 2. I would evaluate the patient and recommend diagnostics and treatments based on my assessment. 3. If it was felt that the patient should be evaluated in clinic or an emergency room setting, then they would be directed there. 4. Our sessions are not being recorded and that personal health information is protected. 5. Our team would provide follow up care in person if/when the patient needs it. Patient does agree to proceed with telemedicine consultation. Patient's location: home address in [facility]  Physician  location [facility]. other people involved in call Sister Jessica Roberts, Patient Galina Maynard      Time spent: Greater than 40    This visit was completed virtually using Doxy. me        HPI: ER follow up for breakthrough seizure, pneumonia  He had seen DR Rich Dos Santos and had Pet scan done for his testicular cancer and was found to have pneumonia in his left lung, was placed on doxycycline. He denies any cough, wheezing or SOB.   Last night he was sitting up and eating a popsicle, he then developed a seizure and had paralysis on MOUTH TWICE A DAY, Disp: 600 mL, Rfl: 5    Control Gel Formula Dressing (DUODERM CGF EXTRA THIN) MISC, Apply 1 Device topically every 3 days, Disp: 10 each, Rfl: 3    Nutritional Supplements (BOOST MAX PROTEIN) LIQD, Take 1 Bottle by mouth three times daily, Disp: 90 Bottle, Rfl: 11    LORazepam (ATIVAN) 0.5 MG tablet, TAKE 1 TABLET BY MOUTH THREE TIMES A DAY AS NEEDED, Disp: , Rfl:     ondansetron (ZOFRAN-ODT) 4 MG disintegrating tablet, Take 1 tablet by mouth every 6 hours as needed for Nausea or Vomiting, Disp: 20 tablet, Rfl: 0    DOCU 50 MG/5ML liquid, , Disp: , Rfl:     Nutritional Supplements (BOOST) LIQD, , Disp: , Rfl:     melatonin 3 MG TABS tablet, 3 mg by PEG Tube route nightly as needed, Disp: , Rfl:   Social History     Socioeconomic History    Marital status:      Spouse name: Devante Mcguire Number of children: 2    Years of education: None    Highest education level: None   Occupational History    None   Social Needs    Financial resource strain: None    Food insecurity     Worry: None     Inability: None    Transportation needs     Medical: None     Non-medical: None   Tobacco Use    Smoking status: Former Smoker     Packs/day: 1.00     Types: Cigarettes     Start date: 1995     Quit date: 2019     Years since quittin.0    Smokeless tobacco: Never Used   Substance and Sexual Activity    Alcohol use: No    Drug use: No    Sexual activity: Yes     Partners: Female   Lifestyle    Physical activity     Days per week: None     Minutes per session: None    Stress: None   Relationships    Social connections     Talks on phone: None     Gets together: None     Attends [employer] service: None     Active member of club or organization: None     Attends meetings of clubs or organizations: None     Relationship status: None    Intimate partner violence     Fear of current or ex partner: None     Emotionally abused: None     Physically abused: None     Forced sexual

## 2021-02-25 NOTE — TELEPHONE ENCOUNTER
Porsha called to schedule pt in two weeks, is this to be a virtual visit or in office visit. It looked like COVID test was ordered, but Porsha states he didn't have one, so wanted to make sure which office visit is to be scheduled?

## 2021-02-25 NOTE — TELEPHONE ENCOUNTER
Myron Martinez from [facility] called,    They would like a DME order for ness villeda sent to DME supplier,  Her number is [phone]. I tried to call leslie with no answer left message for call back.

## 2021-02-25 NOTE — PATIENT INSTRUCTIONS
The medication list included in this document is our record of what you are currently taking, including any changes that were made at today's visit.  If you find any differences when compared to your medications at home, or have any questions that were not answered at your visit, please contact the office. Patient Education        Pneumonia: Care Instructions  Your Care Instructions     Pneumonia is an infection of the lungs. Most cases are caused by infections from bacteria or viruses. Pneumonia may be mild or very severe. If it is caused by bacteria, you will be treated with antibiotics. It may take a few weeks to a few months to recover fully from pneumonia, depending on how sick you were and whether your overall health is good. Follow-up care is a key part of your treatment and safety. Be sure to make and go to all appointments, and call your doctor if you are having problems. It's also a good idea to know your test results and keep a list of the medicines you take. How can you care for yourself at home? · Take your antibiotics exactly as directed. Do not stop taking the medicine just because you are feeling better. You need to take the full course of antibiotics. · Take your medicines exactly as prescribed. Call your doctor if you think you are having a problem with your medicine. · Get plenty of rest and sleep. You may feel weak and tired for a while, but your energy level will improve with time. · To prevent dehydration, drink plenty of fluids, enough so that your urine is light yellow or clear like water. Choose water and other caffeine-free clear liquids until you feel better. If you have kidney, heart, or liver disease and have to limit fluids, talk with your doctor before you increase the amount of fluids you drink.   · You are not getting better after 2 days (48 hours).     · You do not get better as expected. Where can you learn more? Go to https://[employer]eb[employer]. org and sign in to your [employer] account. Enter D336 in the [employer] box to learn more about \"Pneumonia: Care Instructions. \"     If you do not have an account, please click on the \"Sign Up Now\" link. Current as of: February 24, 2020               Content Version: 12.6  © 2006-2020 [employer]. Care instructions adapted under license by [facility] ([facility]). If you have questions about a medical condition or this instruction, always ask your healthcare professional. [address] any warranty or liability for your use of this information.

## 2021-02-26 ENCOUNTER — TELEPHONE (OUTPATIENT)
Dept: FAMILY MEDICINE CLINIC | Age: 46
End: 2021-02-26

## 2021-02-26 NOTE — TELEPHONE ENCOUNTER
Patient sister called patients back is hurting him and its hard to move him and would like muscle relaxer also she noticed a red [place] on his abd and thinks maybe its ringworm could she get Rx for that     Last seen 2/25/2021  Next appt Visit date not found    [phone]

## 2021-03-01 ENCOUNTER — TELEPHONE (OUTPATIENT)
Dept: FAMILY MEDICINE CLINIC | Age: 46
End: 2021-03-01

## 2021-03-01 DIAGNOSIS — Z78.9 BRADEN SCALE FOR SKIN RISK ASSESSMENT SCORE 2, POTENTIAL PROBLEM: Primary | ICD-10-CM

## 2021-03-01 NOTE — TELEPHONE ENCOUNTER
Viky/[facility] called stating while working with patient today, she was informed of blistered area on back from heating pad. Toro Velazco is asking if Dyke Schwab will put Nursing Orders in to have someone from [facility] come out to the patient's home, eval and treat.      Last seen 2/25/2021  Next appt 3/12/2021

## 2021-03-03 DIAGNOSIS — Z20.822 ENCOUNTER FOR PREOPERATIVE SCREENING LABORATORY TESTING FOR COVID-19 VIRUS: ICD-10-CM

## 2021-03-03 DIAGNOSIS — Z01.812 ENCOUNTER FOR PREOPERATIVE SCREENING LABORATORY TESTING FOR COVID-19 VIRUS: ICD-10-CM

## 2021-03-04 LAB — SARS-COV-2, PCR: NOT DETECTED

## 2021-03-10 DIAGNOSIS — J18.9 PNEUMONIA OF LEFT LOWER LOBE DUE TO INFECTIOUS ORGANISM: Primary | ICD-10-CM

## 2021-03-12 ENCOUNTER — OFFICE VISIT (OUTPATIENT)
Dept: FAMILY MEDICINE CLINIC | Age: 46
End: 2021-03-12
Payer: MEDICARE

## 2021-03-12 VITALS
TEMPERATURE: 97.4 F | OXYGEN SATURATION: 98 % | BODY MASS INDEX: 25.1 KG/M2 | DIASTOLIC BLOOD PRESSURE: 78 MMHG | HEART RATE: 60 BPM | SYSTOLIC BLOOD PRESSURE: 124 MMHG | HEIGHT: 71 IN | RESPIRATION RATE: 16 BRPM

## 2021-03-12 DIAGNOSIS — G82.50 SPASTIC QUADRIPARESIS (HCC): ICD-10-CM

## 2021-03-12 DIAGNOSIS — E43 SEVERE PROTEIN-CALORIE MALNUTRITION (HCC): ICD-10-CM

## 2021-03-12 DIAGNOSIS — T21.14XA: ICD-10-CM

## 2021-03-12 DIAGNOSIS — Z01.818 PREOPERATIVE CLEARANCE: ICD-10-CM

## 2021-03-12 DIAGNOSIS — J18.9 PNEUMONIA OF LEFT LOWER LOBE DUE TO INFECTIOUS ORGANISM: Primary | ICD-10-CM

## 2021-03-12 DIAGNOSIS — C62.91 SEMINOMA OF RIGHT TESTIS, STAGE 1 (HCC): ICD-10-CM

## 2021-03-12 PROBLEM — I95.9 HYPOTENSION: Status: RESOLVED | Noted: 2018-01-08 | Resolved: 2021-03-12

## 2021-03-12 PROBLEM — R63.30 FEEDING DIFFICULTIES: Status: RESOLVED | Noted: 2019-03-31 | Resolved: 2021-03-12

## 2021-03-12 PROBLEM — R13.10 DYSPHAGIA: Status: RESOLVED | Noted: 2018-01-08 | Resolved: 2021-03-12

## 2021-03-12 LAB
ALBUMIN SERPL-MCNC: 4.3 G/DL (ref 3.5–5.2)
ALP BLD-CCNC: 140 U/L (ref 40–129)
ALT SERPL-CCNC: 8 U/L (ref 0–40)
ANION GAP SERPL CALCULATED.3IONS-SCNC: 11 MMOL/L (ref 7–16)
AST SERPL-CCNC: 17 U/L (ref 0–39)
BASOPHILS ABSOLUTE: 0.09 E9/L (ref 0–0.2)
BASOPHILS RELATIVE PERCENT: 1.4 % (ref 0–2)
BILIRUB SERPL-MCNC: 0.5 MG/DL (ref 0–1.2)
BUN BLDV-MCNC: 13 MG/DL (ref 6–20)
CALCIUM SERPL-MCNC: 10.2 MG/DL (ref 8.6–10.2)
CHLORIDE BLD-SCNC: 104 MMOL/L (ref 98–107)
CHOLESTEROL, TOTAL: 168 MG/DL (ref 0–199)
CO2: 28 MMOL/L (ref 22–29)
CREAT SERPL-MCNC: 0.9 MG/DL (ref 0.7–1.2)
EOSINOPHILS ABSOLUTE: 0.14 E9/L (ref 0.05–0.5)
EOSINOPHILS RELATIVE PERCENT: 2.2 % (ref 0–6)
GFR AFRICAN AMERICAN: >60
GFR NON-AFRICAN AMERICAN: >60 ML/MIN/1.73
GLUCOSE BLD-MCNC: 84 MG/DL (ref 74–99)
HCT VFR BLD CALC: 44.9 % (ref 37–54)
HDLC SERPL-MCNC: 63 MG/DL
HEMOGLOBIN: 13.8 G/DL (ref 12.5–16.5)
IMMATURE GRANULOCYTES #: 0.04 E9/L
IMMATURE GRANULOCYTES %: 0.6 % (ref 0–5)
LDL CHOLESTEROL CALCULATED: 88 MG/DL (ref 0–99)
LYMPHOCYTES ABSOLUTE: 1.77 E9/L (ref 1.5–4)
LYMPHOCYTES RELATIVE PERCENT: 27.9 % (ref 20–42)
MCH RBC QN AUTO: 29.9 PG (ref 26–35)
MCHC RBC AUTO-ENTMCNC: 30.7 % (ref 32–34.5)
MCV RBC AUTO: 97.2 FL (ref 80–99.9)
MONOCYTES ABSOLUTE: 0.5 E9/L (ref 0.1–0.95)
MONOCYTES RELATIVE PERCENT: 7.9 % (ref 2–12)
NEUTROPHILS ABSOLUTE: 3.8 E9/L (ref 1.8–7.3)
NEUTROPHILS RELATIVE PERCENT: 60 % (ref 43–80)
PDW BLD-RTO: 13.3 FL (ref 11.5–15)
PLATELET # BLD: 344 E9/L (ref 130–450)
PMV BLD AUTO: 8.7 FL (ref 7–12)
POTASSIUM SERPL-SCNC: 4.9 MMOL/L (ref 3.5–5)
PREALBUMIN: 28 MG/DL (ref 20–40)
RBC # BLD: 4.62 E12/L (ref 3.8–5.8)
SODIUM BLD-SCNC: 143 MMOL/L (ref 132–146)
T4 FREE: 1.37 NG/DL (ref 0.93–1.7)
TOTAL PROTEIN: 7.5 G/DL (ref 6.4–8.3)
TRIGL SERPL-MCNC: 85 MG/DL (ref 0–149)
TSH SERPL DL<=0.05 MIU/L-ACNC: 1.56 UIU/ML (ref 0.27–4.2)
VLDLC SERPL CALC-MCNC: 17 MG/DL
WBC # BLD: 6.3 E9/L (ref 4.5–11.5)

## 2021-03-12 PROCEDURE — G8419 CALC BMI OUT NRM PARAM NOF/U: HCPCS | Performed by: NURSE PRACTITIONER

## 2021-03-12 PROCEDURE — 1036F TOBACCO NON-USER: CPT | Performed by: NURSE PRACTITIONER

## 2021-03-12 PROCEDURE — 99213 OFFICE O/P EST LOW 20 MIN: CPT | Performed by: NURSE PRACTITIONER

## 2021-03-12 PROCEDURE — G8427 DOCREV CUR MEDS BY ELIG CLIN: HCPCS | Performed by: NURSE PRACTITIONER

## 2021-03-12 PROCEDURE — G8484 FLU IMMUNIZE NO ADMIN: HCPCS | Performed by: NURSE PRACTITIONER

## 2021-03-12 ASSESSMENT — ENCOUNTER SYMPTOMS
DIARRHEA: 0
TROUBLE SWALLOWING: 0
SHORTNESS OF BREATH: 0
FACIAL SWELLING: 0
VOMITING: 0
SINUS PRESSURE: 0
VOICE CHANGE: 0
CONSTIPATION: 0
WHEEZING: 0
COUGH: 0
NAUSEA: 0
BACK PAIN: 0
SORE THROAT: 0
ABDOMINAL PAIN: 0
CHEST TIGHTNESS: 0
RHINORRHEA: 0
SINUS PAIN: 0
COLOR CHANGE: 0

## 2021-03-12 NOTE — PROGRESS NOTES
OFFICE PROGRESS NOTE  [address]  [address] [place] [address] [address] [address]  Dept: [phone]   Chief Complaint   Patient presents with    Pneumonia     2 week follow up chest x-ray done yesterday needs letter stating that he is clear. HPI:     Here today for follow up on LLL pneumonia noted on Pet scan he was treated with doxycycline for 10 day. He is feeling much better. Today he is much more vocal and is trying to actually get out of the WC. He is unable to bear any weight on his legs. He is now living with his sister and has made a lot of progress in a few months. He will be getting a baclofen pump in the near future after he is cleared today. In review of the CXR his pneumonia has resolved. He has a burn on his lower back near the tail bone from heating pad which I can't see at this time in the [facility] and Visiting nurses has been treating him. Per his sister he has an open area at this time but improving. Discussed he may not be able to have surgery until this is healed. February 2017 diagnosed with testicular cancer the next day he had surgery, Seminoma. December 2017 he fell down the basement stairs and had TBI a/p right craniotomy and recovered from this. While he was in the hospital he missed 2 appointments with Dr Sunita Bowman then he saw him March or April 2018 the cancer had returned to his abdomen. He had aggressive chemo for the summer, he had his first seizure after the first round of chemo. He follows with DR Sunita Bowman every 3 months. He was transported to hospital in [place] due to seizure when they were on vacation after the first round of chemo, was placed on dilantin. Then he was changed to keppra and vimpat and follows with Merlinda Sills CNS.     He then developed blood clots in both legs 2019 he was placed on blood thinners Xarelto, has Vena Cava filter in place.  He was back to driving and drove to [facility] and had a brain bleed 3/31/2019 large left SDH s/p craniotomy with an artificial flap and follows with Dr Geo Desai last visit August 2020. He is incontinent of bowel and bladder, he is able to feed himself. Has G tube, mediport. They use a ozzie, hospital bed, aid comes to the home 3 days a week to stretch him. He was going to therapy ordered by Dr Nevaeh Oden but after 5 treatments they discharged him due to no progress. Current Outpatient Medications:     pantoprazole (PROTONIX) 40 MG tablet, Take 1 tablet by mouth daily, Disp: 30 tablet, Rfl: 0    baclofen (LIORESAL) 10 MG tablet, TAKE 1 TABLET BY MOUTH TWICE A DAY AS NEEDED, Disp: 60 tablet, Rfl: 0    sertraline (ZOLOFT) 50 MG tablet, TAKE 1 TABLET BY MOUTH EVERY DAY, Disp: 30 tablet, Rfl: 0    Handicap Placard MISC, by Does not apply route Start 2/2/2021 and end 2/1/2025, Disp: 1 each, Rfl: 0    lacosamide (VIMPAT) 100 MG TABS tablet, Take 1 tablet by mouth 2 times daily for 90 days. , Disp: 60 tablet, Rfl: 2    levETIRAcetam (KEPPRA) 100 MG/ML solution, TAKE 10 MLS BY MOUTH TWICE A DAY, Disp: 600 mL, Rfl: 5    Control Gel Formula Dressing (DUODERM CGF EXTRA THIN) MISC, Apply 1 Device topically every 3 days, Disp: 10 each, Rfl: 3    Nutritional Supplements (BOOST MAX PROTEIN) LIQD, Take 1 Bottle by mouth three times daily, Disp: 90 Bottle, Rfl: 11    LORazepam (ATIVAN) 0.5 MG tablet, TAKE 1 TABLET BY MOUTH THREE TIMES A DAY AS NEEDED, Disp: , Rfl:     ondansetron (ZOFRAN-ODT) 4 MG disintegrating tablet, Take 1 tablet by mouth every 6 hours as needed for Nausea or Vomiting, Disp: 20 tablet, Rfl: 0    DOCU 50 MG/5ML liquid, , Disp: , Rfl:     Nutritional Supplements (BOOST) LIQD, , Disp: , Rfl:     melatonin 3 MG TABS tablet, 3 mg by PEG Tube route nightly as needed, Disp: , Rfl:   Social History     Socioeconomic History    Marital status:      Spouse name: Levy Macedo Number of children: 2    Years of education: None    Highest education level: None Occupational History    None   Social Needs    Financial resource strain: None    Food insecurity     Worry: None     Inability: None    Transportation needs     Medical: None     Non-medical: None   Tobacco Use    Smoking status: Former Smoker     Packs/day: 1.00     Types: Cigarettes     Start date: 1995     Quit date: 2019     Years since quittin.0    Smokeless tobacco: Never Used   Substance and Sexual Activity    Alcohol use: No    Drug use: No    Sexual activity: Yes     Partners: Female   Lifestyle    Physical activity     Days per week: None     Minutes per session: None    Stress: None   Relationships    Social connections     Talks on phone: None     Gets together: None     Attends [employer] service: None     Active member of club or organization: None     Attends meetings of clubs or organizations: None     Relationship status: None    Intimate partner violence     Fear of current or ex partner: None     Emotionally abused: None     Physically abused: None     Forced sexual activity: None   Other Topics Concern    None   Social History Narrative    ** Merged History Encounter **            I have reviewed Tl's allergies, medications, problem list, medical, social and family history and have updated as needed in the electronic medical record    Review of Systems   Constitutional: Positive for activity change. Negative for appetite change, chills, diaphoresis, fatigue, fever and unexpected weight change. HENT: Negative for congestion, dental problem, drooling, ear discharge, ear pain, facial swelling, hearing loss, mouth sores, nosebleeds, postnasal drip, rhinorrhea, sinus pressure, sinus pain, sneezing, sore throat, tinnitus, trouble swallowing and voice change. Eyes: Negative for visual disturbance. Respiratory: Negative for cough, chest tightness, shortness of breath and wheezing. Cardiovascular: Negative for chest pain, palpitations and leg swelling. masses or lesions noted, good dentition  Neck: supple and non-tender without mass, trachea midline, no cervical lymphadenopathy, no bruit, no thyromegaly or nodules  Cardiovascular: regular rate and regular rhythm, normal S1 and S2,  no murmurs, rubs, clicks, or gallop. Distal pulses intact, no carotid bruits. 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 Spastic paraparesis  Psychiatric: Good eye contact, normal mood and affect, answers questions appropriately at times    ASSESSMENT/PLAN   Gearrd Carrera was seen today for pneumonia. 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 Discussed exercising 30 minutes daily and Discussed taking medications as directed and adverse effects        I have reviewed my findings and recommendations with Viki Garcia.     Rajesh Zuñiga, NP-C, FNP-BC

## 2021-03-15 PROBLEM — T21.14XA: Status: ACTIVE | Noted: 2021-03-15

## 2021-03-15 PROBLEM — E43 SEVERE PROTEIN-CALORIE MALNUTRITION (HCC): Chronic | Status: RESOLVED | Noted: 2019-06-02 | Resolved: 2021-03-15

## 2021-03-26 ENCOUNTER — TELEPHONE (OUTPATIENT)
Dept: FAMILY MEDICINE CLINIC | Age: 46
End: 2021-03-26

## 2021-03-26 NOTE — TELEPHONE ENCOUNTER
Patients sister called, Dr. Shakira Douglas from [facility] never recived office notes so I re faxed them received fax confirmation. Patients sister also asked about getting incotaince supplies orders being that she had no clue who was supplying them. I advised her I would look in his chart,  I did so and found no information. I called [facility] and stoke with an  who stated he Would have to contact sister to contact passport manager. He said after that he would reach out to the passport manager about getting her supply that she needs for Ammon Gottron.

## 2021-04-06 NOTE — TELEPHONE ENCOUNTER
Brynn Alessia called in wondering if Jason Rogers would benefit from a Baclofen pump and where he could go. Per Dr. Elder Ny, He might benefit from a baclofen pump and he can either go to Dr. Zenaida Fuentes or [facility]. Please place referral to Dr. Zenaida Fuentes. From: Lurena Homans  To: Soila Love Vi Russo

## 2021-04-11 PROBLEM — Z01.818 PREOPERATIVE CLEARANCE: Status: RESOLVED | Noted: 2021-03-12 | Resolved: 2021-04-11

## 2021-05-06 ENCOUNTER — TELEPHONE (OUTPATIENT)
Dept: FAMILY MEDICINE CLINIC | Age: 46
End: 2021-05-06

## 2021-05-06 DIAGNOSIS — Z01.818 PRE-OPERATIVE EXAM: Primary | ICD-10-CM

## 2021-05-06 NOTE — TELEPHONE ENCOUNTER
Patient sister called stated that the patient needs an order for a urinalysis  for his baclofen test run. She stated she need it today if possible. plz advise.

## 2021-05-20 ENCOUNTER — TELEPHONE (OUTPATIENT)
Dept: FAMILY MEDICINE CLINIC | Age: 46
End: 2021-05-20

## 2021-05-20 NOTE — TELEPHONE ENCOUNTER
Called pt sister to see if Patrina Najjar received the baclofen pump. No answer left message for call back.

## 2021-05-24 DIAGNOSIS — R56.9 SEIZURE (HCC): ICD-10-CM

## 2021-05-24 RX ORDER — LACOSAMIDE 100 MG/1
100 TABLET ORAL 2 TIMES DAILY
Qty: 60 TABLET | Refills: 2 | Status: SHIPPED
Start: 2021-05-24 | End: 2021-08-23 | Stop reason: SDUPTHER

## 2021-06-15 ENCOUNTER — OFFICE VISIT (OUTPATIENT)
Dept: FAMILY MEDICINE CLINIC | Age: 46
End: 2021-06-15
Payer: MEDICARE

## 2021-06-15 VITALS
HEIGHT: 71 IN | OXYGEN SATURATION: 94 % | HEART RATE: 57 BPM | SYSTOLIC BLOOD PRESSURE: 122 MMHG | DIASTOLIC BLOOD PRESSURE: 74 MMHG | BODY MASS INDEX: 25.1 KG/M2 | RESPIRATION RATE: 18 BRPM | TEMPERATURE: 97.3 F

## 2021-06-15 DIAGNOSIS — Z00.00 ROUTINE GENERAL MEDICAL EXAMINATION AT A HEALTH CARE FACILITY: Primary | ICD-10-CM

## 2021-06-15 DIAGNOSIS — Z23 NEED FOR PROPHYLACTIC VACCINATION AGAINST STREPTOCOCCUS PNEUMONIAE (PNEUMOCOCCUS): ICD-10-CM

## 2021-06-15 DIAGNOSIS — Z23 NEED FOR PROPHYLACTIC VACCINATION AGAINST DIPHTHERIA-TETANUS-PERTUSSIS (DTP): ICD-10-CM

## 2021-06-15 PROCEDURE — G0438 PPPS, INITIAL VISIT: HCPCS | Performed by: NURSE PRACTITIONER

## 2021-06-15 PROCEDURE — 90732 PPSV23 VACC 2 YRS+ SUBQ/IM: CPT | Performed by: NURSE PRACTITIONER

## 2021-06-15 PROCEDURE — G0009 ADMIN PNEUMOCOCCAL VACCINE: HCPCS | Performed by: NURSE PRACTITIONER

## 2021-06-15 RX ORDER — AMANTADINE HYDROCHLORIDE 100 MG/1
100 TABLET ORAL 2 TIMES DAILY
COMMUNITY
Start: 2021-06-11

## 2021-06-15 SDOH — ECONOMIC STABILITY: INCOME INSECURITY: IN THE LAST 12 MONTHS, WAS THERE A TIME WHEN YOU WERE NOT ABLE TO PAY THE MORTGAGE OR RENT ON TIME?: NO

## 2021-06-15 SDOH — ECONOMIC STABILITY: TRANSPORTATION INSECURITY
IN THE PAST 12 MONTHS, HAS THE LACK OF TRANSPORTATION KEPT YOU FROM MEDICAL APPOINTMENTS OR FROM GETTING MEDICATIONS?: NO

## 2021-06-15 SDOH — ECONOMIC STABILITY: FOOD INSECURITY: WITHIN THE PAST 12 MONTHS, YOU WORRIED THAT YOUR FOOD WOULD RUN OUT BEFORE YOU GOT MONEY TO BUY MORE.: NEVER TRUE

## 2021-06-15 SDOH — ECONOMIC STABILITY: FOOD INSECURITY: WITHIN THE PAST 12 MONTHS, THE FOOD YOU BOUGHT JUST DIDN'T LAST AND YOU DIDN'T HAVE MONEY TO GET MORE.: NEVER TRUE

## 2021-06-15 SDOH — ECONOMIC STABILITY: HOUSING INSECURITY
IN THE LAST 12 MONTHS, WAS THERE A TIME WHEN YOU DID NOT HAVE A STEADY PLACE TO SLEEP OR SLEPT IN A SHELTER (INCLUDING NOW)?: NO

## 2021-06-15 SDOH — ECONOMIC STABILITY: TRANSPORTATION INSECURITY
IN THE PAST 12 MONTHS, HAS LACK OF TRANSPORTATION KEPT YOU FROM MEETINGS, WORK, OR FROM GETTING THINGS NEEDED FOR DAILY LIVING?: NO

## 2021-06-15 ASSESSMENT — LIFESTYLE VARIABLES
HOW OFTEN DO YOU HAVE A DRINK CONTAINING ALCOHOL: NEVER
HOW OFTEN DO YOU HAVE A DRINK CONTAINING ALCOHOL: 0
HOW OFTEN DO YOU HAVE A DRINK CONTAINING ALCOHOL: NEVER
AUDIT-C TOTAL SCORE: INCOMPLETE
AUDIT TOTAL SCORE: INCOMPLETE
HOW OFTEN DO YOU HAVE A DRINK CONTAINING ALCOHOL: 0

## 2021-06-15 ASSESSMENT — PATIENT HEALTH QUESTIONNAIRE - PHQ9
SUM OF ALL RESPONSES TO PHQ QUESTIONS 1-9: 0
SUM OF ALL RESPONSES TO PHQ9 QUESTIONS 1 & 2: 0
SUM OF ALL RESPONSES TO PHQ QUESTIONS 1-9: 0
2. FEELING DOWN, DEPRESSED OR HOPELESS: 0
SUM OF ALL RESPONSES TO PHQ QUESTIONS 1-9: 0
1. LITTLE INTEREST OR PLEASURE IN DOING THINGS: 0

## 2021-06-15 ASSESSMENT — SOCIAL DETERMINANTS OF HEALTH (SDOH): HOW HARD IS IT FOR YOU TO PAY FOR THE VERY BASICS LIKE FOOD, HOUSING, MEDICAL CARE, AND HEATING?: NOT HARD AT ALL

## 2021-06-15 NOTE — PROGRESS NOTES
Medicare Annual Wellness Visit  Name: Joon Hernandez Date: 6/15/2021   MRN: [id number] Sex: Male   Age: 54 y.o. Ethnicity: Non-/Non    : 1975 Race: Norberto Feliciano is here for Medicare AWV    Screenings for behavioral, psychosocial and functional/safety risks, and cognitive dysfunction are all negative except as indicated below. These results, as well as other patient data from the [address] [facility] [address] form, are documented in Flowsheets linked to this Encounter. No Known Allergies      Prior to Visit Medications    Medication Sig Taking? Authorizing Provider   Amantadine (SYMMETREL) 100 MG TABS tablet 1/2 tablet by mouth AT 7 AM AND 1/2 AT 12 PM Yes Historical Provider, MD   lacosamide (VIMPAT) 100 MG TABS tablet Take 1 tablet by mouth 2 times daily for 90 days.  Yes VENKATA Potter CNP   baclofen (LIORESAL) 10 MG tablet TAKE 1 TABLET BY MOUTH TWICE DAILY AS NEEDED Yes VENKATA Brown CNP   sertraline (ZOLOFT) 50 MG tablet TAKE 1 TABLET BY MOUTH EVERY DAY Yes VENKATA Brown CNP   pantoprazole (PROTONIX) 40 MG tablet TAKE 1 TABLET BY MOUTH DAILY Yes VENKATA Brown CNP   Handicap Placard MISC by Does not apply route Start 2021 and end 2025 Yes VENKATA Brown CNP   levETIRAcetam (KEPPRA) 100 MG/ML solution TAKE 10 MLS BY MOUTH TWICE A DAY Yes VENKATA Owens CNS   Control Gel Formula Dressing (DUODERM CGF EXTRA THIN) MISC Apply 1 Device topically every 3 days Yes VENKATA Potts CNP   LORazepam (ATIVAN) 0.5 MG tablet TAKE 1 TABLET BY MOUTH THREE TIMES A DAY AS NEEDED Yes Historical Provider, MD   ondansetron (ZOFRAN-ODT) 4 MG disintegrating tablet Take 1 tablet by mouth every 6 hours as needed for Nausea or Vomiting Yes Barry Tan DO   DOCU 50 MG/5ML liquid  Yes Historical Provider, MD         Past Medical History:   Diagnosis Date    Acute blood loss anemia 2019    Acute deep vein thrombosis (DVT) of iliac vein of right lower extremity ([address] 2/2/2019    Acute DVT (deep venous thrombosis) ([address] 2/2/2019    Acute respiratory failure ([address] 11/27/2017    Bradycardia     Cancer (HCC)     Chronic back pain     Chronic respiratory failure ([address] 1/8/2018    Depression     Diverticulitis     Electrolyte imbalance     Fall (on) (from) other stairs and steps, initial encounter     Headache     History of blood transfusion     History of DVT (deep vein thrombosis) 1/8/2018    Hx of blood clots     Hx of colonoscopy 11/9/2020 1/7/2019 Dr Lynn Neighbours colonoscopy moderate diverticular disease of the left colon. Small internal hemorrhoids treated with IRC. Repeat in 5 years.      Hyperlipidemia     had at age 24, not a current issue    Hypotension 1/8/2018    Intractable headache 6/11/2018    Irritable bowel syndrome 2017    diverticulitis    Restless legs syndrome     Scrotal mass     right, for or 2/3/2017    Seizure ([address]     Severe protein-calorie malnutrition ([address] 6/2/2019    Testicular cancer [facility])        Past Surgical History:   Procedure Laterality Date    COLONOSCOPY      CRANIOPLASTY Right 01/08/2018    Right Crainioplasty    CRANIOPLASTY Left 6/28/2019    LEFT CRANIOPLASTY WITH LETA IMPLANT performed by Phyllis Cintron MD at [place] [address]  12/10/2018    Dr Blane David - IVC Filter Retrieval - Unsuccessful    IL INSJ TUNNELED CTR VAD W/SUBQ PORT AGE 5 YR/> Left 5/17/2018    MEDIPORT CATHETER INSERTION performed by Indu Márquez MD at [place] [address] Right 02/03/2017    right radical orchiectomy    UPPER GASTROINTESTINAL ENDOSCOPY N/A 7/1/2019    EGD ESOPHAGOGASTRODUODENOSCOPY performed by Navin Cabrera MD at [address] [place] [address] History   Problem Relation Age of Onset    Scoliosis Mother     Schizophrenia Father     Scoliosis Father     Cancer Sister         cervical    No Known Problems Brother        CareTeam (Including outside providers/suppliers regularly involved in providing care):   Patient Care Team:  VENKATA Arrieta CNP as PCP - General (Family Nurse Practitioner)  VENKATA Arrieta CNP as PCP - [facility]  Remi Moss MD as Consulting Physician (Hematology and Oncology)  Venita Santiago MD as Surgeon (General Surgery)    Wt Readings from Last 3 Encounters:   02/24/21 180 lb (81.6 kg)   01/18/21 172 lb (78 kg)   11/09/20 172 lb (78 kg)     Vitals:    06/15/21 1007   BP: 122/74   Pulse: 57   Resp: 18   Temp: 97.3 °F (36.3 °C)   SpO2: 94%   Height: 5' 11\" (1.803 m)     Body mass index is 25.1 kg/m². Based upon direct observation of the patient, evaluation of cognition reveals global memory impairment noted secondary to hx of strokes. General Appearance: alert and oriented to person, confused to place and time, well developed and well- nourished, in no acute distress  Skin: warm and dry, no rash or erythema, healing wound on RLQ from baclofen pump insertion  Head: normocephalic and atraumatic  Eyes: pupils equal, round, and reactive to light, extraocular eye movements intact, conjunctivae normal  ENT: tympanic membrane, external ear and ear canal normal bilaterally, nose without deformity, nasal mucosa and turbinates normal without polyps  Neck: supple and non-tender without mass, no thyromegaly or thyroid nodules, no cervical lymphadenopathy  Pulmonary/Chest: clear to auscultation bilaterally- no wheezes, rales or rhonchi, normal air movement, no respiratory distress  Cardiovascular: normal rate, regular rhythm, normal S1 and S2, no murmurs, rubs, clicks, or gallops, distal pulses intact, no carotid bruits  Abdomen: soft, non-tender, non-distended, normal bowel sounds, no masses or organomegaly  Extremities: no cyanosis, clubbing or edema  Musculoskeletal: normal range of motion, no joint swelling, deformity or tenderness, some spasticity to the left arm and lower extremities when laying in bed. Neurologic: reflexes normal and symmetric still has some spasticity left arm sitting in WC, unable to stand on his own and he doesn't put weight on his legs since his stroke, WC bound gait, coordination and speech normal does have a tendency to repeat words hunka over and over when sitting in WC. Patient's complete Health Risk Assessment and screening values have been reviewed and are found in Flowsheets. The following problems were reviewed today and where indicated follow up appointments were made and/or referrals ordered. Positive Risk Factor Screenings with Interventions:            General Health and ACP:  General  In general, how would you say your health is?: Very Good  In the past 7 days, have you experienced any of the following? New or Increased Pain, New or Increased Fatigue, Loneliness, Social Isolation, Stress or Anger?: (!) New or Increased Fatigue  Do you get the social and emotional support that you need?: Yes  Do you have a Living Will?: (!) No  Advance Directives     Power of  Living Will ACP-Advance Directive ACP-Power of     Not on File Filed on 11/26/17 Filed Not on File      General Health Risk Interventions:  · No Living Will: Advance Care Planning addressed with patient today   · Accompanied by sister and patient states no new or increased fatigue. Health Habits/Nutrition:  Health Habits/Nutrition  Do you exercise for at least 20 minutes 2-3 times per week?: Yes  Have you lost any weight without trying in the past 3 months?: (!) Yes  Do you eat only one meal per day?: No  Have you seen the dentist within the past year?: (!) No     Health Habits/Nutrition Interventions:  · Dental exam overdue:  patient encouraged to make appointment with his/her dentist  · patient hasn't had any weight loss, eating well, no issues of choking      ADL:  ADLs  In the past 7 days, did you need help from others to perform any of the following everyday activities?  Eating, dressing, grooming, bathing, toileting, or walking/balance?: (!) Bathing, Toileting, Walking/Balance  In the past 7 days, did you need help from others to take care of any of the following? Laundry, housekeeping, banking/finances, shopping, telephone use, food preparation, transportation, or taking medications?: (!) Housekeeping, Banking/Finances, Shopping, Taking Medications  ADL Interventions:  · Sister cares for him as he lives with her now and does take care of all his needs. He will be resuming PT/OT next week, has made major improvements since living with his sister. Personalized Preventive Plan   Current Health Maintenance Status  Immunization History   Administered Date(s) Administered    Influenza Vaccine, unspecified formulation 10/02/2018    Influenza Virus Vaccine 10/02/2018    Influenza, MDCK Quadv, with preserv IM (Flucelvax 4 yrs and older) 09/21/2018        Health Maintenance   Topic Date Due    Pneumococcal 0-64 years Vaccine (1 of 2 - PPSV23) Never done    COVID-19 Vaccine (1) Never done    DTaP/Tdap/Td vaccine (1 - Tdap) Never done   [employer] Visit (AWV)  Never done    Flu vaccine (Season Ended) 09/01/2021    Colon cancer screen colonoscopy  01/07/2024    Lipid screen  03/12/2026    Hepatitis A vaccine  Aged Out    Hepatitis B vaccine  Aged Out    Hib vaccine  Aged Out    Meningococcal (ACWY) vaccine  Aged Out    Hepatitis C screen  Discontinued    HIV screen  Discontinued     Recommendations for Preventive Services Due: see orders and patient instructions/AVS.  . Recommended screening schedule for the next 5-10 years is provided to the patient in written form: see Patient Instructions/AVS.    There are no diagnoses linked to this encounter. Advance Care Planning   Advanced Care Planning: Discussed the patients choices for care and treatment in case of a health event that adversely affects decision-making abilities.  Also discussed the patients long-term treatment options. Reviewed with the patient the [facility] of [facility] Declaration forms  Reviewed the process of designating a competent adult as an Agent (or -in-fact) that could take make health care decisions for the patient if incompetent. Patient was asked to complete the declaration forms, either acknowledge the forms by a public notary or an eligible witness and provide a signed copy to the practice office.   Time spent (minutes): 10

## 2021-06-15 NOTE — PATIENT INSTRUCTIONS
Personalized Preventive Plan for Analisa May - 6/15/2021  Medicare offers a range of preventive health benefits. Some of the tests and screenings are paid in full while other may be subject to a deductible, co-insurance, and/or copay. Some of these benefits include a comprehensive review of your medical history including lifestyle, illnesses that may run in your family, and various assessments and screenings as appropriate. After reviewing your medical record and screening and assessments performed today your provider may have ordered immunizations, labs, imaging, and/or referrals for you. A list of these orders (if applicable) as well as your Preventive Care list are included within your After Visit Summary for your review. Other Preventive Recommendations:    · A preventive eye exam performed by an eye specialist is recommended every 1-2 years to screen for glaucoma; cataracts, macular degeneration, and other eye disorders. · A preventive dental visit is recommended every 6 months. · Try to get at least 150 minutes of exercise per week or 10,000 steps per day on a pedometer . · Order or download the FREE \"Exercise & Physical Activity: Your Everyday Guide\" from [employer]. Call 2-453.108.4213 or search [employer] online. · You need 1143-3937 mg of calcium and 6383-0718 IU of vitamin D per day. It is possible to meet your calcium requirement with diet alone, but a vitamin D supplement is usually necessary to meet this goal.  · When exposed to the sun, use a sunscreen that protects against both UVA and UVB radiation with an SPF of 30 or greater. Reapply every 2 to 3 hours or after sweating, drying off with a towel, or swimming. · Always wear a seat belt when traveling in a car. Always wear a helmet when riding a bicycle or motorcycle.

## 2021-06-16 ENCOUNTER — OFFICE VISIT (OUTPATIENT)
Dept: NEUROLOGY | Age: 46
End: 2021-06-16
Payer: MEDICARE

## 2021-06-16 VITALS
TEMPERATURE: 97.1 F | DIASTOLIC BLOOD PRESSURE: 71 MMHG | WEIGHT: 220 LBS | HEART RATE: 68 BPM | SYSTOLIC BLOOD PRESSURE: 103 MMHG | OXYGEN SATURATION: 98 % | RESPIRATION RATE: 20 BRPM | HEIGHT: 71 IN | BODY MASS INDEX: 30.8 KG/M2

## 2021-06-16 DIAGNOSIS — E61.1 IRON DEFICIENCY: ICD-10-CM

## 2021-06-16 DIAGNOSIS — G25.81 RLS (RESTLESS LEGS SYNDROME): ICD-10-CM

## 2021-06-16 DIAGNOSIS — S09.90XS INJURY OF HEAD, SEQUELA: ICD-10-CM

## 2021-06-16 DIAGNOSIS — R56.9 SEIZURE (HCC): Primary | ICD-10-CM

## 2021-06-16 PROCEDURE — G8419 CALC BMI OUT NRM PARAM NOF/U: HCPCS | Performed by: CLINICAL NURSE SPECIALIST

## 2021-06-16 PROCEDURE — G8427 DOCREV CUR MEDS BY ELIG CLIN: HCPCS | Performed by: CLINICAL NURSE SPECIALIST

## 2021-06-16 PROCEDURE — 99214 OFFICE O/P EST MOD 30 MIN: CPT | Performed by: CLINICAL NURSE SPECIALIST

## 2021-06-16 PROCEDURE — 1036F TOBACCO NON-USER: CPT | Performed by: CLINICAL NURSE SPECIALIST

## 2021-06-16 RX ORDER — LEVETIRACETAM 1000 MG/1
1000 TABLET ORAL 2 TIMES DAILY
Qty: 60 TABLET | Refills: 11 | Status: SHIPPED
Start: 2021-06-16 | End: 2021-10-12 | Stop reason: SDUPTHER

## 2021-06-16 NOTE — PROGRESS NOTES
with his medicine he has had no continued seizures    He recently obtained a baclofen pump and has been getting physical therapy here soon  He hopes to be ambulating soon he is awake and more conversive today than he has been in years    His sister is very happy with his progress she is here today and an excellent historian    Prior to Visit Medications    Medication Sig Taking? Authorizing Provider   levETIRAcetam (KEPPRA) 1000 MG tablet Take 1 tablet by mouth 2 times daily Yes VENKATA Olguin   Amantadine (SYMMETREL) 100 MG TABS tablet 1/2 tablet by mouth AT 7 AM AND 1/2 AT 12 PM Yes Historical Provider, MD   lacosamide (VIMPAT) 100 MG TABS tablet Take 1 tablet by mouth 2 times daily for 90 days.  Yes VENKATA Marley CNP   baclofen (LIORESAL) 10 MG tablet TAKE 1 TABLET BY MOUTH TWICE DAILY AS NEEDED Yes VENKATA Panda CNP   sertraline (ZOLOFT) 50 MG tablet TAKE 1 TABLET BY MOUTH EVERY DAY Yes VENKATA Panda CNP   pantoprazole (PROTONIX) 40 MG tablet TAKE 1 TABLET BY MOUTH DAILY Yes VENKATA Panda CNP   Handicap Placard MISC by Does not apply route Start 2/2/2021 and end 2/1/2025 Yes VENKATA Jacobs CNP   LORazepam (ATIVAN) 0.5 MG tablet TAKE 1 TABLET BY MOUTH THREE TIMES A DAY AS NEEDED Yes Historical Provider, MD   Control Gel Formula Dressing (DUODERM CGF EXTRA THIN) MISC Apply 1 Device topically every 3 days  VENKATA Potts CNP   ondansetron (ZOFRAN-ODT) 4 MG disintegrating tablet Take 1 tablet by mouth every 6 hours as needed for Nausea or Vomiting  Patient not taking: Reported on 6/16/2021  Newton Friedman DO   DOCU 50 MG/5ML liquid   Historical Provider, MD     Allergies as of 06/16/2021    (No Known Allergies)     Objective:   /71 (Site: Left Upper Arm, Position: Sitting, Cuff Size: Medium Adult)   Pulse 68   Temp 97.1 °F (36.2 °C)   Resp 20   Ht 5' 11\" (1.803 m)   Wt 220 lb (99.8 kg)   SpO2 98%   BMI 30.68 kg/m²      General appearance: alert, appears stated age and cooperative  Head: surgical scar noted right and left hemisphere -indentation  Extremities: no cyanosis or edema  Pulses: 2+ and symmetric  Skin: no rashes or lesions      Mental Status: Alert, oriented to person, place but not year (he believes that it is 2011)    Speech: dysarthric   Language: appropriate -more verbalized than previous appointment    Cranial Nerves:  I: smell    II: visual acuity     II: visual fields Full    II: pupils ARPAN   III,VII: ptosis None   III,IV,VI: extraocular muscles  EOMI without nystagmus    V: mastication Normal   V: facial light touch sensation  Normal   V,VII: corneal reflex  Present   VII: facial muscle function - upper     VII: facial muscle function - lower  minimal left 7th LMN    VIII: hearing Normal   IX: soft palate elevation  Normal   IX,X: gag reflex Present   XI: trapezius strength     XI: sternocleidomastoid strength    XI: neck extension strength     XII: tongue strength  Normal     Motor:  5/5 both biceps and triceps  5/5 right   5/5 right IPS  5/5 left IPS  4/5 gastrocs    Spastic arms and legs    Sensory:  LT normal  Vibration normal    DTR:   Right Brachioradialis reflex 2+  Left Brachioradialis reflex 2+  Right Biceps reflex 3+  Left Biceps reflex 3+  Right Quadriceps reflex 3+  Left Quadriceps reflex 3+  Right Achilles reflex 2+  Left Achilles reflex 2+    No Laird's     Laboratory/Radiology:     CBC with Differential:    Lab Results   Component Value Date    WBC 6.3 03/12/2021    RBC 4.62 03/12/2021    HGB 13.8 03/12/2021    HCT 44.9 03/12/2021     03/12/2021    MCV 97.2 03/12/2021    MCH 29.9 03/12/2021    MCHC 30.7 03/12/2021    RDW 13.3 03/12/2021    METASPCT 1.0 05/29/2018    LYMPHOPCT 27.9 03/12/2021    MONOPCT 7.9 03/12/2021    MYELOPCT 1.5 05/29/2018    BASOPCT 1.4 03/12/2021    MONOSABS 0.50 03/12/2021    LYMPHSABS 1.77 03/12/2021    EOSABS 0.14 03/12/2021    BASOSABS 0.09 03/12/2021     CMP:    Lab Results Component Value Date     03/12/2021    K 4.9 03/12/2021    K 4.3 02/24/2021     03/12/2021    CO2 28 03/12/2021    BUN 13 03/12/2021    CREATININE 0.9 03/12/2021    GFRAA >60 03/12/2021    LABGLOM >60 03/12/2021    GLUCOSE 84 03/12/2021    PROT 7.5 03/12/2021    LABALBU 4.3 03/12/2021    CALCIUM 10.2 03/12/2021    BILITOT 0.5 03/12/2021    ALKPHOS 140 03/12/2021    AST 17 03/12/2021    ALT 8 03/12/2021     MRI Brain:  Redemonstration of postsurgical changes from prior right  frontoparietal craniotomy with stable subjacent extra-axial collection  overlying the frontoparietal convexity measuring up to 10 mm in  maximal dimension. Previous EEG (2018) unremarkable     EEG August 2019 normal     I independently reviewed the labs and imaging studies at today's appointment. Assessment:      In 2017, he suffered a fall down steps causing a SDH and ICH   He recovered well and was attempting to return to work   He was dx with testicular cancer and underwent chemo    Shortly thereafter developed a DVT and was started on Xarelto   Unfortunately, developed a left SDH in [address] and is now 4 months post crani    Post-traumatic epilepsy    Previously maintained on 3 AEDs -- Keppra, Vimpat and Dilantin    Now taking only Keppra and Vimpat      Without reported recurrence with compliance    Spastic quadriparesis -- legs more involved than arms    Recently inserted baclofen pump    Sister also reports a history of restless leg syndrome prior to his intracranial injuries  No reports of iron studies been completed    Plan:     Vimpat at 100mg BID  Continue Keppra 1000mg BID     Starting physical therapy    Iron studies for possible iron deficiency related to restless leg syndrome  He recently started amantadine per his physiatrist at [facility]    Return to office in October Call with continued issues    VENKATA Ly - CNS  11:51 AM  6/16/2021

## 2021-06-22 ENCOUNTER — HOSPITAL ENCOUNTER (OUTPATIENT)
Dept: INFUSION THERAPY | Age: 46
Discharge: HOME | End: 2021-06-22
Payer: MEDICARE

## 2021-06-22 DIAGNOSIS — C62.10 MALIGNANT NEOPLASM OF DESCENDED TESTIS, UNSPECIFIED LATERALITY (HCC): ICD-10-CM

## 2021-06-22 DIAGNOSIS — C62.91 SEMINOMA OF RIGHT TESTIS, STAGE 1 (HCC): Primary | ICD-10-CM

## 2021-06-22 DIAGNOSIS — C62.91 SEMINOMA OF RIGHT TESTIS, STAGE 1 (HCC): ICD-10-CM

## 2021-06-22 LAB
ALBUMIN SERPL-MCNC: 4.4 G/DL (ref 3.5–5.2)
ALP BLD-CCNC: 116 U/L (ref 40–129)
ALT SERPL-CCNC: 14 U/L (ref 0–40)
ANION GAP SERPL CALCULATED.3IONS-SCNC: 12 MMOL/L (ref 7–16)
AST SERPL-CCNC: 12 U/L (ref 0–39)
BASOPHILS ABSOLUTE: 0.05 E9/L (ref 0–0.2)
BASOPHILS RELATIVE PERCENT: 0.7 % (ref 0–2)
BILIRUB SERPL-MCNC: 0.4 MG/DL (ref 0–1.2)
BUN BLDV-MCNC: 13 MG/DL (ref 6–20)
CALCIUM SERPL-MCNC: 10 MG/DL (ref 8.6–10.2)
CHLORIDE BLD-SCNC: 107 MMOL/L (ref 98–107)
CO2: 25 MMOL/L (ref 22–29)
CREAT SERPL-MCNC: 1.1 MG/DL (ref 0.7–1.2)
EOSINOPHILS ABSOLUTE: 0.19 E9/L (ref 0.05–0.5)
EOSINOPHILS RELATIVE PERCENT: 2.8 % (ref 0–6)
GFR AFRICAN AMERICAN: >60
GFR NON-AFRICAN AMERICAN: >60 ML/MIN/1.73
GLUCOSE BLD-MCNC: 52 MG/DL (ref 74–99)
HCT VFR BLD CALC: 45.3 % (ref 37–54)
HEMOGLOBIN: 14.5 G/DL (ref 12.5–16.5)
IMMATURE GRANULOCYTES #: 0.03 E9/L
IMMATURE GRANULOCYTES %: 0.4 % (ref 0–5)
LACTATE DEHYDROGENASE: 194 U/L (ref 135–225)
LYMPHOCYTES ABSOLUTE: 1.88 E9/L (ref 1.5–4)
LYMPHOCYTES RELATIVE PERCENT: 27.4 % (ref 20–42)
MCH RBC QN AUTO: 30.1 PG (ref 26–35)
MCHC RBC AUTO-ENTMCNC: 32 % (ref 32–34.5)
MCV RBC AUTO: 94.2 FL (ref 80–99.9)
MONOCYTES ABSOLUTE: 0.75 E9/L (ref 0.1–0.95)
MONOCYTES RELATIVE PERCENT: 10.9 % (ref 2–12)
NEUTROPHILS ABSOLUTE: 3.95 E9/L (ref 1.8–7.3)
NEUTROPHILS RELATIVE PERCENT: 57.8 % (ref 43–80)
PDW BLD-RTO: 13 FL (ref 11.5–15)
PLATELET # BLD: 217 E9/L (ref 130–450)
PMV BLD AUTO: 8.6 FL (ref 7–12)
POTASSIUM SERPL-SCNC: 4.4 MMOL/L (ref 3.5–5)
RBC # BLD: 4.81 E12/L (ref 3.8–5.8)
SODIUM BLD-SCNC: 144 MMOL/L (ref 132–146)
TOTAL PROTEIN: 7.4 G/DL (ref 6.4–8.3)
WBC # BLD: 6.9 E9/L (ref 4.5–11.5)

## 2021-06-22 PROCEDURE — 85025 COMPLETE CBC W/AUTO DIFF WBC: CPT

## 2021-06-22 PROCEDURE — 80053 COMPREHEN METABOLIC PANEL: CPT

## 2021-06-22 PROCEDURE — 82105 ALPHA-FETOPROTEIN SERUM: CPT

## 2021-06-22 PROCEDURE — 83615 LACTATE (LD) (LDH) ENZYME: CPT

## 2021-06-22 PROCEDURE — 36415 COLL VENOUS BLD VENIPUNCTURE: CPT

## 2021-06-22 PROCEDURE — 84702 CHORIONIC GONADOTROPIN TEST: CPT

## 2021-06-24 LAB — AFP-TUMOR MARKER: 3 NG/ML (ref 0–9)

## 2021-06-25 LAB — HCG TUMOR MARKER: <1 IU/L (ref 0–3)

## 2021-06-28 ENCOUNTER — OFFICE VISIT (OUTPATIENT)
Dept: ONCOLOGY | Age: 46
End: 2021-06-28
Payer: MEDICARE

## 2021-06-28 VITALS
SYSTOLIC BLOOD PRESSURE: 98 MMHG | HEIGHT: 71 IN | BODY MASS INDEX: 30.68 KG/M2 | DIASTOLIC BLOOD PRESSURE: 71 MMHG | HEART RATE: 58 BPM | RESPIRATION RATE: 18 BRPM | OXYGEN SATURATION: 95 % | TEMPERATURE: 96 F

## 2021-06-28 DIAGNOSIS — C62.91 SEMINOMA OF RIGHT TESTIS, STAGE 1 (HCC): ICD-10-CM

## 2021-06-28 DIAGNOSIS — C62.11 MALIGNANT NEOPLASM OF DESCENDED RIGHT TESTIS (HCC): Primary | ICD-10-CM

## 2021-06-28 PROCEDURE — 99212 OFFICE O/P EST SF 10 MIN: CPT

## 2021-06-28 NOTE — PROGRESS NOTES
[address] [facility]. [facility]        Pt Name: Alexa Caceres: 1975  Date of evaluation: 6/28/2021  Primary Care Physician: VENKATA Walters CNP  Reason for evaluation:   Chief Complaint   Patient presents with    Cancer     Medically neoplasm of right descended testes Stage I    Follow-up            Subjective: Here for  follow up. He is in a wheelchair and in company of his wife. He has had frequent seizures and he follows with neurology. On  Vimpat  100 mg twice daily. On  Keppra 1500 mg twice daily and he continues on baclofen and lorazepam.      OBJECTIVE:  VITALS:  height is 5' 11\" (1.803 m). His temperature is 96 °F (35.6 °C). His blood pressure is 98/71 and his pulse is 58. His respiration is 18 and oxygen saturation is 95%. Physical Exam:  Performance Status: 0  Well developed, well nourished male  Eyes:  No gross abnormalities. and PERRL  Heart:  RRR, no murmur. Lungs:  Normal expansion.  Clear to auscultation.  No wheezing. Abdomen:  Soft, Non-Tender   Extremities: no swelling, no edema, no calf tenderness  In wheelchair. Neurologic: CNs grossly intact, no slurred speech, A&O x3. Skin:  Skin color normal. No rashes or lesions. Medications  Prior to Admission medications    Medication Sig Start Date End Date Taking? Authorizing Provider   levETIRAcetam (KEPPRA) 1000 MG tablet Take 1 tablet by mouth 2 times daily 6/16/21  Yes VENKATA Christine   Amantadine (SYMMETREL) 100 MG TABS tablet 1/2 tablet by mouth AT 7 AM AND 1/2 AT 12 PM 6/11/21  Yes Historical Provider, MD   lacosamide (VIMPAT) 100 MG TABS tablet Take 1 tablet by mouth 2 times daily for 90 days.  5/24/21 8/22/21 Yes VENKATA Montelongo CNP   baclofen (LIORESAL) 10 MG tablet TAKE 1 TABLET BY MOUTH TWICE DAILY AS NEEDED 3/22/21  Yes VENKATA Walters CNP   sertraline (ZOLOFT) 50 MG tablet TAKE 1 TABLET BY MOUTH EVERY DAY 3/22/21  Yes VENKATA Walters CNP pantoprazole (PROTONIX) 40 MG tablet TAKE 1 TABLET BY MOUTH DAILY 3/22/21  Yes VENKATA Nichols CNP   Handicap Placard MISC by Does not apply route Start 2/2/2021 and end 2/1/2025 2/2/21  Yes VENKATA Jimenez CNP   Control Gel Formula Dressing (DUODERM CGF EXTRA THIN) MISC Apply 1 Device topically every 3 days 11/3/20  Yes VENKATA Nichols CNP   LORazepam (ATIVAN) 0.5 MG tablet TAKE 1 TABLET BY MOUTH THREE TIMES A DAY AS NEEDED 7/28/20  Yes Historical Provider, MD   ondansetron (ZOFRAN-ODT) 4 MG disintegrating tablet Take 1 tablet by mouth every 6 hours as needed for Nausea or Vomiting 3/27/20  Yes Jemal Martinez, DO   DOCU 50 MG/5ML liquid  1/11/20  Yes Historical Provider, MD    Scheduled Meds:  Continuous Infusions:  PRN Meds:.        Recent Laboratory Data-     Lab Results   Component Value Date    WBC 6.9 06/22/2021    HGB 14.5 06/22/2021    HCT 45.3 06/22/2021    MCV 94.2 06/22/2021     06/22/2021    LYMPHOPCT 27.4 06/22/2021    RBC 4.81 06/22/2021    MCH 30.1 06/22/2021    MCHC 32.0 06/22/2021    RDW 13.0 06/22/2021    NEUTOPHILPCT 57.8 06/22/2021    MONOPCT 10.9 06/22/2021    BASOPCT 0.7 06/22/2021    NEUTROABS 3.95 06/22/2021    LYMPHSABS 1.88 06/22/2021    MONOSABS 0.75 06/22/2021    EOSABS 0.19 06/22/2021    BASOSABS 0.05 06/22/2021       Lab Results   Component Value Date     06/22/2021    K 4.4 06/22/2021     06/22/2021    CO2 25 06/22/2021    BUN 13 06/22/2021    CREATININE 1.1 06/22/2021    GLUCOSE 52 (L) 06/22/2021    CALCIUM 10.0 06/22/2021    PROT 7.4 06/22/2021    LABALBU 4.4 06/22/2021    BILITOT 0.4 06/22/2021    ALKPHOS 116 06/22/2021    AST 12 06/22/2021    ALT 14 06/22/2021    LABGLOM >60 06/22/2021    GFRAA >60 06/22/2021         Lab Results   Component Value Date    IRON 35 (L) 06/13/2018    TIBC 148 (L) 06/13/2018    FERRITIN 1,082 06/13/2018           Radiology-  CT HEAD:  2/24/2021  No acute intracranial abnormality.    Stable appearance of enlarged decompressive hemicraniectomy and duraplasty. In January 2018 he required right cranioplasty and sharp debridement of the skin incision and had external hydrocephalus. Few weeks ago he had  seizures while On vacation in [place]  He was referred to neurology and neurosurgery for evaluation of his recurrent seizures.       Received Cycle #1  cis-platinum and -16  on 4/16/18 through 4/20/18 with G-CSF prophylaxis with Neulasta      He  received  IV hydration with normal saline bolus 500 mL over one hour on 4/23/18  Received  Cycle #2 cis-platinum and -16  on May 7, 2018.     He  received Cycle #3 cis-platinum and -16 on 5/29/2018 through Saturday, June 1, 2018 .        Lately he has had worsening intractable headaches with vertigo and falls  Case discussed with Dr. Martin Valdez  He is was  admitted for MRI of the brain and was seen by neurology and neurosurgical for re-evaluation.     MRI on 6/11 showed subacute right transverse sinus thrombus, encephalomalacia related to previous trauma, right mastoid effusion and redemonstration of s/p frontoparietal craniotomy with 10mm  Fluid collection. Neurosurgery evaluation appreciated. He is scheduled to follow up with Neurology next week. His headache has also improved.         His hemoglobin went  down to 7.5 and he was very symptomatic with his anemia and his platelets are 78  He was transfused with 2 units of packed RBCs for symptomatic anemia and his  last cycle of chemotherapy was postponed until next week.     Received Cycle #4 cis-platinum and -16:  Started on 6/25/18  and completed on Friday, June 29, 2018 .  Mariam Cortez has achieved a complete remission without any residual pathologic lymphadenopathy    Deep Vein Thrombosis of the RLE:  -Reports improvements in pain, swelling of the RLE; no difficulty ambulating. This is his 2nd episode of DVT.  His 1st episode was an December 2017 and at that time it was provoked by his surgery  -Has IVC filter in place back in DEC 2017, Was never retrieved, Antithrombin III normal at 102, platelets stable at 634; pending results of Antiphospholipid antibody titers andThrombophilia Panel  -Okay per Neurology to use anticoagulation since it has been 14 months since he had his Intracranial bleed and He is now on Lovenox        Recommend Lovenox for up to 2 weeks till hematoma in left thigh resolves prior to transitioning him to oral Eliquis or Xarelto to decrease risk of hematoma expansion         Hematoma of the LLE:  -Confirmed on CT and MRI of left thigh     History of Traumatic Brain Injury/Bleed: s/p craniectomy  -IVC Filter in place due to prior contraindication to anticoagulation and history of blood clots. Unsuccessful attempt at the retrieval of the IVC filter  -Managed with anti-seizure agents per Neurology     History of Seminoma:  -Ultrasound on 2/2/2019 was negative for mass or torsion. 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 It was explained to him that his odds for cure with surveillance only on in the vicinity of 85% with only 15% chance of relapse and if he relapses he would be cured with either para-aortic radiation or systemic chemotherapy. He is in agreement for surveillance and will have a follow-up serum LDH as well as CT scan of abdomen and pelvis at 3 month and then again at six-month and then every 6 months for 2 years  He has been reassured.     His follow-up CT scan of chest abdomen and pelvis done on 5/23/2017 were negative for recurrent or metastatic disease.  Extensive left sigmoid diverticulosis was described     He was scheduled for follow up CT scans to be done in November 2017 , but because of trauma, hospitalizations and surgeries he missed his appointment and finally had a follow-up CT scan of abdomen and pelvis on 4/5/2018 which unfortunately showed recurrent retroperitoneal node and he is in clinical remission. To continue surveillance. 11/4/2020  Seen recently by neurology and his Vimpat was increased to 100 mg twice daily and he is maintained on Keppra 1000 mg twice daily. He has been of anticoagulation and he continues to follow with neurosurgery. 2/24/2021  PET/CT done 2/23/2021 shows no evidence of recurrent or metastatic disease. However left lower lobe infiltrates were described consistent with pneumonia. He has history of aspirations in the past with his frequent seizures and tube feeding. He has no fever or chills and no cough per his wife. He will be given a course of oral antibiotics with doxycycline and will have a follow-up chest x-ray in 10 days  He will be having his baclofen pump placed in 2 to 3 weeks      6/28/2021  S/P Baclofen pump   No recurrent seizures   To have his mediport removed   This will be arranged with Dr Yoav Frias  He will receive Covid vaccine tomorrow    [facility]. Brice Cherry M.D., F.A.C.P.   Electronically signed 6/28/2021 at 2:54 PM

## 2021-07-27 DIAGNOSIS — R56.9 SEIZURE (HCC): ICD-10-CM

## 2021-08-02 ENCOUNTER — TELEPHONE (OUTPATIENT)
Dept: FAMILY MEDICINE CLINIC | Age: 46
End: 2021-08-02

## 2021-08-02 NOTE — TELEPHONE ENCOUNTER
----- Message from [facility] sent at 7/30/2021 11:49 AM EDT -----  Subject: Message to Provider    QUESTIONS  Information for Provider? Ponce Pizano from [facility] [place] calling to   verify if Natalie Alicia is a pt of Marcos Martinez and if Shauna Greenfield would sign a form for   his incontinence supplies. Natalie Alicia can be reached at [phone]  ---------------------------------------------------------------------------  --------------  CALL BACK INFO  What is the best way for the office to contact you? OK to leave message on   voicemail  Preferred Call Back Phone Number? [phone]  ---------------------------------------------------------------------------  --------------  SCRIPT ANSWERS  Relationship to Patient? Third Party  Representative Name?  Ponce Pizano from [facility]

## 2021-08-04 DIAGNOSIS — C62.91 SEMINOMA OF RIGHT TESTIS, STAGE 1 (HCC): Primary | ICD-10-CM

## 2021-08-12 ENCOUNTER — TELEPHONE (OUTPATIENT)
Dept: SURGERY | Age: 46
End: 2021-08-12

## 2021-08-12 NOTE — TELEPHONE ENCOUNTER
Per the order of Dr. Jovanna Meade, patient has been scheduled for [facility] on 9/7/21. Patient instructed to please contact our office with any questions. No Covid Vaccine-no blood thinners    Surgery scheduling form faxed to [phone] surgery scheduling and fax confirmation received. Dr. Jovanna Meade to enter orders.

## 2021-08-12 NOTE — TELEPHONE ENCOUNTER
Prior Authorization Form:      DEMOGRAPHICS:                     Patient Name:  Belinda Glass  Patient :  1975            Insurance:  Payor: MEDICARE / Plan: MEDICARE PART A AND B / Product Type: *No Product type* /   Insurance ID Number:    Payor/Plan Subscr  Sex Relation Sub. Ins. ID Effective Group Num   1. MEDICARE - Sabrina Dylan 1975 Male Self [id number] 20                                    [address] [address]   2.  MEDICAID [facility] Dylan 1975 Male Self [id number]                                    [address]         DIAGNOSIS & PROCEDURE:                       Procedure/Operation: Mediport removal          CPT Code: 71401    Diagnosis:  Mediport     ICD10 Code: Z95.828    [facility]:  [facility]    Surgeon:  Gilford Spain INFORMATION:                          Date: 21  Time: TBD             Anesthesia:  MAC/TIVA                                                       Status:  Outpatient        Special Comments:  No covid Vaccine       Electronically signed by Alcon Crane MA on 2021 at 3:39 PM

## 2021-08-17 NOTE — TELEPHONE ENCOUNTER
Pt is out of zoloft Requested by his sister.    Last Appointment   6/15/2021  Next Appointment  9/15/2021

## 2021-08-18 NOTE — TELEPHONE ENCOUNTER
Spoke with pharmacy they swear they never received a refill on 07/27/2021. They state the last they received a script renewal for this medication was in march. I did explain that it showed pharmacy confirmation on 07/27/21 at 07:29 am.  They still state they never received it.

## 2021-08-23 DIAGNOSIS — R56.9 SEIZURE (HCC): ICD-10-CM

## 2021-08-23 RX ORDER — LACOSAMIDE 100 MG/1
100 TABLET ORAL 2 TIMES DAILY
Qty: 60 TABLET | Refills: 2 | Status: SHIPPED | OUTPATIENT
Start: 2021-08-23 | End: 2021-09-07

## 2021-09-02 ENCOUNTER — HOSPITAL ENCOUNTER (OUTPATIENT)
Age: 46
Discharge: HOME | End: 2021-09-04
Payer: COMMERCIAL

## 2021-09-02 DIAGNOSIS — Z01.818 PREOP TESTING: ICD-10-CM

## 2021-09-02 PROCEDURE — U0003 INFECTIOUS AGENT DETECTION BY NUCLEIC ACID (DNA OR RNA); SEVERE ACUTE RESPIRATORY SYNDROME CORONAVIRUS 2 (SARS-COV-2) (CORONAVIRUS DISEASE [COVID-19]), AMPLIFIED PROBE TECHNIQUE, MAKING USE OF HIGH THROUGHPUT TECHNOLOGIES AS DESCRIBED BY CMS-2020-01-R: HCPCS

## 2021-09-02 PROCEDURE — U0005 INFEC AGEN DETEC AMPLI PROBE: HCPCS

## 2021-09-04 LAB
SARS-COV-2: NOT DETECTED
SOURCE: NORMAL

## 2021-09-06 ENCOUNTER — ANESTHESIA EVENT (OUTPATIENT)
Dept: OPERATING ROOM | Age: 46
End: 2021-09-06
Payer: COMMERCIAL

## 2021-09-06 DIAGNOSIS — R56.9 SEIZURE (HCC): ICD-10-CM

## 2021-09-07 ENCOUNTER — ANESTHESIA (OUTPATIENT)
Dept: OPERATING ROOM | Age: 46
End: 2021-09-07
Payer: COMMERCIAL

## 2021-09-07 ENCOUNTER — HOSPITAL ENCOUNTER (OUTPATIENT)
Age: 46
Setting detail: OUTPATIENT SURGERY
Discharge: HOME | End: 2021-09-07
Attending: SURGERY | Admitting: SURGERY
Payer: COMMERCIAL

## 2021-09-07 VITALS
TEMPERATURE: 98.4 F | WEIGHT: 230 LBS | OXYGEN SATURATION: 98 % | HEART RATE: 54 BPM | HEIGHT: 71 IN | SYSTOLIC BLOOD PRESSURE: 119 MMHG | RESPIRATION RATE: 16 BRPM | BODY MASS INDEX: 32.2 KG/M2 | DIASTOLIC BLOOD PRESSURE: 78 MMHG

## 2021-09-07 VITALS
DIASTOLIC BLOOD PRESSURE: 85 MMHG | RESPIRATION RATE: 15 BRPM | SYSTOLIC BLOOD PRESSURE: 105 MMHG | OXYGEN SATURATION: 97 %

## 2021-09-07 DIAGNOSIS — G89.18 POSTOPERATIVE PAIN: ICD-10-CM

## 2021-09-07 DIAGNOSIS — Z01.818 PREOP TESTING: Primary | ICD-10-CM

## 2021-09-07 PROCEDURE — 7100000011 HC PHASE II RECOVERY - ADDTL 15 MIN: Performed by: SURGERY

## 2021-09-07 PROCEDURE — 7100000010 HC PHASE II RECOVERY - FIRST 15 MIN: Performed by: SURGERY

## 2021-09-07 PROCEDURE — 6360000002 HC RX W HCPCS: Performed by: NURSE ANESTHETIST, CERTIFIED REGISTERED

## 2021-09-07 PROCEDURE — 3600000012 HC SURGERY LEVEL 2 ADDTL 15MIN: Performed by: SURGERY

## 2021-09-07 PROCEDURE — 2580000003 HC RX 258: Performed by: SURGERY

## 2021-09-07 PROCEDURE — 2709999900 HC NON-CHARGEABLE SUPPLY: Performed by: SURGERY

## 2021-09-07 PROCEDURE — 2500000003 HC RX 250 WO HCPCS: Performed by: SURGERY

## 2021-09-07 PROCEDURE — 36590 REMOVAL TUNNELED CV CATH: CPT | Performed by: SURGERY

## 2021-09-07 PROCEDURE — 3600000002 HC SURGERY LEVEL 2 BASE: Performed by: SURGERY

## 2021-09-07 PROCEDURE — 3700000000 HC ANESTHESIA ATTENDED CARE: Performed by: SURGERY

## 2021-09-07 PROCEDURE — 3700000001 HC ADD 15 MINUTES (ANESTHESIA): Performed by: SURGERY

## 2021-09-07 RX ORDER — HYDROCODONE BITARTRATE AND ACETAMINOPHEN 5; 325 MG/1; MG/1
1 TABLET ORAL EVERY 6 HOURS PRN
Qty: 6 TABLET | Refills: 0 | Status: SHIPPED | OUTPATIENT
Start: 2021-09-07 | End: 2021-09-10

## 2021-09-07 RX ORDER — SODIUM CHLORIDE 0.9 % (FLUSH) 0.9 %
5-40 SYRINGE (ML) INJECTION EVERY 12 HOURS SCHEDULED
Status: DISCONTINUED | OUTPATIENT
Start: 2021-09-07 | End: 2021-09-07 | Stop reason: HOSPADM

## 2021-09-07 RX ORDER — MIDAZOLAM HYDROCHLORIDE 1 MG/ML
INJECTION INTRAMUSCULAR; INTRAVENOUS PRN
Status: DISCONTINUED | OUTPATIENT
Start: 2021-09-07 | End: 2021-09-07 | Stop reason: SDUPTHER

## 2021-09-07 RX ORDER — LACOSAMIDE 100 MG/1
TABLET, FILM COATED ORAL
Qty: 60 TABLET | Refills: 2 | Status: SHIPPED
Start: 2021-09-07 | End: 2021-10-12 | Stop reason: SDUPTHER

## 2021-09-07 RX ORDER — PROPOFOL 10 MG/ML
INJECTION, EMULSION INTRAVENOUS PRN
Status: DISCONTINUED | OUTPATIENT
Start: 2021-09-07 | End: 2021-09-07 | Stop reason: SDUPTHER

## 2021-09-07 RX ORDER — SODIUM CHLORIDE, SODIUM LACTATE, POTASSIUM CHLORIDE, CALCIUM CHLORIDE 600; 310; 30; 20 MG/100ML; MG/100ML; MG/100ML; MG/100ML
INJECTION, SOLUTION INTRAVENOUS CONTINUOUS
Status: DISCONTINUED | OUTPATIENT
Start: 2021-09-07 | End: 2021-09-07 | Stop reason: HOSPADM

## 2021-09-07 RX ORDER — SODIUM CHLORIDE 9 MG/ML
25 INJECTION, SOLUTION INTRAVENOUS PRN
Status: DISCONTINUED | OUTPATIENT
Start: 2021-09-07 | End: 2021-09-07 | Stop reason: HOSPADM

## 2021-09-07 RX ORDER — SODIUM CHLORIDE 0.9 % (FLUSH) 0.9 %
5-40 SYRINGE (ML) INJECTION PRN
Status: DISCONTINUED | OUTPATIENT
Start: 2021-09-07 | End: 2021-09-07 | Stop reason: HOSPADM

## 2021-09-07 RX ORDER — SODIUM CHLORIDE 9 MG/ML
INJECTION, SOLUTION INTRAVENOUS CONTINUOUS
Status: DISCONTINUED | OUTPATIENT
Start: 2021-09-07 | End: 2021-09-07 | Stop reason: HOSPADM

## 2021-09-07 RX ORDER — SODIUM CHLORIDE 0.9 % (FLUSH) 0.9 %
5-40 SYRINGE (ML) INJECTION PRN
Status: DISCONTINUED | OUTPATIENT
Start: 2021-09-07 | End: 2021-09-07 | Stop reason: SDUPTHER

## 2021-09-07 RX ADMIN — PROPOFOL 30 MG: 10 INJECTION, EMULSION INTRAVENOUS at 15:16

## 2021-09-07 RX ADMIN — SODIUM CHLORIDE: 9 INJECTION, SOLUTION INTRAVENOUS at 13:36

## 2021-09-07 RX ADMIN — MIDAZOLAM 2 MG: 1 INJECTION INTRAMUSCULAR; INTRAVENOUS at 15:13

## 2021-09-07 RX ADMIN — PROPOFOL 100 MCG/KG/MIN: 10 INJECTION, EMULSION INTRAVENOUS at 15:17

## 2021-09-07 ASSESSMENT — PULMONARY FUNCTION TESTS
PIF_VALUE: 20
PIF_VALUE: 22
PIF_VALUE: 10
PIF_VALUE: 20
PIF_VALUE: 22
PIF_VALUE: 0
PIF_VALUE: 22
PIF_VALUE: 20
PIF_VALUE: 22
PIF_VALUE: 24
PIF_VALUE: 0
PIF_VALUE: 20
PIF_VALUE: 22
PIF_VALUE: 0

## 2021-09-07 ASSESSMENT — LIFESTYLE VARIABLES: SMOKING_STATUS: 1

## 2021-09-07 ASSESSMENT — PAIN SCALES - GENERAL
PAINLEVEL_OUTOF10: 0
PAINLEVEL_OUTOF10: 0
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
## 2021-09-07 NOTE — OP NOTE
[id number] [facility]   Operative Report    DATE OF PROCEDURE: 9/7/2021    SURGEON: Dr. Jerry Arias MD, M.D.     James Law: none    PREOPERATIVE DIAGNOSIS: Venous insufficiency (I87.2); Mediport in place, Testicular cancer    POSTOPERATIVE DIAGNOSIS: Same. OPERATION: Removal Central Venous Catheter and Subcutaneous Port. ([id number])     ANESTHESIA: LMAC     ESTIMATED BLOOD LOSS: None. COMPLICATIONS: None. HISTORY: Roderick Etienne is a  51 y.o.  male who has finished chemo treatment for Testicular cancer    OPERATION: The patient was placed on the table in a supine position. He  was given Ancef 1 gram IV preop. SCD's were placed on the legs as DVT prophylaxis. The skin was prepped with ChloroPrep and draped in the usual fashion. The skin was numbed with marcaine plus epinephrine. An incision was made though the old scar above the port. Cautery was used to dissect down around the port and it was freed from the attachments and removed and sent off the field. The catheter tunnel was closed with vicryl suture. The dermis was closed with 3-0 vicryl. Derma+flex glue was placed on the incision, no dressing. The needle and sponge count were correct. The patient tolerated the procedure well and went to recovery in stable condition.     Physician Signature: Electronically signed by Dr. Jerry Arias MD, M.D.
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
## 2021-09-07 NOTE — ANESTHESIA POSTPROCEDURE EVALUATION
Department of Anesthesiology  Postprocedure Note    Patient: Elham Grady  MRN: [id number]  YOB: 1975  Date of evaluation: 9/7/2021  Time:  4:19 PM     Procedure Summary     Date: 09/07/21 Room / Location: [address] [place] [address] / [address] [place] [address]    Anesthesia Start: 5841 Anesthesia Stop: 1976    Procedure: MEDIPORT REMOVAL (N/A Chest) Diagnosis: (MEDIPORT IN PLACE)    Surgeons: Melba Montero MD Responsible Provider: Drew Kramer MD    Anesthesia Type: MAC ASA Status: 3          Anesthesia Type: MAC    Domo Phase I: Domo Score: 10    Domo Phase II: Domo Score: 10    Last vitals: Reviewed and per EMR flowsheets.        Anesthesia Post Evaluation    Patient location during evaluation: bedside  Patient participation: complete - patient participated  Level of consciousness: awake  Pain score: 0  Airway patency: patent  Nausea & Vomiting: no nausea and no vomiting  Complications: no  Cardiovascular status: hemodynamically stable  Respiratory status: acceptable  Hydration status: euvolemic

## 2021-10-12 ENCOUNTER — OFFICE VISIT (OUTPATIENT)
Dept: NEUROLOGY | Age: 46
End: 2021-10-12
Payer: COMMERCIAL

## 2021-10-12 VITALS
TEMPERATURE: 97.3 F | HEIGHT: 71 IN | WEIGHT: 230 LBS | BODY MASS INDEX: 32.2 KG/M2 | SYSTOLIC BLOOD PRESSURE: 106 MMHG | RESPIRATION RATE: 18 BRPM | DIASTOLIC BLOOD PRESSURE: 76 MMHG | OXYGEN SATURATION: 95 % | HEART RATE: 106 BPM

## 2021-10-12 DIAGNOSIS — S09.90XS INJURY OF HEAD, SEQUELA: ICD-10-CM

## 2021-10-12 DIAGNOSIS — I73.9 PVD (PERIPHERAL VASCULAR DISEASE) (HCC): ICD-10-CM

## 2021-10-12 DIAGNOSIS — R56.9 SEIZURE (HCC): Primary | ICD-10-CM

## 2021-10-12 PROCEDURE — G8427 DOCREV CUR MEDS BY ELIG CLIN: HCPCS | Performed by: CLINICAL NURSE SPECIALIST

## 2021-10-12 PROCEDURE — 1036F TOBACCO NON-USER: CPT | Performed by: CLINICAL NURSE SPECIALIST

## 2021-10-12 PROCEDURE — G8484 FLU IMMUNIZE NO ADMIN: HCPCS | Performed by: CLINICAL NURSE SPECIALIST

## 2021-10-12 PROCEDURE — G8417 CALC BMI ABV UP PARAM F/U: HCPCS | Performed by: CLINICAL NURSE SPECIALIST

## 2021-10-12 PROCEDURE — 99214 OFFICE O/P EST MOD 30 MIN: CPT | Performed by: CLINICAL NURSE SPECIALIST

## 2021-10-12 RX ORDER — LACOSAMIDE 100 MG/1
TABLET ORAL
Qty: 60 TABLET | Refills: 2 | Status: SHIPPED
Start: 2021-10-12 | End: 2022-04-21

## 2021-10-12 RX ORDER — LEVETIRACETAM 1000 MG/1
1000 TABLET ORAL 2 TIMES DAILY
Qty: 60 TABLET | Refills: 11 | Status: SHIPPED | OUTPATIENT
Start: 2021-10-12

## 2021-10-12 RX ORDER — BACLOFEN 10 MG/1
TABLET ORAL
COMMUNITY

## 2021-10-12 NOTE — PROGRESS NOTES
Sylvester Schlatter is a 51 y.o. right handed man    Patient was doing well until the evening of the [place]. He was drinking heavily and fell down his basement stairs. He was there all night and in the morning his wife called EMS. He had right SDH/ICH and underwent a crani at the time. He was started on Keppra BID prophylaxis but his wife states he was in charge of his own medications. He was noncompliant with his morning dose but would take his evening dose. He and his wife drove to [place] [employer] and arrived early morning at the end of April 2018, that day he felt \"funny\" asked his daughter to get his wife and she saw his face shaking   This continued until he had a full GTC seizure  He was flown to [facility] and was placed on numerous AEDs, including Keppra 1500mg BID, Vimpat 150mg BID and Dilantin 100mg BID    Unfortunately, he returned to my office only on [address] at that time  Since then he was having recurrent seizures so Vimpat was added and his seizures improved    Currently taking Keppra 1000 mg twice a day and Vimpat 100 mg twice a day    Unfortunately during his first course of rehabilitation, he was dx with testicular cancer and started chemotherapy   During the course of tx, he developed a DVT and needed oral anticoagulation for 6 months  He was entertaining the idea of returning to work 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   He was in [place] at a [employer] when he suddenly collapsed    No seizure reported at the time    CT demonstrated a large left SDH    He underwent crani and his bone was replaced with an artificial  I was able to review and confirm there was no seizure activity witnessed on cEEG during admission     He now lives with sister -- doing well   His brother is here for the appointment and has noticed marked improvement over the past 6 months    No seizures have been reported    Baclofen pump is doing amazing and he did well with physical therapy.   However he has not continued therapy he still exercises at home with family but his brother does note it is not the same as when the therapist was there    We did discuss having him follow-up with PMR -consider continue therapy if able if not possible admission to acute rehab. However I did reiterate to patient and his brother how difficult that is going from outpatient to an inpatient acute rehab unit    He is having increased swelling as well as coolness in his lower extremities. He does have a history of DVT but does not follow with a vascular surgeon    Prior to Visit Medications    Medication Sig Taking?  Authorizing Provider   lacosamide (VIMPAT) 100 MG TABS tablet take 1 tablet by mouth twice a day Yes VENKATA Cardenas   levETIRAcetam (KEPPRA) 1000 MG tablet Take 1 tablet by mouth 2 times daily Yes VENKATA Cardenas   baclofen (LIORESAL) 10 MG tablet Take by mouth Yes Historical Provider, MD   sertraline (ZOLOFT) 50 MG tablet Take one tablet by mouth daily for depression Yes VENKATA Fitzpatrick CNP   pantoprazole (PROTONIX) 40 MG tablet TAKE 1 TABLET BY MOUTH DAILY Yes VENKATA Fitzpatrick CNP   Amantadine (SYMMETREL) 100 MG TABS tablet 1/2 tablet by mouth AT 7 AM AND 1/2 AT 12 PM Yes Historical Provider, MD   Handicap Placard [address] [facility] by Does not apply route Start 2/2/2021 and end 2/1/2025 Yes VENKATA Fitzpatrick CNP   Control Gel Formula Dressing (DUODERM CGF EXTRA THIN) MISC Apply 1 Device topically every 3 days  Patient not taking: Reported on 10/12/2021  VENKATA Fitzpatrick CNP   LORazepam (ATIVAN) 0.5 MG tablet TAKE 1 TABLET BY MOUTH THREE TIMES A DAY AS NEEDED  Patient not taking: Reported on 10/12/2021  Historical Provider, MD   ondansetron (ZOFRAN-ODT) 4 MG disintegrating tablet Take 1 tablet by mouth every 6 hours as needed for Nausea or Vomiting  Patient not taking: Reported on 10/12/2021  Nohemi Augustine DO     Allergies as of 10/12/2021    (No Known Allergies)     Objective: /76 (Site: Left Upper Arm, Position: Sitting, Cuff Size: Medium Adult)   Pulse 106   Temp 97.3 °F (36.3 °C)   Resp 18   Ht 5' 11\" (1.803 m)   Wt 230 lb (104.3 kg)   SpO2 95%   BMI 32.08 kg/m²      General appearance: alert, appears stated age and cooperative  Head: surgical scar noted right and left hemisphere -indentation  Extremities: no cyanosis or edema  Pulses: 2+ and symmetric  Skin: no rashes or lesions      Mental Status: Alert, oriented to person    Speech: dysarthric but clearer than previous appointments  Language: appropriate    Cranial Nerves:  I: smell    II: visual acuity     II: visual fields Full    II: pupils ARPAN   III,VII: ptosis None   III,IV,VI: extraocular muscles  EOMI without nystagmus    V: mastication Normal   V: facial light touch sensation  Normal   V,VII: corneal reflex  Present   VII: facial muscle function - upper     VII: facial muscle function - lower  minimal left 7th LMN    VIII: hearing Normal   IX: soft palate elevation  Normal   IX,X: gag reflex Present   XI: trapezius strength     XI: sternocleidomastoid strength    XI: neck extension strength     XII: tongue strength  Normal     Motor:  5/5 both biceps and triceps  5/5 right   5/5 right IPS  5/5 left IPS  4/5 gastrocs    Spastic arms and legs    Sensory:  LT normal  Vibration normal    DTR:   Right Brachioradialis reflex 2+  Left Brachioradialis reflex 2+  Right Biceps reflex 3+  Left Biceps reflex 3+  Right Quadriceps reflex 3+  Left Quadriceps reflex 3+  Right Achilles reflex 2+  Left Achilles reflex 2+    No Laird's     Laboratory/Radiology:     CBC with Differential:    Lab Results   Component Value Date    WBC 6.9 06/22/2021    RBC 4.81 06/22/2021    HGB 14.5 06/22/2021    HCT 45.3 06/22/2021     06/22/2021    MCV 94.2 06/22/2021    MCH 30.1 06/22/2021    MCHC 32.0 06/22/2021    RDW 13.0 06/22/2021    METASPCT 1.0 05/29/2018    LYMPHOPCT 27.4 06/22/2021    MONOPCT 10.9 06/22/2021    MYELOPCT 1.5 05/29/2018    BASOPCT 0.7 06/22/2021    MONOSABS 0.75 06/22/2021    LYMPHSABS 1.88 06/22/2021    EOSABS 0.19 06/22/2021    BASOSABS 0.05 06/22/2021     CMP:    Lab Results   Component Value Date     06/22/2021    K 4.4 06/22/2021    K 4.3 02/24/2021     06/22/2021    CO2 25 06/22/2021    BUN 13 06/22/2021    CREATININE 1.1 06/22/2021    GFRAA >60 06/22/2021    LABGLOM >60 06/22/2021    GLUCOSE 52 06/22/2021    PROT 7.4 06/22/2021    LABALBU 4.4 06/22/2021    CALCIUM 10.0 06/22/2021    BILITOT 0.4 06/22/2021    ALKPHOS 116 06/22/2021    AST 12 06/22/2021    ALT 14 06/22/2021     MRI Brain:  Redemonstration of postsurgical changes from prior right  frontoparietal craniotomy with stable subjacent extra-axial collection  overlying the frontoparietal convexity measuring up to 10 mm in  maximal dimension. Previous EEG (2018) unremarkable     EEG August 2019 normal     I independently reviewed the labs and imaging studies at today's appointment. 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

## 2021-10-26 ENCOUNTER — OFFICE VISIT (OUTPATIENT)
Dept: FAMILY MEDICINE CLINIC | Age: 46
End: 2021-10-26
Payer: COMMERCIAL

## 2021-10-26 VITALS
OXYGEN SATURATION: 98 % | TEMPERATURE: 97.1 F | RESPIRATION RATE: 16 BRPM | HEART RATE: 52 BPM | SYSTOLIC BLOOD PRESSURE: 118 MMHG | DIASTOLIC BLOOD PRESSURE: 64 MMHG

## 2021-10-26 DIAGNOSIS — M25.473 MILD ANKLE OEDEMA: Primary | ICD-10-CM

## 2021-10-26 DIAGNOSIS — F32.9 REACTIVE DEPRESSION (SITUATIONAL): ICD-10-CM

## 2021-10-26 PROBLEM — Z95.828 S/P INSERTION OF IVC (INFERIOR VENA CAVAL) FILTER: Status: RESOLVED | Noted: 2018-05-04 | Resolved: 2021-10-26

## 2021-10-26 PROBLEM — J18.9 PNEUMONIA OF LEFT LOWER LOBE DUE TO INFECTIOUS ORGANISM: Status: RESOLVED | Noted: 2021-02-25 | Resolved: 2021-10-26

## 2021-10-26 PROBLEM — I62.00 SUBDURAL HEMORRHAGE (HCC): Status: RESOLVED | Noted: 2019-06-01 | Resolved: 2021-10-26

## 2021-10-26 PROBLEM — T21.14XA: Status: RESOLVED | Noted: 2021-03-15 | Resolved: 2021-10-26
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
## 2021-10-26 PROBLEM — R32: Status: ACTIVE | Noted: 2021-10-26

## 2021-10-26 PROBLEM — S06.5XAA SUBDURAL HEMATOMA: Status: RESOLVED | Noted: 2017-11-26 | Resolved: 2021-10-26

## 2021-10-26 PROBLEM — I60.9 SAH (SUBARACHNOID HEMORRHAGE) (HCC): Status: RESOLVED | Noted: 2017-11-26 | Resolved: 2021-10-26

## 2021-10-26 PROBLEM — S31.829A OPEN WOUND OF LEFT BUTTOCK: Status: RESOLVED | Noted: 2020-11-03 | Resolved: 2021-10-26

## 2021-10-26 PROCEDURE — G8484 FLU IMMUNIZE NO ADMIN: HCPCS | Performed by: NURSE PRACTITIONER

## 2021-10-26 PROCEDURE — 99214 OFFICE O/P EST MOD 30 MIN: CPT | Performed by: NURSE PRACTITIONER

## 2021-10-26 PROCEDURE — G8427 DOCREV CUR MEDS BY ELIG CLIN: HCPCS | Performed by: NURSE PRACTITIONER

## 2021-10-26 PROCEDURE — 1036F TOBACCO NON-USER: CPT | Performed by: NURSE PRACTITIONER

## 2021-10-26 PROCEDURE — G8417 CALC BMI ABV UP PARAM F/U: HCPCS | Performed by: NURSE PRACTITIONER

## 2021-10-26 RX ORDER — HYDROCHLOROTHIAZIDE 12.5 MG/1
12.5 CAPSULE, GELATIN COATED ORAL DAILY PRN
Qty: 90 CAPSULE | Refills: 0 | Status: SHIPPED
Start: 2021-10-26 | End: 2022-02-25

## 2021-10-26 RX ORDER — PANTOPRAZOLE SODIUM 40 MG/1
40 TABLET, DELAYED RELEASE ORAL DAILY
Qty: 90 TABLET | Refills: 1 | Status: SHIPPED
Start: 2021-10-26 | End: 2022-02-09 | Stop reason: SDUPTHER

## 2021-10-26 ASSESSMENT — ENCOUNTER SYMPTOMS
SINUS PRESSURE: 0
NAUSEA: 0
VOICE CHANGE: 0
COLOR CHANGE: 0
VOMITING: 0
SORE THROAT: 0
TROUBLE SWALLOWING: 0
SHORTNESS OF BREATH: 0
RHINORRHEA: 0
WHEEZING: 0
COUGH: 0
SINUS PAIN: 0
CHEST TIGHTNESS: 0
BACK PAIN: 0
CONSTIPATION: 0
DIARRHEA: 0
FACIAL SWELLING: 0
ABDOMINAL PAIN: 0

## 2021-10-26 NOTE — ASSESSMENT & PLAN NOTE
New Borderline controlled, changes made today: Rx HCTZ 12.5 mg daily as needed for ankle/foot edema. Discussed this is most likely a side effect from the baclofen and as long as it doesn't progress nothing to worry about at this time. Heart and lungs are clear and he had no problem until his baclofen was being titrated up. , medication adherence emphasized, lifestyle modifications recommended and if taking the HCTZ daily will recheck labs in 2 weeks if not taking daily no need to check labs.

## 2021-10-26 NOTE — PATIENT INSTRUCTIONS
The medication list included in this document is our record of what you are currently taking, including any changes that were made at today's visit.  If you find any differences when compared to your medications at home, or have any questions that were not answered at your visit, please contact the office. Patient Education        hydrochlorothiazide  Pronunciation:  SHARLENE alves  What is the most important information I should know about hydrochlorothiazide? You should not use this medicine if you are unable to urinate. What is hydrochlorothiazide? Hydrochlorothiazide is a thiazide diuretic (water pill) that is used to treat high blood pressure (hypertension). Hydrochlorothiazide may also be used for purposes not listed in this medication guide. What should I discuss with my healthcare provider before taking hydrochlorothiazide? You should not use hydrochlorothiazide if you are allergic to it, or if you are unable to urinate. Tell your doctor if you have ever had:  · kidney disease;  · liver disease;  · gout;  · glaucoma;  · low levels of potassium or sodium in your blood;  · high levels of calcium in your blood;  · a parathyroid gland disorder;  · diabetes; or  · an allergy to sulfa drugs or penicillin. Tell your doctor if you are pregnant or plan to become pregnant. If you take this medicine during pregnancy, your  baby may develop jaundice or other problems. You should not breastfeed while using this medicine. Hydrochlorothiazide is not approved for use by anyone younger than 25years old. How should I take hydrochlorothiazide? Follow all directions on your prescription label and read all medication guides or instruction sheets. Your doctor may occasionally change your dose. Use the medicine exactly as directed.   Call your doctor if you have ongoing vomiting or diarrhea, or if you are sweating more than usual. You can easily become dehydrated while taking this medicine, which can lead to severely low blood pressure or a serious electrolyte imbalance. Your blood pressure will need to be checked often. Your blood and urine may be tested if you have been vomiting or are dehydrated. If you need surgery, tell your surgeon you currently use this medicine. You may need to stop for a short time. Keep using this medicine as directed, even if you feel well. High blood pressure often has no symptoms. You may need to use blood pressure medicine for the rest of your life. Store at room temperature away from moisture, heat, and freezing. Keep the bottle tightly closed when not in use. What happens if I miss a dose? Take the medicine as soon as you can, but skip the missed dose if it is almost time for your next dose. Do not take two doses at one time. What happens if I overdose? Seek emergency medical attention or call the Poison Help line at 1-653.120.2460. Overdose symptoms may include nausea, dizziness, dry mouth, thirst, and muscle pain or weakness. What should I avoid while taking hydrochlorothiazide? Hydrochlorothiazide may increase your risk of skin cancer. Avoid sunlight or tanning beds. Wear protective clothing and use sunscreen (SPF 30 or higher) when you are outdoors. Your doctor may want you to have skin examinations on a regular basis. Drinking alcohol with this medicine can cause side effects. Avoid getting up too fast from a sitting or lying position, or you may feel dizzy. Avoid becoming overheated or dehydrated during exercise, in hot weather, or by not drinking enough fluids. Follow your doctor's instructions about the type and amount of liquids you should drink. In some cases, drinking too much liquid can be as unsafe as not drinking enough. What are the possible side effects of hydrochlorothiazide?   Get emergency medical help if you have signs of an allergic reaction (hives, difficult breathing, swelling in your face or throat) or a severe skin reaction (fever, sore throat, burning eyes, skin pain, red or purple skin rash with blistering and peeling). Call your doctor at once if you have:  · a light-headed feeling;  · eye pain, vision problems;  · jaundice (yellowing of the skin or eyes);  · pale skin, easy bruising, unusual bleeding (nose, mouth, vagina, or rectum);  · shortness of breath, wheezing, cough with foamy mucus, chest pain;  · dehydration symptoms --feeling very thirsty or hot, being unable to urinate, heavy sweating, or hot and dry skin; or  · signs of an electrolyte imbalance --increased thirst or urination, confusion, vomiting, constipation, muscle pain, leg cramps, bone pain, lack of energy, irregular heartbeats, tingly feeling. Common side effects may include:  · weakness;  · feeling like you might pass out;  · severe pain in your upper stomach spreading to your back, nausea and vomiting;  · fever, chills, tiredness, mouth sores, skin sores, easy bruising, unusual bleeding, pale skin, cold hands and feet, feeling light-headed or short of breath; or  · electrolyte imbalance. This is not a complete list of side effects and others may occur. Call your doctor for medical advice about side effects. You may report side effects to FDA at 8-842-FDA-7527. What other drugs will affect hydrochlorothiazide? Taking this medicine with other drugs that make you light-headed can worsen this effect. Ask your doctor before using opioid medication, a sleeping pill, a muscle relaxer, or medicine for anxiety or seizures. Tell your doctor about all your other medicines, especially:  · cholestyramine, colestipol;  · insulin or oral diabetes medicine;  · lithium;  · other blood pressure medications;  · steroid medicine; or  · NSAIDs (nonsteroidal anti-inflammatory drugs) --aspirin, ibuprofen (Advil, Motrin), naproxen (Aleve), celecoxib, diclofenac, indomethacin, meloxicam, and others. This list is not complete.  Other drugs may affect hydrochlorothiazide, including prescription and over-the-counter medicines, vitamins, and herbal products. Not all possible drug interactions are listed here. Where can I get more information? Your pharmacist can provide more information about hydrochlorothiazide. Remember, keep this and all other medicines out of the reach of children, never share your medicines with others, and use this medication only for the indication prescribed. Every effort has been made to ensure that the information provided by [address] [place] [address]  is accurate, up-to-date, and complete, but no guarantee is made to that effect. Drug information contained herein may be time sensitive. [facility] information has been compiled for use by healthcare practitioners and consumers in the [place] and therefore [facility] does not warrant that uses outside of the [place] are appropriate, unless specifically indicated otherwise. [facility]'s drug information does not endorse drugs, diagnose patients or recommend therapy. [facility]'s drug information is an informational resource designed to assist licensed healthcare practitioners in caring for their patients and/or to serve consumers viewing this service as a supplement to, and not a substitute for, the expertise, skill, knowledge and judgment of healthcare practitioners. The absence of a warning for a given drug or drug combination in no way should be construed to indicate that the drug or drug combination is safe, effective or appropriate for any given patient. [facility] does not assume any responsibility for any aspect of healthcare administered with the aid of information [facility] provides. The information contained herein is not intended to cover all possible uses, directions, precautions, warnings, drug interactions, allergic reactions, or adverse effects. If you have questions about the drugs you are taking, check with your doctor, nurse or pharmacist.  Copyright 5588-4085 Mary Jo [address] [place] [address] Avenue: 13.01.  Revision date: 2/9/2021. Care instructions adapted under license by [facility] ([facility]). If you have questions about a medical condition or this instruction, always ask your healthcare professional. [address] any warranty or liability for your use of this information. The medication list included in this document is our record of what you are currently taking, including any changes that were made at today's visit.  If you find any differences when compared to your medications at home, or have any questions that were not answered at your visit, please contact the office.

## 2021-10-26 NOTE — PROGRESS NOTES
OFFICE PROGRESS NOTE  [address] [place] [address] Rd  [address] [place] [address]  Dept: [phone]   Chief Complaint   Patient presents with    3 Month Follow-Up       ASSESSMENT/PLAN   1. Mild ankle oedema  Assessment & Plan:  New Borderline controlled, changes made today: Rx HCTZ 12.5 mg daily as needed for ankle/foot edema. Discussed this is most likely a side effect from the baclofen and as long as it doesn't progress nothing to worry about at this time. Heart and lungs are clear and he had no problem until his baclofen was being titrated up. , medication adherence emphasized, lifestyle modifications recommended and if taking the HCTZ daily will recheck labs in 2 weeks if not taking daily no need to check labs. Orders:  -     hydroCHLOROthiazide (MICROZIDE) 12.5 MG capsule; Take 1 capsule by mouth daily as needed (ankle edema), Disp-90 capsule, R-0Normal  -     Comprehensive Metabolic Panel; Future  -     CBC Auto Differential; Future  2. Reactive depression (situational)  Assessment & Plan:   Well-controlled, continue current medications, continue current treatment plan, medication adherence emphasized and lifestyle modifications recommended       Reviewed labs: CMP, CBCD, 6/22/21 Liset Doss  Reviewed notes from Kim Coffey 10/12/21      Discussed weight loss, Discussed exercising 30 minutes daily and Discussed taking medications as directed and adverse effects    Return in about 3 months (around 1/26/2022), or if symptoms worsen or fail to improve, for leg edema, depression. HPI:   Here today for complaints of leg and feet swelling has been off and on all summer but has gotten worse over the last few months. Accompanied by his brother Anita Peter today and he states initially they thought is was because they took the legs off the [facility] and they were hanging down. He doesn't complain of anything but his brother is concerned.  It occurs off and on but has been more consistent recently. He doesn't eat much salt. He is WC bound. He has a baclofen pump and has been getting dose adjustments 599.6 mcg per day via the pump which has helped with the spasticity tremendously. Discussed with patient and his brother the baclofen can cause ankle edema. Depression has been stable to the sertraline. He denies any suicidal or homicidal ideations. Denies any adverse effects of the medication. Current Outpatient Medications:     hydroCHLOROthiazide (MICROZIDE) 12.5 MG capsule, Take 1 capsule by mouth daily as needed (ankle edema), Disp: 90 capsule, Rfl: 0    sertraline (ZOLOFT) 50 MG tablet, Take one tablet by mouth daily for depression, Disp: 90 tablet, Rfl: 3    pantoprazole (PROTONIX) 40 MG tablet, Take 1 tablet by mouth daily, Disp: 90 tablet, Rfl: 1    lacosamide (VIMPAT) 100 MG TABS tablet, take 1 tablet by mouth twice a day, Disp: 60 tablet, Rfl: 2    levETIRAcetam (KEPPRA) 1000 MG tablet, Take 1 tablet by mouth 2 times daily, Disp: 60 tablet, Rfl: 11    baclofen (LIORESAL) 10 MG tablet, Take by mouth, Disp: , Rfl:     Amantadine (SYMMETREL) 100 MG TABS tablet, 1/2 tablet by mouth AT 7 AM AND 1/2 AT 12 PM, Disp: , Rfl:     Handicap Placard [facility], by Does not apply route Start 2/2/2021 and end 2/1/2025, Disp: 1 each, Rfl: 0    Control Gel Formula Dressing (DUODERM CGF EXTRA THIN) MISC, Apply 1 Device topically every 3 days, Disp: 10 each, Rfl: 3    LORazepam (ATIVAN) 0.5 MG tablet, TAKE 1 TABLET BY MOUTH THREE TIMES A DAY AS NEEDED, Disp: , Rfl:     ondansetron (ZOFRAN-ODT) 4 MG disintegrating tablet, Take 1 tablet by mouth every 6 hours as needed for Nausea or Vomiting (Patient not taking: Reported on 10/12/2021), Disp: 20 tablet, Rfl: 0      Surgical History:  has a past surgical history that includes Testicle removal (Right, 02/03/2017);  Cranioplasty (Right, 01/08/2018); pr insj tunneled ctr vad w/subq port age 10 yr/> (Left, 5/17/2018); other surgical history (12/10/2018); Colonoscopy; Cranioplasty (Left, 6/28/2019); Upper gastrointestinal endoscopy (N/A, 7/1/2019); and Catheter Removal (N/A, 9/7/2021). Social History:  reports that he quit smoking about 2 years ago. His smoking use included cigarettes. He started smoking about 26 years ago. He smoked 1.00 pack per day. He has never used smokeless tobacco. He reports that he does not drink alcohol and does not use drugs. Family History: family history includes Cancer in his sister; No Known Problems in his brother; Schizophrenia in his father; Scoliosis in his father and mother. I have reviewed Tl's allergies, medications, problem list, medical, social and family history and have updated as needed in the electronic medical record    Review of Systems   Constitutional: Positive for activity change. Negative for appetite change, chills, diaphoresis, fatigue, fever and unexpected weight change. HENT: Negative for congestion, dental problem, drooling, ear discharge, ear pain, facial swelling, hearing loss, mouth sores, nosebleeds, postnasal drip, rhinorrhea, sinus pressure, sinus pain, sneezing, sore throat, tinnitus, trouble swallowing and voice change. Eyes: Negative for visual disturbance. Respiratory: Negative for cough, chest tightness, shortness of breath and wheezing. Cardiovascular: Positive for leg swelling ( ankles and feet). Negative for chest pain and palpitations. Gastrointestinal: Negative for abdominal pain, constipation, diarrhea, nausea and vomiting. Endocrine: Negative for cold intolerance, heat intolerance, polydipsia, polyphagia and polyuria. Genitourinary: Negative for difficulty urinating, frequency and urgency. Musculoskeletal: Negative for arthralgias, back pain, gait problem, joint swelling, myalgias, neck pain and neck stiffness. Skin: Negative for color change, pallor, rash and wound.    Allergic/Immunologic: Negative for environmental allergies, food allergies and immunocompromised state. Neurological: Positive for light-headedness ( off and on since stroke). Negative for dizziness, tremors, seizures, syncope, facial asymmetry, speech difficulty, weakness, numbness and headaches. Hematological: Negative for adenopathy. Does not bruise/bleed easily. Psychiatric/Behavioral: Negative for agitation, behavioral problems, confusion, decreased concentration, dysphoric mood, hallucinations, self-injury, sleep disturbance and suicidal ideas. The patient is not nervous/anxious and is not hyperactive. OBJECTIVE:     VS:  Wt Readings from Last 3 Encounters:   10/12/21 230 lb (104.3 kg)   09/03/21 230 lb (104.3 kg)   06/16/21 220 lb (99.8 kg)                       Vitals:    10/26/21 1537   BP: 118/64   Pulse: 52   Resp: 16   Temp: 97.1 °F (36.2 °C)   SpO2: 98%       General: Alert and oriented to person, place, and time, well developed and well nourished, sitting in WC in no acute distress  SKIN: Warm and dry, intact without any rash, masses or lesions  HEAD: normocephalic, atraumatic  Neck: supple and non-tender without mass, trachea midline, no cervical lymphadenopathy, no bruit, no thyromegaly or nodules  Cardiovascular: regular rate and regular rhythm, normal S1 and S2,  no murmurs, rubs, clicks, or gallop. Distal pulses intact, no carotid bruits.  1+pitting edema in the ankles and feet bilateral  Pulmonary/Chest: clear to auscultation bilaterally, no wheezes, rales or rhonchi, normal air movement, no respiratory distress  Abdomen: soft, non-tender, non-distended, normal bowel sounds, no masses or hepatosplenomegaly  Musculoskeletal: decreased ROM in arms and legs but the spasticity has improved since being on the baclofen pump, no joint swelling, deformity or tenderness   Neurologic:  speech normal  Extremities: 1+pitting edema in the ankles and feet bilateral, no clubbing, cyanosis,    Psychiatric: Good eye contact, normal mood and affect, answers questions appropriately at times but not consistently    I have reviewed my findings and recommendations with Candance Nett.     Blake Hightower, APRN - CNP, NP-C, FNP-BC

## 2021-11-01 ENCOUNTER — TELEPHONE (OUTPATIENT)
Dept: FAMILY MEDICINE CLINIC | Age: 46
End: 2021-11-01

## 2021-11-01 NOTE — TELEPHONE ENCOUNTER
There is an open order for a urinalysis in the patient's chart but he is unsure if he still needs to get it done. He apparently needs to be cathed in order to obtain urine. Please advise if he still needs this test. Thank you!

## 2021-11-02 ENCOUNTER — TELEPHONE (OUTPATIENT)
Dept: VASCULAR SURGERY | Age: 46
End: 2021-11-02

## 2021-11-02 NOTE — TELEPHONE ENCOUNTER
Called to confirm appointment for 11/3/21 at 1 p.m, left message with date, time, address, and phone number for patient.

## 2021-11-03 ENCOUNTER — TELEPHONE (OUTPATIENT)
Dept: VASCULAR SURGERY | Age: 46
End: 2021-11-03

## 2021-11-03 ENCOUNTER — OFFICE VISIT (OUTPATIENT)
Dept: VASCULAR SURGERY | Age: 46
End: 2021-11-03
Payer: COMMERCIAL

## 2021-11-03 VITALS — HEIGHT: 71 IN | WEIGHT: 240 LBS | BODY MASS INDEX: 33.6 KG/M2

## 2021-11-03 DIAGNOSIS — I73.9 PERIPHERAL VASCULAR DISEASE (HCC): Primary | ICD-10-CM

## 2021-11-03 DIAGNOSIS — Z95.828 PRESENCE OF INFERIOR VENA CAVA FILTER: Primary | Chronic | ICD-10-CM

## 2021-11-03 PROCEDURE — 1036F TOBACCO NON-USER: CPT | Performed by: SURGERY

## 2021-11-03 PROCEDURE — 99203 OFFICE O/P NEW LOW 30 MIN: CPT | Performed by: SURGERY

## 2021-11-03 PROCEDURE — G8427 DOCREV CUR MEDS BY ELIG CLIN: HCPCS | Performed by: SURGERY

## 2021-11-03 PROCEDURE — G8484 FLU IMMUNIZE NO ADMIN: HCPCS | Performed by: SURGERY

## 2021-11-03 PROCEDURE — G8417 CALC BMI ABV UP PARAM F/U: HCPCS | Performed by: SURGERY
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
## 2021-11-03 NOTE — TELEPHONE ENCOUNTER
Notified Nila Mead of appointment for [address] at [facility] on 11-18-21 at 1:00 pm, arrive at 12:30 to register.

## 2021-11-09 DIAGNOSIS — C62.11 MALIGNANT NEOPLASM OF DESCENDED RIGHT TESTIS (HCC): ICD-10-CM

## 2021-11-09 DIAGNOSIS — C62.91 SEMINOMA OF RIGHT TESTIS, STAGE 1 (HCC): ICD-10-CM

## 2021-11-09 DIAGNOSIS — M25.473 MILD ANKLE OEDEMA: ICD-10-CM

## 2021-11-09 LAB
ALBUMIN SERPL-MCNC: 3.9 G/DL (ref 3.5–5.2)
ALP BLD-CCNC: 121 U/L (ref 40–129)
ALT SERPL-CCNC: 13 U/L (ref 0–40)
ANION GAP SERPL CALCULATED.3IONS-SCNC: 13 MMOL/L (ref 7–16)
AST SERPL-CCNC: 14 U/L (ref 0–39)
BASOPHILS ABSOLUTE: 0.07 E9/L (ref 0–0.2)
BASOPHILS RELATIVE PERCENT: 0.9 % (ref 0–2)
BILIRUB SERPL-MCNC: 0.4 MG/DL (ref 0–1.2)
BUN BLDV-MCNC: 14 MG/DL (ref 6–20)
CALCIUM SERPL-MCNC: 9.7 MG/DL (ref 8.6–10.2)
CHLORIDE BLD-SCNC: 103 MMOL/L (ref 98–107)
CO2: 19 MMOL/L (ref 22–29)
CREAT SERPL-MCNC: 0.9 MG/DL (ref 0.7–1.2)
EOSINOPHILS ABSOLUTE: 0.18 E9/L (ref 0.05–0.5)
EOSINOPHILS RELATIVE PERCENT: 2.3 % (ref 0–6)
GFR AFRICAN AMERICAN: >60
GFR NON-AFRICAN AMERICAN: >60 ML/MIN/1.73
GLUCOSE BLD-MCNC: 81 MG/DL (ref 74–99)
HCT VFR BLD CALC: 48.1 % (ref 37–54)
HEMOGLOBIN: 15.3 G/DL (ref 12.5–16.5)
IMMATURE GRANULOCYTES #: 0.05 E9/L
IMMATURE GRANULOCYTES %: 0.7 % (ref 0–5)
LACTATE DEHYDROGENASE: 261 U/L (ref 135–225)
LYMPHOCYTES ABSOLUTE: 2.03 E9/L (ref 1.5–4)
LYMPHOCYTES RELATIVE PERCENT: 26.5 % (ref 20–42)
MCH RBC QN AUTO: 29.7 PG (ref 26–35)
MCHC RBC AUTO-ENTMCNC: 31.8 % (ref 32–34.5)
MCV RBC AUTO: 93.4 FL (ref 80–99.9)
MONOCYTES ABSOLUTE: 0.66 E9/L (ref 0.1–0.95)
MONOCYTES RELATIVE PERCENT: 8.6 % (ref 2–12)
NEUTROPHILS ABSOLUTE: 4.67 E9/L (ref 1.8–7.3)
NEUTROPHILS RELATIVE PERCENT: 61 % (ref 43–80)
PDW BLD-RTO: 13.8 FL (ref 11.5–15)
PLATELET # BLD: 252 E9/L (ref 130–450)
PMV BLD AUTO: 9.5 FL (ref 7–12)
POTASSIUM SERPL-SCNC: 4.5 MMOL/L (ref 3.5–5)
RBC # BLD: 5.15 E12/L (ref 3.8–5.8)
SODIUM BLD-SCNC: 135 MMOL/L (ref 132–146)
TOTAL PROTEIN: 7.5 G/DL (ref 6.4–8.3)
WBC # BLD: 7.7 E9/L (ref 4.5–11.5)

## 2021-11-12 LAB
AFP-TUMOR MARKER: 4 NG/ML (ref 0–9)
HCG TUMOR MARKER: <1 IU/L (ref 0–3)

## 2021-11-18 ENCOUNTER — HOSPITAL ENCOUNTER (OUTPATIENT)
Dept: INTERVENTIONAL RADIOLOGY/VASCULAR | Age: 46
Discharge: HOME | End: 2021-11-20
Payer: COMMERCIAL

## 2021-11-18 DIAGNOSIS — I73.9 PERIPHERAL VASCULAR DISEASE (HCC): ICD-10-CM

## 2021-11-18 PROCEDURE — 93922 UPR/L XTREMITY ART 2 LEVELS: CPT

## 2021-11-23 ENCOUNTER — TELEPHONE (OUTPATIENT)
Dept: VASCULAR SURGERY | Age: 46
End: 2021-11-23

## 2021-12-28 ENCOUNTER — HOSPITAL ENCOUNTER (OUTPATIENT)
Dept: INFUSION THERAPY | Age: 46
Discharge: HOME | End: 2021-12-28

## 2021-12-28 DIAGNOSIS — C62.91 SEMINOMA OF RIGHT TESTIS, STAGE 1 (HCC): Primary | ICD-10-CM

## 2022-01-14 ENCOUNTER — PATIENT MESSAGE (OUTPATIENT)
Dept: NEUROLOGY | Age: 47
End: 2022-01-14

## 2022-01-14 DIAGNOSIS — R56.9 SEIZURE (HCC): Primary | ICD-10-CM

## 2022-01-18 NOTE — TELEPHONE ENCOUNTER
From: Emperatriz Bermudezort  To: VENKATA Oconnell - CNS  Sent: 1/14/2022 9:58 PM EST  Subject: Romero Horowitz had 2 seizures last week. Dr. Nolan Heredia had prescribed . 5 mg lorazepam for up to 3 times daily. Would you be able to refill it? Thank you!

## 2022-01-20 DIAGNOSIS — C62.10 MALIGNANT NEOPLASM OF DESCENDED TESTIS, UNSPECIFIED LATERALITY (HCC): ICD-10-CM

## 2022-01-20 DIAGNOSIS — C62.91 SEMINOMA OF RIGHT TESTIS, STAGE 1 (HCC): Primary | ICD-10-CM

## 2022-01-20 RX ORDER — LORAZEPAM 0.5 MG/1
0.5 TABLET ORAL DAILY PRN
Qty: 10 TABLET | Refills: 0 | Status: SHIPPED | OUTPATIENT
Start: 2022-01-20 | End: 2022-02-19

## 2022-01-21 ENCOUNTER — HOSPITAL ENCOUNTER (OUTPATIENT)
Dept: INFUSION THERAPY | Age: 47
Discharge: HOME | End: 2022-01-21
Payer: COMMERCIAL

## 2022-01-21 DIAGNOSIS — C62.10 MALIGNANT NEOPLASM OF DESCENDED TESTIS, UNSPECIFIED LATERALITY (HCC): ICD-10-CM

## 2022-01-21 DIAGNOSIS — C62.91 SEMINOMA OF RIGHT TESTIS, STAGE 1 (HCC): ICD-10-CM

## 2022-01-21 LAB
ALBUMIN SERPL-MCNC: 4.3 G/DL (ref 3.5–5.2)
ALP BLD-CCNC: 125 U/L (ref 40–129)
ALT SERPL-CCNC: 16 U/L (ref 0–40)
ANION GAP SERPL CALCULATED.3IONS-SCNC: 9 MMOL/L (ref 7–16)
AST SERPL-CCNC: 13 U/L (ref 0–39)
BASOPHILS ABSOLUTE: 0.04 E9/L (ref 0–0.2)
BASOPHILS RELATIVE PERCENT: 0.7 % (ref 0–2)
BILIRUB SERPL-MCNC: 0.4 MG/DL (ref 0–1.2)
BUN BLDV-MCNC: 13 MG/DL (ref 6–20)
CALCIUM SERPL-MCNC: 9.6 MG/DL (ref 8.6–10.2)
CHLORIDE BLD-SCNC: 104 MMOL/L (ref 98–107)
CO2: 25 MMOL/L (ref 22–29)
CREAT SERPL-MCNC: 1 MG/DL (ref 0.7–1.2)
EOSINOPHILS ABSOLUTE: 0.15 E9/L (ref 0.05–0.5)
EOSINOPHILS RELATIVE PERCENT: 2.6 % (ref 0–6)
GFR AFRICAN AMERICAN: >60
GFR NON-AFRICAN AMERICAN: >60 ML/MIN/1.73
GLUCOSE BLD-MCNC: 134 MG/DL (ref 74–99)
HCT VFR BLD CALC: 46.5 % (ref 37–54)
HEMOGLOBIN: 14.9 G/DL (ref 12.5–16.5)
IMMATURE GRANULOCYTES #: 0.02 E9/L
IMMATURE GRANULOCYTES %: 0.3 % (ref 0–5)
LYMPHOCYTES ABSOLUTE: 1.3 E9/L (ref 1.5–4)
LYMPHOCYTES RELATIVE PERCENT: 22.6 % (ref 20–42)
MCH RBC QN AUTO: 29.4 PG (ref 26–35)
MCHC RBC AUTO-ENTMCNC: 32 % (ref 32–34.5)
MCV RBC AUTO: 91.9 FL (ref 80–99.9)
MONOCYTES ABSOLUTE: 0.39 E9/L (ref 0.1–0.95)
MONOCYTES RELATIVE PERCENT: 6.8 % (ref 2–12)
NEUTROPHILS ABSOLUTE: 3.84 E9/L (ref 1.8–7.3)
NEUTROPHILS RELATIVE PERCENT: 67 % (ref 43–80)
PDW BLD-RTO: 13.2 FL (ref 11.5–15)
PLATELET # BLD: 273 E9/L (ref 130–450)
PMV BLD AUTO: 8.8 FL (ref 7–12)
POTASSIUM SERPL-SCNC: 4 MMOL/L (ref 3.5–5)
RBC # BLD: 5.06 E12/L (ref 3.8–5.8)
SODIUM BLD-SCNC: 138 MMOL/L (ref 132–146)
TOTAL PROTEIN: 7.6 G/DL (ref 6.4–8.3)
WBC # BLD: 5.7 E9/L (ref 4.5–11.5)

## 2022-01-21 PROCEDURE — 36415 COLL VENOUS BLD VENIPUNCTURE: CPT

## 2022-01-21 PROCEDURE — 84702 CHORIONIC GONADOTROPIN TEST: CPT

## 2022-01-21 PROCEDURE — 82105 ALPHA-FETOPROTEIN SERUM: CPT

## 2022-01-21 PROCEDURE — 85025 COMPLETE CBC W/AUTO DIFF WBC: CPT

## 2022-01-21 PROCEDURE — 80053 COMPREHEN METABOLIC PANEL: CPT

## 2022-01-23 LAB — AFP-TUMOR MARKER: 4 NG/ML (ref 0–9)

## 2022-01-24 LAB — HCG TUMOR MARKER: <1 IU/L (ref 0–3)

## 2022-01-25 ENCOUNTER — TELEPHONE (OUTPATIENT)
Dept: FAMILY MEDICINE CLINIC | Age: 47
End: 2022-01-25

## 2022-02-09 ENCOUNTER — TELEMEDICINE (OUTPATIENT)
Dept: FAMILY MEDICINE CLINIC | Age: 47
End: 2022-02-09
Payer: COMMERCIAL

## 2022-02-09 DIAGNOSIS — F32.9 REACTIVE DEPRESSION (SITUATIONAL): ICD-10-CM

## 2022-02-09 DIAGNOSIS — R73.01 IMPAIRED FASTING BLOOD SUGAR: ICD-10-CM

## 2022-02-09 DIAGNOSIS — M25.473 MILD ANKLE OEDEMA: Primary | ICD-10-CM

## 2022-02-09 DIAGNOSIS — Z13.220 SCREENING FOR LIPID DISORDERS: ICD-10-CM

## 2022-02-09 DIAGNOSIS — K21.9 GASTROESOPHAGEAL REFLUX DISEASE WITHOUT ESOPHAGITIS: ICD-10-CM

## 2022-02-09 PROCEDURE — G8427 DOCREV CUR MEDS BY ELIG CLIN: HCPCS | Performed by: NURSE PRACTITIONER

## 2022-02-09 PROCEDURE — G8484 FLU IMMUNIZE NO ADMIN: HCPCS | Performed by: NURSE PRACTITIONER

## 2022-02-09 PROCEDURE — 1036F TOBACCO NON-USER: CPT | Performed by: NURSE PRACTITIONER

## 2022-02-09 PROCEDURE — 99214 OFFICE O/P EST MOD 30 MIN: CPT | Performed by: NURSE PRACTITIONER

## 2022-02-09 PROCEDURE — G8417 CALC BMI ABV UP PARAM F/U: HCPCS | Performed by: NURSE PRACTITIONER

## 2022-02-09 RX ORDER — PANTOPRAZOLE SODIUM 40 MG/1
40 TABLET, DELAYED RELEASE ORAL DAILY
Qty: 90 TABLET | Refills: 1 | Status: SHIPPED | OUTPATIENT
Start: 2022-02-09

## 2022-02-09 ASSESSMENT — PATIENT HEALTH QUESTIONNAIRE - PHQ9
SUM OF ALL RESPONSES TO PHQ QUESTIONS 1-9: 0
SUM OF ALL RESPONSES TO PHQ QUESTIONS 1-9: 0
SUM OF ALL RESPONSES TO PHQ9 QUESTIONS 1 & 2: 0
SUM OF ALL RESPONSES TO PHQ QUESTIONS 1-9: 0
1. LITTLE INTEREST OR PLEASURE IN DOING THINGS: 0
2. FEELING DOWN, DEPRESSED OR HOPELESS: 0
SUM OF ALL RESPONSES TO PHQ QUESTIONS 1-9: 0

## 2022-02-09 ASSESSMENT — ENCOUNTER SYMPTOMS
RHINORRHEA: 0
FACIAL SWELLING: 0
WHEEZING: 0
SINUS PAIN: 0
CONSTIPATION: 0
COUGH: 0
DIARRHEA: 0
SHORTNESS OF BREATH: 0
SINUS PRESSURE: 0
COLOR CHANGE: 0
SORE THROAT: 0
VOMITING: 0
TROUBLE SWALLOWING: 0
NAUSEA: 0
ABDOMINAL PAIN: 0
BACK PAIN: 0
VOICE CHANGE: 0

## 2022-02-09 NOTE — ASSESSMENT & PLAN NOTE
Well-controlled, changes made today: will only use the HCTZ if he needs to for ankle edema, check fasting labs and lifestyle modifications recommended

## 2022-02-09 NOTE — PROGRESS NOTES
OFFICE PROGRESS NOTE  [address]  [address] [place] [address]  Dept: [phone]   Chief Complaint   Patient presents with    Depression    Leg Swelling    Health Maintenance     due for flu, diabetes screen, tdap         HPI:   2/9/2022    TELEHEALTH EVALUATION -- Audio/Visual (During KVYPS-80 public health emergency)  TeleMedicine Video Visit    This visit was performed as a virtual video visit using a synchronous, two-way, audio-video telehealth technology platform. Patient identification was verified at the start of the visit, including the patient's telephone number and physical location. I discussed with the patient the nature of our telehealth visits, that:     1. Due to the nature of an audio- video modality, the only components of a physical exam that could be done are the elements supported by direct observation. 2. I would evaluate the patient and recommend diagnostics and treatments based on my assessment. 3. If it was felt that the patient should be evaluated in clinic or an emergency room setting, then they would be directed there. 4. Our sessions are not being recorded and that personal health information is protected. 5. Our team would provide follow up care in person if/when the patient needs it. Patient does agree to proceed with telemedicine consultation. Patient's location: home address in [facility]  Physician  location [facility]. other people involved in call sister in law Jama Neal      Time spent:  20    This visit was completed virtually using My Chart/Haiku/Linda        HPI:  Follow up on ankle swelling which is intermittent. Accompanied by his sister in law who states no ankle swelling since the summer. He did see vascular who felt he didn't have any problems with circulation. Depression: Patient complains of depression. He complains of no symptoms.  Onset was approximately several years ago, stable since that time. He denies current suicidal and homicidal plan or intent. Family history significant for schizophrenia. Possible organic causes contributing are: neuro, previous head trauma and stroke. Risk factors: negative life event stroke secondary to TBI Previous treatment includes Zoloft. He complains of the following side effects from the treatment: none. GERD has been stable on the pantoprazole. Denies any symptoms. No red flag symptoms. Needs refill. No problems swallowing and eating well. Dr Nikita Mccallum removed his port as he has been 5 years cancer free. Labs reviewed from his last visit 1/21/22 and found to have elevated BS at 134 but he had eaten just prior to labs. No wounds on his skin per sister in [facility]. Home health at the house at this time as well. Current Outpatient Medications:     LORazepam (ATIVAN) 0.5 MG tablet, Take 1 tablet by mouth daily as needed for Anxiety for up to 30 days. , Disp: 10 tablet, Rfl: 0    hydroCHLOROthiazide (MICROZIDE) 12.5 MG capsule, Take 1 capsule by mouth daily as needed (ankle edema), Disp: 90 capsule, Rfl: 0    sertraline (ZOLOFT) 50 MG tablet, Take one tablet by mouth daily for depression, Disp: 90 tablet, Rfl: 3    pantoprazole (PROTONIX) 40 MG tablet, Take 1 tablet by mouth daily, Disp: 90 tablet, Rfl: 1    lacosamide (VIMPAT) 100 MG TABS tablet, take 1 tablet by mouth twice a day, Disp: 60 tablet, Rfl: 2    levETIRAcetam (KEPPRA) 1000 MG tablet, Take 1 tablet by mouth 2 times daily, Disp: 60 tablet, Rfl: 11    baclofen (LIORESAL) 10 MG tablet, Take by mouth, Disp: , Rfl:     Amantadine (SYMMETREL) 100 MG TABS tablet, 1/2 tablet by mouth AT 7 AM AND 1/2 AT 12 PM, Disp: , Rfl:     Handicap Placard [facility], by Does not apply route Start 2/2/2021 and end 2/1/2025, Disp: 1 each, Rfl: 0    Control Gel Formula Dressing (DUODERM CGF EXTRA THIN) MISC, Apply 1 Device topically every 3 days, Disp: 10 each, Rfl: 3    ondansetron (ZOFRAN-ODT) 4 MG disintegrating tablet, Take 1 tablet by mouth every 6 hours as needed for Nausea or Vomiting, Disp: 20 tablet, Rfl: 0  Social History     Socioeconomic History    Marital status:      Spouse name: Kristal Dobsno Number of children: 2    Years of education: None    Highest education level: None   Occupational History    None   Tobacco Use    Smoking status: Former Smoker     Packs/day: 1.00     Types: Cigarettes     Start date: 1995     Quit date: 2019     Years since quittin.9    Smokeless tobacco: Never Used   Vaping Use    Vaping Use: Never used   Substance and Sexual Activity    Alcohol use: No    Drug use: No    Sexual activity: Yes     Partners: Female   Other Topics Concern    None   Social History Narrative    ** Merged History Encounter **          Social Determinants of Health     Financial Resource Strain: Low Risk     Difficulty of Paying Living Expenses: Not hard at all   Food Insecurity: No Food Insecurity    Worried About Running Out of Food in the Last Year: Never true    Romy of Food in the Last Year: Never true   Transportation Needs: No Transportation Needs    Lack of Transportation (Medical): No    Lack of Transportation (Non-Medical):  No   Physical Activity:     Days of Exercise per Week: Not on file    Minutes of Exercise per Session: Not on file   Stress:     Feeling of Stress : Not on file   Social Connections:     Frequency of Communication with Friends and Family: Not on file    Frequency of Social Gatherings with Friends and Family: Not on file    Attends Buddhist Services: Not on file    Active Member of Clubs or Organizations: Not on file    Attends Club or Organization Meetings: Not on file    Marital Status: Not on file   Intimate Partner Violence:     Fear of Current or Ex-Partner: Not on file    Emotionally Abused: Not on file    Physically Abused: Not on file    Sexually Abused: Not on file   Housing Stability: Unknown    Unable to Pay for Housing in the Last Year: No    Number of Places Lived in the Last Year: Not on file    Unstable Housing in the Last Year: No       I have reviewed Tl's allergies, medications, problem list, medical, social and family history and have updated as needed in the electronic medical record    Review of Systems   Constitutional: Positive for activity change. Negative for appetite change, chills, diaphoresis, fatigue, fever and unexpected weight change. HENT: Negative for congestion, dental problem, drooling, ear discharge, ear pain, facial swelling, hearing loss, mouth sores, nosebleeds, postnasal drip, rhinorrhea, sinus pressure, sinus pain, sneezing, sore throat, tinnitus, trouble swallowing and voice change. Respiratory: Negative for cough, shortness of breath and wheezing. Cardiovascular: Negative for chest pain, palpitations and leg swelling. Gastrointestinal: Negative for abdominal pain, constipation, diarrhea, nausea and vomiting. Genitourinary:        Urinary incontinence   Musculoskeletal: Positive for gait problem. Negative for arthralgias, back pain, joint swelling, myalgias, neck pain and neck stiffness. Skin: Negative for color change, pallor, rash and wound. Neurological: Negative for dizziness, seizures, syncope, facial asymmetry, speech difficulty, light-headedness and headaches. Psychiatric/Behavioral: Negative for agitation, behavioral problems, confusion, decreased concentration, dysphoric mood, hallucinations, self-injury, sleep disturbance and suicidal ideas. The patient is not nervous/anxious and is not hyperactive. OBJECTIVE:     VS:  Wt Readings from Last 3 Encounters:   11/03/21 240 lb (108.9 kg)   10/12/21 230 lb (104.3 kg)   09/03/21 230 lb (104.3 kg)                       There were no vitals filed for this visit.     General: Alert and oriented to person, place, and time, well developed and well nourished, sitting up in chair, in no acute distress  SKIN: intact without any rash, masses or lesions  HEAD: normocephalic, atraumatic  ENT: normal hearing, Nose without deformity     Neck:  without mass, trachea midline, no thyromegaly or nodules  Pulmonary/Chest: respirations even and unlabored, no respiratory distress  Neurologic:  speech normal  Extremities: no clubbing, cyanosis, or edema. Psychiatric: Good eye contact, normal mood and affect, answers questions appropriately    ASSESSMENT/PLAN   Diamante Reese was seen today for depression, leg swelling and health maintenance. Diagnoses and all orders for this visit:    Mild ankle oedema  Well-controlled, changes made today: will only use the HCTZ if he needs to for ankle edema, check fasting labs and lifestyle modifications recommended  -CMP, CBCD ordered    Reactive depression (situational)  Well-controlled, continue current plan pending work up below, medication adherence emphasized and lifestyle modifications recommended. Refill Sertraline    Gastroesophageal reflux disease without esophagitis  Well-controlled, continue current medications, continue current treatment plan, medication adherence emphasized and lifestyle modifications recommended. Rx refilled for Pantoprazole  -CMP  -CBCD    Impaired fasting blood sugar New  Unclear control, lifestyle modifications recommended and check A1c on labs  -A1C ordered on labs    Screening lipids lipid panel ordered        Reviewed labs CMP, CBCD 1/22/22 Dr Jorden Ga,          Return in about 6 months (around 8/9/2022) for depression, lab results, GERD. Discussed exercising 30 minutes daily and Discussed taking medications as directed and adverse effects    Brennon Norris is a 51 y.o. male being evaluated by a Virtual Visit (video visit) encounter to address concerns as mentioned above. A caregiver was present when appropriate.  Due to this being a TeleHealth encounter (During [place] [address] public health emergency), evaluation of the following organ systems was limited: Vitals/Constitutional/EENT/Resp/CV/GI//MS/Neuro/Skin/Heme-Lymph-Imm. Pursuant to the emergency declaration under the [address] [place] [address], [address] [place] [address] and the [employer] Act, this Virtual Visit was conducted with patient's (and/or legal guardian's) consent, to reduce the patient's risk of exposure to COVID-19 and provide necessary medical care. The patient (and/or legal guardian) has also been advised to contact this office for worsening conditions or problems, and seek emergency medical treatment and/or call 911 if deemed necessary. Services were provided through a video synchronous discussion virtually to substitute for in-person clinic visit. Patient and provider were located at their individual homes. --VENKATA Becerra CNP on 2/9/2022 at 8:06 AM    An electronic signature was used to authenticate this note. I have reviewed my findings and recommendations with Adonis Sosa.     VENKATA Becerra CNP, NP-C, FNP-BC

## 2022-02-14 DIAGNOSIS — F32.9 REACTIVE DEPRESSION (SITUATIONAL): Primary | ICD-10-CM

## 2022-02-14 RX ORDER — SERTRALINE HYDROCHLORIDE 100 MG/1
TABLET, FILM COATED ORAL
Qty: 90 TABLET | Refills: 1 | Status: SHIPPED | OUTPATIENT
Start: 2022-02-14

## 2022-02-25 ENCOUNTER — OFFICE VISIT (OUTPATIENT)
Dept: FAMILY MEDICINE CLINIC | Age: 47
End: 2022-02-25
Payer: COMMERCIAL

## 2022-02-25 VITALS
WEIGHT: 230 LBS | RESPIRATION RATE: 16 BRPM | SYSTOLIC BLOOD PRESSURE: 118 MMHG | BODY MASS INDEX: 32.2 KG/M2 | DIASTOLIC BLOOD PRESSURE: 72 MMHG | HEIGHT: 71 IN | HEART RATE: 56 BPM | TEMPERATURE: 97.6 F | OXYGEN SATURATION: 90 %

## 2022-02-25 DIAGNOSIS — Z00.00 ENCOUNTER FOR WELL ADULT EXAM WITHOUT ABNORMAL FINDINGS: Primary | ICD-10-CM

## 2022-02-25 DIAGNOSIS — Z23 NEEDS FLU SHOT: ICD-10-CM

## 2022-02-25 PROBLEM — M25.473: Status: RESOLVED | Noted: 2021-10-26 | Resolved: 2022-02-25

## 2022-02-25 PROCEDURE — 90674 CCIIV4 VAC NO PRSV 0.5 ML IM: CPT | Performed by: NURSE PRACTITIONER

## 2022-02-25 PROCEDURE — 99396 PREV VISIT EST AGE 40-64: CPT | Performed by: NURSE PRACTITIONER

## 2022-02-25 PROCEDURE — G0008 ADMIN INFLUENZA VIRUS VAC: HCPCS | Performed by: NURSE PRACTITIONER

## 2022-02-25 PROCEDURE — G8482 FLU IMMUNIZE ORDER/ADMIN: HCPCS | Performed by: NURSE PRACTITIONER

## 2022-02-25 ASSESSMENT — ENCOUNTER SYMPTOMS
WHEEZING: 0
CHEST TIGHTNESS: 0
TROUBLE SWALLOWING: 0
NAUSEA: 0
COUGH: 0
DIARRHEA: 0
SINUS PRESSURE: 0
SHORTNESS OF BREATH: 0
SORE THROAT: 0
BACK PAIN: 0
CONSTIPATION: 0
RHINORRHEA: 0
VOMITING: 0
ABDOMINAL PAIN: 0
SINUS PAIN: 0
FACIAL SWELLING: 0
VOICE CHANGE: 0
COLOR CHANGE: 0
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
## 2022-03-15 DIAGNOSIS — S06.9XAS TRAUMATIC BRAIN INJURY WITH DEPRESSED FRONTAL SKULL FRACTURE, SEQUELA (HCC): Primary | ICD-10-CM

## 2022-03-15 DIAGNOSIS — S02.0XXS TRAUMATIC BRAIN INJURY WITH DEPRESSED FRONTAL SKULL FRACTURE, SEQUELA (HCC): Primary | ICD-10-CM

## 2022-04-06 ENCOUNTER — TELEPHONE (OUTPATIENT)
Dept: FAMILY MEDICINE CLINIC | Age: 47
End: 2022-04-06

## 2022-04-20 DIAGNOSIS — R56.9 SEIZURE (HCC): ICD-10-CM

## 2022-04-21 RX ORDER — LACOSAMIDE 100 MG/1
TABLET ORAL
Qty: 60 TABLET | Refills: 2 | Status: SHIPPED | OUTPATIENT
Start: 2022-04-21 | End: 2022-07-21

## 2022-08-03 NOTE — ED PROVIDER NOTES
myself   LABS:  Results for orders placed or performed during the hospital encounter of 03/26/20   CBC Auto Differential   Result Value Ref Range    WBC 8.3 4.5 - 11.5 E9/L    RBC 5.10 3.80 - 5.80 E12/L    Hemoglobin 15.7 12.5 - 16.5 g/dL    Hematocrit 46.7 37.0 - 54.0 %    MCV 91.6 80.0 - 99.9 fL    MCH 30.8 26.0 - 35.0 pg    MCHC 33.6 32.0 - 34.5 %    RDW 12.4 11.5 - 15.0 fL    Platelets 982 432 - 819 E9/L    MPV 9.2 7.0 - 12.0 fL    Neutrophils % 96.5 (H) 43.0 - 80.0 %    Lymphocytes % 1.7 (L) 20.0 - 42.0 %    Monocytes % 1.7 (L) 2.0 - 12.0 %    Eosinophils % 0.0 0.0 - 6.0 %    Basophils % 0.2 0.0 - 2.0 %    Neutrophils Absolute 8.05 (H) 1.80 - 7.30 E9/L    Lymphocytes Absolute 0.17 (L) 1.50 - 4.00 E9/L    Monocytes Absolute 0.17 0.10 - 0.95 E9/L    Eosinophils Absolute 0.00 (L) 0.05 - 0.50 E9/L    Basophils Absolute 0.00 0.00 - 0.20 E9/L    Poikilocytes 1+     Ovalocytes 1+    Basic Metabolic Panel w/ Reflex to MG   Result Value Ref Range    Sodium 140 132 - 146 mmol/L    Potassium reflex Magnesium 5.6 (H) 3.5 - 5.0 mmol/L    Chloride 107 98 - 107 mmol/L    CO2 21 (L) 22 - 29 mmol/L    Anion Gap 12 7 - 16 mmol/L    Glucose 106 (H) 74 - 99 mg/dL    BUN 21 (H) 6 - 20 mg/dL    CREATININE 0.8 0.7 - 1.2 mg/dL    GFR Non-African American >60 >=60 mL/min/1.73    GFR African American >60     Calcium 9.6 8.6 - 10.2 mg/dL   Lactic Acid, Plasma   Result Value Ref Range    Lactic Acid 1.8 0.5 - 2.2 mmol/L       RADIOLOGY:  Interpreted by Radiologist.  XR CHEST PORTABLE    (Results Pending)       ------------------------- NURSING NOTES AND VITALS REVIEWED ---------------------------   The nursing notes within the ED encounter and vital signs as below have been reviewed.    /64   Pulse 81   Temp 98 °F (36.7 °C) (Oral)   Resp 20   Ht 5' 11\" (1.803 m)   Wt 205 lb (93 kg)   SpO2 99%   BMI 28.59 kg/m²   Oxygen Saturation Interpretation: Normal    ---------------------------------------------------PHYSICAL EXAM--------------------------------------    GEN: No acute distress, chronically ill-appearing, well nourished   HENT: Normocephalic, atraumatic, oral mucosa moist  EYES: No scleral injection, no scleral icterus, PERRL   PULM: Lungs clear to ascultation bilaterally, no wheezes, no crackles  CARDIO: Regular rate, regular rhythm, normal S1/S2  ABD: Soft, non-tender, no rigidity. PEG tube in place without drainage, mild peristomal erythema  MSK: No deformities, no edema, palpable pulses all extremities   SKIN: No rashes, no lacerations, no abrasions   NEURO: Awake, alert, at baseline mental status        ------------------------------ ED COURSE/MEDICAL DECISION MAKING----------------------  Medications   ondansetron (ZOFRAN) 4 MG/2ML injection (has no administration in time range)   0.9 % sodium chloride bolus (0 mLs Intravenous Stopped 3/27/20 0046)   ondansetron (ZOFRAN) injection 4 mg (4 mg Intravenous Given 3/27/20 0127)         ED Course and Medical Decision Making:   Patient reports with reported cough. Patient nontoxic and well-appearing, hemodynamically stable. Chest x-ray unremarkable for acute process. Afebrile. No leukocytosis. No respiratory distress. Patient with one episode of emesis while in emergency department, was provided with prescription for antiemetics. Abdomen soft nontender non-peritoneal.  Patient discharged home with appropriate recommendations and return precautions, recommended follow-up with primary care physician.       --------------------------------- ADDITIONAL PROVIDER NOTES ---------------------------------    This patient's ED course included: re-evaluation prior to disposition and a personal history and physicial eaxmination    This patient has remained hemodynamically stable during their ED course. Counseling:   The emergency provider has spoken with the patient and spouse/SO (via phone by nursing) and discussed todays results, in addition to providing specific details for the plan of care and counseling regarding the diagnosis and prognosis. Questions are answered at this time and they are agreeable with the plan.      --------------------------------- IMPRESSION AND DISPOSITION ---------------------------------    IMPRESSION  1.  Cough        DISPOSITION  Disposition: Discharge to home  Patient condition is good        Ana Nuñez DO  03/27/20 9075 Total Number Of Aks Treated: 2 Consent: The patient's consent was obtained including but not limited to risks of crusting, scabbing, blistering, scarring, darker or lighter pigmentary change, recurrence, incomplete removal and infection. Render Post-Care Instructions In Note?: yes Aperture Size (Optional): B Number Of Freeze-Thaw Cycles: 1 freeze-thaw cycle Post-Care Instructions: I reviewed with the patient in detail post-care instructions. Patient is to wear sunprotection, and avoid picking at any of the treated lesions. Pt may apply Vaseline to crusted or scabbing areas. Render In Bullet Format When Appropriate: No Detail Level: Zone

## 2023-01-18 ENCOUNTER — HOSPITAL ENCOUNTER (OUTPATIENT)
Dept: DATA CONVERSION | Facility: HOSPITAL | Age: 48
Discharge: HOME | End: 2023-01-18
Attending: NEUROLOGICAL SURGERY | Admitting: NEUROLOGICAL SURGERY

## 2023-01-18 DIAGNOSIS — Z86.711 PERSONAL HISTORY OF PULMONARY EMBOLISM: ICD-10-CM

## 2023-01-18 DIAGNOSIS — Z87.891 PERSONAL HISTORY OF NICOTINE DEPENDENCE: ICD-10-CM

## 2023-01-18 DIAGNOSIS — Z87.820 PERSONAL HISTORY OF TRAUMATIC BRAIN INJURY: ICD-10-CM

## 2023-01-18 DIAGNOSIS — R25.2 CRAMP AND SPASM: ICD-10-CM

## 2023-01-18 DIAGNOSIS — G40.909 EPILEPSY, UNSPECIFIED, NOT INTRACTABLE, WITHOUT STATUS EPILEPTICUS (MULTI): ICD-10-CM

## 2023-01-18 DIAGNOSIS — E66.9 OBESITY, UNSPECIFIED: ICD-10-CM

## 2023-01-18 DIAGNOSIS — Z97.8 PRESENCE OF OTHER SPECIFIED DEVICES: ICD-10-CM

## 2023-01-18 DIAGNOSIS — Z20.822 CONTACT WITH AND (SUSPECTED) EXPOSURE TO COVID-19: ICD-10-CM

## 2023-01-18 DIAGNOSIS — Z86.718 PERSONAL HISTORY OF OTHER VENOUS THROMBOSIS AND EMBOLISM: ICD-10-CM

## 2023-01-18 DIAGNOSIS — Z85.47 PERSONAL HISTORY OF MALIGNANT NEOPLASM OF TESTIS: ICD-10-CM

## 2023-01-18 LAB — SARS-COV-2 RESULT: NOT DETECTED

## 2023-10-02 NOTE — OP NOTE
PROCEDURE DETAILS    Preoperative Diagnosis:  spasticity  Postoperative Diagnosis:  spasticity  Surgeon: Najma Mccain  Resident/Fellow/Other Assistant: Sam Britton    Procedure:  1. rt baclofen pump reservoir repositioning  2. baclofen pump refill  Estimated Blood Loss: 5  Findings: see operative report  Specimens(s) Collected: no,     Complications: none apparent                                Attestation:   Note Completion:  Attending Attestation I was present for the entire procedure    I am a: Resident/Fellow         Electronic Signatures:  Sam Britton (Resident))  (Signed 18-Jan-2023 13:57)   Authored: Post-Operative Note, Chart Review, Note Completion  Najma Mccain)  (Signed 26-Jan-2023 20:12)   Authored: Note Completion   Co-Signer: Post-Operative Note, Chart Review, Note Completion      Last Updated: 26-Jan-2023 20:12 by Najma Mccain)

## 2023-10-24 PROBLEM — R13.10 DYSPHAGIA: Status: ACTIVE | Noted: 2023-10-24

## 2023-10-24 PROBLEM — S06.5XAA SDH (SUBDURAL HEMATOMA) (MULTI): Status: ACTIVE | Noted: 2023-10-24

## 2023-10-24 PROBLEM — R82.90 URINE ABNORMALITY: Status: ACTIVE | Noted: 2023-10-24

## 2023-10-24 PROBLEM — G40.909 NONINTRACTABLE EPILEPSY WITHOUT STATUS EPILEPTICUS (MULTI): Status: ACTIVE | Noted: 2023-10-24
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
## 2023-10-24 PROBLEM — R25.2 SPASTICITY AS LATE EFFECT OF CEREBROVASCULAR ACCIDENT (CVA): Status: ACTIVE | Noted: 2023-10-24

## 2023-10-24 PROBLEM — Z95.828 PRESENCE OF INFERIOR VENA CAVA FILTER: Status: ACTIVE | Noted: 2023-10-24

## 2023-10-24 PROBLEM — D49.59: Status: ACTIVE | Noted: 2023-10-24

## 2023-10-24 PROBLEM — G81.02: Status: ACTIVE | Noted: 2023-10-24

## 2023-10-24 PROBLEM — M95.2 ACQUIRED SKULL DEFECT: Status: ACTIVE | Noted: 2023-10-24

## 2023-10-24 PROBLEM — I95.9 HYPOTENSION: Status: ACTIVE | Noted: 2023-10-24

## 2023-10-24 PROBLEM — G81.90 HEMIPLEGIA (MULTI): Status: ACTIVE | Noted: 2023-10-24

## 2023-10-24 PROBLEM — R29.6 FALLS: Status: ACTIVE | Noted: 2023-10-24

## 2023-10-24 PROBLEM — S06.9XAA TRAUMATIC BRAIN INJURY (MULTI): Status: ACTIVE | Noted: 2023-10-24

## 2023-10-24 PROBLEM — Z97.8 STATUS POST INSERTION OF INTRATHECAL BACLOFEN PUMP: Status: ACTIVE | Noted: 2023-10-24

## 2023-10-24 PROBLEM — E88.09 HYPOALBUMINEMIA: Status: ACTIVE | Noted: 2023-10-24

## 2023-10-24 PROBLEM — I60.9: Status: ACTIVE | Noted: 2023-10-24

## 2023-10-24 PROBLEM — I69.398 SPASTICITY AS LATE EFFECT OF CEREBROVASCULAR ACCIDENT (CVA): Status: ACTIVE | Noted: 2023-10-24

## 2023-10-24 PROBLEM — G93.5: Status: ACTIVE | Noted: 2023-10-24

## 2023-10-24 PROBLEM — E87.8 ELECTROLYTE IMBALANCE: Status: ACTIVE | Noted: 2023-10-24

## 2023-10-24 PROBLEM — T21.04XA BURN OF BACK: Status: ACTIVE | Noted: 2023-10-24

## 2023-10-24 RX ORDER — OMEPRAZOLE 20 MG/1
20 CAPSULE, DELAYED RELEASE ORAL DAILY
COMMUNITY

## 2023-10-24 RX ORDER — BACLOFEN 10 MG/1
10 TABLET ORAL DAILY
COMMUNITY

## 2023-10-24 RX ORDER — AMANTADINE HYDROCHLORIDE 100 MG/1
100 TABLET ORAL 2 TIMES DAILY
COMMUNITY
Start: 2021-06-11

## 2023-10-24 RX ORDER — LACOSAMIDE 100 MG/1
100 TABLET ORAL 2 TIMES DAILY
COMMUNITY

## 2023-10-24 RX ORDER — SERTRALINE HYDROCHLORIDE 100 MG/1
50 TABLET, FILM COATED ORAL DAILY
COMMUNITY

## 2023-10-24 RX ORDER — LORAZEPAM 0.5 MG/1
1 TABLET ORAL 3 TIMES DAILY PRN
COMMUNITY

## 2023-10-24 RX ORDER — PANTOPRAZOLE SODIUM 40 MG/1
1 TABLET, DELAYED RELEASE ORAL DAILY
COMMUNITY

## 2023-10-24 RX ORDER — LEVETIRACETAM 1000 MG/1
1 TABLET ORAL 2 TIMES DAILY
COMMUNITY

## 2023-10-25 NOTE — PATIENT INSTRUCTIONS
-Phone number for [employer] for the wheelchair is [phone]  -Intrathecal baclofen refilled 750 mcg per day  -Continue amantadine 100 mg in the morning and around lunchtime.  -Continue daily exercises  -Let me know if you switch insurances and want me to place a referral for [facility] again.  -Follow up before 2/4/24 for pump refill

## 2023-10-25 NOTE — PROGRESS NOTES
Provider Impressions     This is a pleasant 48-year-old left-handed man with past medical history significant for traumatic brain injury x2 (subdural hematoma in 2017 following a fall down stairs while intoxicated, subarachnoid hemorrhage in 2019), epilepsy, neoplasm of testis, spasticity, and previous DVT/PE who presents for follow-up. Status post baclofen pump placement on 5/17/2021.     Now that the patient has been cognitively much better since initiation of amantadine, he is able to actively and actively participate in inpatient rehab, least 3 hours/day, whereas before there was little carryover and participation. It is medically necessary and in this patient's best interest for him to participate in intense therapy 3 hours/day PT, OT, and speech, to help maximize his recovery, rehabilitation, and improvement to allow him to be as independent as possible. Just a year ago this time, the patient was not able to stand at all, and now he has been able to stand for 30 seconds at a time, and has potential to improve much further. [facility] may also be a good option.     -Phone number for [employer] for the wheelchair is [phone]  -Intrathecal baclofen refilled 750 mcg per day  -Continue amantadine 100 mg in the morning and around lunchtime.  -Continue daily exercises  -Let me know if you switch insurances and want me to place a referral for [facility] again.  -Follow up before 2/4/24 for pump refill              The patient expressed understanding and agreement with the assessment and plan. Patient encouraged to contact us should they have any questions, concerns, or any changes in symptoms.     Thank you for allowing me to participate in the care of your patient.     ** Dictated with voice recognition software, please forgive any errors in grammar and/or spelling **      Chief Complaint     Baclofen pump fill.      History of Present IllnessThis is a pleasant 48-year-old left-handed man with past  medical history significant for traumatic brain injury x2 (subdural hematoma in 2017 following a fall down stairs while intoxicated, subarachnoid hemorrhage in 2019), epilepsy, neoplasm of testis, spasticity, and previous DVT/PE who presents for follow-up. Status post baclofen pump placement on 5/17/2021.     He was last seen here on 7/27/2023 , at which point I refilled his pump and advised him to continue his medications and exercises.      They were thinking about trying [facility] again now that he is in a better physical and cognitive state. However, unfortunately his insurance is not accepted at this location.  They are planning on switching insurances now.  They are looking into it.    He also needs a new wheelchair, his is malfunctioning and keeps getting stuck, but insurance will also not pay for it.     He rates his pain as a 0/10     Otherwise, there have been no changes to his medications or past medical history since the last visit.     ____________________  6/11/2021: Baclofen pump dose increased to 90 MCG per day, start amantadine, PT and OT ordered  6/25/2021: Baclofen pump dose increased to 110 MCG per day with an extra bolus at 8 AM, increase amantadine, proceed with PT and OT  7-2-21: Pump dose increased by 22.9% to 135 MCG, 8 AM and 2 PM bolus, continue PT and OT, continue amantadine  7/9/2021: Pump dose increase by 22%, 8 AM and 2 PM bolus, continue PT and OT and amantadine  7/16/22: Pump dose increase by 21%, 8 AM and 2 PM bolus, continue PT and OT and amantadine  7/23/2021: Pump dose increased by 20%, 8 AM and 2 PM bolus, continue PT, OT, and amantadine  7/30/2021: Pump dose increased by 25%, 8 AM and 2 PM bolus, continue PT, OT, amantadine  8/6/2021: Pump dose increased by 20%, 8 AM and 2 PM bolus, continue therapy and amantadine  8/13/2021: Pump dose increased by 19%, 8 AM and 2 PM bolus, continue therapy and amantadine  8/20/2021: Pump dose increased by 22%, 8 AM and 2 PM bolus, pump  "refilled, continue therapy and amantadine  9/3/2021: Pump dose increased by 14%, 8 AM bolus, 2 PM bolus removed. Wean off baclofen, continue amantadine and home exercises, standing frame ordered  12/21/2021: Pump dose increased by 12.6%, a.m. bolus removed, continue amantadine, home exercises  4/8/2022: Pump dose increased by 11% and refilled, continue amantadine and home exercises, abdominal x-rays ordered for pump placement verification  7/13/2022: Pump dose refilled, PT and OT ordered, referral back to Dr. Morocho for pump location revision.  10/14/2022: Pump refilled, continue PT and OT, follow-up with Dr. Mccain for pump revision surgery consideration  4/21/2023: Pump refilled, continue medications and exercises, ciprofloxacin ordered, we will reach out to [facility]  7/27/2023: Pump refilled, continue medications and exercises  10/26/2023:Pump refilled, continue medications and exercises, [employer]  _________________  As a reminder:     TIMELINE OF COMPLAINT(S): History obtained mostly from patient's brother with some input from patient himself. History is limited because patient lives with his sister who is his primary medical caretaker, but was unable to attend today's appointment. Will attempt to obtain records from patient's previous hospital stays at [facility] in [place] and [facility] in [place]. Will also confirm history with the sister at subsequent appointments.      Patient's first traumatic brain injury occurred in November of 2017 a. Fter a fall down stairs while intoxicated. He developed a SDH on the right. He required right craniotomy for evacuation of blood after this fall at [facility]. His rehab and deficits following this first incident are unclear. Patient's second injury occurred in March 2019 while he was shopping at a [employer]. Patient had another fall and was found to have a SAH on the left. Patient's brother states that \"it was unclear whether the " "bleed caused the fall or whether he obtained the bleed from the fall.\" Just prior to this second incident, he was placed on blood thinners and had an inferior vena cava filter placed for \"blood clots\" (unsure if DVTs/PEs/or both). He also has a history of epilepsy and is currently on Keppra. His fall could also have been from a seizure. His last known seizure was several months before his second fall, with no major seizure activity in the past couple of years.     Patient was in [place] during this second injury and was taken to [facility]. He was in a coma for \"several days.\" Patient had a second craniotomy procedure. He was then transferred to [facility], closer to his home in [place]. He began his acute rehab at [facility] in June 2019. [facility] before having a second stay at [facility]. He finally was able to be discharged in January 2020 and lives at his sister's house in an adapted suite in the [facility].      Patient has 24 hour care from his sister/brother and [facility] 4-6 times per week. He has been getting inconsistent therapy since his discharge home due to COVID. He has only received home therapy and has never been to outpatient therapy. He is currently getting home therapy 1-2 times per week. Patient has a Jessica lift for transfers and a power WC. Currently, the patient can feed himself independently. He can perform some dressing, both UB and LB, but he lacks some coordination and motivation. His brother states he is dependent for other ADLs. Patient has occasional incontinence of bowel/bladder and wears adult diapers. His brother is unsure if he is unable to hold his bowels/bladder and states that he \"often doesn't tell us he has to go.\" Pt does not have a special diet.     Today, pt is here to establish physiatry care and for evaluation for possible ITB pump. Most of his spasticity is in his bilateral LE with some in his UE. " Pt also gets painful spasms in his legs at night that wake him up. He has intermittent pain in his legs, arms, and neck on occasion. During this visit, pt states he has no pain and is unable to describe the type, quality and severity of pain when it does occur.      Approximately 10 pages from [facility] were received and reviewed, it seems like it was regarding an ER visit on 2/24/2021, so after my visit with him. A CT head was done for possible breakthrough seizure. There was no intracranial abnormality. He was recommended to schedule an appointment with his neurologist.  3/24/2021 addendum: 18 pages of outside medical records from [facility] were received today and reviewed, and summarized below. Approximately 15 minutes was spent on this  There is a progress note from neuro critical ICU attending which mentions that he is a 44-year-old patient with seizure disorder, on Xarelto due to chronic DVT who presented on 3/31/2019 after a fall from standing, neuro imaging revealed subarachnoid hemorrhage and large subdural hemorrhage. CT showed multi compartmental intracranial hemorrhage with dominant left hemispheric subdural collection extending into the cranial vertex and resulting up to 15.3 mm left to right midline shift with subfalcine herniation and uncal herniation as well as pattern of right ventricular entrapment. He went to the OR for left hemicraniectomy and evacuation of SDH. Postop head CT showed left decompressive cranium with postsurgical changes, increase in IPH bilateral frontal lobes, right parietal IPH increased.  He also had a TBI/SDH in 2017 after falling down the stairs while intoxicated, he recovered well and was cleared to return to work although he did have some problem solving difficulties at times, but his wife felt that he was 98% recovered.  Discharge notes from 3/31/2019 also received and reviewed. He was sent to an LTAC, PM&R was consulted  no mention of  amantadine  I did not get any other notes or records        Pain:  LOCATION- Legs, arms, neck. Painful spasms in legs at night  RADIATION- Unknown  ASSOCIATED WITH- Unknown  NUMBNESS/TINGLING- No  WEAKNESS- Yes, legs and arms  CONSTANT or INTERMITTENT- Intermittent  SEVERITY/QUANTITY- Unknown  QUALITY- Unknown  EXACERBATED BY- Therapy  BETTER WITH- Unknown     PHYSICAL THERAPY: Yes. Inconsistent home therapy. Currently 1-2 times per week  TENS UNIT: No  CHIROPRACTIC MANIPULATION: No  ACUPUNCTURE TREATMENTS: No  DEEP TISSUE MASSAGE THERAPY: Yes  OSTEOPATHIC MANIPULATION THERAPY: Yes  INJECTIONS: No  EMG/NCS: No     IMAGING: Yes, Shira Mcelroy     FUNCTIONAL HISTORY: The patient can feed himself independently. He can perform some dressing, both UB and LB, but he lacks some coordination and motivation. His brother states he is dependent for other ADLs. Jessica lift. Power WC. Building a ramp for easier access to rear of home.     SH:  Lives in: [place]  Lives with: Sister  Occupation: Disabled  Tobacco: Former  Alcohol: No  Drugs: No     __________________  ROS: The patient and his brother deny night sweats, fevers, chills, recent significant weight loss. A 14 point review of systems was reviewed with the patient and is as above and otherwise negative. ROS questionnaire Positive for difficulty with ADLs, memory problems      Physical Exam  GEN - Alert, well-developed, well-nourished, no acute distress. PWC. Previously, A&Ox2 (knew his name, place with cueing, birthday with cueing, unable to remember year or president with cues)  PSYCH - Cooperative, appropriate mood and affect. Smiling and interactive with examiner and brother  HEENT - Healed craniotomy surgical incisions. EOMI  RESP - Non-labored respirations, equal expansion  CV - warm and well-perfused, No cyanosis or edema in extremities.   ABD- soft, ND, incision site healing well, clean dry and intact, no erythema or induration or swelling,  "overweight  SKIN - No rash.     Previous NEURO -   MM: Orientation - A&Ox2 (knew his name, place with cuing, birthday with cuing, unable to remember year or president with cues)., memory - Immediate recall 3/3. After exam, remembered 1/3 after cues (\"blue\"), Calculation - Said seven quarters would make $1.25. States that the similarity between a bicycle and a car is that they both have motors. Pt perseverates on tasks given to him, frequently repeating \"ok\" over and over again. There was evidence of possible pseudobulbar affect, patient was frequently laughing throughout the encounter     CRANIAL NERVES:   1: Not tested, 2: Visual fields intact. 3, 4, 6: EOMI w/o nystagmus or saccadic breakdown. No diplopia. No ptosis.  5: Sensation intact to LT. 7 There was L sided facial asymmetry?.8. Hears finger rub equally and bilaterally.  9,10: Voice normal. Palate elevates symmetrically. There was no dysarthria.11. The shoulder shrugs and head turns were normal. 12 Tongue protrudes to the left right     MOTOR:  Pronator drift bilaterally  RUE strength - 5/5 shoulder abduction, 5/5 biceps, 5/5 triceps, 5/5 wrist extensors, 5-/5 finger flexors, 5-/5 interossei, and 5-/5    LUE strength - 5-/5 shoulder abduction, 4/5 biceps, 4/5 triceps, 5/5 wrist extensors, 5-/5 finger flexors, 4-/5 interossei, and 3-/5    RLE strength - 5/5 hip flexors, 3-/5 knee flexors, 2/5 knee extensors, 3-/5 ankle dorsiflexors, 2/5 ankle plantar flexors, and 3-/5 EHL (lower extremity strength testing may be limited d/t patient effort)  LLE strength - 5-/5 hip flexors, 3-/5 knee flexors, 2/5 knee extensors, 3-/5 ankle dorsiflexors, 2/5 ankle plantar flexors, and 3-/5 EHL (lower extremity strength testing may be limited d/t patient effort)     Sensation - intact to light touch in bilateral upper/lower extremities.      COORDINATION: Good gross motor control with finger to nose bilaterally     Reflexes - 2+ biceps, brachioradialis, and triceps " bilaterally. 1+ Achilles reflexes bilaterally. 3+ right patellar and 2+ left patellar reflexes. No Clonus, No Babinski, Nagel's negative bilaterally     Tone - MAS of 1 in bilateral elbow flexion/extension. MAS of 3 in bilateral knee flexion/extension and 3 in dorsiflexion/plantarflexion of ankle in equinovarus positioning     Gait: deferred               Procedure  Lioresal Intrathecal Refill Kit (baclofen injection) 40 mg/20 mL kit #[id number]  NDC [address]-1  LOT [id number]  EXP 6/28/2025  Manuf: Amneal     ITB pump interrogated, refilled, dose increased. Please see scanned report for more details.  Dose unchanged at 749.3 mcg/day  6 cc remaining in pump  8.6 cc removed  next refill date: 2/4/2024

## 2023-10-26 ENCOUNTER — PROCEDURE VISIT (OUTPATIENT)
Dept: PHYSICAL MEDICINE AND REHAB | Facility: CLINIC | Age: 48
End: 2023-10-26
Payer: COMMERCIAL

## 2023-10-26 VITALS
DIASTOLIC BLOOD PRESSURE: 79 MMHG | SYSTOLIC BLOOD PRESSURE: 117 MMHG | BODY MASS INDEX: 32.08 KG/M2 | OXYGEN SATURATION: 93 % | HEIGHT: 71 IN | HEART RATE: 71 BPM | TEMPERATURE: 96.8 F

## 2023-10-26 DIAGNOSIS — I69.398 SPASTICITY AS LATE EFFECT OF CEREBROVASCULAR ACCIDENT (CVA): ICD-10-CM

## 2023-10-26 DIAGNOSIS — G81.90 HEMIPLEGIA, UNSPECIFIED ETIOLOGY, UNSPECIFIED HEMIPLEGIA TYPE, UNSPECIFIED LATERALITY (MULTI): ICD-10-CM

## 2023-10-26 DIAGNOSIS — G81.02: Primary | ICD-10-CM

## 2023-10-26 DIAGNOSIS — S06.9X9D TRAUMATIC BRAIN INJURY WITH LOSS OF CONSCIOUSNESS, SUBSEQUENT ENCOUNTER: ICD-10-CM

## 2023-10-26 DIAGNOSIS — R25.2 SPASTICITY AS LATE EFFECT OF CEREBROVASCULAR ACCIDENT (CVA): ICD-10-CM

## 2023-10-26 DIAGNOSIS — S06.5XAA SDH (SUBDURAL HEMATOMA) (MULTI): ICD-10-CM

## 2023-10-26 DIAGNOSIS — I60.9: ICD-10-CM

## 2023-10-26 DIAGNOSIS — Z97.8 STATUS POST INSERTION OF INTRATHECAL BACLOFEN PUMP: ICD-10-CM

## 2023-10-26 PROCEDURE — 62370 ANL SP INF PMP W/MDREPRG&FIL: CPT | Performed by: PHYSICAL MEDICINE & REHABILITATION

## 2023-10-26 RX ORDER — OXYCODONE HYDROCHLORIDE 5 MG/1
5 CAPSULE ORAL EVERY 6 HOURS PRN
COMMUNITY

## 2023-10-26 ASSESSMENT — PAIN SCALES - GENERAL: PAINLEVEL: 0-NO PAIN

## 2024-02-01 NOTE — PROGRESS NOTES
Provider Impressions     This is a pleasant 48-year-old left-handed man with past medical history significant for traumatic brain injury x2 (subdural hematoma in 2017 following a fall down stairs while intoxicated, subarachnoid hemorrhage in 2019), epilepsy, neoplasm of testis, spasticity, and previous DVT/PE who presents for follow-up. Status post baclofen pump placement on 5/17/2021.     Now that the patient has been cognitively much better since initiation of amantadine, he is able to actively and actively participate in inpatient rehab, least 3 hours/day, whereas before there was little carryover and participation. It is medically necessary and in this patient's best interest for him to participate in intense therapy 3 hours/day PT, OT, and speech, to help maximize his recovery, rehabilitation, and improvement to allow him to be as independent as possible. Just a year ago this time, the patient was not able to stand at all, and now he has been able to stand for 30 seconds at a time, and has potential to improve much further. [facility] may also be a good option.    -Intrathecal baclofen refilled 750 mcg per day  -Continue amantadine 100 mg in the morning and around lunchtime.  -Continue daily exercises  -Let me know if you switch insurances and want me to place a referral for [facility] again.  -Follow up before 5/13/24 for pump refill              The patient expressed understanding and agreement with the assessment and plan. Patient encouraged to contact us should they have any questions, concerns, or any changes in symptoms.     Thank you for allowing me to participate in the care of your patient.     ** Dictated with voice recognition software, please forgive any errors in grammar and/or spelling **      Chief Complaint     Baclofen pump fill.      History of Present Illness    This is a pleasant 48-year-old left-handed man with past medical history significant for traumatic brain injury x2 (subdural  hematoma in 2017 following a fall down stairs while intoxicated, subarachnoid hemorrhage in 2019), epilepsy, neoplasm of testis, spasticity, and previous DVT/PE who presents for follow-up. Status post baclofen pump placement on 5/17/2021.     He was last seen here on 10/26/2023 , at which point I refilled his pump and advised him to continue his medications and exercises.    I also referred him to [facility] for new wheelchair.      They were thinking about trying [facility] again now that he is in a better physical and cognitive state. However, unfortunately his insurance is not accepted at this location.  They are planning on switching insurances now.  They are looking into it.       He rates his pain as a 2/10, previously 0/10, some back pain     Otherwise, there have been no changes to his medications or past medical history since the last visit.     ____________________  6/11/2021: Baclofen pump dose increased to 90 MCG per day, start amantadine, PT and OT ordered  6/25/2021: Baclofen pump dose increased to 110 MCG per day with an extra bolus at 8 AM, increase amantadine, proceed with PT and OT  7-2-21: Pump dose increased by 22.9% to 135 MCG, 8 AM and 2 PM bolus, continue PT and OT, continue amantadine  7/9/2021: Pump dose increase by 22%, 8 AM and 2 PM bolus, continue PT and OT and amantadine  7/16/22: Pump dose increase by 21%, 8 AM and 2 PM bolus, continue PT and OT and amantadine  7/23/2021: Pump dose increased by 20%, 8 AM and 2 PM bolus, continue PT, OT, and amantadine  7/30/2021: Pump dose increased by 25%, 8 AM and 2 PM bolus, continue PT, OT, amantadine  8/6/2021: Pump dose increased by 20%, 8 AM and 2 PM bolus, continue therapy and amantadine  8/13/2021: Pump dose increased by 19%, 8 AM and 2 PM bolus, continue therapy and amantadine  8/20/2021: Pump dose increased by 22%, 8 AM and 2 PM bolus, pump refilled, continue therapy and amantadine  9/3/2021: Pump dose increased by 14%, 8 AM bolus, 2 PM  "bolus removed. Wean off baclofen, continue amantadine and home exercises, standing frame ordered  12/21/2021: Pump dose increased by 12.6%, a.m. bolus removed, continue amantadine, home exercises  4/8/2022: Pump dose increased by 11% and refilled, continue amantadine and home exercises, abdominal x-rays ordered for pump placement verification  7/13/2022: Pump dose refilled, PT and OT ordered, referral back to Dr. Morocho for pump location revision.  10/14/2022: Pump refilled, continue PT and OT, follow-up with Dr. Mccain for pump revision surgery consideration  4/21/2023: Pump refilled, continue medications and exercises, ciprofloxacin ordered, we will reach out to [facility]  7/27/2023: Pump refilled, continue medications and exercises  10/26/2023:Pump refilled, continue medications and exercises, [facility]  2/2/24: Pump refilled, continue medications and exercises,  _________________  As a reminder:     TIMELINE OF COMPLAINT(S): History obtained mostly from patient's brother with some input from patient himself. History is limited because patient lives with his sister who is his primary medical caretaker, but was unable to attend today's appointment. Will attempt to obtain records from patient's previous hospital stays at [facility] in [place] and [facility] in [place]. Will also confirm history with the sister at subsequent appointments.      Patient's first traumatic brain injury occurred in November of 2017 a. Fter a fall down stairs while intoxicated. He developed a SDH on the right. He required right craniotomy for evacuation of blood after this fall at [facility]. His rehab and deficits following this first incident are unclear. Patient's second injury occurred in March 2019 while he was shopping at a [employer]. Patient had another fall and was found to have a SAH on the left. Patient's brother states that \"it was unclear whether the bleed caused the fall or whether he " "obtained the bleed from the fall.\" Just prior to this second incident, he was placed on blood thinners and had an inferior vena cava filter placed for \"blood clots\" (unsure if DVTs/PEs/or both). He also has a history of epilepsy and is currently on Keppra. His fall could also have been from a seizure. His last known seizure was several months before his second fall, with no major seizure activity in the past couple of years.     Patient was in [place] during this second injury and was taken to [facility]. He was in a coma for \"several days.\" Patient had a second craniotomy procedure. He was then transferred to [facility], closer to his home in [place]. He began his acute rehab at [facility] in June 2019. [facility] before having a second stay at [facility]. He finally was able to be discharged in January 2020 and lives at his sister's house in an adapted suite [facility] the [facility].      Patient has 24 hour care from his sister/brother and [facility] 4-6 times per week. He has been getting inconsistent therapy since his discharge home due to COVID. He has only received home therapy and has never been to outpatient therapy. He is currently getting home therapy 1-2 times per week. Patient has a Jessica lift for transfers and a power WC. Currently, the patient can feed himself independently. He can perform some dressing, both UB and LB, but he lacks some coordination and motivation. His brother states he is dependent for other ADLs. Patient has occasional incontinence of bowel/bladder and wears adult diapers. His brother is unsure if he is unable to hold his bowels/bladder and states that he \"often doesn't tell us he has to go.\" Pt does not have a special diet.     Today, pt is here to establish physiatry care and for evaluation for possible ITB pump. Most of his spasticity is in his bilateral LE with some in his UE. Pt also gets painful spasms in his " legs at night that wake him up. He has intermittent pain in his legs, arms, and neck on occasion. During this visit, pt states he has no pain and is unable to describe the type, quality and severity of pain when it does occur.      Approximately 10 pages from [facility] were received and reviewed, it seems like it was regarding an ER visit on 2/24/2021, so after my visit with him. A CT head was done for possible breakthrough seizure. There was no intracranial abnormality. He was recommended to schedule an appointment with his neurologist.  3/24/2021 addendum: 18 pages of outside medical records from [facility] were received today and reviewed, and summarized below. Approximately 15 minutes was spent on this  There is a progress note from neuro critical ICU attending which mentions that he is a 44-year-old patient with seizure disorder, on Xarelto due to chronic DVT who presented on 3/31/2019 after a fall from standing, neuro imaging revealed subarachnoid hemorrhage and large subdural hemorrhage. CT showed multi compartmental intracranial hemorrhage with dominant left hemispheric subdural collection extending into the cranial vertex and resulting up to 15.3 mm left to right midline shift with subfalcine herniation and uncal herniation as well as pattern of right ventricular entrapment. He went to the OR for left hemicraniectomy and evacuation of SDH. Postop head CT showed left decompressive cranium with postsurgical changes, increase in IPH bilateral frontal lobes, right parietal IPH increased.  He also had a TBI/SDH in 2017 after falling down the stairs while intoxicated, he recovered well and was cleared to return to work although he did have some problem solving difficulties at times, but his wife felt that he was 98% recovered.  Discharge notes from 3/31/2019 also received and reviewed. He was sent to an LTAC, PM&R was consulted  no mention of amantadine  I did not get any other notes  or records        Pain:  LOCATION- Legs, arms, neck. Painful spasms in legs at night  RADIATION- Unknown  ASSOCIATED WITH- Unknown  NUMBNESS/TINGLING- No  WEAKNESS- Yes, legs and arms  CONSTANT or INTERMITTENT- Intermittent  SEVERITY/QUANTITY- Unknown  QUALITY- Unknown  EXACERBATED BY- Therapy  BETTER WITH- Unknown     PHYSICAL THERAPY: Yes. Inconsistent home therapy. Currently 1-2 times per week  TENS UNIT: No  CHIROPRACTIC MANIPULATION: No  ACUPUNCTURE TREATMENTS: No  DEEP TISSUE MASSAGE THERAPY: Yes  OSTEOPATHIC MANIPULATION THERAPY: Yes  INJECTIONS: No  EMG/NCS: No     IMAGING: Yes, Shira Mcelroy     FUNCTIONAL HISTORY: The patient can feed himself independently. He can perform some dressing, both UB and LB, but he lacks some coordination and motivation. His brother states he is dependent for other ADLs. Jessica lift. Power WC. Building a ramp for easier access to rear of home.     SH:  Lives in: [place]  Lives with: Sister  Occupation: Disabled  Tobacco: Former  Alcohol: No  Drugs: No     __________________  ROS: The patient and his brother deny night sweats, fevers, chills, recent significant weight loss. A 14 point review of systems was reviewed with the patient and is as above and otherwise negative. ROS questionnaire Positive for difficulty with ADLs, memory problems, back pain, depression      Physical Exam  GEN - Alert, well-developed, well-nourished, no acute distress. PWC. Previously, A&Ox2 (knew his name, place with cueing, birthday with cueing, unable to remember year or president with cues)  PSYCH - Cooperative, appropriate mood and affect. Smiling and interactive with examiner and brother  HEENT - Healed craniotomy surgical incisions. EOMI  RESP - Non-labored respirations, equal expansion  CV - warm and well-perfused, No cyanosis or edema in extremities.   ABD- soft, ND, incision site healing well, clean dry and intact, no erythema or induration or swelling, overweight  SKIN - No  "rash.     Previous NEURO -   MM: Orientation - A&Ox2 (knew his name, place with cuing, birthday with cuing, unable to remember year or president with cues)., memory - Immediate recall 3/3. After exam, remembered 1/3 after cues (\"blue\"), Calculation - Said seven quarters would make $1.25. States that the similarity between a bicycle and a car is that they both have motors. Pt perseverates on tasks given to him, frequently repeating \"ok\" over and over again. There was evidence of possible pseudobulbar affect, patient was frequently laughing throughout the encounter     CRANIAL NERVES:   1: Not tested, 2: Visual fields intact. 3, 4, 6: EOMI w/o nystagmus or saccadic breakdown. No diplopia. No ptosis.  5: Sensation intact to LT. 7 There was L sided facial asymmetry?.8. Hears finger rub equally and bilaterally.  9,10: Voice normal. Palate elevates symmetrically. There was no dysarthria.11. The shoulder shrugs and head turns were normal. 12 Tongue protrudes to the left right     MOTOR:  Pronator drift bilaterally  RUE strength - 5/5 shoulder abduction, 5/5 biceps, 5/5 triceps, 5/5 wrist extensors, 5-/5 finger flexors, 5-/5 interossei, and 5-/5    LUE strength - 5-/5 shoulder abduction, 4/5 biceps, 4/5 triceps, 5/5 wrist extensors, 5-/5 finger flexors, 4-/5 interossei, and 3-/5    RLE strength - 5/5 hip flexors, 3-/5 knee flexors, 2/5 knee extensors, 3-/5 ankle dorsiflexors, 2/5 ankle plantar flexors, and 3-/5 EHL (lower extremity strength testing may be limited d/t patient effort)  LLE strength - 5-/5 hip flexors, 3-/5 knee flexors, 2/5 knee extensors, 3-/5 ankle dorsiflexors, 2/5 ankle plantar flexors, and 3-/5 EHL (lower extremity strength testing may be limited d/t patient effort)     Sensation - intact to light touch in bilateral upper/lower extremities.      COORDINATION: Good gross motor control with finger to nose bilaterally     Reflexes - 2+ biceps, brachioradialis, and triceps bilaterally. 1+ Achilles " reflexes bilaterally. 3+ right patellar and 2+ left patellar reflexes. No Clonus, No Babinski, Nagel's negative bilaterally     Tone - MAS of 1 in bilateral elbow flexion/extension. MAS of 3 in bilateral knee flexion/extension and 3 in dorsiflexion/plantarflexion of ankle in equinovarus positioning     Gait: deferred         Procedure  Lioresal Intrathecal Refill Kit (baclofen injection) 40 mg/20 mL kit #[id number]  NDC [address]-5  LOT [id number]  EXP 8/21/2025  Manuf: Amneal     ITB pump interrogated, refilled, dose increased. Please see scanned report for more details.  Dose unchanged at 749.3 mcg/day  3 cc remaining in pump  6 cc removed  next refill date: 5/13/2024

## 2024-02-01 NOTE — PATIENT INSTRUCTIONS
-Intrathecal baclofen refilled 750 mcg per day  -Continue amantadine 100 mg in the morning and around lunchtime.  -Continue daily exercises  -Let me know if you switch insurances and want me to place a referral for [facility] again.  -Follow up before 5/13/24 for pump refill

## 2024-02-02 ENCOUNTER — PROCEDURE VISIT (OUTPATIENT)
Dept: PHYSICAL MEDICINE AND REHAB | Facility: CLINIC | Age: 49
End: 2024-02-02
Payer: MEDICARE

## 2024-02-02 VITALS
BODY MASS INDEX: 32.08 KG/M2 | OXYGEN SATURATION: 96 % | TEMPERATURE: 98 F | SYSTOLIC BLOOD PRESSURE: 107 MMHG | HEART RATE: 60 BPM | DIASTOLIC BLOOD PRESSURE: 78 MMHG | HEIGHT: 71 IN

## 2024-02-02 DIAGNOSIS — I69.398 SPASTICITY AS LATE EFFECT OF CEREBROVASCULAR ACCIDENT (CVA): Primary | ICD-10-CM

## 2024-02-02 DIAGNOSIS — I60.9: ICD-10-CM

## 2024-02-02 DIAGNOSIS — S06.9X9D TRAUMATIC BRAIN INJURY WITH LOSS OF CONSCIOUSNESS, SUBSEQUENT ENCOUNTER: ICD-10-CM

## 2024-02-02 DIAGNOSIS — G81.90 HEMIPLEGIA, UNSPECIFIED ETIOLOGY, UNSPECIFIED HEMIPLEGIA TYPE, UNSPECIFIED LATERALITY (MULTI): ICD-10-CM

## 2024-02-02 DIAGNOSIS — G81.02: ICD-10-CM

## 2024-02-02 DIAGNOSIS — R25.2 SPASTICITY AS LATE EFFECT OF CEREBROVASCULAR ACCIDENT (CVA): Primary | ICD-10-CM

## 2024-02-02 DIAGNOSIS — S06.5XAA SDH (SUBDURAL HEMATOMA) (MULTI): ICD-10-CM

## 2024-02-02 DIAGNOSIS — Z97.8 STATUS POST INSERTION OF INTRATHECAL BACLOFEN PUMP: ICD-10-CM

## 2024-02-02 PROCEDURE — 62370 ANL SP INF PMP W/MDREPRG&FIL: CPT | Performed by: PHYSICAL MEDICINE & REHABILITATION

## 2024-02-02 ASSESSMENT — PAIN SCALES - GENERAL: PAINLEVEL: 2

## 2024-05-01 ENCOUNTER — TELEPHONE (OUTPATIENT)
Dept: PHYSICAL MEDICINE AND REHAB | Facility: CLINIC | Age: 49
End: 2024-05-01
Payer: MEDICARE

## 2024-05-01 NOTE — TELEPHONE ENCOUNTER
Angelica hodge (family member) called regarding the pump denial from [facility]  Called back and LVM, in my absence they failed to obtain PA on his pump fill, I was attempting to retroauth, but it was denied due to more than 30 days has passed.  She can ignore the denial and will not receive a bill as he has state insurance, dr. Persaud unfortunately will not get paid

## 2024-05-06 NOTE — OP NOTE
PREOPERATIVE DIAGNOSIS:  Suboptimal position of the baclofen pump reservoir.    POSTOPERATIVE DIAGNOSIS:  Suboptimal position of the baclofen pump reservoir.    OPERATION/PROCEDURE:  Repositioning of the intrathecal baclofen pump reservoir and refill  of the pump with medication.     SURGEON:  Najma Mccain MD.    ASSISTANT(S):  Assistant surgeon:  Nakul Arreaga.    ANESTHESIA:    INDICATIONS:  The patient is a 47-year-old male with a history of spasticity  secondary to traumatic brain injury, who has a baclofen pump placed  for spasticity.  However, the pump reservoir is no longer in a  superficial enough location as the patient has gained weight and  there was a lot of difficulty accessing the pump for refills.  In  addition, his pump reservoir is nearly empty and needs a refill.  Thus, it was determined he would benefit from repositioning of the  pump and simultaneous refill of the pump and the procedure as well as  the associated risks and benefits were described at length to the  patient and family who understood and agreed to proceed.     SURGICAL TECHNIQUE:  The patient was brought to the operating room on 01/18/2023.  He  underwent general endotracheal intubation per Anesthesia.  He was  positioned supine on the operating room table with all pressure  points padded appropriately.  He received preoperative antibiotics.  He was prepped and draped in sterile fashion.  Local anesthetic was  administered.  His previous incision in the right abdomen was opened  sharply.  Careful dissection was done down to the reservoir and the  catheter was visualized around it.  At this point, the reservoir was  disconnected from its stay sutures and a new pocket was made in the  soft tissue that was superficial to the older pocket.  First, the  older pocket was also sutured together to close the dead space after  some irrigation with antibiotic irrigation in that area.  Next, the  morbid superficial pocket was  dissected for the reservoir to go and  the reservoir was then positioned in its new location and was  connected to the stay sutures and the catheter was ensured to be  behind the reservoir and at this time, the reservoir was also  refilled with the new medication as will be described at the end of  this dictation.  The sutures were tied and after the reservoir was  filled, the wound was irrigated with IrriSept irrigation followed by  a copious antibiotic irrigation and closed in sequential layers.  Please note that prior to the procedure and at the completion of the  procedure, fluoroscopy was also done to confirm the catheter tip  location and to confirm the catheter tip is not in new location.  Please note the pump contents were filled. This was a SynchroMed 2  Medtronic baclofen pump.  This is a 40 mL baclofen pump reservoir and  it was filled with baclofen at 2000 mcg/mL and it was programmed to  the previous rate of 749.3 mcg a day as per the neurologist's  recommendations and each at the beginning of the session, the  reservoir had 6 mL and refilled it to contain 40 mL and no catheter  was disconnected or changed during this procedure.       Najma Mccain MD    DD:  01/30/2023 08:46:02 EST  DT:  01/30/2023 10:00:59 EST  DICTATION NUMBER:  [id number]  INTERNAL JOB NUMBER:  [id number]    CC:  Najma Mccain MD, Fax: [phone]        Electronic Signatures:  Najma Mccain) (Signed on 06-Feb-2023 12:51)   Authored  Unsigned, Draft (SYS GENERATED) (Entered on 30-Jan-2023 10:00)   Entered    Last Updated: 06-Feb-2023 12:51 by Najma Mccain)

## 2024-05-09 NOTE — PATIENT INSTRUCTIONS
-Intrathecal baclofen refilled 750 mcg per day  -Continue amantadine 100 mg in the morning and around lunchtime.  -Continue daily exercises  -Let me know if you switch insurances and want me to place a referral for [facility] again.  -Follow up before 8/19/24 for pump refill

## 2024-05-09 NOTE — PROGRESS NOTES
Provider Impressions     This is a pleasant 48-year-old left-handed man with past medical history significant for traumatic brain injury x2 (subdural hematoma in 2017 following a fall down stairs while intoxicated, subarachnoid hemorrhage in 2019), epilepsy, neoplasm of testis, spasticity, and previous DVT/PE who presents for follow-up. Status post baclofen pump placement on 5/17/2021.     Now that the patient has been cognitively much better since initiation of amantadine, he is able to actively and actively participate in inpatient rehab, least 3 hours/day, whereas before there was little carryover and participation. It is medically necessary and in this patient's best interest for him to participate in intense therapy 3 hours/day PT, OT, and speech, to help maximize his recovery, rehabilitation, and improvement to allow him to be as independent as possible. Just a year ago this time, the patient was not able to stand at all, and now he has been able to stand for 30 seconds at a time, and has potential to improve much further. [facility] may also be a good option.      -Intrathecal baclofen refilled 750 mcg per day  -Continue amantadine 100 mg in the morning and around lunchtime.  -Continue daily exercises  -Let me know if you switch insurances and want me to place a referral for [facility] again.  -Follow up before 8/19/24 for pump refill              The patient expressed understanding and agreement with the assessment and plan. Patient encouraged to contact us should they have any questions, concerns, or any changes in symptoms.     Thank you for allowing me to participate in the care of your patient.     ** Dictated with voice recognition software, please forgive any errors in grammar and/or spelling **      Chief Complaint     Baclofen pump fill.      History of Present Illness    This is a pleasant 48-year-old left-handed man with past medical history significant for traumatic brain injury x2  (subdural hematoma in 2017 following a fall down stairs while intoxicated, subarachnoid hemorrhage in 2019), epilepsy, neoplasm of testis, spasticity, and previous DVT/PE who presents for follow-up. Status post baclofen pump placement on 5/17/2021.     He was last seen here on 2/2/24 , at which point I refilled his pump and advised him to continue his medications and exercises. They are doing PT 4 times per week at his SNF.    I also referred him to [facility] for new wheelchair.      They were thinking about trying [facility] again now that he is in a better physical and cognitive state.      He rates his pain as a 0/10, previously 2/10 currently .     Otherwise, there have been no changes to his medications or past medical history since the last visit.     ____________________  6/11/2021: Baclofen pump dose increased to 90 MCG per day, start amantadine, PT and OT ordered  6/25/2021: Baclofen pump dose increased to 110 MCG per day with an extra bolus at 8 AM, increase amantadine, proceed with PT and OT  7-2-21: Pump dose increased by 22.9% to 135 MCG, 8 AM and 2 PM bolus, continue PT and OT, continue amantadine  7/9/2021: Pump dose increase by 22%, 8 AM and 2 PM bolus, continue PT and OT and amantadine  7/16/22: Pump dose increase by 21%, 8 AM and 2 PM bolus, continue PT and OT and amantadine  7/23/2021: Pump dose increased by 20%, 8 AM and 2 PM bolus, continue PT, OT, and amantadine  7/30/2021: Pump dose increased by 25%, 8 AM and 2 PM bolus, continue PT, OT, amantadine  8/6/2021: Pump dose increased by 20%, 8 AM and 2 PM bolus, continue therapy and amantadine  8/13/2021: Pump dose increased by 19%, 8 AM and 2 PM bolus, continue therapy and amantadine  8/20/2021: Pump dose increased by 22%, 8 AM and 2 PM bolus, pump refilled, continue therapy and amantadine  9/3/2021: Pump dose increased by 14%, 8 AM bolus, 2 PM bolus removed. Wean off baclofen, continue amantadine and home exercises, standing frame  "ordered  12/21/2021: Pump dose increased by 12.6%, a.m. bolus removed, continue amantadine, home exercises  4/8/2022: Pump dose increased by 11% and refilled, continue amantadine and home exercises, abdominal x-rays ordered for pump placement verification  7/13/2022: Pump dose refilled, PT and OT ordered, referral back to Dr. Morocho for pump location revision.  10/14/2022: Pump refilled, continue PT and OT, follow-up with Dr. Mccain for pump revision surgery consideration  4/21/2023: Pump refilled, continue medications and exercises, ciprofloxacin ordered, we will reach out to [facility]  7/27/2023: Pump refilled, continue medications and exercises  10/26/2023:Pump refilled, continue medications and exercises, [facility]  2/2/24: Pump refilled, continue medications and exercises  5/10/2024:Pump refilled, continue medications and exercises  _________________  As a reminder:     TIMELINE OF COMPLAINT(S): History obtained mostly from patient's brother with some input from patient himself. History is limited because patient lives with his sister who is his primary medical caretaker, but was unable to attend today's appointment. Will attempt to obtain records from patient's previous hospital stays at [facility] in [place] and [facility] in [place]. Will also confirm history with the sister at subsequent appointments.      Patient's first traumatic brain injury occurred in November of 2017 a. Fter a fall down stairs while intoxicated. He developed a SDH on the right. He required right craniotomy for evacuation of blood after this fall at [facility]. His rehab and deficits following this first incident are unclear. Patient's second injury occurred in March 2019 while he was shopping at a [employer]. Patient had another fall and was found to have a SAH on the left. Patient's brother states that \"it was unclear whether the bleed caused the fall or whether he obtained the bleed from the " "fall.\" Just prior to this second incident, he was placed on blood thinners and had an inferior vena cava filter placed for \"blood clots\" (unsure if DVTs/PEs/or both). He also has a history of epilepsy and is currently on Keppra. His fall could also have been from a seizure. His last known seizure was several months before his second fall, with no major seizure activity in the past couple of years.     Patient was in [place] during this second injury and was taken to [facility]. He was in a coma for \"several days.\" Patient had a second craniotomy procedure. He was then transferred to [facility], closer to his home in [place]. He began his acute rehab at [facility] in June 2019. [facility] before having a second stay at [facility]. He finally was able to be discharged in January 2020 and lives at his sister's house in an adapted suite in the [facility].      Patient has 24 hour care from his sister/brother and [facility] 4-6 times per week. He has been getting inconsistent therapy since his discharge home due to COVID. He has only received home therapy and has never been to outpatient therapy. He is currently getting home therapy 1-2 times per week. Patient has a Jessica lift for transfers and a power WC. Currently, the patient can feed himself independently. He can perform some dressing, both UB and LB, but he lacks some coordination and motivation. His brother states he is dependent for other ADLs. Patient has occasional incontinence of bowel/bladder and wears adult diapers. His brother is unsure if he is unable to hold his bowels/bladder and states that he \"often doesn't tell us he has to go.\" Pt does not have a special diet.     Today, pt is here to establish physiatry care and for evaluation for possible ITB pump. Most of his spasticity is in his bilateral LE with some in his UE. Pt also gets painful spasms in his legs at night that wake him " up. He has intermittent pain in his legs, arms, and neck on occasion. During this visit, pt states he has no pain and is unable to describe the type, quality and severity of pain when it does occur.      Approximately 10 pages from [facility] were received and reviewed, it seems like it was regarding an ER visit on 2/24/2021, so after my visit with him. A CT head was done for possible breakthrough seizure. There was no intracranial abnormality. He was recommended to schedule an appointment with his neurologist.  3/24/2021 addendum: 18 pages of outside medical records from [facility] were received today and reviewed, and summarized below. Approximately 15 minutes was spent on this  There is a progress note from neuro critical ICU attending which mentions that he is a 44-year-old patient with seizure disorder, on Xarelto due to chronic DVT who presented on 3/31/2019 after a fall from standing, neuro imaging revealed subarachnoid hemorrhage and large subdural hemorrhage. CT showed multi compartmental intracranial hemorrhage with dominant left hemispheric subdural collection extending into the cranial vertex and resulting up to 15.3 mm left to right midline shift with subfalcine herniation and uncal herniation as well as pattern of right ventricular entrapment. He went to the OR for left hemicraniectomy and evacuation of SDH. Postop head CT showed left decompressive cranium with postsurgical changes, increase in IPH bilateral frontal lobes, right parietal IPH increased.  He also had a TBI/SDH in 2017 after falling down the stairs while intoxicated, he recovered well and was cleared to return to work although he did have some problem solving difficulties at times, but his wife felt that he was 98% recovered.  Discharge notes from 3/31/2019 also received and reviewed. He was sent to an LTAC, PM&R was consulted  no mention of amantadine  I did not get any other notes or records         Pain:  LOCATION- Legs, arms, neck. Painful spasms in legs at night  RADIATION- Unknown  ASSOCIATED WITH- Unknown  NUMBNESS/TINGLING- No  WEAKNESS- Yes, legs and arms  CONSTANT or INTERMITTENT- Intermittent  SEVERITY/QUANTITY- Unknown  QUALITY- Unknown  EXACERBATED BY- Therapy  BETTER WITH- Unknown     PHYSICAL THERAPY: Yes. Inconsistent home therapy. Currently 1-2 times per week  TENS UNIT: No  CHIROPRACTIC MANIPULATION: No  ACUPUNCTURE TREATMENTS: No  DEEP TISSUE MASSAGE THERAPY: Yes  OSTEOPATHIC MANIPULATION THERAPY: Yes  INJECTIONS: No  EMG/NCS: No     IMAGING: Yes, Shira Mcelroy     FUNCTIONAL HISTORY: The patient can feed himself independently. He can perform some dressing, both UB and LB, but he lacks some coordination and motivation. His brother states he is dependent for other ADLs. Jessica lift. Power WC. Building a ramp for easier access to rear of home.     SH:  Lives in: [place]  Lives with: Sister  Occupation: Disabled  Tobacco: Former  Alcohol: No  Drugs: No     __________________  ROS: The patient and his brother deny night sweats, fevers, chills, recent significant weight loss. A 14 point review of systems was reviewed with the patient and is as above and otherwise negative. ROS questionnaire Positive for difficulty with ADLs, memory problems, back pain, depression      Physical Exam  GEN - Alert, well-developed, well-nourished, no acute distress. PWC. Previously, A&Ox2 (knew his name, place with cueing, birthday with cueing, unable to remember year or president with cues)  PSYCH - Cooperative, appropriate mood and affect. Smiling and interactive with examiner and brother  HEENT - Healed craniotomy surgical incisions. EOMI  RESP - Non-labored respirations, equal expansion  CV - warm and well-perfused, No cyanosis or edema in extremities.   ABD- soft, ND, incision site healing well, clean dry and intact, no erythema or induration or swelling, overweight  SKIN - No rash.    "  Previous NEURO -   MM: Orientation - A&Ox2 (knew his name, place with cuing, birthday with cuing, unable to remember year or president with cues)., memory - Immediate recall 3/3. After exam, remembered 1/3 after cues (\"blue\"), Calculation - Said seven quarters would make $1.25. States that the similarity between a bicycle and a car is that they both have motors. Pt perseverates on tasks given to him, frequently repeating \"ok\" over and over again. There was evidence of possible pseudobulbar affect, patient was frequently laughing throughout the encounter     CRANIAL NERVES:   1: Not tested, 2: Visual fields intact. 3, 4, 6: EOMI w/o nystagmus or saccadic breakdown. No diplopia. No ptosis.  5: Sensation intact to LT. 7 There was L sided facial asymmetry?.8. Hears finger rub equally and bilaterally.  9,10: Voice normal. Palate elevates symmetrically. There was no dysarthria.11. The shoulder shrugs and head turns were normal. 12 Tongue protrudes to the left right     MOTOR:  Pronator drift bilaterally  RUE strength - 5/5 shoulder abduction, 5/5 biceps, 5/5 triceps, 5/5 wrist extensors, 5-/5 finger flexors, 5-/5 interossei, and 5-/5    LUE strength - 5-/5 shoulder abduction, 4/5 biceps, 4/5 triceps, 5/5 wrist extensors, 5-/5 finger flexors, 4-/5 interossei, and 3-/5    RLE strength - 5/5 hip flexors, 3-/5 knee flexors, 2/5 knee extensors, 3-/5 ankle dorsiflexors, 2/5 ankle plantar flexors, and 3-/5 EHL (lower extremity strength testing may be limited d/t patient effort)  LLE strength - 5-/5 hip flexors, 3-/5 knee flexors, 2/5 knee extensors, 3-/5 ankle dorsiflexors, 2/5 ankle plantar flexors, and 3-/5 EHL (lower extremity strength testing may be limited d/t patient effort)     Sensation - intact to light touch in bilateral upper/lower extremities.      COORDINATION: Good gross motor control with finger to nose bilaterally     Reflexes - 2+ biceps, brachioradialis, and triceps bilaterally. 1+ Achilles reflexes " bilaterally. 3+ right patellar and 2+ left patellar reflexes. No Clonus, No Babinski, Nagel's negative bilaterally     Tone - MAS of 1 in bilateral elbow flexion/extension. MAS of 3 in bilateral knee flexion/extension and 3 in dorsiflexion/plantarflexion of ankle in equinovarus positioning     Gait: deferred         Procedure  [facility] Intrathecal Refill Kit (baclofen injection) 40 mg/20 mL kit #[id number]  NDC [address]-6  LOT [id number]  EXP 8/30/2025  Manuf: Amneal     ITB pump interrogated, refilled, dose increased. Please see scanned report for more details.  Dose unchanged at 749.3 mcg/day  3.4 cc remaining in pump  7 cc removed  next refill date: 8/19/2024

## 2024-05-10 ENCOUNTER — PROCEDURE VISIT (OUTPATIENT)
Dept: PHYSICAL MEDICINE AND REHAB | Facility: CLINIC | Age: 49
End: 2024-05-10
Payer: COMMERCIAL

## 2024-05-10 VITALS — HEART RATE: 60 BPM | TEMPERATURE: 97.7 F | DIASTOLIC BLOOD PRESSURE: 79 MMHG | SYSTOLIC BLOOD PRESSURE: 111 MMHG

## 2024-05-10 DIAGNOSIS — G81.02: ICD-10-CM

## 2024-05-10 DIAGNOSIS — S06.5XAA SDH (SUBDURAL HEMATOMA) (MULTI): ICD-10-CM

## 2024-05-10 DIAGNOSIS — S06.9X9D TRAUMATIC BRAIN INJURY WITH LOSS OF CONSCIOUSNESS, SUBSEQUENT ENCOUNTER: ICD-10-CM

## 2024-05-10 DIAGNOSIS — R25.2 SPASTICITY AS LATE EFFECT OF CEREBROVASCULAR ACCIDENT (CVA): ICD-10-CM

## 2024-05-10 DIAGNOSIS — G81.90 HEMIPLEGIA, UNSPECIFIED ETIOLOGY, UNSPECIFIED HEMIPLEGIA TYPE, UNSPECIFIED LATERALITY (MULTI): ICD-10-CM

## 2024-05-10 DIAGNOSIS — I69.398 SPASTICITY AS LATE EFFECT OF CEREBROVASCULAR ACCIDENT (CVA): ICD-10-CM

## 2024-05-10 DIAGNOSIS — Z97.8 STATUS POST INSERTION OF INTRATHECAL BACLOFEN PUMP: Primary | ICD-10-CM

## 2024-05-10 DIAGNOSIS — I60.9: ICD-10-CM

## 2024-05-10 PROCEDURE — 62370 ANL SP INF PMP W/MDREPRG&FIL: CPT | Performed by: PHYSICAL MEDICINE & REHABILITATION

## 2024-05-10 ASSESSMENT — PAIN SCALES - GENERAL: PAINLEVEL: 0-NO PAIN

## 2024-08-12 NOTE — PATIENT INSTRUCTIONS
-Intrathecal baclofen refilled 750 mcg per day  -Continue amantadine 100 mg in the morning and around lunchtime.  -Continue daily exercises  -Follow up before 11/22/24 for pump refill

## 2024-08-12 NOTE — PROGRESS NOTES
Provider Impressions     This is a pleasant 49-year-old left-handed man with past medical history significant for traumatic brain injury x2 (subdural hematoma in 2017 following a fall down stairs while intoxicated, subarachnoid hemorrhage in 2019), epilepsy, neoplasm of testis, spasticity, and previous DVT/PE who presents for follow-up. Status post baclofen pump placement on 5/17/2021.    -Intrathecal baclofen refilled 750 mcg per day  -Continue amantadine 100 mg in the morning and around lunchtime.  -Continue daily exercises  -Let me know if you switch insurances and want me to place a referral for [facility] neurorehab again.  -Follow up before 11/22/24 for pump refill           The patient expressed understanding and agreement with the assessment and plan. Patient encouraged to contact us should they have any questions, concerns, or any changes in symptoms.     Thank you for allowing me to participate in the care of your patient.     ** Dictated with voice recognition software, please forgive any errors in grammar and/or spelling **      Chief Complaint     Baclofen pump fill.      History of Present Illness    This is a pleasant 49-year-old left-handed man with past medical history significant for traumatic brain injury x2 (subdural hematoma in 2017 following a fall down stairs while intoxicated, subarachnoid hemorrhage in 2019), epilepsy, neoplasm of testis, spasticity, and previous DVT/PE who presents for follow-up. Status post baclofen pump placement on 5/17/2021.     He was last seen here on 5/10/24 , at which point I refilled his pump and advised him to continue his medications and exercises. They are doing PT 2-4 times per week at his SNF.    I also referred him to [facility] for new wheelchair. He likes it. It is much bigger.      They were thinking about trying neuro restorative again now that he is in a better physical and cognitive state. But they closed down their [place] location so its a moot point  now.     He rates his pain as a 0/10, previously 2/10 currently .     Otherwise, there have been no changes to his medications or past medical history since the last visit.     ____________________  6/11/2021: Baclofen pump dose increased to 90 MCG per day, start amantadine, PT and OT ordered  6/25/2021: Baclofen pump dose increased to 110 MCG per day with an extra bolus at 8 AM, increase amantadine, proceed with PT and OT  7-2-21: Pump dose increased by 22.9% to 135 MCG, 8 AM and 2 PM bolus, continue PT and OT, continue amantadine  7/9/2021: Pump dose increase by 22%, 8 AM and 2 PM bolus, continue PT and OT and amantadine  7/16/22: Pump dose increase by 21%, 8 AM and 2 PM bolus, continue PT and OT and amantadine  7/23/2021: Pump dose increased by 20%, 8 AM and 2 PM bolus, continue PT, OT, and amantadine  7/30/2021: Pump dose increased by 25%, 8 AM and 2 PM bolus, continue PT, OT, amantadine  8/6/2021: Pump dose increased by 20%, 8 AM and 2 PM bolus, continue therapy and amantadine  8/13/2021: Pump dose increased by 19%, 8 AM and 2 PM bolus, continue therapy and amantadine  8/20/2021: Pump dose increased by 22%, 8 AM and 2 PM bolus, pump refilled, continue therapy and amantadine  9/3/2021: Pump dose increased by 14%, 8 AM bolus, 2 PM bolus removed. Wean off baclofen, continue amantadine and home exercises, standing frame ordered  12/21/2021: Pump dose increased by 12.6%, a.m. bolus removed, continue amantadine, home exercises  4/8/2022: Pump dose increased by 11% and refilled, continue amantadine and home exercises, abdominal x-rays ordered for pump placement verification  7/13/2022: Pump dose refilled, PT and OT ordered, referral back to Dr. Morocho for pump location revision.  10/14/2022: Pump refilled, continue PT and OT, follow-up with Dr. Mccain for pump revision surgery consideration  4/21/2023: Pump refilled, continue medications and exercises, ciprofloxacin ordered, we will reach out to neuro  "[facility]  7/27/2023: Pump refilled, continue medications and exercises  10/26/2023:Pump refilled, continue medications and exercises, [facility]  2/2/24: Pump refilled, continue medications and exercises  5/10/2024:Pump refilled, continue medications and exercises  8/13/23:  Pump refilled, continue medications and exercises  _________________  As a reminder:     TIMELINE OF COMPLAINT(S): History obtained mostly from patient's brother with some input from patient himself. History is limited because patient lives with his sister who is his primary medical caretaker, but was unable to attend today's appointment. Will attempt to obtain records from patient's previous hospital stays at [facility] in [place] and [facility] in [place]. Will also confirm history with the sister at subsequent appointments.      Patient's first traumatic brain injury occurred in November of 2017 a. Fter a fall down stairs while intoxicated. He developed a SDH on the right. He required right craniotomy for evacuation of blood after this fall at [facility]. His rehab and deficits following this first incident are unclear. Patient's second injury occurred in March 2019 while he was shopping at a [employer]. Patient had another fall and was found to have a SAH on the left. Patient's brother states that \"it was unclear whether the bleed caused the fall or whether he obtained the bleed from the fall.\" Just prior to this second incident, he was placed on blood thinners and had an inferior vena cava filter placed for \"blood clots\" (unsure if DVTs/PEs/or both). He also has a history of epilepsy and is currently on Keppra. His fall could also have been from a seizure. His last known seizure was several months before his second fall, with no major seizure activity in the past couple of years.     Patient was in [place] during this second injury and was taken to [facility]. He was in a coma for \"several " "days.\" Patient had a second craniotomy procedure. He was then transferred to [facility], closer to his home in [place]. He began his acute rehab at [facility] [place], operated by [facility] in June 2019. He [facility] before having a second stay at [facility]. He finally was able to be discharged in January 2020 and lives at his sister's house in an adapted suite in the [facility].      Patient has 24 hour care from his sister/brother and [facility] 4-6 times per week. He has been getting inconsistent therapy since his discharge home due to COVID. He has only received home therapy and has never been to outpatient therapy. He is currently getting home therapy 1-2 times per week. Patient has a Jessica lift for transfers and a power WC. Currently, the patient can feed himself independently. He can perform some dressing, both UB and LB, but he lacks some coordination and motivation. His brother states he is dependent for other ADLs. Patient has occasional incontinence of bowel/bladder and wears adult diapers. His brother is unsure if he is unable to hold his bowels/bladder and states that he \"often doesn't tell us he has to go.\" Pt does not have a special diet.     Today, pt is here to establish physiatry care and for evaluation for possible ITB pump. Most of his spasticity is in his bilateral LE with some in his UE. Pt also gets painful spasms in his legs at night that wake him up. He has intermittent pain in his legs, arms, and neck on occasion. During this visit, pt states he has no pain and is unable to describe the type, quality and severity of pain when it does occur.      Approximately 10 pages from [facility] were received and reviewed, it seems like it was regarding an ER visit on 2/24/2021, so after my visit with him. A CT head was done for possible breakthrough seizure. There was no intracranial abnormality. He was recommended to schedule an appointment with his " neurologist.  3/24/2021 addendum: 18 pages of outside medical records from [facility] were received today and reviewed, and summarized below. Approximately 15 minutes was spent on this  There is a progress note from neuro critical ICU attending which mentions that he is a 44-year-old patient with seizure disorder, on Xarelto due to chronic DVT who presented on 3/31/2019 after a fall from standing, neuro imaging revealed subarachnoid hemorrhage and large subdural hemorrhage. CT showed multi compartmental intracranial hemorrhage with dominant left hemispheric subdural collection extending into the cranial vertex and resulting up to 15.3 mm left to right midline shift with subfalcine herniation and uncal herniation as well as pattern of right ventricular entrapment. He went to the OR for left hemicraniectomy and evacuation of SDH. Postop head CT showed left decompressive cranium with postsurgical changes, increase in IPH bilateral frontal lobes, right parietal IPH increased.  He also had a TBI/SDH in 2017 after falling down the stairs while intoxicated, he recovered well and was cleared to return to work although he did have some problem solving difficulties at times, but his wife felt that he was 98% recovered.  Discharge notes from 3/31/2019 also received and reviewed. He was sent to an LTAC, PM&R was consulted  no mention of amantadine  I did not get any other notes or records        Pain:  LOCATION- Legs, arms, neck. Painful spasms in legs at night  RADIATION- Unknown  ASSOCIATED WITH- Unknown  NUMBNESS/TINGLING- No  WEAKNESS- Yes, legs and arms  CONSTANT or INTERMITTENT- Intermittent  SEVERITY/QUANTITY- Unknown  QUALITY- Unknown  EXACERBATED BY- Therapy  BETTER WITH- Unknown     PHYSICAL THERAPY: Yes. Inconsistent home therapy. Currently 1-2 times per week  TENS UNIT: No  CHIROPRACTIC MANIPULATION: No  ACUPUNCTURE TREATMENTS: No  DEEP TISSUE MASSAGE THERAPY: Yes  OSTEOPATHIC MANIPULATION THERAPY:  "Yes  INJECTIONS: No  EMG/NCS: No     IMAGING: Yes, [facility] [employer] [employer]     FUNCTIONAL HISTORY: The patient can feed himself independently. He can perform some dressing, both UB and LB, but he lacks some coordination and motivation. His brother states he is dependent for other ADLs. Jessica lift. Power WC. Building a ramp for easier access to rear of home.     SH:  Lives in: [place]  Lives with: Sister  Occupation: Disabled  Tobacco: Former  Alcohol: No  Drugs: No     __________________  ROS: The patient and his brother deny night sweats, fevers, chills, recent significant weight loss. A 14 point review of systems was reviewed with the patient and is as above and otherwise negative. ROS questionnaire Positive for difficulty with ADLs       Physical Exam  GEN - Alert, well-developed, well-nourished, no acute distress. PWC. Previously, A&Ox2 (knew his name, place with cueing, birthday with cueing, unable to remember year or president with cues)  PSYCH - Cooperative, appropriate mood and affect. Smiling and interactive with examiner and brother  HEENT - Healed craniotomy surgical incisions. EOMI  RESP - Non-labored respirations, equal expansion  CV - warm and well-perfused, No cyanosis or edema in extremities.   ABD- soft, ND, incision site healing well, clean dry and intact, no erythema or induration or swelling, overweight  SKIN - No rash.     Previous NEURO -   MM: Orientation - A&Ox2 (knew his name, place with cuing, birthday with cuing, unable to remember year or president with cues)., memory - Immediate recall 3/3. After exam, remembered 1/3 after cues (\"blue\"), Calculation - Said seven quarters would make $1.25. States that the similarity between a bicycle and a car is that they both have motors. Pt perseverates on tasks given to him, frequently repeating \"ok\" over and over again. There was evidence of possible pseudobulbar affect, patient was frequently laughing throughout the encounter     CRANIAL " NERVES:   1: Not tested, 2: Visual fields intact. 3, 4, 6: EOMI w/o nystagmus or saccadic breakdown. No diplopia. No ptosis.  5: Sensation intact to LT. 7 There was L sided facial asymmetry?.8. Hears finger rub equally and bilaterally.  9,10: Voice normal. Palate elevates symmetrically. There was no dysarthria.11. The shoulder shrugs and head turns were normal. 12 Tongue protrudes to the left right     MOTOR:  Pronator drift bilaterally  RUE strength - 5/5 shoulder abduction, 5/5 biceps, 5/5 triceps, 5/5 wrist extensors, 5-/5 finger flexors, 5-/5 interossei, and 5-/5    LUE strength - 5-/5 shoulder abduction, 4/5 biceps, 4/5 triceps, 5/5 wrist extensors, 5-/5 finger flexors, 4-/5 interossei, and 3-/5    RLE strength - 5/5 hip flexors, 3-/5 knee flexors, 2/5 knee extensors, 3-/5 ankle dorsiflexors, 2/5 ankle plantar flexors, and 3-/5 EHL (lower extremity strength testing may be limited d/t patient effort)  LLE strength - 5-/5 hip flexors, 3-/5 knee flexors, 2/5 knee extensors, 3-/5 ankle dorsiflexors, 2/5 ankle plantar flexors, and 3-/5 EHL (lower extremity strength testing may be limited d/t patient effort)     Sensation - intact to light touch in bilateral upper/lower extremities.      COORDINATION: Good gross motor control with finger to nose bilaterally     Reflexes - 2+ biceps, brachioradialis, and triceps bilaterally. 1+ Achilles reflexes bilaterally. 3+ right patellar and 2+ left patellar reflexes. No Clonus, No Babinski, Nagel's negative bilaterally     Tone - MAS of 1 in bilateral elbow flexion/extension. MAS of 3 in bilateral knee flexion/extension and 3 in dorsiflexion/plantarflexion of ankle in equinovarus positioning     Gait: deferred         Procedure  Lioresal Intrathecal Refill Kit (baclofen injection) 40 mg/20 mL kit #[id number]  NDC [address]-0  LOT [id number]  EXP 8/31/2025  Manuf: Amneal     ITB pump interrogated, r functioning  Inspection efilled, dose increased. Please see scanned report for  more details.  Dose unchanged at 749.3 mcg/day  4.5 cc remaining in pump  7 cc removed  next refill date: 11/22/2024

## 2024-08-13 ENCOUNTER — APPOINTMENT (OUTPATIENT)
Dept: PHYSICAL MEDICINE AND REHAB | Facility: CLINIC | Age: 49
End: 2024-08-13
Payer: MEDICARE

## 2024-08-13 VITALS
HEIGHT: 71 IN | SYSTOLIC BLOOD PRESSURE: 126 MMHG | OXYGEN SATURATION: 96 % | BODY MASS INDEX: 32.08 KG/M2 | DIASTOLIC BLOOD PRESSURE: 93 MMHG | TEMPERATURE: 97.3 F | HEART RATE: 57 BPM

## 2024-08-13 DIAGNOSIS — G81.90 HEMIPLEGIA, UNSPECIFIED ETIOLOGY, UNSPECIFIED HEMIPLEGIA TYPE, UNSPECIFIED LATERALITY (MULTI): ICD-10-CM

## 2024-08-13 DIAGNOSIS — R25.2 SPASTICITY AS LATE EFFECT OF CEREBROVASCULAR ACCIDENT (CVA): Primary | ICD-10-CM

## 2024-08-13 DIAGNOSIS — I69.398 SPASTICITY AS LATE EFFECT OF CEREBROVASCULAR ACCIDENT (CVA): Primary | ICD-10-CM

## 2024-08-13 DIAGNOSIS — Z97.8 STATUS POST INSERTION OF INTRATHECAL BACLOFEN PUMP: ICD-10-CM

## 2024-08-13 DIAGNOSIS — I60.9: ICD-10-CM

## 2024-08-13 DIAGNOSIS — G81.02: ICD-10-CM

## 2024-08-13 DIAGNOSIS — S06.5XAA SDH (SUBDURAL HEMATOMA) (MULTI): ICD-10-CM

## 2024-08-13 DIAGNOSIS — S06.9X9D TRAUMATIC BRAIN INJURY WITH LOSS OF CONSCIOUSNESS, SUBSEQUENT ENCOUNTER: ICD-10-CM

## 2024-08-13 PROCEDURE — 62370 ANL SP INF PMP W/MDREPRG&FIL: CPT | Performed by: PHYSICAL MEDICINE & REHABILITATION

## 2024-08-13 ASSESSMENT — PAIN SCALES - GENERAL: PAINLEVEL: 0-NO PAIN

## 2024-09-20 ENCOUNTER — TELEPHONE (OUTPATIENT)
Dept: PHYSICAL MEDICINE AND REHAB | Facility: CLINIC | Age: 49
End: 2024-09-20
Payer: MEDICARE

## 2024-09-20 NOTE — TELEPHONE ENCOUNTER
Korin Parekh,  Yes, it is scanned in his chart under date 5/2/24  APPROVED X 1 VISIT 5/10/24-6/9/24 auth # [id number]          Previous Messages       ----- Message -----  From: Nicole Barker  Sent: 9/20/2024   8:53 AM EDT  To: Korin Langley,    Can you please confirm if auth was received for this pt for cpt 49074 for dos 5/10/24.    thanks

## 2024-11-08 ENCOUNTER — TELEPHONE (OUTPATIENT)
Dept: PHYSICAL MEDICINE AND REHAB | Facility: CLINIC | Age: 49
End: 2024-11-08
Payer: MEDICARE

## 2024-11-08 NOTE — TELEPHONE ENCOUNTER
(Late entry) Patients family member called 11/7/24 and spoke with  staff.  She stated that Erasmo cannot make the 11/19 appointment for his pump fill.  We told her unfortunately he is due for his pump to be refilled by 11/22/24 and his authorization is good starting 11/19/24, we do not have any open appointments in that short window.  Advised they will need to find a way to make it to the appointment, caller understood.

## 2024-11-18 NOTE — PATIENT INSTRUCTIONS
-Intrathecal baclofen refilled 750 mcg per day  -Continue amantadine 100 mg in the morning and around lunchtime.  -Continue daily exercises  -Follow up before 2/28/25 for pump refill

## 2024-11-18 NOTE — PROGRESS NOTES
Provider Impressions     This is a pleasant 49-year-old left-handed man with past medical history significant for traumatic brain injury x2 (subdural hematoma in 2017 following a fall down stairs while intoxicated, subarachnoid hemorrhage in 2019), epilepsy, neoplasm of testis, spasticity, and previous DVT/PE who presents for follow-up. Status post baclofen pump placement on 5/17/2021.    -Intrathecal baclofen refilled 750 mcg per day  -Continue amantadine 100 mg in the morning and around lunchtime.  -Continue daily exercises  -Follow up before 2/28/25 for pump refill              The patient expressed understanding and agreement with the assessment and plan. Patient encouraged to contact us should they have any questions, concerns, or any changes in symptoms.     Thank you for allowing me to participate in the care of your patient.     ** Dictated with voice recognition software, please forgive any errors in grammar and/or spelling **      Chief Complaint     Baclofen pump fill.      History of Present Illness    This is a pleasant 49-year-old left-handed man with past medical history significant for traumatic brain injury x2 (subdural hematoma in 2017 following a fall down stairs while intoxicated, subarachnoid hemorrhage in 2019), epilepsy, neoplasm of testis, spasticity, and previous DVT/PE who presents for follow-up. Status post baclofen pump placement on 5/17/2021.     He was last seen here on 8/13/24 , at which point I refilled his pump and advised him to continue his medications and exercises.     They are doing PT 2-4 times per week at his SNF.    I also previously referred him to [facility] for new wheelchair. He likes it. It is much bigger.    He is thinking about getting his pump refilled closer to home, he will let us know when this happens.      He rates his pain as a 0/10, previously 2/10 currently .     Otherwise, there have been no changes to his medications or past medical history since the  last visit.     ____________________  6/11/2021: Baclofen pump dose increased to 90 MCG per day, start amantadine, PT and OT ordered  6/25/2021: Baclofen pump dose increased to 110 MCG per day with an extra bolus at 8 AM, increase amantadine, proceed with PT and OT  7-2-21: Pump dose increased by 22.9% to 135 MCG, 8 AM and 2 PM bolus, continue PT and OT, continue amantadine  7/9/2021: Pump dose increase by 22%, 8 AM and 2 PM bolus, continue PT and OT and amantadine  7/16/22: Pump dose increase by 21%, 8 AM and 2 PM bolus, continue PT and OT and amantadine  7/23/2021: Pump dose increased by 20%, 8 AM and 2 PM bolus, continue PT, OT, and amantadine  7/30/2021: Pump dose increased by 25%, 8 AM and 2 PM bolus, continue PT, OT, amantadine  8/6/2021: Pump dose increased by 20%, 8 AM and 2 PM bolus, continue therapy and amantadine  8/13/2021: Pump dose increased by 19%, 8 AM and 2 PM bolus, continue therapy and amantadine  8/20/2021: Pump dose increased by 22%, 8 AM and 2 PM bolus, pump refilled, continue therapy and amantadine  9/3/2021: Pump dose increased by 14%, 8 AM bolus, 2 PM bolus removed. Wean off baclofen, continue amantadine and home exercises, standing frame ordered  12/21/2021: Pump dose increased by 12.6%, a.m. bolus removed, continue amantadine, home exercises  4/8/2022: Pump dose increased by 11% and refilled, continue amantadine and home exercises, abdominal x-rays ordered for pump placement verification  7/13/2022: Pump dose refilled, PT and OT ordered, referral back to Dr. Morocho for pump location revision.  10/14/2022: Pump refilled, continue PT and OT, follow-up with Dr. Mccain for pump revision surgery consideration  4/21/2023: Pump refilled, continue medications and exercises, ciprofloxacin ordered, we will reach out to neuro restorative  7/27/2023: Pump refilled, continue medications and exercises  10/26/2023:Pump refilled, continue medications and exercises, [employer]  2/2/24: Pump refilled,  "continue medications and exercises  5/10/2024:Pump refilled, continue medications and exercises  8/13/23:  Pump refilled, continue medications and exercises  11/19/24: Pump refilled, continue medications and exercises  _________________  As a reminder:     TIMELINE OF COMPLAINT(S): History obtained mostly from patient's brother with some input from patient himself. History is limited because patient lives with his sister who is his primary medical caretaker, but was unable to attend today's appointment. Will attempt to obtain records from patient's previous hospital stays at [facility] in [place] and [facility] in [place]. Will also confirm history with the sister at subsequent appointments.      Patient's first traumatic brain injury occurred in November of 2017 a. Fter a fall down stairs while intoxicated. He developed a SDH on the right. He required right craniotomy for evacuation of blood after this fall at [facility]. His rehab and deficits following this first incident are unclear. Patient's second injury occurred in March 2019 while he was shopping at a [employer]. Patient had another fall and was found to have a SAH on the left. Patient's brother states that \"it was unclear whether the bleed caused the fall or whether he obtained the bleed from the fall.\" Just prior to this second incident, he was placed on blood thinners and had an inferior vena cava filter placed for \"blood clots\" (unsure if DVTs/PEs/or both). He also has a history of epilepsy and is currently on Keppra. His fall could also have been from a seizure. His last known seizure was several months before his second fall, with no major seizure activity in the past couple of years.     Patient was in [place] during this second injury and was taken to [facility]. He was in a coma for \"several days.\" Patient had a second craniotomy procedure. He was then transferred to [facility], closer to " "his home in [place]. He began his acute rehab at [facility] [place] by [facility] in June 2019[facility] before having a second stay at [place]. He finally was able to be discharged in January 2020 and lives at his sister's house in an adapted suite in the [facility].      Patient has 24 hour care from his sister/brother and [facility] 4-6 times per week. He has been getting inconsistent therapy since his discharge home due to COVID. He has only received home therapy and has never been to outpatient therapy. He is currently getting home therapy 1-2 times per week. Patient has a Jessica lift for transfers and a power WC. Currently, the patient can feed himself independently. He can perform some dressing, both UB and LB, but he lacks some coordination and motivation. His brother states he is dependent for other ADLs. Patient has occasional incontinence of bowel/bladder and wears adult diapers. His brother is unsure if he is unable to hold his bowels/bladder and states that he \"often doesn't tell us he has to go.\" Pt does not have a special diet.     Today, pt is here to establish physiatry care and for evaluation for possible ITB pump. Most of his spasticity is in his bilateral LE with some in his UE. Pt also gets painful spasms in his legs at night that wake him up. He has intermittent pain in his legs, arms, and neck on occasion. During this visit, pt states he has no pain and is unable to describe the type, quality and severity of pain when it does occur.      Approximately 10 pages from [facility] were received and reviewed, it seems like it was regarding an ER visit on 2/24/2021, so after my visit with him. A CT head was done for possible breakthrough seizure. There was no intracranial abnormality. He was recommended to schedule an appointment with his neurologist.  3/24/2021 addendum: 18 pages of outside medical records from [facility] were received today and " reviewed, and summarized below. Approximately 15 minutes was spent on this  There is a progress note from neuro critical ICU attending which mentions that he is a 44-year-old patient with seizure disorder, on Xarelto due to chronic DVT who presented on 3/31/2019 after a fall from standing, neuro imaging revealed subarachnoid hemorrhage and large subdural hemorrhage. CT showed multi compartmental intracranial hemorrhage with dominant left hemispheric subdural collection extending into the cranial vertex and resulting up to 15.3 mm left to right midline shift with subfalcine herniation and uncal herniation as well as pattern of right ventricular entrapment. He went to the OR for left hemicraniectomy and evacuation of SDH. Postop head CT showed left decompressive cranium with postsurgical changes, increase in IPH bilateral frontal lobes, right parietal IPH increased.  He also had a TBI/SDH in 2017 after falling down the stairs while intoxicated, he recovered well and was cleared to return to work although he did have some problem solving difficulties at times, but his wife felt that he was 98% recovered.  Discharge notes from 3/31/2019 also received and reviewed. He was sent to an LTAC, PM&R was consulted  no mention of amantadine  I did not get any other notes or records        Pain:  LOCATION- Legs, arms, neck. Painful spasms in legs at night  RADIATION- Unknown  ASSOCIATED WITH- Unknown  NUMBNESS/TINGLING- No  WEAKNESS- Yes, legs and arms  CONSTANT or INTERMITTENT- Intermittent  SEVERITY/QUANTITY- Unknown  QUALITY- Unknown  EXACERBATED BY- Therapy  BETTER WITH- Unknown     PHYSICAL THERAPY: Yes. Inconsistent home therapy. Currently 1-2 times per week  TENS UNIT: No  CHIROPRACTIC MANIPULATION: No  ACUPUNCTURE TREATMENTS: No  DEEP TISSUE MASSAGE THERAPY: Yes  OSTEOPATHIC MANIPULATION THERAPY: Yes  INJECTIONS: No  EMG/NCS: No     IMAGING: Yes, Shira Mcelroy     FUNCTIONAL HISTORY: The patient can  "feed himself independently. He can perform some dressing, both UB and LB, but he lacks some coordination and motivation. His brother states he is dependent for other ADLs. Jessica lift. Power WC. Building a ramp for easier access to rear of home.     SH:  Lives in: [place]  Lives with: Sister  Occupation: Disabled  Tobacco: Former  Alcohol: No  Drugs: No     __________________  ROS: The patient and his brother deny night sweats, fevers, chills, recent significant weight loss. A 14 point review of systems was reviewed with the patient and is as above and otherwise negative. ROS questionnaire Positive for difficulty with ADLs and walking      Physical Exam  GEN - Alert, well-developed, well-nourished, no acute distress. PWC. Previously, A&Ox2 (knew his name, place with cueing, birthday with cueing, unable to remember year or president with cues)  PSYCH - Cooperative, appropriate mood and affect. Smiling and interactive with examiner and brother  HEENT - Healed craniotomy surgical incisions. EOMI  RESP - Non-labored respirations, equal expansion  CV - warm and well-perfused, No cyanosis or edema in extremities.   ABD- soft, ND, incision site healing well, clean dry and intact, no erythema or induration or swelling, overweight  SKIN - No rash.     Previous NEURO -   MM: Orientation - A&Ox2 (knew his name, place with cuing, birthday with cuing, unable to remember year or president with cues)., memory - Immediate recall 3/3. After exam, remembered 1/3 after cues (\"blue\"), Calculation - Said seven quarters would make $1.25. States that the similarity between a bicycle and a car is that they both have motors. Pt perseverates on tasks given to him, frequently repeating \"ok\" over and over again. There was evidence of possible pseudobulbar affect, patient was frequently laughing throughout the encounter     CRANIAL NERVES:   1: Not tested, 2: Visual fields intact. 3, 4, 6: EOMI w/o nystagmus or saccadic breakdown. No diplopia. No " ptosis.  5: Sensation intact to LT. 7 There was L sided facial asymmetry?.8. Hears finger rub equally and bilaterally.  9,10: Voice normal. Palate elevates symmetrically. There was no dysarthria.11. The shoulder shrugs and head turns were normal. 12 Tongue protrudes to the left right     MOTOR:  Pronator drift bilaterally  RUE strength - 5/5 shoulder abduction, 5/5 biceps, 5/5 triceps, 5/5 wrist extensors, 5-/5 finger flexors, 5-/5 interossei, and 5-/5    LUE strength - 5-/5 shoulder abduction, 4/5 biceps, 4/5 triceps, 5/5 wrist extensors, 5-/5 finger flexors, 4-/5 interossei, and 3-/5    RLE strength - 5/5 hip flexors, 3-/5 knee flexors, 2/5 knee extensors, 3-/5 ankle dorsiflexors, 2/5 ankle plantar flexors, and 3-/5 EHL (lower extremity strength testing may be limited d/t patient effort)  LLE strength - 5-/5 hip flexors, 3-/5 knee flexors, 2/5 knee extensors, 3-/5 ankle dorsiflexors, 2/5 ankle plantar flexors, and 3-/5 EHL (lower extremity strength testing may be limited d/t patient effort)     Sensation - intact to light touch in bilateral upper/lower extremities.      COORDINATION: Good gross motor control with finger to nose bilaterally     Reflexes - 2+ biceps, brachioradialis, and triceps bilaterally. 1+ Achilles reflexes bilaterally. 3+ right patellar and 2+ left patellar reflexes. No Clonus, No Babinski, Nagel's negative bilaterally     Tone - MAS of 1 in bilateral elbow flexion/extension. MAS of 3 in bilateral knee flexion/extension and 3 in dorsiflexion/plantarflexion of ankle in equinovarus positioning     Gait: deferred         Procedure    Gablofen (baclofen injection) X 2  40,000mcg/ml (2,000 mcg/ml)  NDC: 85983-967-86  LOT: [id number]  EXP: 10/2026  Manuf: [facility]       ITB pump interrogated, refilled, dose unchanged. Please see scanned report for more details.  Dose unchanged at 749.3 mcg/day  3.3 cc remaining in pump  7 cc removed  next refill date: 2/28/25

## 2024-11-19 ENCOUNTER — APPOINTMENT (OUTPATIENT)
Dept: PHYSICAL MEDICINE AND REHAB | Facility: CLINIC | Age: 49
End: 2024-11-19
Payer: MEDICARE

## 2024-11-19 VITALS
TEMPERATURE: 96.9 F | HEIGHT: 71 IN | SYSTOLIC BLOOD PRESSURE: 121 MMHG | HEART RATE: 63 BPM | OXYGEN SATURATION: 92 % | BODY MASS INDEX: 32.08 KG/M2 | DIASTOLIC BLOOD PRESSURE: 81 MMHG

## 2024-11-19 DIAGNOSIS — Z97.8 STATUS POST INSERTION OF INTRATHECAL BACLOFEN PUMP: ICD-10-CM

## 2024-11-19 DIAGNOSIS — G81.90 HEMIPLEGIA, UNSPECIFIED ETIOLOGY, UNSPECIFIED HEMIPLEGIA TYPE, UNSPECIFIED LATERALITY (MULTI): ICD-10-CM

## 2024-11-19 DIAGNOSIS — S06.5XAA SDH (SUBDURAL HEMATOMA) (MULTI): ICD-10-CM

## 2024-11-19 DIAGNOSIS — R25.2 SPASTICITY AS LATE EFFECT OF CEREBROVASCULAR ACCIDENT (CVA): Primary | ICD-10-CM

## 2024-11-19 DIAGNOSIS — I69.398 SPASTICITY AS LATE EFFECT OF CEREBROVASCULAR ACCIDENT (CVA): Primary | ICD-10-CM

## 2024-11-19 DIAGNOSIS — I60.9 NONTRAUMATIC SUBARACHNOID HEMORRHAGE: ICD-10-CM

## 2024-11-19 DIAGNOSIS — G81.02: ICD-10-CM

## 2024-11-19 DIAGNOSIS — S06.9X9D TRAUMATIC BRAIN INJURY WITH LOSS OF CONSCIOUSNESS, SUBSEQUENT ENCOUNTER: ICD-10-CM

## 2024-11-19 PROCEDURE — 62370 ANL SP INF PMP W/MDREPRG&FIL: CPT | Performed by: PHYSICAL MEDICINE & REHABILITATION

## 2024-11-19 ASSESSMENT — PAIN SCALES - GENERAL: PAINLEVEL_OUTOF10: 0-NO PAIN

## 2024-12-11 ENCOUNTER — TELEPHONE (OUTPATIENT)
Dept: CASE MANAGEMENT | Age: 49
End: 2024-12-11

## 2024-12-11 ENCOUNTER — HOSPITAL ENCOUNTER (OUTPATIENT)
Dept: INFUSION THERAPY | Age: 49
Discharge: HOME | End: 2024-12-11
Payer: COMMERCIAL

## 2024-12-11 ENCOUNTER — OFFICE VISIT (OUTPATIENT)
Dept: ONCOLOGY | Age: 49
End: 2024-12-11
Payer: COMMERCIAL

## 2024-12-11 VITALS
SYSTOLIC BLOOD PRESSURE: 105 MMHG | OXYGEN SATURATION: 94 % | TEMPERATURE: 96.9 F | HEART RATE: 76 BPM | DIASTOLIC BLOOD PRESSURE: 79 MMHG

## 2024-12-11 DIAGNOSIS — Z85.47 H/O TESTICULAR CANCER: ICD-10-CM

## 2024-12-11 DIAGNOSIS — Z85.47 H/O TESTICULAR CANCER: Primary | ICD-10-CM

## 2024-12-11 LAB
ALBUMIN SERPL-MCNC: 4.4 G/DL (ref 3.5–5.2)
ALP SERPL-CCNC: 127 U/L (ref 40–129)
ALT SERPL-CCNC: 20 U/L (ref 0–40)
ANION GAP SERPL CALCULATED.3IONS-SCNC: 11 MMOL/L (ref 7–16)
AST SERPL-CCNC: 14 U/L (ref 0–39)
BASOPHILS # BLD: 0.06 K/UL (ref 0–0.2)
BASOPHILS NFR BLD: 1 % (ref 0–2)
BILIRUB SERPL-MCNC: 0.9 MG/DL (ref 0–1.2)
BUN SERPL-MCNC: 8 MG/DL (ref 6–20)
CALCIUM SERPL-MCNC: 9.5 MG/DL (ref 8.6–10.2)
CHLORIDE SERPL-SCNC: 108 MMOL/L (ref 98–107)
CO2 SERPL-SCNC: 24 MMOL/L (ref 22–29)
CREAT SERPL-MCNC: 1 MG/DL (ref 0.7–1.2)
EOSINOPHIL # BLD: 0.14 K/UL (ref 0.05–0.5)
EOSINOPHILS RELATIVE PERCENT: 2 % (ref 0–6)
ERYTHROCYTE [DISTWIDTH] IN BLOOD BY AUTOMATED COUNT: 13.7 % (ref 11.5–15)
GFR, ESTIMATED: >90 ML/MIN/1.73M2
GLUCOSE SERPL-MCNC: 157 MG/DL (ref 74–99)
HCT VFR BLD AUTO: 51.1 % (ref 37–54)
HGB BLD-MCNC: 17 G/DL (ref 12.5–16.5)
IMM GRANULOCYTES # BLD AUTO: 0.04 K/UL (ref 0–0.58)
IMM GRANULOCYTES NFR BLD: 1 % (ref 0–5)
LDH SERPL-CCNC: 309 U/L (ref 135–225)
LYMPHOCYTES NFR BLD: 1.85 K/UL (ref 1.5–4)
LYMPHOCYTES RELATIVE PERCENT: 25 % (ref 20–42)
MCH RBC QN AUTO: 30.4 PG (ref 26–35)
MCHC RBC AUTO-ENTMCNC: 33.3 G/DL (ref 32–34.5)
MCV RBC AUTO: 91.3 FL (ref 80–99.9)
MONOCYTES NFR BLD: 0.54 K/UL (ref 0.1–0.95)
MONOCYTES NFR BLD: 7 % (ref 2–12)
NEUTROPHILS NFR BLD: 65 % (ref 43–80)
NEUTS SEG NFR BLD: 4.79 K/UL (ref 1.8–7.3)
PLATELET # BLD AUTO: 275 K/UL (ref 130–450)
PMV BLD AUTO: 9.1 FL (ref 7–12)
POTASSIUM SERPL-SCNC: 4.2 MMOL/L (ref 3.5–5)
PROT SERPL-MCNC: 7.6 G/DL (ref 6.4–8.3)
RBC # BLD AUTO: 5.6 M/UL (ref 3.8–5.8)
SODIUM SERPL-SCNC: 143 MMOL/L (ref 132–146)
WBC OTHER # BLD: 7.4 K/UL (ref 4.5–11.5)

## 2024-12-11 PROCEDURE — 85025 COMPLETE CBC W/AUTO DIFF WBC: CPT

## 2024-12-11 PROCEDURE — 82105 ALPHA-FETOPROTEIN SERUM: CPT

## 2024-12-11 PROCEDURE — 80053 COMPREHEN METABOLIC PANEL: CPT

## 2024-12-11 PROCEDURE — 83615 LACTATE (LD) (LDH) ENZYME: CPT

## 2024-12-11 PROCEDURE — 84702 CHORIONIC GONADOTROPIN TEST: CPT

## 2024-12-11 PROCEDURE — 99214 OFFICE O/P EST MOD 30 MIN: CPT

## 2024-12-11 PROCEDURE — 36415 COLL VENOUS BLD VENIPUNCTURE: CPT

## 2024-12-11 RX ORDER — LACOSAMIDE 100 MG/1
100 TABLET ORAL 2 TIMES DAILY
COMMUNITY

## 2024-12-11 NOTE — TELEPHONE ENCOUNTER
Met with patient and his sister, Deysi, during his consultation appointment with Dr. Betts at [facility] for his history of right testicular cancer (T1, Nx, M0).  Patient is reestablishing with Dr. Betts since he was last seen 6/28/2021 for follow up.  Introduced myself and explained my role with patients receiving treatment at our cancer center.  Patient was friendly and receptive.  Completed nursing assessment for the center including medication review and medical, social, and surgical histories.  Patient is currently at [facility] but has great family support.  They have a handicap van and assist with transportation for appointments.  He is in a motorized wheelchair after medical history of traumatic brain injury x 2 (2017 & 2019).  He follows with Dr. Galeas at  for his baclofen pump.  Denies any issues with his appetite or weight loss.  Denies any pain.  Discussed the ancillary staff available at the center and described their roles.  Patient and family decline any referral needs at this time.  Denies any patient resource needs.  Patient and family appreciative of visit.  Nurse navigator will be available if needed.  Patient next follow up appointment with Dr. Betts to be scheduled 10/13/2025.  ETHEL Red, BSW, RN, OCN  Oncology Nurse Navigator

## 2024-12-11 NOTE — PROGRESS NOTES
cancer OR will you be receiving neoadjuvant treatment for breast cancer?      0- No                                                                                    TOTAL 0        Score of 0-1: No action  Score 2 or greater:  For Non-Diabetic Patient: Recommend adding Ensure Enlive 2 x daily and provide patient with Ensure wellness bag with coupons  For Diabetic Patient: Recommend adding Glucerna Shake 2 x daily and provide patient with Glucerna Wellness bag with coupons  Route to the dietitian via Epic          OT ASSESSMENT FOR REFERRAL    Are you having  difficulty performing daily routine tasks  due to fatigue or weakness (ie: bathing/showering, dressing, housework, meal prep, work, …): Patient had brain injury and wheelchair     Do you have any arm flexibility/ROM restrictions, swelling or pain that limit activity: No     Any changes in memory, attention/focus that impact daily activities: No     Do you avoid participation in leisure/social activity due weakness, fatigue or pain: No     ARE ANY OF THE ABOVE ARE ANSWERED YES: Yes - but NO OT referral request sent due to patient already being seen by OT.          PT ASSESSMENT FOR REFERRAL    Have you had any recent falls in past 2 months: Patient at [facility] for history of brain injury     Do you have difficulty  going up/down stairs: Yes     Are you having difficulty walking: Yes     Do you often hold onto furniture/environmental supports or feel off balance when you are walking: Yes     Do you need to take rest breaks when you are walking: Yes     Any pain on scale of 1-10 that limits your mobility: No 0/10    ARE ANY OF THE ABOVE ARE ANSWERED YES: Yes - but NO PT referral request sent due to patient already being seen by PT.       PELVIC FLOOR DYSFUNCTION SCREENING ASSESSMENT    - Do you have any pelvic pain? No     - Do you have any fecal constipation or fecal incontinence? No     - Do you have any urinary incontinence or difficulty starting

## 2024-12-11 NOTE — PROGRESS NOTES
RBC 4.80 03/09/2022    MCH 29.6 03/09/2022    MCHC 32.9 03/09/2022    RDW 13.5 03/09/2022    NEUTOPHILPCT 56.8 03/09/2022    MONOPCT 8.5 03/09/2022    EOSPCT 2.9 03/09/2022    BASOPCT 0.9 03/09/2022    NEUTROABS 4.90 03/09/2022    LYMPHSABS 2.60 03/09/2022    MONOSABS 0.73 03/09/2022    EOSABS 0.25 03/09/2022    BASOSABS 0.08 03/09/2022       Lab Results   Component Value Date     03/09/2022    K 4.0 03/09/2022     (H) 03/09/2022    CO2 25 03/09/2022    BUN 12 03/09/2022    CREATININE 1.1 03/09/2022    GLUCOSE 97 03/09/2022    CALCIUM 9.5 03/09/2022    BILITOT 0.4 01/21/2022    ALKPHOS 125 01/21/2022    AST 13 01/21/2022    ALT 16 01/21/2022    LABGLOM >60 03/09/2022    GFRAA >60 03/09/2022       Lab Results   Component Value Date    IRON 66 08/21/2019    TIBC 275 08/21/2019    FERRITIN 228 08/21/2019           Radiology-    No results found.      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  He has an excellent chance of cure and was simply recommended surveillance as per NCCN guidelines.  It was explained to him that his odds for cure with surveillance only on in the vicinity of 85% with only 15% chance of relapse and if he relapses he would be cured with either para-aortic radiation or systemic chemotherapy.  Last imaging with PET CT scan in February 2021 showed no evidence of recurrent or metastatic disease.      It was explained to him that at this point is 6 years out and no follow-up imaging required.  He will be followed with surveillance on a yearly basis and his tumor markers will be repeated.          Sebastien Betts M.D., F.A.C.P.  Electronically signed 12/11/2024 at 12:02 PM

## 2024-12-13 LAB — AFP SERPL-MCNC: 3.1 UG/L

## 2024-12-14 LAB — B-HCG SERPL-ACNC: <1 IU/L (ref 0–3)

## 2025-02-24 NOTE — PROGRESS NOTES
Provider Impressions     This is a pleasant 49-year-old left-handed man with past medical history significant for traumatic brain injury x2 (subdural hematoma in 2017 following a fall down stairs while intoxicated, subarachnoid hemorrhage in 2019), epilepsy, neoplasm of testis, spasticity, and previous DVT/PE who presents for follow-up. Status post baclofen pump placement on 5/17/2021.    -Intrathecal baclofen refilled 750 mcg per day  -Continue amantadine 100 mg in the morning and around lunchtime.  -Continue daily exercises  -Follow up before 6/6/25 for pump refill, but let us know if you will not be refilling with us              The patient expressed understanding and agreement with the assessment and plan. Patient encouraged to contact us should they have any questions, concerns, or any changes in symptoms.     Thank you for allowing me to participate in the care of your patient.     ** Dictated with voice recognition software, please forgive any errors in grammar and/or spelling **      Chief Complaint     Baclofen pump fill.      History of Present Illness    This is a pleasant 49-year-old left-handed man with past medical history significant for traumatic brain injury x2 (subdural hematoma in 2017 following a fall down stairs while intoxicated, subarachnoid hemorrhage in 2019), epilepsy, neoplasm of testis, spasticity, and previous DVT/PE who presents for follow-up. Status post baclofen pump placement on 5/17/2021.     He was last seen here on 11/19/24 , at which point I refilled his pump and advised him to continue his medications and exercises.     They are doing PT 2-4 times per week at his SNF.    He is thinking about getting his pump refilled closer to home, he will let us know when this happens.      He rates his pain as a 0/10, previously 2/10 currently .     Otherwise, there have been no changes to his medications or past medical history since the last visit.     ____________________  6/11/2021:  Baclofen pump dose increased to 90 MCG per day, start amantadine, PT and OT ordered  6/25/2021: Baclofen pump dose increased to 110 MCG per day with an extra bolus at 8 AM, increase amantadine, proceed with PT and OT  7-2-21: Pump dose increased by 22.9% to 135 MCG, 8 AM and 2 PM bolus, continue PT and OT, continue amantadine  7/9/2021: Pump dose increase by 22%, 8 AM and 2 PM bolus, continue PT and OT and amantadine  7/16/22: Pump dose increase by 21%, 8 AM and 2 PM bolus, continue PT and OT and amantadine  7/23/2021: Pump dose increased by 20%, 8 AM and 2 PM bolus, continue PT, OT, and amantadine  7/30/2021: Pump dose increased by 25%, 8 AM and 2 PM bolus, continue PT, OT, amantadine  8/6/2021: Pump dose increased by 20%, 8 AM and 2 PM bolus, continue therapy and amantadine  8/13/2021: Pump dose increased by 19%, 8 AM and 2 PM bolus, continue therapy and amantadine  8/20/2021: Pump dose increased by 22%, 8 AM and 2 PM bolus, pump refilled, continue therapy and amantadine  9/3/2021: Pump dose increased by 14%, 8 AM bolus, 2 PM bolus removed. Wean off baclofen, continue amantadine and home exercises, standing frame ordered  12/21/2021: Pump dose increased by 12.6%, a.m. bolus removed, continue amantadine, home exercises  4/8/2022: Pump dose increased by 11% and refilled, continue amantadine and home exercises, abdominal x-rays ordered for pump placement verification  7/13/2022: Pump dose refilled, PT and OT ordered, referral back to Dr. Morocho for pump location revision.  10/14/2022: Pump refilled, continue PT and OT, follow-up with Dr. Mccain for pump revision surgery consideration  4/21/2023: Pump refilled, continue medications and exercises, ciprofloxacin ordered, we will reach out to neuro restorative  7/27/2023: Pump refilled, continue medications and exercises  10/26/2023:Pump refilled, continue medications and exercises, [employer]  2/2/24: Pump refilled, continue medications and exercises  5/10/2024:Pump  "refilled, continue medications and exercises  8/13/23:  Pump refilled, continue medications and exercises  11/19/24: Pump refilled, continue medications and exercises  2/25/25: Pump refilled, same as above  _________________   As a reminder:     TIMELINE OF COMPLAINT(S): History obtained mostly from patient's brother with some input from patient himself. History is limited because patient lives with his sister who is his primary medical caretaker, but was unable to attend today's appointment. Will attempt to obtain records from patient's previous hospital stays at [facility] in [place] and [facility] in [place]. Will also confirm history with the sister at subsequent appointments.      Patient's first traumatic brain injury occurred in November of 2017 a. Fter a fall down stairs while intoxicated. He developed a SDH on the right. He required right craniotomy for evacuation of blood after this fall at [facility]. His rehab and deficits following this first incident are unclear. Patient's second injury occurred in March 2019 while he was shopping at a [employer]. Patient had another fall and was found to have a SAH on the left. Patient's brother states that \"it was unclear whether the bleed caused the fall or whether he obtained the bleed from the fall.\" Just prior to this second incident, he was placed on blood thinners and had an inferior vena cava filter placed for \"blood clots\" (unsure if DVTs/PEs/or both). He also has a history of epilepsy and is currently on Keppra. His fall could also have been from a seizure. His last known seizure was several months before his second fall, with no major seizure activity in the past couple of years.     Patient was in [place] during this second injury and was taken to [facility]. He was in a coma for \"several days.\" Patient had a second craniotomy procedure. He was then transferred to [facility], closer to his home " "in [place]. He began his acute rehab at [facility] in June 2019. [facility] before having a second stay at [facility]. He finally was able to be discharged in January 2020 and lives at his sister's house in an adapted suite in the [facility].      Patient has 24 hour care from his sister/brother and [facility] 4-6 times per week. He has been getting inconsistent therapy since his discharge home due to COVID. He has only received home therapy and has never been to outpatient therapy. He is currently getting home therapy 1-2 times per week. Patient has a Jessica lift for transfers and a power WC. Currently, the patient can feed himself independently. He can perform some dressing, both UB and LB, but he lacks some coordination and motivation. His brother states he is dependent for other ADLs. Patient has occasional incontinence of bowel/bladder and wears adult diapers. His brother is unsure if he is unable to hold his bowels/bladder and states that he \"often doesn't tell us he has to go.\" Pt does not have a special diet.     Today, pt is here to establish physiatry care and for evaluation for possible ITB pump. Most of his spasticity is in his bilateral LE with some in his UE. Pt also gets painful spasms in his legs at night that wake him up. He has intermittent pain in his legs, arms, and neck on occasion. During this visit, pt states he has no pain and is unable to describe the type, quality and severity of pain when it does occur.      Approximately 10 pages from [facility] were received and reviewed, it seems like it was regarding an ER visit on 2/24/2021, so after my visit with him. A CT head was done for possible breakthrough seizure. There was no intracranial abnormality. He was recommended to schedule an appointment with his neurologist.  3/24/2021 addendum: 18 pages of outside medical records from [facility] were received today and reviewed, " and summarized below. Approximately 15 minutes was spent on this  There is a progress note from neuro critical ICU attending which mentions that he is a 44-year-old patient with seizure disorder, on Xarelto due to chronic DVT who presented on 3/31/2019 after a fall from standing, neuro imaging revealed subarachnoid hemorrhage and large subdural hemorrhage. CT showed multi compartmental intracranial hemorrhage with dominant left hemispheric subdural collection extending into the cranial vertex and resulting up to 15.3 mm left to right midline shift with subfalcine herniation and uncal herniation as well as pattern of right ventricular entrapment. He went to the OR for left hemicraniectomy and evacuation of SDH. Postop head CT showed left decompressive cranium with postsurgical changes, increase in IPH bilateral frontal lobes, right parietal IPH increased.  He also had a TBI/SDH in 2017 after falling down the stairs while intoxicated, he recovered well and was cleared to return to work although he did have some problem solving difficulties at times, but his wife felt that he was 98% recovered.  Discharge notes from 3/31/2019 also received and reviewed. He was sent to an LTAC, PM&R was consulted  no mention of amantadine  I did not get any other notes or records        Pain:  LOCATION- Legs, arms, neck. Painful spasms in legs at night  RADIATION- Unknown  ASSOCIATED WITH- Unknown  NUMBNESS/TINGLING- No  WEAKNESS- Yes, legs and arms  CONSTANT or INTERMITTENT- Intermittent  SEVERITY/QUANTITY- Unknown  QUALITY- Unknown  EXACERBATED BY- Therapy  BETTER WITH- Unknown     PHYSICAL THERAPY: Yes. Inconsistent home therapy. Currently 1-2 times per week  TENS UNIT: No  CHIROPRACTIC MANIPULATION: No  ACUPUNCTURE TREATMENTS: No  DEEP TISSUE MASSAGE THERAPY: Yes  OSTEOPATHIC MANIPULATION THERAPY: Yes  INJECTIONS: No  EMG/NCS: No     IMAGING: Yes, [employer]     FUNCTIONAL HISTORY: The patient can feed  "himself independently. He can perform some dressing, both UB and LB, but he lacks some coordination and motivation. His brother states he is dependent for other ADLs. Jessica lift. Power WC. Building a ramp for easier access to rear of home.     SH:  Lives in: [place]  Lives with: Sister  Occupation: Disabled  Tobacco: Former  Alcohol: No  Drugs: No     __________________  ROS: The patient and his brother deny night sweats, fevers, chills, recent significant weight loss. A 14 point review of systems was reviewed with the patient and is as above and otherwise negative. ROS questionnaire Positive for fatigue, chronic bowel and urine incontinence, muscle pain/tightness/spasms, depression, anxiety, memory problems, difficulty with ADLs      Physical Exam  GEN - Alert, well-developed, well-nourished, no acute distress. PWC. Previously, A&Ox2 (knew his name, place with cueing, birthday with cueing, unable to remember year or president with cues)  PSYCH - Cooperative, appropriate mood and affect. Smiling and interactive with examiner and brother  HEENT - Healed craniotomy surgical incisions. EOMI  RESP - Non-labored respirations, equal expansion  CV - warm and well-perfused, No cyanosis or edema in extremities.   ABD- soft, ND, incision site healing well, clean dry and intact, no erythema or induration or swelling, overweight  SKIN - No rash.     Previous NEURO -   MM: Orientation - A&Ox2 (knew his name, place with cuing, birthday with cuing, unable to remember year or president with cues)., memory - Immediate recall 3/3. After exam, remembered 1/3 after cues (\"blue\"), Calculation - Said seven quarters would make $1.25. States that the similarity between a bicycle and a car is that they both have motors. Pt perseverates on tasks given to him, frequently repeating \"ok\" over and over again. There was evidence of possible pseudobulbar affect, patient was frequently laughing throughout the encounter     CRANIAL NERVES:   1: Not " tested, 2: Visual fields intact. 3, 4, 6: EOMI w/o nystagmus or saccadic breakdown. No diplopia. No ptosis.  5: Sensation intact to LT. 7 There was L sided facial asymmetry?.8. Hears finger rub equally and bilaterally.  9,10: Voice normal. Palate elevates symmetrically. There was no dysarthria.11. The shoulder shrugs and head turns were normal. 12 Tongue protrudes to the left right     MOTOR:  Pronator drift bilaterally  RUE strength - 5/5 shoulder abduction, 5/5 biceps, 5/5 triceps, 5/5 wrist extensors, 5-/5 finger flexors, 5-/5 interossei, and 5-/5    LUE strength - 5-/5 shoulder abduction, 4/5 biceps, 4/5 triceps, 5/5 wrist extensors, 5-/5 finger flexors, 4-/5 interossei, and 3-/5    RLE strength - 5/5 hip flexors, 3-/5 knee flexors, 2/5 knee extensors, 3-/5 ankle dorsiflexors, 2/5 ankle plantar flexors, and 3-/5 EHL (lower extremity strength testing may be limited d/t patient effort)  LLE strength - 5-/5 hip flexors, 3-/5 knee flexors, 2/5 knee extensors, 3-/5 ankle dorsiflexors, 2/5 ankle plantar flexors, and 3-/5 EHL (lower extremity strength testing may be limited d/t patient effort)     Sensation - intact to light touch in bilateral upper/lower extremities.      COORDINATION: Good gross motor control with finger to nose bilaterally     Reflexes - 2+ biceps, brachioradialis, and triceps bilaterally. 1+ Achilles reflexes bilaterally. 3+ right patellar and 2+ left patellar reflexes. No Clonus, No Babinski, Nagel's negative bilaterally     Tone - MAS of 1 in bilateral elbow flexion/extension. MAS of 3 in bilateral knee flexion/extension and 3 in dorsiflexion/plantarflexion of ankle in equinovarus positioning     Gait: deferred         Procedure    Gablofen (baclofen injection) X 2  40,000mcg/ml (2,000 mcg/ml)  NDC: 88889-842-98  LOT: [id number]  EXP: 11/2026  Manuf: [facility]       ITB pump interrogated, refilled, dose unchanged. Please see scanned report for more details.  Dose unchanged at  749.3 mcg/day  3.4 cc remaining in pump  7 cc removed  next refill date: 6/6/25

## 2025-02-24 NOTE — PATIENT INSTRUCTIONS
-Intrathecal baclofen refilled 750 mcg per day  -Continue amantadine 100 mg in the morning and around lunchtime.  -Continue daily exercises  -Follow up before 6/6/25 for pump refill, but let us know if you will not be refilling with us

## 2025-02-25 ENCOUNTER — APPOINTMENT (OUTPATIENT)
Dept: PHYSICAL MEDICINE AND REHAB | Facility: CLINIC | Age: 50
End: 2025-02-25
Payer: MEDICARE

## 2025-02-25 VITALS
HEIGHT: 71 IN | SYSTOLIC BLOOD PRESSURE: 121 MMHG | TEMPERATURE: 97.1 F | OXYGEN SATURATION: 92 % | DIASTOLIC BLOOD PRESSURE: 80 MMHG | HEART RATE: 67 BPM | BODY MASS INDEX: 32.08 KG/M2

## 2025-02-25 DIAGNOSIS — R25.2 SPASTICITY AS LATE EFFECT OF CEREBROVASCULAR ACCIDENT (CVA): Primary | ICD-10-CM

## 2025-02-25 DIAGNOSIS — G81.02: ICD-10-CM

## 2025-02-25 DIAGNOSIS — S06.5XAA SDH (SUBDURAL HEMATOMA) (MULTI): ICD-10-CM

## 2025-02-25 DIAGNOSIS — I69.398 SPASTICITY AS LATE EFFECT OF CEREBROVASCULAR ACCIDENT (CVA): Primary | ICD-10-CM

## 2025-02-25 DIAGNOSIS — S06.9X9D TRAUMATIC BRAIN INJURY WITH LOSS OF CONSCIOUSNESS, SUBSEQUENT ENCOUNTER: ICD-10-CM

## 2025-02-25 DIAGNOSIS — G81.90 HEMIPLEGIA, UNSPECIFIED ETIOLOGY, UNSPECIFIED HEMIPLEGIA TYPE, UNSPECIFIED LATERALITY (MULTI): ICD-10-CM

## 2025-02-25 DIAGNOSIS — I60.9 NONTRAUMATIC SUBARACHNOID HEMORRHAGE: ICD-10-CM

## 2025-02-25 DIAGNOSIS — Z97.8 STATUS POST INSERTION OF INTRATHECAL BACLOFEN PUMP: ICD-10-CM

## 2025-02-25 PROCEDURE — 62370 ANL SP INF PMP W/MDREPRG&FIL: CPT | Performed by: PHYSICAL MEDICINE & REHABILITATION

## 2025-02-25 ASSESSMENT — PAIN SCALES - GENERAL: PAINLEVEL_OUTOF10: 0-NO PAIN

## 2025-05-27 NOTE — PROGRESS NOTES
Provider Impressions     This is a pleasant 49-year-old left-handed man with past medical history significant for traumatic brain injury x2 (subdural hematoma in 2017 following a fall down stairs while intoxicated, subarachnoid hemorrhage in 2019), epilepsy, neoplasm of testis, spasticity, and previous DVT/PE who presents for follow-up. Status post baclofen pump placement on 5/17/2021.    -Intrathecal baclofen refilled 750 mcg per day  -Continue amantadine 100 mg in the morning and around lunchtime.  -Continue daily exercises  -You can look into [facility] again  -Follow up before 9/6/25 for pump refill, but let us know if you will not be refilling with us              The patient expressed understanding and agreement with the assessment and plan. Patient encouraged to contact us should they have any questions, concerns, or any changes in symptoms.     Thank you for allowing me to participate in the care of your patient.     ** Dictated with voice recognition software, please forgive any errors in grammar and/or spelling **      Chief Complaint     Baclofen pump fill.      History of Present Illness    This is a pleasant 49-year-old left-handed man with past medical history significant for traumatic brain injury x2 (subdural hematoma in 2017 following a fall down stairs while intoxicated, subarachnoid hemorrhage in 2019), epilepsy, neoplasm of testis, spasticity, and previous DVT/PE who presents for follow-up. Status post baclofen pump placement on 5/17/2021.     He was last seen here on 2/25/2025,, at which point I refilled his pump and advised him to continue his medications and exercises.     They are doing PT 2-4 times per week at his SNF.    He is thinking about getting his pump refilled closer to home, he will let us know when this happens.      He rates his pain as a 0/10, previously 0/10.     Otherwise, there have been no changes to his medications or past medical history since the last visit.      ____________________  6/11/2021: Baclofen pump dose increased to 90 MCG per day, start amantadine, PT and OT ordered  6/25/2021: Baclofen pump dose increased to 110 MCG per day with an extra bolus at 8 AM, increase amantadine, proceed with PT and OT  7-2-21: Pump dose increased by 22.9% to 135 MCG, 8 AM and 2 PM bolus, continue PT and OT, continue amantadine  7/9/2021: Pump dose increase by 22%, 8 AM and 2 PM bolus, continue PT and OT and amantadine  7/16/22: Pump dose increase by 21%, 8 AM and 2 PM bolus, continue PT and OT and amantadine  7/23/2021: Pump dose increased by 20%, 8 AM and 2 PM bolus, continue PT, OT, and amantadine  7/30/2021: Pump dose increased by 25%, 8 AM and 2 PM bolus, continue PT, OT, amantadine  8/6/2021: Pump dose increased by 20%, 8 AM and 2 PM bolus, continue therapy and amantadine  8/13/2021: Pump dose increased by 19%, 8 AM and 2 PM bolus, continue therapy and amantadine  8/20/2021: Pump dose increased by 22%, 8 AM and 2 PM bolus, pump refilled, continue therapy and amantadine  9/3/2021: Pump dose increased by 14%, 8 AM bolus, 2 PM bolus removed. Wean off baclofen, continue amantadine and home exercises, standing frame ordered  12/21/2021: Pump dose increased by 12.6%, a.m. bolus removed, continue amantadine, home exercises  4/8/2022: Pump dose increased by 11% and refilled, continue amantadine and home exercises, abdominal x-rays ordered for pump placement verification  7/13/2022: Pump dose refilled, PT and OT ordered, referral back to Dr. Morocho for pump location revision.  10/14/2022: Pump refilled, continue PT and OT, follow-up with Dr. Mccain for pump revision surgery consideration  4/21/2023: Pump refilled, continue medications and exercises, ciprofloxacin ordered, we will reach out to [facility]  7/27/2023: Pump refilled, continue medications and exercises  10/26/2023:Pump refilled, continue medications and exercises, [facility]  2/2/24: Pump refilled, continue medications  "and exercises  5/10/2024:Pump refilled, continue medications and exercises  8/13/23:  Pump refilled, continue medications and exercises  11/19/24: Pump refilled, continue medications and exercises  2/25/25: Pump refilled, same as above  5/20/2025: Pump refilled, same as above  _________________   As a reminder:     TIMELINE OF COMPLAINT(S): History obtained mostly from patient's brother with some input from patient himself. History is limited because patient lives with his sister who is his primary medical caretaker, but was unable to attend today's appointment. Will attempt to obtain records from patient's previous hospital stays at [facility] in [place] and [facility] in [place]. Will also confirm history with the sister at subsequent appointments.      Patient's first traumatic brain injury occurred in November of 2017 a. Fter a fall down stairs while intoxicated. He developed a SDH on the right. He required right craniotomy for evacuation of blood after this fall at [facility]. His rehab and deficits following this first incident are unclear. Patient's second injury occurred in March 2019 while he was shopping at a [employer]. Patient had another fall and was found to have a SAH on the left. Patient's brother states that \"it was unclear whether the bleed caused the fall or whether he obtained the bleed from the fall.\" Just prior to this second incident, he was placed on blood thinners and had an inferior vena cava filter placed for \"blood clots\" (unsure if DVTs/PEs/or both). He also has a history of epilepsy and is currently on Keppra. His fall could also have been from a seizure. His last known seizure was several months before his second fall, with no major seizure activity in the past couple of years.     Patient was in [place] during this second injury and was taken to [facility]. He was in a coma for \"several days.\" Patient had a second craniotomy procedure. He " "was then transferred to [facility], closer to his home in [place]. He began his acute rehab at [facility] in June 2019. [facility] before having a second stay at [facility]. He finally was able to be discharged in January 2020 and lives at his sister's house in an adapted suite [facility] the [facility].      Patient has 24 hour care from his sister/brother and [facility] 4-6 times per week. He has been getting inconsistent therapy since his discharge home due to COVID. He has only received home therapy and has never been to outpatient therapy. He is currently getting home therapy 1-2 times per week. Patient has a Jessica lift for transfers and a power WC. Currently, the patient can feed himself independently. He can perform some dressing, both UB and LB, but he lacks some coordination and motivation. His brother states he is dependent for other ADLs. Patient has occasional incontinence of bowel/bladder and wears adult diapers. His brother is unsure if he is unable to hold his bowels/bladder and states that he \"often doesn't tell us he has to go.\" Pt does not have a special diet.     Today, pt is here to establish physiatry care and for evaluation for possible ITB pump. Most of his spasticity is in his bilateral LE with some in his UE. Pt also gets painful spasms in his legs at night that wake him up. He has intermittent pain in his legs, arms, and neck on occasion. During this visit, pt states he has no pain and is unable to describe the type, quality and severity of pain when it does occur.      Approximately 10 pages from [facility] were received and reviewed, it seems like it was regarding an ER visit on 2/24/2021, so after my visit with him. A CT head was done for possible breakthrough seizure. There was no intracranial abnormality. He was recommended to schedule an appointment with his neurologist.  3/24/2021 addendum: 18 pages of outside medical records " from [facility] were received today and reviewed, and summarized below. Approximately 15 minutes was spent on this  There is a progress note from neuro critical ICU attending which mentions that he is a 44-year-old patient with seizure disorder, on Xarelto due to chronic DVT who presented on 3/31/2019 after a fall from standing, neuro imaging revealed subarachnoid hemorrhage and large subdural hemorrhage. CT showed multi compartmental intracranial hemorrhage with dominant left hemispheric subdural collection extending into the cranial vertex and resulting up to 15.3 mm left to right midline shift with subfalcine herniation and uncal herniation as well as pattern of right ventricular entrapment. He went to the OR for left hemicraniectomy and evacuation of SDH. Postop head CT showed left decompressive cranium with postsurgical changes, increase in IPH bilateral frontal lobes, right parietal IPH increased.  He also had a TBI/SDH in 2017 after falling down the stairs while intoxicated, he recovered well and was cleared to return to work although he did have some problem solving difficulties at times, but his wife felt that he was 98% recovered.  Discharge notes from 3/31/2019 also received and reviewed. He was sent to an LTAC, PM&R was consulted  no mention of amantadine  I did not get any other notes or records        Pain:  LOCATION- Legs, arms, neck. Painful spasms in legs at night  RADIATION- Unknown  ASSOCIATED WITH- Unknown  NUMBNESS/TINGLING- No  WEAKNESS- Yes, legs and arms  CONSTANT or INTERMITTENT- Intermittent  SEVERITY/QUANTITY- Unknown  QUALITY- Unknown  EXACERBATED BY- Therapy  BETTER WITH- Unknown     PHYSICAL THERAPY: Yes. Inconsistent home therapy. Currently 1-2 times per week  TENS UNIT: No  CHIROPRACTIC MANIPULATION: No  ACUPUNCTURE TREATMENTS: No  DEEP TISSUE MASSAGE THERAPY: Yes  OSTEOPATHIC MANIPULATION THERAPY: Yes  INJECTIONS: No  EMG/NCS: No     IMAGING: Yes, [facility] [employer]     FUNCTIONAL HISTORY: The patient can feed himself independently. He can perform some dressing, both UB and LB, but he lacks some coordination and motivation. His brother states he is dependent for other ADLs. Jessica lift. Power WC. Building a ramp for easier access to rear of home.     SH:  Lives in: [place]  Lives with: Sister  Occupation: Disabled  Tobacco: Former  Alcohol: No  Drugs: No     __________________  ROS: The patient and his brother deny night sweats, fevers, chills, recent significant weight loss. A 14 point review of systems was reviewed with the patient and is as above and otherwise negative. ROS questionnaire Positive for difficulty with ADLs      Physical Exam  GEN - Alert, well-developed, well-nourished, no acute distress. PWC. Previously, A&Ox2 (knew his name, place with cueing, birthday with cueing, unable to remember year or president with cues)  PSYCH - Cooperative, appropriate mood and affect. Smiling and interactive with examiner and brother  HEENT - Healed craniotomy surgical incisions. EOMI  RESP - Non-labored respirations, equal expansion  CV - warm and well-perfused, No cyanosis or edema in extremities.   ABD- soft, ND, incision site healing well, clean dry and intact, no erythema or induration or swelling, overweight  SKIN - No rash.     Previous NEURO -   MM: Orientation - A&Ox2 (knew his name, place with cuing, birthday with cuing, unable to remember year or president with cues)., memory - Immediate recall 3/3. After exam, remembered 1/3 after cues (\"blue\"), Calculation - Said seven quarters would make $1.25. States that the similarity between a bicycle and a car is that they both have motors. Pt perseverates on tasks given to him, frequently repeating \"ok\" over and over again. There was evidence of possible pseudobulbar affect, patient was frequently laughing throughout the encounter     CRANIAL NERVES:   1: Not tested, 2: Visual fields intact. 3, 4, 6: EOMI w/o " nystagmus or saccadic breakdown. No diplopia. No ptosis.  5: Sensation intact to LT. 7 There was L sided facial asymmetry?.8. Hears finger rub equally and bilaterally.  9,10: Voice normal. Palate elevates symmetrically. There was no dysarthria.11. The shoulder shrugs and head turns were normal. 12 Tongue protrudes to the left right     MOTOR:  Pronator drift bilaterally  RUE strength - 5/5 shoulder abduction, 5/5 biceps, 5/5 triceps, 5/5 wrist extensors, 5-/5 finger flexors, 5-/5 interossei, and 5-/5    LUE strength - 5-/5 shoulder abduction, 4/5 biceps, 4/5 triceps, 5/5 wrist extensors, 5-/5 finger flexors, 4-/5 interossei, and 3-/5    RLE strength - 5/5 hip flexors, 3-/5 knee flexors, 2/5 knee extensors, 3-/5 ankle dorsiflexors, 2/5 ankle plantar flexors, and 3-/5 EHL (lower extremity strength testing may be limited d/t patient effort)  LLE strength - 5-/5 hip flexors, 3-/5 knee flexors, 2/5 knee extensors, 3-/5 ankle dorsiflexors, 2/5 ankle plantar flexors, and 3-/5 EHL (lower extremity strength testing may be limited d/t patient effort)     Sensation - intact to light touch in bilateral upper/lower extremities.      COORDINATION: Good gross motor control with finger to nose bilaterally     Reflexes - 2+ biceps, brachioradialis, and triceps bilaterally. 1+ Achilles reflexes bilaterally. 3+ right patellar and 2+ left patellar reflexes. No Clonus, No Babinski, Nagel's negative bilaterally     Tone - MAS of 1 in bilateral elbow flexion/extension. MAS of 3 in bilateral knee flexion/extension and 3 in dorsiflexion/plantarflexion of ankle in equinovarus positioning     Gait: deferred         Procedure    Gablofen (baclofen injection) X 2  40,000mcg/ml (2,000 mcg/ml)  NDC: 98937-826-62  LOT: [id number]  EXP: 12/2026  Manuf: [facility]       ITB pump interrogated, refilled, dose unchanged. Please see scanned report for more details.  Dose unchanged at 749.3 mcg/day  5.6 cc remaining in pump  10 cc  removed  next refill date: 9/6/25

## 2025-05-27 NOTE — PATIENT INSTRUCTIONS
-Intrathecal baclofen refilled 750 mcg per day  -Continue amantadine 100 mg in the morning and around lunchtime.  -Continue daily exercises  -You can look into [facility] again  -Follow up before 9/6/25 for pump refill, but let us know if you will not be refilling with us

## 2025-05-28 ENCOUNTER — APPOINTMENT (OUTPATIENT)
Dept: PHYSICAL MEDICINE AND REHAB | Facility: CLINIC | Age: 50
End: 2025-05-28
Payer: COMMERCIAL

## 2025-05-28 VITALS
DIASTOLIC BLOOD PRESSURE: 83 MMHG | HEART RATE: 57 BPM | BODY MASS INDEX: 32.08 KG/M2 | OXYGEN SATURATION: 96 % | HEIGHT: 71 IN | TEMPERATURE: 96.9 F | SYSTOLIC BLOOD PRESSURE: 117 MMHG

## 2025-05-28 DIAGNOSIS — S06.5XAA SDH (SUBDURAL HEMATOMA) (MULTI): ICD-10-CM

## 2025-05-28 DIAGNOSIS — R25.2 SPASTICITY AS LATE EFFECT OF CEREBROVASCULAR ACCIDENT (CVA): Primary | ICD-10-CM

## 2025-05-28 DIAGNOSIS — G81.02: ICD-10-CM

## 2025-05-28 DIAGNOSIS — S06.9X9D TRAUMATIC BRAIN INJURY WITH LOSS OF CONSCIOUSNESS, SUBSEQUENT ENCOUNTER: ICD-10-CM

## 2025-05-28 DIAGNOSIS — I69.398 SPASTICITY AS LATE EFFECT OF CEREBROVASCULAR ACCIDENT (CVA): Primary | ICD-10-CM

## 2025-05-28 DIAGNOSIS — Z97.8 STATUS POST INSERTION OF INTRATHECAL BACLOFEN PUMP: ICD-10-CM

## 2025-05-28 DIAGNOSIS — G81.90 HEMIPLEGIA, UNSPECIFIED ETIOLOGY, UNSPECIFIED HEMIPLEGIA TYPE, UNSPECIFIED LATERALITY (MULTI): ICD-10-CM

## 2025-05-28 DIAGNOSIS — I60.9 NONTRAUMATIC SUBARACHNOID HEMORRHAGE: ICD-10-CM

## 2025-05-28 PROCEDURE — 62370 ANL SP INF PMP W/MDREPRG&FIL: CPT | Performed by: PHYSICAL MEDICINE & REHABILITATION

## 2025-05-28 ASSESSMENT — PAIN SCALES - GENERAL: PAINLEVEL_OUTOF10: 0-NO PAIN

## 2025-07-22 ENCOUNTER — TELEPHONE (OUTPATIENT)
Dept: PHYSICAL MEDICINE AND REHAB | Facility: CLINIC | Age: 50
End: 2025-07-22
Payer: MEDICARE

## 2025-07-22 NOTE — TELEPHONE ENCOUNTER
Korin Johnson  I sent this patient a [employer] message to take care of this and contact billing with questions.          Previous Messages       ----- Message -----  From: Nicole Barker  Sent: 7/18/2025  10:59 AM EDT  To: Korin Langley,    Can you please inform this pt that their coordinations of benefits need to be updated with [facility]. I've sent a letter through [employer], however its still not updated.      Thanks

## 2025-09-02 ENCOUNTER — APPOINTMENT (OUTPATIENT)
Dept: PHYSICAL MEDICINE AND REHAB | Facility: CLINIC | Age: 50
End: 2025-09-02
Payer: COMMERCIAL

## 2025-09-02 ASSESSMENT — PAIN SCALES - GENERAL: PAINLEVEL_OUTOF10: 0-NO PAIN

## 2025-12-09 ENCOUNTER — APPOINTMENT (OUTPATIENT)
Dept: PHYSICAL MEDICINE AND REHAB | Facility: CLINIC | Age: 50
End: 2025-12-09
Payer: MEDICARE

## (undated) DEVICE — INTENDED FOR TISSUE SEPARATION, AND OTHER PROCEDURES THAT REQUIRE A SHARP SURGICAL BLADE TO PUNCTURE OR CUT.: Brand: BARD-PARKER ® STAINLESS STEEL BLADES

## (undated) DEVICE — SOLUTION IV IRRIG WATER 1000ML POUR BRL 2F7114

## (undated) DEVICE — PENCIL ES L3M BTTN SWCH HOLSTER W/ BLDE ELECTRD EDGE

## (undated) DEVICE — Device

## (undated) DEVICE — LIMB HOLDER, WRIST/ANKLE: Brand: DEROYAL

## (undated) DEVICE — PAD,NON-ADHERENT,3X8,STERILE,LF,1/PK: Brand: MEDLINE

## (undated) DEVICE — GLOVE SURG SZ 85 STD WHT LTX SYN POLYMER BEAD REINF ANTI RL

## (undated) DEVICE — APPLICATOR MEDICATED 26 CC SOLUTION HI LT ORNG CHLORAPREP

## (undated) DEVICE — 1.5L THIN WALL CAN: Brand: CRD

## (undated) DEVICE — PACK,LAPAROTOMY,NO GOWNS: Brand: MEDLINE

## (undated) DEVICE — DOUBLE BASIN SET: Brand: MEDLINE INDUSTRIES, INC.

## (undated) DEVICE — TOWEL,OR,DSP,ST,BLUE,STD,6/PK,12PK/CS: Brand: MEDLINE

## (undated) DEVICE — CANNULA NSL ORAL AD FOR CAPNOFLEX CO2 O2 AIRLFE

## (undated) DEVICE — NDL CNTR 40CT FM MAG: Brand: MEDLINE INDUSTRIES, INC.

## (undated) DEVICE — BLADE ES ELASTOMERIC COAT INSUL DURABLE BEND UPTO 90DEG

## (undated) DEVICE — SYRINGE, LUER LOCK, 10ML: Brand: MEDLINE

## (undated) DEVICE — DRESSING,GAUZE,XEROFORM,CURAD,1"X8",ST: Brand: CURAD

## (undated) DEVICE — BLOCK BITE 60FR RUBBER ADLT DENTAL

## (undated) DEVICE — DRESSING TRNSPAR W5XL4.5IN FLM SHT SEMIPERMEABLE WIND

## (undated) DEVICE — SURGICAL PROCEDURE PACK CRANIOTOMY CUST

## (undated) DEVICE — SET SURG INSTR DISSECT

## (undated) DEVICE — LABEL MED 4 IN SURG PANEL W/ PEN STRL

## (undated) DEVICE — MARKER,SKIN,WI/RULER AND LABELS: Brand: MEDLINE

## (undated) DEVICE — NEEDLE HYPO 25GA L1.5IN BLU POLYPR HUB S STL REG BVL STR

## (undated) DEVICE — DEFENDO AIR WATER SUCTION AND BIOPSY VALVE KIT FOR  OLYMPUS: Brand: DEFENDO AIR/WATER/SUCTION AND BIOPSY VALVE

## (undated) DEVICE — APPLICATOR PREP 26ML 0.7% IOD POVACRYLEX 74% ISO ALC ST

## (undated) DEVICE — CHLORAPREP 26ML ORANGE

## (undated) DEVICE — GOWN,SIRUS,FABRNF,L,20/CS: Brand: MEDLINE

## (undated) DEVICE — GAUZE,SPONGE,2"X2",8PLY,STERILE,LF,2'S: Brand: MEDLINE

## (undated) DEVICE — READY WET SKIN SCRUB TRAY-LF: Brand: MEDLINE INDUSTRIES, INC.

## (undated) DEVICE — GAUZE,SPONGE,POST-OP,4X3,STRL,LF: Brand: MEDLINE

## (undated) DEVICE — SYRINGE,EAR/ULCER, 3 OZ, STERILE: Brand: MEDLINE

## (undated) DEVICE — GOWN,SIRUS,FABRNF,XL,20/CS: Brand: MEDLINE

## (undated) DEVICE — GAUZE,SPONGE,4"X4",16PLY,XRAY,STRL,LF: Brand: MEDLINE

## (undated) DEVICE — GLOVE ORANGE PI 7 1/2   MSG9075

## (undated) DEVICE — COVER,LIGHT HANDLE,FLX,1/PK: Brand: MEDLINE INDUSTRIES, INC.

## (undated) DEVICE — GLOVE SURG SZ 65 THK91MIL LTX FREE SYN POLYISOPRENE

## (undated) DEVICE — SYRINGE MED 10ML TRNSLUC BRL PLUNG BLK MRK POLYPR CTRL

## (undated) DEVICE — BLADE CLP TAPR HD WET DRY CAPABILITY GTT IN CHARGING USE

## (undated) DEVICE — SCANLAN® SUTURE BOOT™ INSTRUMENT JAW COVERS - ORIGINAL YELLOW, MINI PKG (3 PAIR/CARTRIDGE, 1 CARTRIDGE/PKG): Brand: SCANLAN® SUTURE BOOT™ INSTRUMENT JAW COVERS

## (undated) DEVICE — GLOVE ORANGE PI 8   MSG9080

## (undated) DEVICE — 3M™ IOBAN™ 2 ANTIMICROBIAL INCISE DRAPE 6640EZ: Brand: IOBAN™ 2

## (undated) DEVICE — SOLUTION IV IRRIG POUR BRL 0.9% SODIUM CHL 2F7124

## (undated) DEVICE — TOWEL,OR,DSP,ST,BLUE,DLX,10/PK,8PK/CS: Brand: MEDLINE

## (undated) DEVICE — ELECTRODE PT RET AD L9FT HI MOIST COND ADH HYDRGEL CORDED

## (undated) DEVICE — DRAPE 64X41IN RADIOLOGY C ARM EQUIP STER

## (undated) DEVICE — STAPLER SKIN L39MM DIA0.53MM CRWN 5.7MM S STL FIX HD PROX

## (undated) DEVICE — CLOTH SURG PREP PREOPERATIVE CHLORHEXIDINE GLUC 2% READYPREP

## (undated) DEVICE — GOWN,SIRUS,NONRNF,SETINSLV,XL,20/CS: Brand: MEDLINE

## (undated) DEVICE — STRIP,CLOSURE,WOUND,MEDI-STRIP,1/4X3: Brand: MEDLINE

## (undated) DEVICE — STANDARD HYPODERMIC NEEDLE,POLYPROPYLENE HUB: Brand: MONOJECT

## (undated) DEVICE — BANDAGE,GAUZE,CONFORMING,4"X75",STRL,LF: Brand: MEDLINE

## (undated) DEVICE — NDLCTR: FOAM/MAG 40CT 64/CS: Brand: MEDICAL ACTION INDUSTRIES

## (undated) DEVICE — CANNULA IV 18GA L15IN BLNT FILL LUERLOCK HUB MJCT

## (undated) DEVICE — BANDAGE,GAUZE,4.5"X4.1YD,STERILE,LF: Brand: MEDLINE